# Patient Record
Sex: FEMALE | Race: WHITE | Employment: UNEMPLOYED | URBAN - METROPOLITAN AREA
[De-identification: names, ages, dates, MRNs, and addresses within clinical notes are randomized per-mention and may not be internally consistent; named-entity substitution may affect disease eponyms.]

---

## 2017-01-04 ENCOUNTER — OFFICE VISIT (OUTPATIENT)
Dept: FAMILY MEDICINE CLINIC | Age: 42
End: 2017-01-04

## 2017-01-04 VITALS
SYSTOLIC BLOOD PRESSURE: 132 MMHG | BODY MASS INDEX: 42.37 KG/M2 | WEIGHT: 196.4 LBS | TEMPERATURE: 98.2 F | DIASTOLIC BLOOD PRESSURE: 78 MMHG | HEART RATE: 81 BPM | HEIGHT: 57 IN

## 2017-01-04 DIAGNOSIS — R05.9 COUGH: ICD-10-CM

## 2017-01-04 DIAGNOSIS — J06.9 UPPER RESPIRATORY TRACT INFECTION, UNSPECIFIED TYPE: Primary | ICD-10-CM

## 2017-01-04 PROCEDURE — 99213 OFFICE O/P EST LOW 20 MIN: CPT | Performed by: NURSE PRACTITIONER

## 2017-01-04 RX ORDER — DEXTROMETHORPHAN HYDROBROMIDE AND PROMETHAZINE HYDROCHLORIDE 15; 6.25 MG/5ML; MG/5ML
5 SYRUP ORAL 4 TIMES DAILY PRN
Qty: 118 ML | Refills: 0 | Status: SHIPPED | OUTPATIENT
Start: 2017-01-04 | End: 2017-01-11

## 2017-01-04 ASSESSMENT — ENCOUNTER SYMPTOMS
DIARRHEA: 0
SORE THROAT: 1
CONSTIPATION: 0
SWOLLEN GLANDS: 0
VISUAL CHANGE: 0
COUGH: 1
RHINORRHEA: 1
WHEEZING: 0
BLOOD IN STOOL: 0
NAUSEA: 0
ABDOMINAL DISTENTION: 0
ANAL BLEEDING: 0
CHEST TIGHTNESS: 0
SINUS PRESSURE: 0
VOMITING: 0
ABDOMINAL PAIN: 0
CHANGE IN BOWEL HABIT: 0
SHORTNESS OF BREATH: 0

## 2017-03-19 DIAGNOSIS — R09.89 LABILE BLOOD PRESSURE: ICD-10-CM

## 2017-03-19 RX ORDER — LISINOPRIL 10 MG/1
TABLET ORAL
Qty: 30 TABLET | Refills: 0 | Status: SHIPPED | OUTPATIENT
Start: 2017-03-19 | End: 2017-04-19 | Stop reason: SDUPTHER

## 2017-03-31 ENCOUNTER — HOSPITAL ENCOUNTER (OUTPATIENT)
Age: 42
Setting detail: SPECIMEN
Discharge: HOME | End: 2017-03-31
Payer: COMMERCIAL

## 2017-03-31 ENCOUNTER — OFFICE VISIT (OUTPATIENT)
Dept: FAMILY MEDICINE CLINIC | Age: 42
End: 2017-03-31

## 2017-03-31 VITALS
OXYGEN SATURATION: 97 % | HEIGHT: 57 IN | HEART RATE: 84 BPM | DIASTOLIC BLOOD PRESSURE: 78 MMHG | SYSTOLIC BLOOD PRESSURE: 126 MMHG | WEIGHT: 195 LBS | BODY MASS INDEX: 42.07 KG/M2 | TEMPERATURE: 98.3 F | RESPIRATION RATE: 16 BRPM

## 2017-03-31 DIAGNOSIS — B37.9 YEAST INFECTION: ICD-10-CM

## 2017-03-31 DIAGNOSIS — N39.0 URINARY TRACT INFECTION WITH HEMATURIA, SITE UNSPECIFIED: ICD-10-CM

## 2017-03-31 DIAGNOSIS — R31.9 URINARY TRACT INFECTION WITH HEMATURIA, SITE UNSPECIFIED: ICD-10-CM

## 2017-03-31 DIAGNOSIS — R30.0 DYSURIA: Primary | ICD-10-CM

## 2017-03-31 LAB
BILIRUBIN, POC: ABNORMAL
BLOOD URINE, POC: ABNORMAL
CLARITY, POC: ABNORMAL
COLOR, POC: ABNORMAL
GLUCOSE URINE, POC: 250
KETONES, POC: ABNORMAL
LEUKOCYTE EST, POC: ABNORMAL
NITRITE, POC: ABNORMAL
PH, POC: 5
PROTEIN, POC: 30
SPECIFIC GRAVITY, POC: 1.03
UROBILINOGEN, POC: 2

## 2017-03-31 PROCEDURE — 87086 URINE CULTURE/COLONY COUNT: CPT

## 2017-03-31 PROCEDURE — 99213 OFFICE O/P EST LOW 20 MIN: CPT | Performed by: NURSE PRACTITIONER

## 2017-03-31 PROCEDURE — 81003 URINALYSIS AUTO W/O SCOPE: CPT | Performed by: NURSE PRACTITIONER

## 2017-03-31 RX ORDER — NYSTATIN 100000 [USP'U]/G
POWDER TOPICAL
Qty: 1 BOTTLE | Refills: 2 | Status: SHIPPED | OUTPATIENT
Start: 2017-03-31 | End: 2019-01-03 | Stop reason: ALTCHOICE

## 2017-03-31 RX ORDER — FLUCONAZOLE 150 MG/1
TABLET ORAL
Qty: 2 TABLET | Refills: 0 | Status: SHIPPED | OUTPATIENT
Start: 2017-03-31 | End: 2017-06-23 | Stop reason: CLARIF

## 2017-03-31 RX ORDER — PHENAZOPYRIDINE HYDROCHLORIDE 200 MG/1
200 TABLET, FILM COATED ORAL 3 TIMES DAILY PRN
Qty: 30 TABLET | Refills: 0 | Status: SHIPPED | OUTPATIENT
Start: 2017-03-31 | End: 2017-05-18 | Stop reason: CLARIF

## 2017-03-31 RX ORDER — SULFAMETHOXAZOLE AND TRIMETHOPRIM 800; 160 MG/1; MG/1
1 TABLET ORAL 2 TIMES DAILY
Qty: 14 TABLET | Refills: 0 | Status: SHIPPED | OUTPATIENT
Start: 2017-03-31 | End: 2017-04-07

## 2017-04-02 LAB — URINE CULTURE, ROUTINE: NORMAL

## 2017-04-19 DIAGNOSIS — R09.89 LABILE BLOOD PRESSURE: ICD-10-CM

## 2017-04-19 RX ORDER — LISINOPRIL 10 MG/1
10 TABLET ORAL DAILY
Qty: 30 TABLET | Refills: 3 | Status: SHIPPED | OUTPATIENT
Start: 2017-04-19 | End: 2017-05-18 | Stop reason: SDUPTHER

## 2017-04-20 DIAGNOSIS — G47.09 OTHER INSOMNIA: ICD-10-CM

## 2017-04-21 RX ORDER — ZOLPIDEM TARTRATE 10 MG/1
TABLET ORAL
Qty: 30 TABLET | Refills: 0 | Status: SHIPPED | OUTPATIENT
Start: 2017-04-21 | End: 2017-05-18 | Stop reason: SDUPTHER

## 2017-05-18 ENCOUNTER — HOSPITAL ENCOUNTER (OUTPATIENT)
Age: 42
Setting detail: SPECIMEN
Discharge: HOME | End: 2017-05-18
Payer: COMMERCIAL

## 2017-05-18 ENCOUNTER — OFFICE VISIT (OUTPATIENT)
Dept: FAMILY MEDICINE CLINIC | Age: 42
End: 2017-05-18

## 2017-05-18 VITALS
HEIGHT: 60 IN | BODY MASS INDEX: 38.09 KG/M2 | TEMPERATURE: 98.6 F | WEIGHT: 194 LBS | HEART RATE: 89 BPM | RESPIRATION RATE: 18 BRPM | SYSTOLIC BLOOD PRESSURE: 108 MMHG | OXYGEN SATURATION: 98 % | DIASTOLIC BLOOD PRESSURE: 64 MMHG

## 2017-05-18 DIAGNOSIS — Z79.899 HIGH RISK MEDICATION USE: ICD-10-CM

## 2017-05-18 DIAGNOSIS — F33.1 MODERATE EPISODE OF RECURRENT MAJOR DEPRESSIVE DISORDER (HCC): Primary | ICD-10-CM

## 2017-05-18 DIAGNOSIS — F41.9 ANXIETY: ICD-10-CM

## 2017-05-18 DIAGNOSIS — I10 ESSENTIAL HYPERTENSION: ICD-10-CM

## 2017-05-18 DIAGNOSIS — Z11.4 SCREENING FOR HIV (HUMAN IMMUNODEFICIENCY VIRUS): ICD-10-CM

## 2017-05-18 DIAGNOSIS — H04.123 CHRONICALLY DRY EYES, BILATERAL: ICD-10-CM

## 2017-05-18 DIAGNOSIS — G47.09 OTHER INSOMNIA: ICD-10-CM

## 2017-05-18 PROCEDURE — 80307 DRUG TEST PRSMV CHEM ANLYZR: CPT

## 2017-05-18 PROCEDURE — 99213 OFFICE O/P EST LOW 20 MIN: CPT | Performed by: NURSE PRACTITIONER

## 2017-05-18 RX ORDER — ZOLPIDEM TARTRATE 10 MG/1
TABLET ORAL
Qty: 30 TABLET | Refills: 2 | Status: SHIPPED | OUTPATIENT
Start: 2017-05-18 | End: 2017-06-23 | Stop reason: SDUPTHER

## 2017-05-18 RX ORDER — ALPRAZOLAM 2 MG/1
TABLET ORAL
Qty: 90 TABLET | Refills: 2 | Status: SHIPPED | OUTPATIENT
Start: 2017-05-18 | End: 2017-06-23 | Stop reason: SDUPTHER

## 2017-05-18 RX ORDER — CYCLOSPORINE 0.5 MG/ML
1 EMULSION OPHTHALMIC 2 TIMES DAILY
Qty: 1 EACH | Refills: 2 | Status: SHIPPED | OUTPATIENT
Start: 2017-05-18 | End: 2017-08-10 | Stop reason: ALTCHOICE

## 2017-05-18 RX ORDER — LISINOPRIL 10 MG/1
10 TABLET ORAL DAILY
Qty: 30 TABLET | Refills: 3 | Status: SHIPPED | OUTPATIENT
Start: 2017-05-18 | End: 2017-06-23 | Stop reason: SDUPTHER

## 2017-05-18 RX ORDER — ESCITALOPRAM OXALATE 10 MG/1
10 TABLET ORAL DAILY
Qty: 30 TABLET | Refills: 3 | Status: SHIPPED | OUTPATIENT
Start: 2017-05-18 | End: 2017-06-23 | Stop reason: SDUPTHER

## 2017-05-18 ASSESSMENT — PATIENT HEALTH QUESTIONNAIRE - PHQ9
1. LITTLE INTEREST OR PLEASURE IN DOING THINGS: 0
2. FEELING DOWN, DEPRESSED OR HOPELESS: 0
SUM OF ALL RESPONSES TO PHQ QUESTIONS 1-9: 0
SUM OF ALL RESPONSES TO PHQ9 QUESTIONS 1 & 2: 0

## 2017-05-19 LAB
ALCOHOL URINE: NEGATIVE MG/DL
AMPHETAMINE SCREEN, URINE: POSITIVE NG/ML
BARBITURATE SCREEN URINE: NEGATIVE NG/ML
BENZODIAZEPINE SCREEN, URINE: NEGATIVE NG/ML
CANNABINOID SCREEN URINE: NEGATIVE NG/ML
COCAINE METABOLITE SCREEN URINE: NEGATIVE NG/ML
CREATININE URINE: 247.9 MG/DL (ref 20–400)
Lab: NORMAL
MDMA URINE: POSITIVE NG/ML
METHADONE SCREEN, URINE: NEGATIVE NG/ML
OPIATE SCREEN URINE: NEGATIVE NG/ML
OXYCODONE SCREEN URINE: NEGATIVE NG/ML
PHENCYCLIDINE SCREEN URINE: NEGATIVE NG/ML
PROPOXYPHENE SCREEN: NEGATIVE NG/ML

## 2017-06-02 DIAGNOSIS — F33.3 SEVERE RECURRENT MAJOR DEPRESSIVE DISORDER WITH PSYCHOTIC FEATURES (HCC): ICD-10-CM

## 2017-06-02 RX ORDER — BUPROPION HYDROCHLORIDE 200 MG/1
TABLET, EXTENDED RELEASE ORAL
Qty: 60 TABLET | Refills: 0 | Status: SHIPPED | OUTPATIENT
Start: 2017-06-02 | End: 2017-06-23 | Stop reason: SDUPTHER

## 2017-06-23 ENCOUNTER — OFFICE VISIT (OUTPATIENT)
Dept: FAMILY MEDICINE CLINIC | Age: 42
End: 2017-06-23

## 2017-06-23 ENCOUNTER — HOSPITAL ENCOUNTER (OUTPATIENT)
Age: 42
Setting detail: SPECIMEN
Discharge: HOME | End: 2017-06-23
Payer: COMMERCIAL

## 2017-06-23 VITALS
DIASTOLIC BLOOD PRESSURE: 68 MMHG | BODY MASS INDEX: 38.09 KG/M2 | OXYGEN SATURATION: 95 % | SYSTOLIC BLOOD PRESSURE: 108 MMHG | HEART RATE: 93 BPM | WEIGHT: 194 LBS | TEMPERATURE: 97.8 F | HEIGHT: 60 IN

## 2017-06-23 DIAGNOSIS — G47.09 OTHER INSOMNIA: ICD-10-CM

## 2017-06-23 DIAGNOSIS — F33.1 MODERATE EPISODE OF RECURRENT MAJOR DEPRESSIVE DISORDER (HCC): ICD-10-CM

## 2017-06-23 DIAGNOSIS — R30.0 DYSURIA: ICD-10-CM

## 2017-06-23 DIAGNOSIS — Z79.899 HIGH RISK MEDICATION USE: ICD-10-CM

## 2017-06-23 DIAGNOSIS — F41.9 ANXIETY: ICD-10-CM

## 2017-06-23 DIAGNOSIS — B37.9 YEAST INFECTION: ICD-10-CM

## 2017-06-23 DIAGNOSIS — I10 ESSENTIAL HYPERTENSION: ICD-10-CM

## 2017-06-23 DIAGNOSIS — F33.3 SEVERE RECURRENT MAJOR DEPRESSIVE DISORDER WITH PSYCHOTIC FEATURES (HCC): Primary | ICD-10-CM

## 2017-06-23 DIAGNOSIS — H04.123 DRY EYES: ICD-10-CM

## 2017-06-23 LAB
BILIRUBIN, POC: NEGATIVE
BLOOD URINE, POC: 10
CLARITY, POC: NORMAL
COLOR, POC: YELLOW
GLUCOSE URINE, POC: NEGATIVE
KETONES, POC: NEGATIVE
LEUKOCYTE EST, POC: NEGATIVE
NITRITE, POC: NEGATIVE
PH, POC: 6
PROTEIN, POC: NEGATIVE
SPECIFIC GRAVITY, POC: 1.03
UROBILINOGEN, POC: 3.5

## 2017-06-23 PROCEDURE — 80307 DRUG TEST PRSMV CHEM ANLYZR: CPT

## 2017-06-23 PROCEDURE — 87086 URINE CULTURE/COLONY COUNT: CPT

## 2017-06-23 PROCEDURE — 81003 URINALYSIS AUTO W/O SCOPE: CPT | Performed by: NURSE PRACTITIONER

## 2017-06-23 PROCEDURE — 99214 OFFICE O/P EST MOD 30 MIN: CPT | Performed by: NURSE PRACTITIONER

## 2017-06-23 RX ORDER — CROMOLYN SODIUM 40 MG/ML
1 SOLUTION/ DROPS OPHTHALMIC 4 TIMES DAILY
Qty: 1 BOTTLE | Refills: 1 | Status: SHIPPED | OUTPATIENT
Start: 2017-06-23 | End: 2017-08-10 | Stop reason: ALTCHOICE

## 2017-06-23 RX ORDER — ZOLPIDEM TARTRATE 10 MG/1
TABLET ORAL
Qty: 30 TABLET | Refills: 0 | Status: SHIPPED | OUTPATIENT
Start: 2017-06-23 | End: 2017-10-04 | Stop reason: SDUPTHER

## 2017-06-23 RX ORDER — BUPROPION HYDROCHLORIDE 200 MG/1
TABLET, EXTENDED RELEASE ORAL
Qty: 60 TABLET | Refills: 3 | Status: SHIPPED | OUTPATIENT
Start: 2017-06-23 | End: 2017-09-18 | Stop reason: SDUPTHER

## 2017-06-23 RX ORDER — FLUCONAZOLE 150 MG/1
TABLET ORAL
Qty: 7 TABLET | Refills: 0 | Status: SHIPPED | OUTPATIENT
Start: 2017-06-23 | End: 2017-08-10 | Stop reason: ALTCHOICE

## 2017-06-23 RX ORDER — LISINOPRIL 10 MG/1
10 TABLET ORAL DAILY
Qty: 30 TABLET | Refills: 5 | Status: SHIPPED | OUTPATIENT
Start: 2017-06-23 | End: 2018-02-01 | Stop reason: SDUPTHER

## 2017-06-23 RX ORDER — ALPRAZOLAM 2 MG/1
TABLET ORAL
Qty: 60 TABLET | Refills: 0 | Status: SHIPPED | OUTPATIENT
Start: 2017-06-23 | End: 2018-03-01 | Stop reason: SDUPTHER

## 2017-06-23 RX ORDER — TIZANIDINE 4 MG/1
4 TABLET ORAL 2 TIMES DAILY PRN
Qty: 60 TABLET | Refills: 3 | Status: SHIPPED | OUTPATIENT
Start: 2017-06-23 | End: 2018-03-01 | Stop reason: SDUPTHER

## 2017-06-23 RX ORDER — ESCITALOPRAM OXALATE 20 MG/1
20 TABLET ORAL DAILY
Qty: 30 TABLET | Refills: 1 | Status: SHIPPED | OUTPATIENT
Start: 2017-06-23 | End: 2017-09-18 | Stop reason: SDUPTHER

## 2017-06-23 RX ORDER — NYSTATIN 100000 [USP'U]/G
POWDER TOPICAL
Qty: 1 BOTTLE | Status: CANCELLED | OUTPATIENT
Start: 2017-06-23

## 2017-06-23 ASSESSMENT — PATIENT HEALTH QUESTIONNAIRE - PHQ9
SUM OF ALL RESPONSES TO PHQ9 QUESTIONS 1 & 2: 0
SUM OF ALL RESPONSES TO PHQ QUESTIONS 1-9: 0
1. LITTLE INTEREST OR PLEASURE IN DOING THINGS: 0
2. FEELING DOWN, DEPRESSED OR HOPELESS: 0

## 2017-06-25 LAB
ALCOHOL URINE: NEGATIVE MG/DL
AMPHETAMINE SCREEN, URINE: POSITIVE NG/ML
BARBITURATE SCREEN URINE: NEGATIVE NG/ML
BENZODIAZEPINE SCREEN, URINE: NEGATIVE NG/ML
CANNABINOID SCREEN URINE: POSITIVE NG/ML
COCAINE METABOLITE SCREEN URINE: NEGATIVE NG/ML
CREATININE URINE: 212.3 MG/DL (ref 20–400)
Lab: NORMAL
MDMA URINE: POSITIVE NG/ML
METHADONE SCREEN, URINE: NEGATIVE NG/ML
OPIATE SCREEN URINE: NEGATIVE NG/ML
OXYCODONE SCREEN URINE: NEGATIVE NG/ML
PHENCYCLIDINE SCREEN URINE: NEGATIVE NG/ML
PROPOXYPHENE SCREEN: NEGATIVE NG/ML
URINE CULTURE, ROUTINE: NORMAL

## 2017-08-10 ENCOUNTER — OFFICE VISIT (OUTPATIENT)
Dept: FAMILY MEDICINE CLINIC | Age: 42
End: 2017-08-10

## 2017-08-10 VITALS
HEART RATE: 95 BPM | DIASTOLIC BLOOD PRESSURE: 70 MMHG | OXYGEN SATURATION: 99 % | RESPIRATION RATE: 18 BRPM | BODY MASS INDEX: 39.66 KG/M2 | SYSTOLIC BLOOD PRESSURE: 120 MMHG | WEIGHT: 202 LBS | TEMPERATURE: 98.2 F | HEIGHT: 60 IN

## 2017-08-10 DIAGNOSIS — F43.10 PTSD (POST-TRAUMATIC STRESS DISORDER): Primary | ICD-10-CM

## 2017-08-10 DIAGNOSIS — F32.A ANXIETY AND DEPRESSION: ICD-10-CM

## 2017-08-10 DIAGNOSIS — F41.9 ANXIETY AND DEPRESSION: ICD-10-CM

## 2017-08-10 DIAGNOSIS — B37.9 YEAST INFECTION: ICD-10-CM

## 2017-08-10 PROCEDURE — 99213 OFFICE O/P EST LOW 20 MIN: CPT | Performed by: NURSE PRACTITIONER

## 2017-08-10 RX ORDER — FLUCONAZOLE 150 MG/1
150 TABLET ORAL DAILY
Qty: 30 TABLET | Refills: 1 | Status: SHIPPED | OUTPATIENT
Start: 2017-08-10 | End: 2018-03-01 | Stop reason: SDUPTHER

## 2017-08-10 RX ORDER — NYSTATIN 100000 U/G
CREAM TOPICAL
Qty: 30 G | Refills: 1 | Status: SHIPPED | OUTPATIENT
Start: 2017-08-10 | End: 2018-03-01 | Stop reason: SDUPTHER

## 2017-08-10 RX ORDER — TOPIRAMATE 100 MG/1
100 TABLET, FILM COATED ORAL NIGHTLY
Qty: 30 TABLET | Refills: 3 | Status: SHIPPED | OUTPATIENT
Start: 2017-08-10 | End: 2017-10-04 | Stop reason: SDUPTHER

## 2017-08-10 ASSESSMENT — PATIENT HEALTH QUESTIONNAIRE - PHQ9
SUM OF ALL RESPONSES TO PHQ9 QUESTIONS 1 & 2: 2
SUM OF ALL RESPONSES TO PHQ QUESTIONS 1-9: 2
2. FEELING DOWN, DEPRESSED OR HOPELESS: 1
1. LITTLE INTEREST OR PLEASURE IN DOING THINGS: 1

## 2017-09-18 ENCOUNTER — OFFICE VISIT (OUTPATIENT)
Dept: FAMILY MEDICINE CLINIC | Age: 42
End: 2017-09-18

## 2017-09-18 VITALS
SYSTOLIC BLOOD PRESSURE: 108 MMHG | RESPIRATION RATE: 18 BRPM | DIASTOLIC BLOOD PRESSURE: 60 MMHG | TEMPERATURE: 98.5 F | WEIGHT: 203 LBS | OXYGEN SATURATION: 99 % | HEIGHT: 60 IN | HEART RATE: 84 BPM | BODY MASS INDEX: 39.85 KG/M2

## 2017-09-18 DIAGNOSIS — Z23 NEED FOR TETANUS BOOSTER: ICD-10-CM

## 2017-09-18 DIAGNOSIS — Z23 NEED FOR INFLUENZA VACCINATION: ICD-10-CM

## 2017-09-18 DIAGNOSIS — F33.3 SEVERE RECURRENT MAJOR DEPRESSIVE DISORDER WITH PSYCHOTIC FEATURES (HCC): ICD-10-CM

## 2017-09-18 DIAGNOSIS — R00.2 HEART PALPITATIONS: ICD-10-CM

## 2017-09-18 DIAGNOSIS — F33.1 MODERATE EPISODE OF RECURRENT MAJOR DEPRESSIVE DISORDER (HCC): Primary | ICD-10-CM

## 2017-09-18 DIAGNOSIS — F41.9 ANXIETY: ICD-10-CM

## 2017-09-18 PROCEDURE — 99213 OFFICE O/P EST LOW 20 MIN: CPT | Performed by: NURSE PRACTITIONER

## 2017-09-18 PROCEDURE — 90471 IMMUNIZATION ADMIN: CPT | Performed by: NURSE PRACTITIONER

## 2017-09-18 PROCEDURE — 90715 TDAP VACCINE 7 YRS/> IM: CPT | Performed by: NURSE PRACTITIONER

## 2017-09-18 PROCEDURE — 90688 IIV4 VACCINE SPLT 0.5 ML IM: CPT | Performed by: NURSE PRACTITIONER

## 2017-09-18 PROCEDURE — 90472 IMMUNIZATION ADMIN EACH ADD: CPT | Performed by: NURSE PRACTITIONER

## 2017-09-18 RX ORDER — BUPROPION HYDROCHLORIDE 200 MG/1
TABLET, EXTENDED RELEASE ORAL
Qty: 60 TABLET | Refills: 3 | Status: SHIPPED | OUTPATIENT
Start: 2017-09-18 | End: 2018-02-12 | Stop reason: SDUPTHER

## 2017-09-18 RX ORDER — ESCITALOPRAM OXALATE 20 MG/1
20 TABLET ORAL DAILY
Qty: 30 TABLET | Refills: 3 | Status: SHIPPED | OUTPATIENT
Start: 2017-09-18 | End: 2017-10-04 | Stop reason: SDUPTHER

## 2017-09-18 ASSESSMENT — PATIENT HEALTH QUESTIONNAIRE - PHQ9
2. FEELING DOWN, DEPRESSED OR HOPELESS: 0
SUM OF ALL RESPONSES TO PHQ9 QUESTIONS 1 & 2: 0
1. LITTLE INTEREST OR PLEASURE IN DOING THINGS: 0
SUM OF ALL RESPONSES TO PHQ QUESTIONS 1-9: 0

## 2017-09-21 ENCOUNTER — OFFICE VISIT (OUTPATIENT)
Dept: FAMILY MEDICINE CLINIC | Age: 42
End: 2017-09-21

## 2017-09-21 ENCOUNTER — TELEPHONE (OUTPATIENT)
Dept: FAMILY MEDICINE CLINIC | Age: 42
End: 2017-09-21

## 2017-09-21 ENCOUNTER — HOSPITAL ENCOUNTER (OUTPATIENT)
Dept: NON INVASIVE DIAGNOSTICS | Age: 42
Discharge: HOME | End: 2017-09-21
Payer: COMMERCIAL

## 2017-09-21 VITALS
TEMPERATURE: 97.9 F | BODY MASS INDEX: 39.73 KG/M2 | WEIGHT: 202.38 LBS | HEIGHT: 60 IN | DIASTOLIC BLOOD PRESSURE: 78 MMHG | SYSTOLIC BLOOD PRESSURE: 124 MMHG | OXYGEN SATURATION: 98 % | HEART RATE: 84 BPM | RESPIRATION RATE: 14 BRPM

## 2017-09-21 DIAGNOSIS — J20.9 ACUTE BRONCHITIS, UNSPECIFIED ORGANISM: Primary | ICD-10-CM

## 2017-09-21 DIAGNOSIS — R00.2 HEART PALPITATIONS: ICD-10-CM

## 2017-09-21 PROCEDURE — 99213 OFFICE O/P EST LOW 20 MIN: CPT | Performed by: PHYSICIAN ASSISTANT

## 2017-09-21 PROCEDURE — 93225 XTRNL ECG REC<48 HRS REC: CPT

## 2017-09-21 PROCEDURE — 93226 XTRNL ECG REC<48 HR SCAN A/R: CPT

## 2017-09-21 RX ORDER — AZITHROMYCIN 250 MG/1
TABLET, FILM COATED ORAL
Qty: 1 PACKET | Refills: 0 | Status: SHIPPED | OUTPATIENT
Start: 2017-09-21 | End: 2017-10-01

## 2017-09-21 RX ORDER — PREDNISONE 20 MG/1
20 TABLET ORAL DAILY
Qty: 5 TABLET | Refills: 0 | Status: SHIPPED | OUTPATIENT
Start: 2017-09-21 | End: 2018-02-01 | Stop reason: SDUPTHER

## 2017-09-21 RX ORDER — BENZONATATE 100 MG/1
100 CAPSULE ORAL 3 TIMES DAILY PRN
Qty: 30 CAPSULE | Refills: 0 | Status: SHIPPED | OUTPATIENT
Start: 2017-09-21 | End: 2018-02-01 | Stop reason: SDUPTHER

## 2017-09-21 ASSESSMENT — ENCOUNTER SYMPTOMS
SINUS PAIN: 0
COUGH: 1
NAUSEA: 0
RHINORRHEA: 1
SORE THROAT: 1

## 2017-10-04 DIAGNOSIS — F41.9 ANXIETY AND DEPRESSION: ICD-10-CM

## 2017-10-04 DIAGNOSIS — F33.1 MODERATE EPISODE OF RECURRENT MAJOR DEPRESSIVE DISORDER (HCC): ICD-10-CM

## 2017-10-04 DIAGNOSIS — F43.10 PTSD (POST-TRAUMATIC STRESS DISORDER): ICD-10-CM

## 2017-10-04 DIAGNOSIS — F32.A ANXIETY AND DEPRESSION: ICD-10-CM

## 2017-10-04 DIAGNOSIS — F41.9 ANXIETY: ICD-10-CM

## 2017-10-05 RX ORDER — ZOLPIDEM TARTRATE 10 MG/1
TABLET ORAL
Qty: 30 TABLET | Refills: 0 | Status: SHIPPED | OUTPATIENT
Start: 2017-10-05 | End: 2017-11-15 | Stop reason: SDUPTHER

## 2017-10-05 RX ORDER — ESCITALOPRAM OXALATE 20 MG/1
20 TABLET ORAL DAILY
Qty: 30 TABLET | Refills: 3 | Status: SHIPPED | OUTPATIENT
Start: 2017-10-05 | End: 2018-02-10 | Stop reason: SDUPTHER

## 2017-10-05 RX ORDER — TOPIRAMATE 100 MG/1
100 TABLET, FILM COATED ORAL NIGHTLY
Qty: 30 TABLET | Refills: 3 | Status: SHIPPED | OUTPATIENT
Start: 2017-10-05 | End: 2018-04-29 | Stop reason: SDUPTHER

## 2017-11-16 RX ORDER — ZOLPIDEM TARTRATE 10 MG/1
TABLET ORAL
Qty: 30 TABLET | Refills: 0 | Status: SHIPPED | OUTPATIENT
Start: 2017-11-16 | End: 2017-12-18 | Stop reason: SDUPTHER

## 2017-12-18 RX ORDER — ZOLPIDEM TARTRATE 10 MG/1
TABLET ORAL
Qty: 30 TABLET | Refills: 0 | Status: SHIPPED | OUTPATIENT
Start: 2017-12-18 | End: 2018-01-22 | Stop reason: SDUPTHER

## 2017-12-19 ENCOUNTER — HOSPITAL ENCOUNTER (OUTPATIENT)
Dept: LAB | Age: 42
Discharge: HOME | End: 2017-12-19
Payer: COMMERCIAL

## 2017-12-19 ENCOUNTER — HOSPITAL ENCOUNTER (OUTPATIENT)
Dept: GENERAL RADIOLOGY | Age: 42
Discharge: HOME | End: 2017-12-19
Payer: COMMERCIAL

## 2017-12-19 ENCOUNTER — HOSPITAL ENCOUNTER (OUTPATIENT)
Age: 42
Discharge: HOME | End: 2017-12-19
Payer: COMMERCIAL

## 2017-12-19 ENCOUNTER — OFFICE VISIT (OUTPATIENT)
Dept: FAMILY MEDICINE CLINIC | Age: 42
End: 2017-12-19

## 2017-12-19 VITALS
DIASTOLIC BLOOD PRESSURE: 60 MMHG | TEMPERATURE: 97.8 F | HEIGHT: 60 IN | SYSTOLIC BLOOD PRESSURE: 104 MMHG | HEART RATE: 76 BPM | RESPIRATION RATE: 12 BRPM | BODY MASS INDEX: 40.64 KG/M2 | OXYGEN SATURATION: 98 % | WEIGHT: 207 LBS

## 2017-12-19 DIAGNOSIS — G89.29 CHRONIC LEFT SHOULDER PAIN: ICD-10-CM

## 2017-12-19 DIAGNOSIS — R20.0 LEFT ARM NUMBNESS: ICD-10-CM

## 2017-12-19 DIAGNOSIS — M79.622 LEFT UPPER ARM PAIN: ICD-10-CM

## 2017-12-19 DIAGNOSIS — G89.29 CHRONIC LEFT SHOULDER PAIN: Primary | ICD-10-CM

## 2017-12-19 DIAGNOSIS — I10 ESSENTIAL HYPERTENSION: ICD-10-CM

## 2017-12-19 DIAGNOSIS — M25.512 CHRONIC LEFT SHOULDER PAIN: ICD-10-CM

## 2017-12-19 DIAGNOSIS — M25.512 CHRONIC LEFT SHOULDER PAIN: Primary | ICD-10-CM

## 2017-12-19 DIAGNOSIS — Z11.4 SCREENING FOR HIV (HUMAN IMMUNODEFICIENCY VIRUS): ICD-10-CM

## 2017-12-19 DIAGNOSIS — K21.9 GASTROESOPHAGEAL REFLUX DISEASE WITHOUT ESOPHAGITIS: ICD-10-CM

## 2017-12-19 LAB
ALBUMIN SERPL-MCNC: 3.9 G/DL (ref 3.9–4.9)
ALP BLD-CCNC: 64 U/L (ref 40–130)
ALT SERPL-CCNC: 11 U/L (ref 0–33)
ANION GAP SERPL CALCULATED.3IONS-SCNC: 12 MEQ/L (ref 7–13)
AST SERPL-CCNC: 9 U/L (ref 0–35)
BASOPHILS ABSOLUTE: 0 K/UL (ref 0–0.2)
BASOPHILS RELATIVE PERCENT: 0.3 %
BILIRUB SERPL-MCNC: 0.1 MG/DL (ref 0–1.2)
BUN BLDV-MCNC: 11 MG/DL (ref 6–20)
CALCIUM SERPL-MCNC: 8.5 MG/DL (ref 8.6–10.2)
CHLORIDE BLD-SCNC: 104 MEQ/L (ref 98–107)
CHOLESTEROL, TOTAL: 210 MG/DL (ref 0–199)
CO2: 24 MEQ/L (ref 22–29)
CREAT SERPL-MCNC: 0.85 MG/DL (ref 0.5–0.9)
EOSINOPHILS ABSOLUTE: 0.1 K/UL (ref 0–0.7)
EOSINOPHILS RELATIVE PERCENT: 1.5 %
GFR AFRICAN AMERICAN: >60
GFR NON-AFRICAN AMERICAN: >60
GLOBULIN: 2.6 G/DL (ref 2.3–3.5)
GLUCOSE BLD-MCNC: 86 MG/DL (ref 74–109)
HCT VFR BLD CALC: 41 % (ref 37–47)
HDLC SERPL-MCNC: 43 MG/DL (ref 40–59)
HEMOGLOBIN: 13.3 G/DL (ref 12–16)
LDL CHOLESTEROL CALCULATED: 108 MG/DL (ref 0–129)
LYMPHOCYTES ABSOLUTE: 3 K/UL (ref 1–4.8)
LYMPHOCYTES RELATIVE PERCENT: 32.8 %
MCH RBC QN AUTO: 30.4 PG (ref 27–31.3)
MCHC RBC AUTO-ENTMCNC: 32.6 % (ref 33–37)
MCV RBC AUTO: 93.2 FL (ref 82–100)
MONOCYTES ABSOLUTE: 0.9 K/UL (ref 0.2–0.8)
MONOCYTES RELATIVE PERCENT: 9.3 %
NEUTROPHILS ABSOLUTE: 5.2 K/UL (ref 1.4–6.5)
NEUTROPHILS RELATIVE PERCENT: 56.1 %
PDW BLD-RTO: 14.3 % (ref 11.5–14.5)
PLATELET # BLD: 287 K/UL (ref 130–400)
POTASSIUM SERPL-SCNC: 4 MEQ/L (ref 3.5–5.1)
RBC # BLD: 4.4 M/UL (ref 4.2–5.4)
SODIUM BLD-SCNC: 140 MEQ/L (ref 132–144)
TOTAL PROTEIN: 6.5 G/DL (ref 6.4–8.1)
TRIGL SERPL-MCNC: 295 MG/DL (ref 0–200)
WBC # BLD: 9.2 K/UL (ref 4.8–10.8)

## 2017-12-19 PROCEDURE — 96372 THER/PROPH/DIAG INJ SC/IM: CPT | Performed by: NURSE PRACTITIONER

## 2017-12-19 PROCEDURE — 80053 COMPREHEN METABOLIC PANEL: CPT

## 2017-12-19 PROCEDURE — 80061 LIPID PANEL: CPT

## 2017-12-19 PROCEDURE — 73030 X-RAY EXAM OF SHOULDER: CPT

## 2017-12-19 PROCEDURE — 85025 COMPLETE CBC W/AUTO DIFF WBC: CPT

## 2017-12-19 PROCEDURE — 36415 COLL VENOUS BLD VENIPUNCTURE: CPT

## 2017-12-19 PROCEDURE — 99213 OFFICE O/P EST LOW 20 MIN: CPT | Performed by: NURSE PRACTITIONER

## 2017-12-19 PROCEDURE — 73060 X-RAY EXAM OF HUMERUS: CPT

## 2017-12-19 PROCEDURE — 86703 HIV-1/HIV-2 1 RESULT ANTBDY: CPT

## 2017-12-19 RX ORDER — PREDNISONE 50 MG/1
50 TABLET ORAL DAILY
Qty: 5 TABLET | Refills: 0 | Status: SHIPPED | OUTPATIENT
Start: 2017-12-19 | End: 2017-12-24

## 2017-12-19 RX ORDER — PANTOPRAZOLE SODIUM 40 MG/1
40 TABLET, DELAYED RELEASE ORAL DAILY
Qty: 30 TABLET | Refills: 3 | Status: SHIPPED | OUTPATIENT
Start: 2017-12-19 | End: 2018-10-08 | Stop reason: SDUPTHER

## 2017-12-19 RX ORDER — HYDROCODONE BITARTRATE AND ACETAMINOPHEN 5; 325 MG/1; MG/1
1 TABLET ORAL EVERY 6 HOURS PRN
Qty: 28 TABLET | Refills: 0 | Status: SHIPPED | OUTPATIENT
Start: 2017-12-19 | End: 2017-12-26

## 2017-12-19 RX ORDER — KETOROLAC TROMETHAMINE 30 MG/ML
60 INJECTION, SOLUTION INTRAMUSCULAR; INTRAVENOUS ONCE
Status: COMPLETED | OUTPATIENT
Start: 2017-12-19 | End: 2017-12-19

## 2017-12-19 RX ORDER — TRIAMCINOLONE ACETONIDE 40 MG/ML
80 INJECTION, SUSPENSION INTRA-ARTICULAR; INTRAMUSCULAR ONCE
Status: COMPLETED | OUTPATIENT
Start: 2017-12-19 | End: 2017-12-19

## 2017-12-19 RX ORDER — TRIAMCINOLONE ACETONIDE 40 MG/ML
80 INJECTION, SUSPENSION INTRA-ARTICULAR; INTRAMUSCULAR ONCE
Status: DISCONTINUED | OUTPATIENT
Start: 2017-12-19 | End: 2017-12-19

## 2017-12-19 RX ADMIN — TRIAMCINOLONE ACETONIDE 80 MG: 40 INJECTION, SUSPENSION INTRA-ARTICULAR; INTRAMUSCULAR at 08:55

## 2017-12-19 RX ADMIN — KETOROLAC TROMETHAMINE 60 MG: 30 INJECTION, SOLUTION INTRAMUSCULAR; INTRAVENOUS at 08:55

## 2017-12-19 ASSESSMENT — PATIENT HEALTH QUESTIONNAIRE - PHQ9
1. LITTLE INTEREST OR PLEASURE IN DOING THINGS: 0
SUM OF ALL RESPONSES TO PHQ QUESTIONS 1-9: 0
SUM OF ALL RESPONSES TO PHQ9 QUESTIONS 1 & 2: 0
2. FEELING DOWN, DEPRESSED OR HOPELESS: 0

## 2017-12-19 NOTE — PROGRESS NOTES
Please notify Leobardo Ontiveros that x-ray results of the shoulder and arm are normal. Recommend MRI of the shoulder as ordered.

## 2017-12-21 LAB — HIV-1 AND HIV-2 ANTIBODIES: NEGATIVE

## 2018-01-22 RX ORDER — ZOLPIDEM TARTRATE 10 MG/1
TABLET ORAL
Qty: 30 TABLET | Refills: 0 | Status: SHIPPED | OUTPATIENT
Start: 2018-01-22 | End: 2018-02-19 | Stop reason: SDUPTHER

## 2018-02-01 ENCOUNTER — OFFICE VISIT (OUTPATIENT)
Dept: FAMILY MEDICINE CLINIC | Age: 43
End: 2018-02-01
Payer: COMMERCIAL

## 2018-02-01 VITALS
BODY MASS INDEX: 40.84 KG/M2 | WEIGHT: 208 LBS | TEMPERATURE: 97.7 F | DIASTOLIC BLOOD PRESSURE: 64 MMHG | RESPIRATION RATE: 14 BRPM | HEIGHT: 60 IN | SYSTOLIC BLOOD PRESSURE: 108 MMHG | HEART RATE: 79 BPM | OXYGEN SATURATION: 99 %

## 2018-02-01 DIAGNOSIS — J02.9 SORE THROAT: ICD-10-CM

## 2018-02-01 DIAGNOSIS — H65.02 ACUTE SEROUS OTITIS MEDIA OF LEFT EAR, RECURRENCE NOT SPECIFIED: ICD-10-CM

## 2018-02-01 DIAGNOSIS — F33.1 MODERATE EPISODE OF RECURRENT MAJOR DEPRESSIVE DISORDER (HCC): ICD-10-CM

## 2018-02-01 DIAGNOSIS — R06.2 WHEEZE: ICD-10-CM

## 2018-02-01 DIAGNOSIS — R53.83 OTHER FATIGUE: ICD-10-CM

## 2018-02-01 DIAGNOSIS — E78.1 HYPERTRIGLYCERIDEMIA: ICD-10-CM

## 2018-02-01 DIAGNOSIS — J20.9 ACUTE BRONCHITIS, UNSPECIFIED ORGANISM: ICD-10-CM

## 2018-02-01 DIAGNOSIS — I10 ESSENTIAL HYPERTENSION: Primary | ICD-10-CM

## 2018-02-01 DIAGNOSIS — H92.02 LEFT EAR PAIN: ICD-10-CM

## 2018-02-01 DIAGNOSIS — R52 BODY ACHES: ICD-10-CM

## 2018-02-01 DIAGNOSIS — R05.9 COUGH: ICD-10-CM

## 2018-02-01 LAB
INFLUENZA A ANTIBODY: NEGATIVE
INFLUENZA B ANTIBODY: NEGATIVE
S PYO AG THROAT QL: NORMAL

## 2018-02-01 PROCEDURE — 87804 INFLUENZA ASSAY W/OPTIC: CPT | Performed by: NURSE PRACTITIONER

## 2018-02-01 PROCEDURE — 99214 OFFICE O/P EST MOD 30 MIN: CPT | Performed by: NURSE PRACTITIONER

## 2018-02-01 PROCEDURE — 87880 STREP A ASSAY W/OPTIC: CPT | Performed by: NURSE PRACTITIONER

## 2018-02-01 RX ORDER — AMOXICILLIN AND CLAVULANATE POTASSIUM 875; 125 MG/1; MG/1
1 TABLET, FILM COATED ORAL EVERY 12 HOURS
Qty: 20 TABLET | Refills: 0 | Status: SHIPPED | OUTPATIENT
Start: 2018-02-01 | End: 2018-02-11

## 2018-02-01 RX ORDER — HYDROCODONE BITARTRATE AND ACETAMINOPHEN 5; 325 MG/1; MG/1
1 TABLET ORAL EVERY 4 HOURS PRN
Qty: 18 TABLET | Refills: 0 | Status: SHIPPED | OUTPATIENT
Start: 2018-02-01 | End: 2018-02-04

## 2018-02-01 RX ORDER — PREDNISONE 20 MG/1
20 TABLET ORAL DAILY
Qty: 5 TABLET | Refills: 0 | Status: SHIPPED | OUTPATIENT
Start: 2018-02-01 | End: 2018-02-06

## 2018-02-01 RX ORDER — BENZONATATE 100 MG/1
100 CAPSULE ORAL 3 TIMES DAILY PRN
Qty: 30 CAPSULE | Refills: 0 | Status: SHIPPED | OUTPATIENT
Start: 2018-02-01 | End: 2018-02-08

## 2018-02-01 RX ORDER — LISINOPRIL 10 MG/1
10 TABLET ORAL DAILY
Qty: 30 TABLET | Refills: 5 | Status: SHIPPED | OUTPATIENT
Start: 2018-02-01 | End: 2018-03-01 | Stop reason: ALTCHOICE

## 2018-02-01 ASSESSMENT — PATIENT HEALTH QUESTIONNAIRE - PHQ9
1. LITTLE INTEREST OR PLEASURE IN DOING THINGS: 0
SUM OF ALL RESPONSES TO PHQ9 QUESTIONS 1 & 2: 0
SUM OF ALL RESPONSES TO PHQ QUESTIONS 1-9: 0
2. FEELING DOWN, DEPRESSED OR HOPELESS: 0

## 2018-02-01 NOTE — PROGRESS NOTES
Dyslipidemia and Hypertension: Sukhjinder Trivedi presents for evaluation of lipids. Compliance with treatment thus far has been good. The patient exercises intermittently. Patient here for follow-up of elevated blood pressure. She is not exercising and is adherent to low salt diet. Blood pressure is well controlled at home Antihypertensive medication side effects: no medication side effects noted. Use of agents associated with hypertension: none. Depression: Sukhjinder Trivedi reports being in a good mood that is stable. The patient is not reporting insomnia, difficulty concentrating and usual interest in activities. This patient is not homicidal or suicidal.    Flu like symptoms: she has a severe cough that is worse at night. She has been bringing up green or yellow sputum. Fever and chills. She has felt exhausted. Vaginal odor:  She states that this has been going on for a while. It stinks when she urinates but also, her vaginal area smells and her underwear smells bad. She has left ear pain and sinus pressure. Lymph nodes feel swollen. She has also had a harsh cough. Bringing up some green/yellow phlegm. Hard time sleeping at night because of it. The pain in her ear is severe at times. 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 She has had fever and chills and has been feeling fatigued. Lab Results   Component Value Date    ALT 11 12/19/2017    AST 9 12/19/2017     Lab Results   Component Value Date    CHOL 210 (H) 12/19/2017    TRIG 295 (H) 12/19/2017    HDL 43 12/19/2017    LDLCALC 108 12/19/2017        PMH: The patient is not known to have coexisting coronary artery disease. ROS: This patient reports no chest pain or pressure. The patient reports no nausea or vomiting. There is no heartburn or indigestion. There is no diarrhea or constipation. No black, bloody, mucusy or tarry stool noticed. The patient reports no bloating and no change in appetite.  There is no numbness, tingling or swelling in the extremities. EXAM:  Constitutional Blood pressure 108/64, pulse 79, temperature 97.7 °F (36.5 °C), temperature source Temporal, resp. rate 14, height 4' 11.5\" (1.511 m), weight 208 lb (94.3 kg), last menstrual period 06/15/2015, SpO2 99 %, not currently breastfeeding. .  She has a normal affect, no acute distress, appears well developed and well nourished. Ears:  TM- right-  normal and left-  injected and fluid-  white  Nose/Sinuses:  Nares normal. Septum midline. Mucosa normal. No drainage or sinus tenderness. Mouth/Throat:  Mucosa moist.  No lesions. Pharynx without erythema, edema or exudate. Neck:  neck- supple, no mass, non-tender and no bruits  Lungs:  Normal expansion. Clear to auscultation. No rales, rhonchi, or wheezing., No chest wall tenderness. Harsh cough with wheeze. Heart:  Heart sounds are normal.  Regular rate and rhythm without murmur, gallop or rub. Abdomen:  Soft, non-tender, normal bowel sounds. No bruits, organomegaly or masses. Extremities: Extremities warm to touch, pink, with no edema. Rapid influenza test results: Influenza A is negative               Influenza B is negative    The rapid strep test today was Negative      DIAGNOSIS:   1. Essential hypertension  CBC Auto Differential    Comprehensive Metabolic Panel    lisinopril (PRINIVIL;ZESTRIL) 10 MG tablet   2. Hypertriglyceridemia  Lipid Panel   3. Other fatigue  TSH without Reflex    T4, Free    Vitamin D 25 Hydroxy    XR CHEST STANDARD (2 VW)   4. Moderate episode of recurrent major depressive disorder ([facility] [address]     5. Sore throat  POCT rapid strep A   6. Body aches  POCT Influenza A/B    XR CHEST STANDARD (2 VW)   7. Acute bronchitis, unspecified organism  benzonatate (TESSALON PERLES) 100 MG capsule    HYDROcodone-homatropine (HYCODAN) 5-1.5 MG/5ML syrup    predniSONE (DELTASONE) 20 MG tablet    amoxicillin-clavulanate (AUGMENTIN) 875-125 MG per tablet   8. Cough  XR CHEST STANDARD (2 VW)   9.

## 2018-02-10 DIAGNOSIS — F41.9 ANXIETY: ICD-10-CM

## 2018-02-10 DIAGNOSIS — F33.1 MODERATE EPISODE OF RECURRENT MAJOR DEPRESSIVE DISORDER (HCC): ICD-10-CM

## 2018-02-11 RX ORDER — ESCITALOPRAM OXALATE 20 MG/1
20 TABLET ORAL DAILY
Qty: 30 TABLET | Refills: 5 | Status: SHIPPED | OUTPATIENT
Start: 2018-02-11 | End: 2018-08-16 | Stop reason: SDUPTHER

## 2018-02-12 DIAGNOSIS — F33.3 SEVERE RECURRENT MAJOR DEPRESSIVE DISORDER WITH PSYCHOTIC FEATURES (HCC): ICD-10-CM

## 2018-02-12 RX ORDER — BUPROPION HYDROCHLORIDE 200 MG/1
TABLET, EXTENDED RELEASE ORAL
Qty: 60 TABLET | Refills: 0 | Status: SHIPPED | OUTPATIENT
Start: 2018-02-12 | End: 2018-04-29 | Stop reason: SDUPTHER

## 2018-02-19 RX ORDER — ZOLPIDEM TARTRATE 10 MG/1
TABLET ORAL
Qty: 30 TABLET | Refills: 0 | Status: SHIPPED | OUTPATIENT
Start: 2018-02-19 | End: 2018-03-29 | Stop reason: SDUPTHER

## 2018-03-01 ENCOUNTER — OFFICE VISIT (OUTPATIENT)
Dept: FAMILY MEDICINE CLINIC | Age: 43
End: 2018-03-01
Payer: COMMERCIAL

## 2018-03-01 ENCOUNTER — HOSPITAL ENCOUNTER (OUTPATIENT)
Age: 43
Setting detail: SPECIMEN
Discharge: HOME | End: 2018-03-01

## 2018-03-01 VITALS
BODY MASS INDEX: 40.84 KG/M2 | RESPIRATION RATE: 14 BRPM | HEART RATE: 84 BPM | WEIGHT: 208 LBS | OXYGEN SATURATION: 97 % | HEIGHT: 60 IN | DIASTOLIC BLOOD PRESSURE: 70 MMHG | TEMPERATURE: 98.7 F | SYSTOLIC BLOOD PRESSURE: 104 MMHG

## 2018-03-01 DIAGNOSIS — G44.201 INTRACTABLE TENSION-TYPE HEADACHE, UNSPECIFIED CHRONICITY PATTERN: ICD-10-CM

## 2018-03-01 DIAGNOSIS — N89.8 VAGINAL ODOR: Primary | ICD-10-CM

## 2018-03-01 DIAGNOSIS — Z12.31 ENCOUNTER FOR SCREENING MAMMOGRAM FOR BREAST CANCER: ICD-10-CM

## 2018-03-01 DIAGNOSIS — Z00.00 ROUTINE GENERAL MEDICAL EXAMINATION AT A HEALTH CARE FACILITY: ICD-10-CM

## 2018-03-01 DIAGNOSIS — N89.8 VAGINAL ODOR: ICD-10-CM

## 2018-03-01 DIAGNOSIS — I10 ESSENTIAL HYPERTENSION: ICD-10-CM

## 2018-03-01 DIAGNOSIS — F41.9 ANXIETY: ICD-10-CM

## 2018-03-01 DIAGNOSIS — B37.9 YEAST INFECTION: ICD-10-CM

## 2018-03-01 LAB
BILIRUBIN URINE: NEGATIVE
BLOOD, URINE: NEGATIVE
CLARITY: CLEAR
COLOR: YELLOW
GLUCOSE URINE: NEGATIVE MG/DL
KETONES, URINE: NEGATIVE MG/DL
LEUKOCYTE ESTERASE, URINE: NEGATIVE
NITRITE, URINE: NEGATIVE
PH UA: 7 (ref 5–9)
PROTEIN UA: NEGATIVE MG/DL
SPECIFIC GRAVITY UA: 1.02 (ref 1–1.03)
UROBILINOGEN, URINE: 0.2 E.U./DL

## 2018-03-01 PROCEDURE — 87086 URINE CULTURE/COLONY COUNT: CPT

## 2018-03-01 PROCEDURE — 88175 CYTOPATH C/V AUTO FLUID REDO: CPT

## 2018-03-01 PROCEDURE — 87070 CULTURE OTHR SPECIMN AEROBIC: CPT

## 2018-03-01 PROCEDURE — 99214 OFFICE O/P EST MOD 30 MIN: CPT | Performed by: NURSE PRACTITIONER

## 2018-03-01 PROCEDURE — 81003 URINALYSIS AUTO W/O SCOPE: CPT

## 2018-03-01 RX ORDER — TIZANIDINE 4 MG/1
4 TABLET ORAL 2 TIMES DAILY PRN
Qty: 60 TABLET | Refills: 3 | Status: SHIPPED | OUTPATIENT
Start: 2018-03-01 | End: 2019-01-03 | Stop reason: SDUPTHER

## 2018-03-01 RX ORDER — FLUCONAZOLE 150 MG/1
150 TABLET ORAL DAILY
Qty: 30 TABLET | Refills: 1 | Status: SHIPPED | OUTPATIENT
Start: 2018-03-01 | End: 2019-01-03 | Stop reason: ALTCHOICE

## 2018-03-01 RX ORDER — NYSTATIN 100000 U/G
CREAM TOPICAL
Qty: 30 G | Refills: 1 | Status: SHIPPED | OUTPATIENT
Start: 2018-03-01 | End: 2019-01-03 | Stop reason: SDUPTHER

## 2018-03-01 RX ORDER — LISINOPRIL 5 MG/1
5 TABLET ORAL DAILY
Qty: 30 TABLET | Refills: 2 | Status: SHIPPED | OUTPATIENT
Start: 2018-03-01 | End: 2018-05-29 | Stop reason: SDUPTHER

## 2018-03-01 RX ORDER — ALPRAZOLAM 2 MG/1
TABLET ORAL
Qty: 60 TABLET | Refills: 0 | Status: SHIPPED | OUTPATIENT
Start: 2018-03-01 | End: 2018-06-04 | Stop reason: SDUPTHER

## 2018-03-02 DIAGNOSIS — Z00.00 ROUTINE GENERAL MEDICAL EXAMINATION AT A HEALTH CARE FACILITY: ICD-10-CM

## 2018-03-03 LAB — URINE CULTURE, ROUTINE: NORMAL

## 2018-03-04 LAB
GENITAL CULTURE, ROUTINE: ABNORMAL
GENITAL CULTURE, ROUTINE: ABNORMAL
ORGANISM: ABNORMAL

## 2018-03-05 ENCOUNTER — TELEPHONE (OUTPATIENT)
Dept: FAMILY MEDICINE CLINIC | Age: 43
End: 2018-03-05

## 2018-03-05 RX ORDER — FLUCONAZOLE 150 MG/1
150 TABLET ORAL DAILY
Qty: 7 TABLET | Refills: 0 | Status: SHIPPED | OUTPATIENT
Start: 2018-03-05 | End: 2018-05-01 | Stop reason: SDUPTHER

## 2018-03-29 ENCOUNTER — PATIENT MESSAGE (OUTPATIENT)
Dept: FAMILY MEDICINE CLINIC | Age: 43
End: 2018-03-29

## 2018-03-29 RX ORDER — ZOLPIDEM TARTRATE 10 MG/1
TABLET ORAL
Qty: 30 TABLET | Refills: 0 | Status: SHIPPED | OUTPATIENT
Start: 2018-03-29 | End: 2018-05-01 | Stop reason: SDUPTHER

## 2018-03-29 NOTE — TELEPHONE ENCOUNTER
From: Antony Dickens  To:  Hiwot Shepherd CNP  Sent: 3/29/2018 8:14 AM EDT  Subject: Prescription Question    I need my ambin refilled I cant find it in my list to hit thank you so much

## 2018-04-29 DIAGNOSIS — F43.10 PTSD (POST-TRAUMATIC STRESS DISORDER): ICD-10-CM

## 2018-04-29 DIAGNOSIS — F41.9 ANXIETY AND DEPRESSION: ICD-10-CM

## 2018-04-29 DIAGNOSIS — F33.3 SEVERE RECURRENT MAJOR DEPRESSIVE DISORDER WITH PSYCHOTIC FEATURES (HCC): ICD-10-CM

## 2018-04-29 DIAGNOSIS — F32.A ANXIETY AND DEPRESSION: ICD-10-CM

## 2018-04-30 RX ORDER — TOPIRAMATE 100 MG/1
TABLET, FILM COATED ORAL
Qty: 30 TABLET | Refills: 0 | Status: SHIPPED | OUTPATIENT
Start: 2018-04-30 | End: 2018-05-28 | Stop reason: SDUPTHER

## 2018-04-30 RX ORDER — BUPROPION HYDROCHLORIDE 200 MG/1
TABLET, EXTENDED RELEASE ORAL
Qty: 60 TABLET | Refills: 0 | Status: SHIPPED | OUTPATIENT
Start: 2018-04-30 | End: 2018-05-28 | Stop reason: SDUPTHER

## 2018-05-01 ENCOUNTER — HOSPITAL ENCOUNTER (OUTPATIENT)
Age: 43
Setting detail: SPECIMEN
Discharge: HOME | End: 2018-05-01

## 2018-05-01 ENCOUNTER — OFFICE VISIT (OUTPATIENT)
Dept: FAMILY MEDICINE CLINIC | Age: 43
End: 2018-05-01
Payer: COMMERCIAL

## 2018-05-01 VITALS
HEIGHT: 60 IN | TEMPERATURE: 98.8 F | SYSTOLIC BLOOD PRESSURE: 110 MMHG | RESPIRATION RATE: 16 BRPM | DIASTOLIC BLOOD PRESSURE: 78 MMHG | BODY MASS INDEX: 41.43 KG/M2 | WEIGHT: 211 LBS | HEART RATE: 87 BPM | OXYGEN SATURATION: 98 %

## 2018-05-01 DIAGNOSIS — G47.00 INSOMNIA, UNSPECIFIED TYPE: ICD-10-CM

## 2018-05-01 DIAGNOSIS — F43.0 STRESS REACTION: ICD-10-CM

## 2018-05-01 DIAGNOSIS — F33.3 SEVERE RECURRENT MAJOR DEPRESSIVE DISORDER WITH PSYCHOTIC FEATURES (HCC): Primary | ICD-10-CM

## 2018-05-01 DIAGNOSIS — R07.89 CHEST PRESSURE: ICD-10-CM

## 2018-05-01 DIAGNOSIS — Z79.899 HIGH RISK MEDICATION USE: ICD-10-CM

## 2018-05-01 PROCEDURE — 99214 OFFICE O/P EST MOD 30 MIN: CPT | Performed by: NURSE PRACTITIONER

## 2018-05-01 PROCEDURE — 80307 DRUG TEST PRSMV CHEM ANLYZR: CPT

## 2018-05-01 RX ORDER — HYDROXYZINE PAMOATE 50 MG/1
50 CAPSULE ORAL NIGHTLY PRN
Qty: 30 CAPSULE | Refills: 1 | Status: SHIPPED | OUTPATIENT
Start: 2018-05-01 | End: 2019-01-03 | Stop reason: SDUPTHER

## 2018-05-01 RX ORDER — PROPRANOLOL HYDROCHLORIDE 20 MG/1
20 TABLET ORAL 3 TIMES DAILY
Qty: 90 TABLET | Refills: 1 | Status: SHIPPED | OUTPATIENT
Start: 2018-05-01 | End: 2018-08-16 | Stop reason: ALTCHOICE

## 2018-05-01 RX ORDER — ZOLPIDEM TARTRATE 10 MG/1
TABLET ORAL
Qty: 30 TABLET | Refills: 0 | Status: SHIPPED | OUTPATIENT
Start: 2018-05-01 | End: 2018-06-06 | Stop reason: SDUPTHER

## 2018-05-05 LAB
6-ACETYLMORPHINE: NOT DETECTED
7-AMINOCLONAZEPAM: NOT DETECTED
ALPHA-OH-ALPRAZOLAM: NOT DETECTED
ALPRAZOLAM: NOT DETECTED
AMPHETAMINE: NOT DETECTED
BARBITURATES: NOT DETECTED
BENZOYLECGONINE: NOT DETECTED
BUPRENORPHINE: NOT DETECTED
CARISOPRODOL: NOT DETECTED
CLONAZEPAM: NOT DETECTED
CODEINE: NOT DETECTED
CREATININE URINE: 89.1 MG/DL (ref 20–400)
DIAZEPAM: NOT DETECTED
EER PAIN MGT DRUG PANEL, HIGH RES/EMIT U: NORMAL
ETHYL GLUCURONIDE: NOT DETECTED
FENTANYL: NOT DETECTED
HYDROCODONE: NOT DETECTED
HYDROMORPHONE: NOT DETECTED
LORAZEPAM: NOT DETECTED
MARIJUANA METABOLITE: NOT DETECTED
MDA: NOT DETECTED
MDEA: NOT DETECTED
MDMA URINE: NOT DETECTED
MEPERIDINE: NOT DETECTED
METHADONE: NOT DETECTED
METHAMPHETAMINE: NOT DETECTED
METHYLPHENIDATE: NOT DETECTED
MIDAZOLAM: NOT DETECTED
MORPHINE: NOT DETECTED
NORBUPRENORPHINE, FREE: NOT DETECTED
NORDIAZEPAM: NOT DETECTED
NORFENTANYL: NOT DETECTED
NORHYDROCODONE, URINE: NOT DETECTED
NOROXYCODONE: NOT DETECTED
NOROXYMORPHONE, URINE: NOT DETECTED
OXAZEPAM: NOT DETECTED
OXYCODONE: NOT DETECTED
OXYMORPHONE: NOT DETECTED
PAIN MANAGEMENT DRUG PANEL: NORMAL
PCP: NOT DETECTED
PHENTERMINE: NOT DETECTED
PROPOXYPHENE: NOT DETECTED
TAPENTADOL, URINE: NOT DETECTED
TAPENTADOL-O-SULFATE, URINE: NOT DETECTED
TEMAZEPAM: NOT DETECTED
TRAMADOL: NOT DETECTED
ZOLPIDEM: PRESENT

## 2018-05-28 DIAGNOSIS — F41.9 ANXIETY AND DEPRESSION: ICD-10-CM

## 2018-05-28 DIAGNOSIS — F33.3 SEVERE RECURRENT MAJOR DEPRESSIVE DISORDER WITH PSYCHOTIC FEATURES (HCC): ICD-10-CM

## 2018-05-28 DIAGNOSIS — F32.A ANXIETY AND DEPRESSION: ICD-10-CM

## 2018-05-28 DIAGNOSIS — F43.10 PTSD (POST-TRAUMATIC STRESS DISORDER): ICD-10-CM

## 2018-05-29 DIAGNOSIS — I10 ESSENTIAL HYPERTENSION: ICD-10-CM

## 2018-05-29 RX ORDER — TOPIRAMATE 100 MG/1
TABLET, FILM COATED ORAL
Qty: 30 TABLET | Refills: 0 | Status: SHIPPED | OUTPATIENT
Start: 2018-05-29 | End: 2018-10-08 | Stop reason: SDUPTHER

## 2018-05-29 RX ORDER — LISINOPRIL 5 MG/1
5 TABLET ORAL DAILY
Qty: 30 TABLET | Refills: 3 | Status: SHIPPED | OUTPATIENT
Start: 2018-05-29 | End: 2018-08-16 | Stop reason: ALTCHOICE

## 2018-05-29 RX ORDER — BUPROPION HYDROCHLORIDE 200 MG/1
TABLET, EXTENDED RELEASE ORAL
Qty: 60 TABLET | Refills: 0 | Status: SHIPPED | OUTPATIENT
Start: 2018-05-29 | End: 2018-08-16 | Stop reason: ALTCHOICE

## 2018-06-04 DIAGNOSIS — F41.9 ANXIETY: ICD-10-CM

## 2018-06-05 RX ORDER — ALPRAZOLAM 2 MG/1
TABLET ORAL
Qty: 30 TABLET | Refills: 0 | Status: SHIPPED | OUTPATIENT
Start: 2018-06-05 | End: 2018-07-23 | Stop reason: SDUPTHER

## 2018-06-06 DIAGNOSIS — G47.00 INSOMNIA, UNSPECIFIED TYPE: ICD-10-CM

## 2018-06-07 RX ORDER — ZOLPIDEM TARTRATE 10 MG/1
TABLET ORAL
Qty: 30 TABLET | Refills: 0 | Status: SHIPPED | OUTPATIENT
Start: 2018-06-07 | End: 2018-07-23 | Stop reason: SDUPTHER

## 2018-07-23 DIAGNOSIS — F41.9 ANXIETY: ICD-10-CM

## 2018-07-23 DIAGNOSIS — G47.00 INSOMNIA, UNSPECIFIED TYPE: ICD-10-CM

## 2018-07-24 RX ORDER — ALPRAZOLAM 2 MG/1
TABLET ORAL
Qty: 30 TABLET | Refills: 0 | Status: SHIPPED | OUTPATIENT
Start: 2018-07-24 | End: 2018-08-16 | Stop reason: SDUPTHER

## 2018-07-24 RX ORDER — ZOLPIDEM TARTRATE 10 MG/1
TABLET ORAL
Qty: 30 TABLET | Refills: 0 | Status: SHIPPED | OUTPATIENT
Start: 2018-07-24 | End: 2018-08-16 | Stop reason: SDUPTHER

## 2018-07-24 NOTE — TELEPHONE ENCOUNTER
Last seen 5/1/2018. Has a follow up appointment scheduled for 8/7/2018. Medication is pending if okay. Please advise.

## 2018-08-16 ENCOUNTER — OFFICE VISIT (OUTPATIENT)
Dept: FAMILY MEDICINE CLINIC | Age: 43
End: 2018-08-16
Payer: COMMERCIAL

## 2018-08-16 VITALS
RESPIRATION RATE: 12 BRPM | HEART RATE: 85 BPM | BODY MASS INDEX: 42.6 KG/M2 | DIASTOLIC BLOOD PRESSURE: 80 MMHG | TEMPERATURE: 98.2 F | WEIGHT: 217 LBS | OXYGEN SATURATION: 98 % | SYSTOLIC BLOOD PRESSURE: 124 MMHG | HEIGHT: 60 IN

## 2018-08-16 DIAGNOSIS — F33.1 MODERATE EPISODE OF RECURRENT MAJOR DEPRESSIVE DISORDER (HCC): Primary | ICD-10-CM

## 2018-08-16 DIAGNOSIS — I10 ESSENTIAL HYPERTENSION: ICD-10-CM

## 2018-08-16 DIAGNOSIS — F41.9 ANXIETY: ICD-10-CM

## 2018-08-16 DIAGNOSIS — G47.00 INSOMNIA, UNSPECIFIED TYPE: ICD-10-CM

## 2018-08-16 PROCEDURE — 99214 OFFICE O/P EST MOD 30 MIN: CPT | Performed by: NURSE PRACTITIONER

## 2018-08-16 RX ORDER — ALPRAZOLAM 2 MG/1
TABLET ORAL
Qty: 30 TABLET | Refills: 0 | Status: CANCELLED | OUTPATIENT
Start: 2018-08-16 | End: 2018-09-15

## 2018-08-16 RX ORDER — LISINOPRIL 10 MG/1
10 TABLET ORAL DAILY
Qty: 30 TABLET | Refills: 3 | Status: SHIPPED | OUTPATIENT
Start: 2018-08-16 | End: 2018-10-08 | Stop reason: SDUPTHER

## 2018-08-16 RX ORDER — OLANZAPINE 2.5 MG/1
2.5 TABLET ORAL NIGHTLY
Qty: 30 TABLET | Refills: 3 | Status: SHIPPED | OUTPATIENT
Start: 2018-08-16 | End: 2018-10-08 | Stop reason: SDUPTHER

## 2018-08-16 RX ORDER — FLUCONAZOLE 150 MG/1
150 TABLET ORAL DAILY
Qty: 30 TABLET | Refills: 1 | Status: CANCELLED | OUTPATIENT
Start: 2018-08-16

## 2018-08-16 RX ORDER — ZOLPIDEM TARTRATE 10 MG/1
TABLET ORAL
Qty: 30 TABLET | Refills: 2 | Status: SHIPPED | OUTPATIENT
Start: 2018-08-16 | End: 2018-10-08 | Stop reason: SDUPTHER

## 2018-08-16 RX ORDER — ESCITALOPRAM OXALATE 20 MG/1
20 TABLET ORAL DAILY
Qty: 30 TABLET | Refills: 5 | Status: SHIPPED | OUTPATIENT
Start: 2018-08-16 | End: 2019-01-03 | Stop reason: ALTCHOICE

## 2018-08-16 RX ORDER — LISINOPRIL 10 MG/1
TABLET ORAL
COMMUNITY
Start: 2018-05-27 | End: 2018-08-16 | Stop reason: SDUPTHER

## 2018-08-16 RX ORDER — ALPRAZOLAM 2 MG/1
TABLET ORAL
Qty: 30 TABLET | Refills: 2 | Status: SHIPPED | OUTPATIENT
Start: 2018-08-16 | End: 2018-10-08 | Stop reason: SDUPTHER

## 2018-08-16 RX ORDER — PROPRANOLOL HYDROCHLORIDE 10 MG/1
10 TABLET ORAL 3 TIMES DAILY
Qty: 90 TABLET | Refills: 3 | Status: SHIPPED | OUTPATIENT
Start: 2018-08-16 | End: 2018-10-08 | Stop reason: SDUPTHER

## 2018-08-16 RX ORDER — IBUPROFEN 800 MG/1
800 TABLET ORAL EVERY 6 HOURS PRN
Qty: 120 TABLET | Refills: 3 | Status: SHIPPED | OUTPATIENT
Start: 2018-08-16 | End: 2019-01-03 | Stop reason: SDUPTHER

## 2018-08-16 NOTE — PROGRESS NOTES
Sarah Pearson reports being in a poor mood that is not stable. The patient is  reporting insomnia, difficulty concentrating and usual interest in activities. This patient is  not homicidal or suicidal.  The medication from last visit, Romie Pavon, is  helping and is not causing any side effects. The patient is not going to counseling/therapy. She states that she stopped taking the Wellbutrin because it seemed to make her anxiety worse. She has a lot of things going on in her life including her son who has had issues with the law. She feels like every day something is going on that feels like she can never get caught up. She states that Mearl Hammans does not know what she has done today is her this\". When asked if she cared to elaborate or tell me details she declines. She is not interested in talking to psychology at this time. She states that she takes Xanax very sparingly but does still take it. She never takes an entire tablet at a time. She will need a refill today. She feels that she needs something to help control her mood. She feels like she could \"go off the edge\" at any time. She admits that most of her stress comes from her family members. She feels shaky at times and then gets a headache after she has been very angry or upset. Insomnia: She states that she continues to take her Ambien most nights with no side effects. She has been able to sleep for the most part but if she does happen wake up she has a hard time falling back asleep because her anxiety has been so bad. Hypertension: She states that her blood pressure has been okay as far she knows that she feels like it goes high at times especially when she is under a lot of stress. She feels like her heart skips or heart races at times. This is always around high stress situations. Occasional pressure in the chest without pain. Xanax helps make it better when the medication is taken. ROS: The patient reports no nausea or vomiting.   There

## 2018-08-27 ENCOUNTER — HOSPITAL ENCOUNTER (EMERGENCY)
Age: 43
Discharge: HOME | End: 2018-08-27
Attending: EMERGENCY MEDICINE

## 2018-08-27 ENCOUNTER — APPOINTMENT (OUTPATIENT)
Dept: GENERAL RADIOLOGY | Age: 43
End: 2018-08-27

## 2018-08-27 VITALS
HEIGHT: 59 IN | OXYGEN SATURATION: 97 % | TEMPERATURE: 98.8 F | BODY MASS INDEX: 44.35 KG/M2 | HEART RATE: 79 BPM | SYSTOLIC BLOOD PRESSURE: 144 MMHG | DIASTOLIC BLOOD PRESSURE: 78 MMHG | WEIGHT: 220 LBS | RESPIRATION RATE: 18 BRPM

## 2018-08-27 DIAGNOSIS — S63.634A SPRAIN OF INTERPHALANGEAL JOINT OF RIGHT RING FINGER, INITIAL ENCOUNTER: Primary | ICD-10-CM

## 2018-08-27 PROCEDURE — 73140 X-RAY EXAM OF FINGER(S): CPT

## 2018-08-27 PROCEDURE — 29130 APPL FINGER SPLINT STATIC: CPT

## 2018-08-27 PROCEDURE — 99283 EMERGENCY DEPT VISIT LOW MDM: CPT

## 2018-08-27 ASSESSMENT — PAIN SCALES - GENERAL: PAINLEVEL_OUTOF10: 3

## 2018-08-27 ASSESSMENT — PAIN DESCRIPTION - LOCATION: LOCATION: FINGER (COMMENT WHICH ONE)

## 2018-08-27 ASSESSMENT — PAIN DESCRIPTION - PAIN TYPE: TYPE: ACUTE PAIN

## 2018-08-27 ASSESSMENT — PAIN DESCRIPTION - DESCRIPTORS: DESCRIPTORS: CONSTANT

## 2018-08-27 ASSESSMENT — ENCOUNTER SYMPTOMS: COLOR CHANGE: 1

## 2018-08-27 ASSESSMENT — PAIN - FUNCTIONAL ASSESSMENT: PAIN_FUNCTIONAL_ASSESSMENT: 0-10

## 2018-08-27 ASSESSMENT — PAIN DESCRIPTION - ORIENTATION: ORIENTATION: RIGHT

## 2018-08-28 NOTE — ED PROVIDER NOTES
98 % 4' 11\" (1.499 m) 220 lb (99.8 kg)       Physical Exam  This is a 80-year-old female without distress. Swelling and bruising to the DIP joint. Sclerae PIP joint but no bruising. Tenderness in both areas. Patient has no pain at the MCP or proximal.  Neurologic, vascular status tendon function intact in extension and flexion. No skin wounds. DIAGNOSTIC RESULTS     EKG: All EKG's are interpreted by the Emergency Department Physician who either signs or Co-signs this chart in the absence of a cardiologist.        RADIOLOGY:   Non-plain film images such as CT, Ultrasound and MRI are read by the radiologist. Plain radiographic images are visualized and preliminarily interpreted by the emergency physician with the below findings:    X-ray of the right ring finger reveals no fracture dislocation as interpreted by myself. Interpretation per the Radiologist below, if available at the time of this note:    XR FINGER RIGHT (MIN 2 VIEWS)    (Results Pending)         ED BEDSIDE ULTRASOUND:   Performed by ED Physician - none    LABS:  Labs Reviewed - No data to display    All other labs were within normal range or not returned as of this dictation. EMERGENCY DEPARTMENT COURSE and DIFFERENTIAL DIAGNOSIS/MDM:   Vitals:    Vitals:    08/27/18 2009   BP: (!) 151/81   Pulse: 88   Temp: 98.8 °F (37.1 °C)   TempSrc: Oral   SpO2: 98%   Weight: 220 lb (99.8 kg)   Height: 4' 11\" (1.499 m)     Patient has finger sprain but no fracture. Frank tape splint. Activity as tolerated. Discussed with patient. MDM    CRITICAL CARE TIME   Total Critical Care time was  minutes, excluding separately reportable procedures. There was a high probability of clinically significant/life threatening deterioration in the patient's condition which required my urgent intervention. CONSULTS:  None    PROCEDURES:  Unless otherwise noted below, none     Procedures    FINAL IMPRESSION      1.  Sprain of interphalangeal joint of right ring finger, initial encounter          DISPOSITION/PLAN   DISPOSITION Decision To Discharge 08/27/2018 09:06:44 PM      PATIENT REFERRED TO:  ANA Wilson - CNP  [facility] [address]  [phone]      As needed if not improving in 1 week      DISCHARGE MEDICATIONS:  New Prescriptions    No medications on file          (Please note that portions of this note were completed with a voice recognition program.  Efforts were made to edit the dictations but occasionally words are mis-transcribed.)    Didier Miranda MD (electronically signed)  Attending Emergency Physician       Nghia Angeles MD  08/27/18 0216

## 2018-08-28 NOTE — ED TRIAGE NOTES
Patient states was a [place] yesterday and hit the 4th digit of the right hand on part of the ride she was on, patient states pain increases with movement. Patient states it is the distal end of the finger, small amount of bruising noted, with minimal swelling, ice pack was applied.

## 2018-09-14 ENCOUNTER — HOSPITAL ENCOUNTER (OUTPATIENT)
Dept: LAB | Age: 43
Discharge: HOME | End: 2018-09-14
Payer: COMMERCIAL

## 2018-09-14 ENCOUNTER — TELEPHONE (OUTPATIENT)
Dept: FAMILY MEDICINE CLINIC | Age: 43
End: 2018-09-14

## 2018-09-14 DIAGNOSIS — N32.81 OVERACTIVE BLADDER: Primary | ICD-10-CM

## 2018-09-14 DIAGNOSIS — I10 ESSENTIAL HYPERTENSION: ICD-10-CM

## 2018-09-14 DIAGNOSIS — R53.83 OTHER FATIGUE: ICD-10-CM

## 2018-09-14 LAB
ALBUMIN SERPL-MCNC: 3.7 G/DL (ref 3.9–4.9)
ALP BLD-CCNC: 67 U/L (ref 40–130)
ALT SERPL-CCNC: 14 U/L (ref 0–33)
ANION GAP SERPL CALCULATED.3IONS-SCNC: 12 MEQ/L (ref 7–13)
AST SERPL-CCNC: 12 U/L (ref 0–35)
BASOPHILS ABSOLUTE: 0 K/UL (ref 0–0.2)
BASOPHILS RELATIVE PERCENT: 0.3 %
BILIRUB SERPL-MCNC: <0.2 MG/DL (ref 0–1.2)
BUN BLDV-MCNC: 11 MG/DL (ref 6–20)
CALCIUM SERPL-MCNC: 9.1 MG/DL (ref 8.6–10.2)
CHLORIDE BLD-SCNC: 101 MEQ/L (ref 98–107)
CO2: 26 MEQ/L (ref 22–29)
CREAT SERPL-MCNC: 0.67 MG/DL (ref 0.5–0.9)
EOSINOPHILS ABSOLUTE: 0.2 K/UL (ref 0–0.7)
EOSINOPHILS RELATIVE PERCENT: 1.6 %
GFR AFRICAN AMERICAN: >60
GFR NON-AFRICAN AMERICAN: >60
GLOBULIN: 2.9 G/DL (ref 2.3–3.5)
GLUCOSE BLD-MCNC: 149 MG/DL (ref 74–109)
HCT VFR BLD CALC: 41.1 % (ref 37–47)
HEMOGLOBIN: 13.4 G/DL (ref 12–16)
LYMPHOCYTES ABSOLUTE: 2.8 K/UL (ref 1–4.8)
LYMPHOCYTES RELATIVE PERCENT: 28.8 %
MCH RBC QN AUTO: 29.5 PG (ref 27–31.3)
MCHC RBC AUTO-ENTMCNC: 32.6 % (ref 33–37)
MCV RBC AUTO: 90.4 FL (ref 82–100)
MONOCYTES ABSOLUTE: 0.7 K/UL (ref 0.2–0.8)
MONOCYTES RELATIVE PERCENT: 7.3 %
NEUTROPHILS ABSOLUTE: 6 K/UL (ref 1.4–6.5)
NEUTROPHILS RELATIVE PERCENT: 62 %
PDW BLD-RTO: 14.3 % (ref 11.5–14.5)
PLATELET # BLD: 318 K/UL (ref 130–400)
POTASSIUM SERPL-SCNC: 4 MEQ/L (ref 3.5–5.1)
RBC # BLD: 4.54 M/UL (ref 4.2–5.4)
SODIUM BLD-SCNC: 139 MEQ/L (ref 132–144)
T4 FREE: 1.3 NG/DL (ref 0.93–1.7)
TOTAL PROTEIN: 6.6 G/DL (ref 6.4–8.1)
TSH SERPL DL<=0.05 MIU/L-ACNC: 3.39 UIU/ML (ref 0.27–4.2)
VITAMIN D 25-HYDROXY: 8.4 NG/ML (ref 30–100)
WBC # BLD: 9.7 K/UL (ref 4.8–10.8)

## 2018-09-14 PROCEDURE — 85025 COMPLETE CBC W/AUTO DIFF WBC: CPT

## 2018-09-14 PROCEDURE — 84443 ASSAY THYROID STIM HORMONE: CPT

## 2018-09-14 PROCEDURE — 36415 COLL VENOUS BLD VENIPUNCTURE: CPT

## 2018-09-14 PROCEDURE — 80053 COMPREHEN METABOLIC PANEL: CPT

## 2018-09-14 PROCEDURE — 84439 ASSAY OF FREE THYROXINE: CPT

## 2018-09-14 PROCEDURE — 82306 VITAMIN D 25 HYDROXY: CPT

## 2018-09-14 RX ORDER — DARIFENACIN HYDROBROMIDE 7.5 MG/1
7.5 TABLET, EXTENDED RELEASE ORAL DAILY
Qty: 30 TABLET | Refills: 3 | Status: SHIPPED | OUTPATIENT
Start: 2018-09-14 | End: 2019-01-03 | Stop reason: SDUPTHER

## 2018-09-14 NOTE — TELEPHONE ENCOUNTER
Patient was here this morning with her sister said to put a telephone message in for a bladder medication for her that you know what the nameof it is she didn't know the name. cp

## 2018-10-08 ENCOUNTER — OFFICE VISIT (OUTPATIENT)
Dept: FAMILY MEDICINE CLINIC | Age: 43
End: 2018-10-08

## 2018-10-08 VITALS
HEIGHT: 60 IN | HEART RATE: 87 BPM | DIASTOLIC BLOOD PRESSURE: 80 MMHG | BODY MASS INDEX: 44.17 KG/M2 | RESPIRATION RATE: 16 BRPM | WEIGHT: 225 LBS | SYSTOLIC BLOOD PRESSURE: 110 MMHG | TEMPERATURE: 98.9 F | OXYGEN SATURATION: 98 %

## 2018-10-08 DIAGNOSIS — E55.9 VITAMIN D DEFICIENCY: ICD-10-CM

## 2018-10-08 DIAGNOSIS — H65.112 ACUTE MUCOID OTITIS MEDIA OF LEFT EAR: ICD-10-CM

## 2018-10-08 DIAGNOSIS — I10 ESSENTIAL HYPERTENSION: ICD-10-CM

## 2018-10-08 DIAGNOSIS — J01.40 ACUTE NON-RECURRENT PANSINUSITIS: ICD-10-CM

## 2018-10-08 DIAGNOSIS — F32.A ANXIETY AND DEPRESSION: ICD-10-CM

## 2018-10-08 DIAGNOSIS — R73.09 ELEVATED GLUCOSE: ICD-10-CM

## 2018-10-08 DIAGNOSIS — F43.10 PTSD (POST-TRAUMATIC STRESS DISORDER): ICD-10-CM

## 2018-10-08 DIAGNOSIS — G47.00 INSOMNIA, UNSPECIFIED TYPE: ICD-10-CM

## 2018-10-08 DIAGNOSIS — R05.9 COUGH: ICD-10-CM

## 2018-10-08 DIAGNOSIS — F33.1 MODERATE EPISODE OF RECURRENT MAJOR DEPRESSIVE DISORDER (HCC): Primary | ICD-10-CM

## 2018-10-08 DIAGNOSIS — F41.9 ANXIETY: ICD-10-CM

## 2018-10-08 DIAGNOSIS — Z23 FLU VACCINE NEED: ICD-10-CM

## 2018-10-08 DIAGNOSIS — F41.9 ANXIETY AND DEPRESSION: ICD-10-CM

## 2018-10-08 DIAGNOSIS — G44.209 ACUTE NON INTRACTABLE TENSION-TYPE HEADACHE: ICD-10-CM

## 2018-10-08 DIAGNOSIS — K21.9 GASTROESOPHAGEAL REFLUX DISEASE WITHOUT ESOPHAGITIS: ICD-10-CM

## 2018-10-08 PROCEDURE — 96372 THER/PROPH/DIAG INJ SC/IM: CPT | Performed by: NURSE PRACTITIONER

## 2018-10-08 PROCEDURE — 99214 OFFICE O/P EST MOD 30 MIN: CPT | Performed by: NURSE PRACTITIONER

## 2018-10-08 PROCEDURE — 90688 IIV4 VACCINE SPLT 0.5 ML IM: CPT | Performed by: NURSE PRACTITIONER

## 2018-10-08 PROCEDURE — 90471 IMMUNIZATION ADMIN: CPT | Performed by: NURSE PRACTITIONER

## 2018-10-08 RX ORDER — KETOROLAC TROMETHAMINE 30 MG/ML
60 INJECTION, SOLUTION INTRAMUSCULAR; INTRAVENOUS ONCE
Status: COMPLETED | OUTPATIENT
Start: 2018-10-08 | End: 2018-10-08

## 2018-10-08 RX ORDER — AMOXICILLIN AND CLAVULANATE POTASSIUM 875; 125 MG/1; MG/1
1 TABLET, FILM COATED ORAL EVERY 12 HOURS
Qty: 20 TABLET | Refills: 0 | Status: SHIPPED | OUTPATIENT
Start: 2018-10-08 | End: 2018-10-18

## 2018-10-08 RX ORDER — LISINOPRIL 10 MG/1
10 TABLET ORAL DAILY
Qty: 30 TABLET | Refills: 3 | Status: SHIPPED | OUTPATIENT
Start: 2018-10-08 | End: 2019-01-03 | Stop reason: SDUPTHER

## 2018-10-08 RX ORDER — OLANZAPINE 2.5 MG/1
2.5 TABLET ORAL NIGHTLY
Qty: 30 TABLET | Refills: 3 | Status: SHIPPED | OUTPATIENT
Start: 2018-10-08 | End: 2019-01-03 | Stop reason: SDUPTHER

## 2018-10-08 RX ORDER — ZOLPIDEM TARTRATE 10 MG/1
TABLET ORAL
Qty: 30 TABLET | Refills: 2 | Status: SHIPPED | OUTPATIENT
Start: 2018-10-08 | End: 2019-01-03 | Stop reason: SDUPTHER

## 2018-10-08 RX ORDER — CEFTRIAXONE 500 MG/1
500 INJECTION, POWDER, FOR SOLUTION INTRAMUSCULAR; INTRAVENOUS ONCE
Status: COMPLETED | OUTPATIENT
Start: 2018-10-08 | End: 2018-10-08

## 2018-10-08 RX ORDER — PANTOPRAZOLE SODIUM 40 MG/1
40 TABLET, DELAYED RELEASE ORAL DAILY
Qty: 30 TABLET | Refills: 3 | Status: SHIPPED | OUTPATIENT
Start: 2018-10-08 | End: 2019-04-30 | Stop reason: ALTCHOICE

## 2018-10-08 RX ORDER — ALPRAZOLAM 2 MG/1
TABLET ORAL
Qty: 30 TABLET | Refills: 2 | Status: SHIPPED | OUTPATIENT
Start: 2018-10-08 | End: 2019-01-03 | Stop reason: SDUPTHER

## 2018-10-08 RX ORDER — TOPIRAMATE 100 MG/1
TABLET, FILM COATED ORAL
Qty: 30 TABLET | Refills: 3 | Status: SHIPPED | OUTPATIENT
Start: 2018-10-08 | End: 2019-01-03 | Stop reason: SDUPTHER

## 2018-10-08 RX ORDER — PROPRANOLOL HYDROCHLORIDE 10 MG/1
10 TABLET ORAL 3 TIMES DAILY
Qty: 90 TABLET | Refills: 3 | Status: SHIPPED | OUTPATIENT
Start: 2018-10-08 | End: 2019-01-03 | Stop reason: SDUPTHER

## 2018-10-08 RX ORDER — ERGOCALCIFEROL 1.25 MG/1
50000 CAPSULE ORAL WEEKLY
Qty: 12 CAPSULE | Refills: 1 | Status: SHIPPED | OUTPATIENT
Start: 2018-10-08 | End: 2019-01-03 | Stop reason: SDUPTHER

## 2018-10-08 RX ADMIN — KETOROLAC TROMETHAMINE 60 MG: 30 INJECTION, SOLUTION INTRAMUSCULAR; INTRAVENOUS at 15:31

## 2018-10-08 RX ADMIN — CEFTRIAXONE 500 MG: 500 INJECTION, POWDER, FOR SOLUTION INTRAMUSCULAR; INTRAVENOUS at 15:29

## 2018-10-08 ASSESSMENT — PATIENT HEALTH QUESTIONNAIRE - PHQ9
SUM OF ALL RESPONSES TO PHQ9 QUESTIONS 1 & 2: 0
SUM OF ALL RESPONSES TO PHQ QUESTIONS 1-9: 0
1. LITTLE INTEREST OR PLEASURE IN DOING THINGS: 0
SUM OF ALL RESPONSES TO PHQ QUESTIONS 1-9: 0
2. FEELING DOWN, DEPRESSED OR HOPELESS: 0

## 2018-10-08 NOTE — PROGRESS NOTES
Vaccine Information Sheet, \"Influenza - Inactivated\"  given to Bailey Soto, or parent/legal guardian of  Bailey Soto and verbalized understanding. Patient responses:    Have you ever had a reaction to a flu vaccine? No  Are you able to eat eggs without adverse effects? No  Do you have any current illness? No  Have you ever had Guillian [place] Syndrome? No    Flu vaccine given per order. Please see immunization tab.

## 2018-10-08 NOTE — PROGRESS NOTES
Marquise Ignacio reports being in a good mood that is  stable. The patient is  reporting insomnia, difficulty concentrating and usual interest in activities. This patient is  not homicidal or suicidal.  The medication from last visit lexapro and zanaflex is  helping and is not causing any side effects. The patient is not going to counseling/therapy. Anxiety: she states that she continues to take xanax very sparingly. Continues to take high does but does not take often. Insomnia: she states that she has been sleeping ok when she takes the Ambien. No side effects. GERD: symptoms have been well controlled on medication. Will need refill today. HTN: she states that she has been taking the lisinopril without side effect. URI: she states that she has been sick for several days now. She has sinus pain and pressure around her ears and behind her eyes. Has had some minimal drainage from both eyes that is described as crusts. No pink or redness to the eyes. No pains to the eyes themselves. There feels to be a pressure from behind eyes low. She has had a dry cough. Feels like she has some postnasal drip coming from the nose. There is no chest pressure or tightness. nof ever or chills. ROS: This patient reports no chest pain or pressure. The patient reports no nausea or vomiting. There is no heartburn or indigestion. There is no diarrhea or constipation. No black, bloody, mucusy or tarry stool noticed. The patient reports no bloating and no change in appetite. There is no numbness, tingling or swelling in the extremities. EXAM:  Constitutional Blood pressure 110/80, pulse 87, temperature 98.9 °F (37.2 °C), temperature source Temporal, resp. rate 16, height 5' (1.524 m), weight 225 lb (102.1 kg), last menstrual period 06/15/2015, SpO2 98 %, not currently breastfeeding. ,normal affect, no acute distress, appears well developed and well nourished.   Ears:  TM- right-  normal and left- injected and fluid-  dark yellow and [place]- normal  Nose/Sinuses:  Nares normal. Septum midline. Mucosa normal. No drainage or sinus tenderness. Mouth/Throat:  Mucosa moist.  No lesions. Pharynx without erythema, edema or exudate. Neck:  neck- supple, no mass, non-tender and no bruits  Lungs:  Normal expansion. Clear to auscultation. No rales, rhonchi, or wheezing. Heart:  Heart sounds are normal.  Regular rate and rhythm without murmur, gallop or rub. Abdomen:  Soft, non-tender, normal bowel sounds. No bruits, organomegaly or masses. Extremities: pulses present in all extremities. No edema. DIAGNOSIS:    Diagnosis Orders   1. Moderate episode of recurrent major depressive disorder (HCC)  OLANZapine (ZYPREXA) 2.5 MG tablet   2. Anxiety  ALPRAZolam (XANAX) 2 MG tablet    propranolol (INDERAL) 10 MG tablet   3. Insomnia, unspecified type  OLANZapine (ZYPREXA) 2.5 MG tablet    zolpidem (AMBIEN) 10 MG tablet   4. Gastroesophageal reflux disease without esophagitis  pantoprazole (PROTONIX) 40 MG tablet   5. PTSD (post-traumatic stress disorder)  topiramate (TOPAMAX) 100 MG tablet   6. Anxiety and depression  topiramate (TOPAMAX) 100 MG tablet   7. Vitamin D deficiency  Vitamin D 25 Hydroxy    vitamin D (ERGOCALCIFEROL) 61851 units CAPS capsule   8. Essential hypertension  lisinopril (PRINIVIL;ZESTRIL) 10 MG tablet    CBC Auto Differential    Comprehensive Metabolic Panel   9. Elevated glucose  Hemoglobin A1C   10. Cough  HYDROcodone-homatropine (HYCODAN) 5-1.5 MG/5ML syrup   11. Acute mucoid otitis media of left ear  amoxicillin-clavulanate (AUGMENTIN) 875-125 MG per tablet    cefTRIAXone (ROCEPHIN) injection 500 mg   12. Acute non-recurrent pansinusitis  amoxicillin-clavulanate (AUGMENTIN) 875-125 MG per tablet    cefTRIAXone (ROCEPHIN) injection 500 mg   13. Acute non intractable tension-type headache  ketorolac (TORADOL) injection 60 mg   14.  Flu vaccine need  INFLUENZA, QUADV, 3 YRS AND OLDER, IM, MDV, 0.5ML (FLUZONE QUADV)       PLAN: Include orders in the DX section. Follow up: 2 months and as needed. Blood work one week prior as ordered. 1.  Mood has been stable with comminution medication. We'll continue the same and refill given. 2. 5. 6.   Anxiety symptoms have been well controlled with current medication including as needed Xanax. She continues to take sparingly. 3.  She states that she continues to take Ambien for insomnia or else she has a difficult time falling asleep. No side effects with medication. 4.  GERD symptoms have been well controlled with current medication. Refill given. 7.  Reviewed recent lab with very low vitamin D level. Discussed recommendation for her weekly vitamin D supplement high dose and repeat labs in about 8 weeks. Discussed common symptoms associated with vitamin D deficiency and she has many. 8.  Blood pressure has been well controlled on lisinopril. Continue the same. 9.  She was not fasting for last lab but glucose level was slightly elevated. Will check hemoglobin A1c with next lab as this has not been checked in a while. 10.  11.  12.  13. Antibiotic given for ear infection. Cough syrup given for symptom. Patient request Toradol injection antibiotic injection to help get things started. She just wants to feel better. 14.  Patient requests flu shot today. She is afebrile. Immunization is given per her wishes. Controlled Substances Monitoring:     RX Monitoring 10/8/2018   Attestation The Prescription Monitoring Report for this patient was reviewed today. Documentation Possible medication side effects, risk of tolerance/dependence & alternative treatments discussed. ;No signs of potential drug abuse or diversion identified. Medication Contracts Existing medication contract.          Please note this report has been partially produced using speech recognition software and may cause contain errors related to that system including grammar, punctuation and spelling as well as words and phrases that may seem inappropriate. If there are questions or concerns please feel free to contact me to clarify.         Electronically signed by Aleyda Estrada, 9:26 AM 10/9/18

## 2019-01-03 ENCOUNTER — HOSPITAL ENCOUNTER (OUTPATIENT)
Age: 44
Setting detail: SPECIMEN
Discharge: HOME | End: 2019-01-03
Payer: COMMERCIAL

## 2019-01-03 ENCOUNTER — TELEPHONE (OUTPATIENT)
Dept: FAMILY MEDICINE CLINIC | Age: 44
End: 2019-01-03

## 2019-01-03 ENCOUNTER — OFFICE VISIT (OUTPATIENT)
Dept: FAMILY MEDICINE CLINIC | Age: 44
End: 2019-01-03
Payer: COMMERCIAL

## 2019-01-03 VITALS
DIASTOLIC BLOOD PRESSURE: 78 MMHG | RESPIRATION RATE: 16 BRPM | TEMPERATURE: 97.6 F | BODY MASS INDEX: 43.78 KG/M2 | HEART RATE: 95 BPM | HEIGHT: 60 IN | SYSTOLIC BLOOD PRESSURE: 114 MMHG | OXYGEN SATURATION: 98 % | WEIGHT: 223 LBS

## 2019-01-03 DIAGNOSIS — B37.9 YEAST INFECTION: ICD-10-CM

## 2019-01-03 DIAGNOSIS — F32.A ANXIETY AND DEPRESSION: ICD-10-CM

## 2019-01-03 DIAGNOSIS — I10 ESSENTIAL HYPERTENSION: ICD-10-CM

## 2019-01-03 DIAGNOSIS — J45.31 REACTIVE AIRWAY DISEASE, MILD PERSISTENT, WITH ACUTE EXACERBATION: ICD-10-CM

## 2019-01-03 DIAGNOSIS — Z79.899 HIGH RISK MEDICATION USE: ICD-10-CM

## 2019-01-03 DIAGNOSIS — K21.9 GASTROESOPHAGEAL REFLUX DISEASE WITHOUT ESOPHAGITIS: ICD-10-CM

## 2019-01-03 DIAGNOSIS — G47.00 INSOMNIA, UNSPECIFIED TYPE: ICD-10-CM

## 2019-01-03 DIAGNOSIS — N32.81 OVERACTIVE BLADDER: ICD-10-CM

## 2019-01-03 DIAGNOSIS — E55.9 VITAMIN D DEFICIENCY: ICD-10-CM

## 2019-01-03 DIAGNOSIS — F41.9 ANXIETY AND DEPRESSION: ICD-10-CM

## 2019-01-03 DIAGNOSIS — F33.1 MODERATE EPISODE OF RECURRENT MAJOR DEPRESSIVE DISORDER (HCC): Primary | ICD-10-CM

## 2019-01-03 DIAGNOSIS — H66.93 BILATERAL OTITIS MEDIA, UNSPECIFIED OTITIS MEDIA TYPE: ICD-10-CM

## 2019-01-03 DIAGNOSIS — F41.9 ANXIETY: ICD-10-CM

## 2019-01-03 DIAGNOSIS — R30.0 DYSURIA: ICD-10-CM

## 2019-01-03 DIAGNOSIS — E66.01 CLASS 3 SEVERE OBESITY WITH BODY MASS INDEX (BMI) OF 40.0 TO 44.9 IN ADULT, UNSPECIFIED OBESITY TYPE, UNSPECIFIED WHETHER SERIOUS COMORBIDITY PRESENT (HCC): ICD-10-CM

## 2019-01-03 DIAGNOSIS — F43.0 STRESS REACTION: ICD-10-CM

## 2019-01-03 DIAGNOSIS — F43.10 PTSD (POST-TRAUMATIC STRESS DISORDER): ICD-10-CM

## 2019-01-03 LAB
BILIRUBIN, POC: ABNORMAL
BLOOD URINE, POC: 25
CLARITY, POC: ABNORMAL
COLOR, POC: YELLOW
GLUCOSE URINE, POC: ABNORMAL
KETONES, POC: ABNORMAL
LEUKOCYTE EST, POC: ABNORMAL
NITRITE, POC: ABNORMAL
PH, POC: 6
PROTEIN, POC: ABNORMAL
SPECIFIC GRAVITY, POC: 1.03
UROBILINOGEN, POC: 3.5

## 2019-01-03 PROCEDURE — 81003 URINALYSIS AUTO W/O SCOPE: CPT | Performed by: NURSE PRACTITIONER

## 2019-01-03 PROCEDURE — 87086 URINE CULTURE/COLONY COUNT: CPT

## 2019-01-03 PROCEDURE — 80307 DRUG TEST PRSMV CHEM ANLYZR: CPT

## 2019-01-03 PROCEDURE — 99214 OFFICE O/P EST MOD 30 MIN: CPT | Performed by: NURSE PRACTITIONER

## 2019-01-03 RX ORDER — NEOMYCIN SULFATE, POLYMYXIN B SULFATE, HYDROCORTISONE 3.5; 10000; 1 MG/ML; [USP'U]/ML; MG/ML
2 SOLUTION/ DROPS AURICULAR (OTIC) EVERY 8 HOURS SCHEDULED
Qty: 1 BOTTLE | Refills: 1 | Status: SHIPPED | OUTPATIENT
Start: 2019-01-03 | End: 2019-01-13

## 2019-01-03 RX ORDER — OLANZAPINE 2.5 MG/1
2.5 TABLET ORAL NIGHTLY
Qty: 30 TABLET | Refills: 3 | Status: SHIPPED | OUTPATIENT
Start: 2019-01-03 | End: 2019-02-28 | Stop reason: SDUPTHER

## 2019-01-03 RX ORDER — FLUCONAZOLE 150 MG/1
150 TABLET ORAL DAILY
Qty: 30 TABLET | Refills: 1 | Status: CANCELLED | OUTPATIENT
Start: 2019-01-03

## 2019-01-03 RX ORDER — BUDESONIDE 0.25 MG/2ML
250 INHALANT ORAL 2 TIMES DAILY
Qty: 60 AMPULE | Refills: 0 | Status: SHIPPED | OUTPATIENT
Start: 2019-01-03 | End: 2019-03-26

## 2019-01-03 RX ORDER — LISINOPRIL 10 MG/1
10 TABLET ORAL DAILY
Qty: 30 TABLET | Refills: 3 | Status: SHIPPED | OUTPATIENT
Start: 2019-01-03 | End: 2019-04-30 | Stop reason: ALTCHOICE

## 2019-01-03 RX ORDER — DESVENLAFAXINE 50 MG/1
50 TABLET, EXTENDED RELEASE ORAL DAILY
Qty: 30 TABLET | Refills: 3 | Status: SHIPPED | OUTPATIENT
Start: 2019-01-03 | End: 2019-04-25 | Stop reason: SDUPTHER

## 2019-01-03 RX ORDER — PANTOPRAZOLE SODIUM 40 MG/1
40 TABLET, DELAYED RELEASE ORAL DAILY
Qty: 30 TABLET | Refills: 3 | Status: CANCELLED | OUTPATIENT
Start: 2019-01-03

## 2019-01-03 RX ORDER — TOPIRAMATE 100 MG/1
TABLET, FILM COATED ORAL
Qty: 30 TABLET | Refills: 3 | Status: SHIPPED | OUTPATIENT
Start: 2019-01-03 | End: 2019-04-30 | Stop reason: ALTCHOICE

## 2019-01-03 RX ORDER — ZOLPIDEM TARTRATE 10 MG/1
TABLET ORAL
Qty: 30 TABLET | Refills: 2 | Status: SHIPPED | OUTPATIENT
Start: 2019-01-03 | End: 2019-02-28 | Stop reason: SDUPTHER

## 2019-01-03 RX ORDER — ALBUTEROL SULFATE 0.63 MG/3ML
1 SOLUTION RESPIRATORY (INHALATION) EVERY 6 HOURS PRN
Qty: 270 ML | Refills: 3 | Status: SHIPPED | OUTPATIENT
Start: 2019-01-03 | End: 2019-03-26

## 2019-01-03 RX ORDER — ERGOCALCIFEROL 1.25 MG/1
50000 CAPSULE ORAL WEEKLY
Qty: 12 CAPSULE | Refills: 1 | Status: SHIPPED | OUTPATIENT
Start: 2019-01-03 | End: 2019-04-30 | Stop reason: SDUPTHER

## 2019-01-03 RX ORDER — PROPRANOLOL HYDROCHLORIDE 10 MG/1
10 TABLET ORAL 3 TIMES DAILY
Qty: 90 TABLET | Refills: 3 | Status: SHIPPED | OUTPATIENT
Start: 2019-01-03 | End: 2019-08-23 | Stop reason: ALTCHOICE

## 2019-01-03 RX ORDER — DARIFENACIN HYDROBROMIDE 7.5 MG/1
7.5 TABLET, EXTENDED RELEASE ORAL DAILY
Qty: 30 TABLET | Refills: 3 | Status: SHIPPED | OUTPATIENT
Start: 2019-01-03 | End: 2019-04-30 | Stop reason: ALTCHOICE

## 2019-01-03 RX ORDER — HYDROXYZINE PAMOATE 50 MG/1
50 CAPSULE ORAL NIGHTLY PRN
Qty: 30 CAPSULE | Refills: 1 | Status: SHIPPED | OUTPATIENT
Start: 2019-01-03 | End: 2019-04-30 | Stop reason: SDUPTHER

## 2019-01-03 RX ORDER — ALBUTEROL SULFATE 1.25 MG/3ML
1 SOLUTION RESPIRATORY (INHALATION) EVERY 6 HOURS PRN
Qty: 360 ML | Refills: 3 | Status: SHIPPED | OUTPATIENT
Start: 2019-01-03 | End: 2019-03-26

## 2019-01-03 RX ORDER — ALPRAZOLAM 2 MG/1
TABLET ORAL
Qty: 30 TABLET | Refills: 2 | Status: SHIPPED | OUTPATIENT
Start: 2019-01-03 | End: 2019-03-26 | Stop reason: SDUPTHER

## 2019-01-03 RX ORDER — TIZANIDINE 4 MG/1
4 TABLET ORAL 2 TIMES DAILY PRN
Qty: 60 TABLET | Refills: 3 | Status: SHIPPED | OUTPATIENT
Start: 2019-01-03 | End: 2019-04-30 | Stop reason: SDUPTHER

## 2019-01-03 RX ORDER — NYSTATIN 100000 U/G
CREAM TOPICAL
Qty: 30 G | Refills: 1 | Status: SHIPPED | OUTPATIENT
Start: 2019-01-03 | End: 2019-03-26

## 2019-01-03 RX ORDER — IBUPROFEN 800 MG/1
800 TABLET ORAL EVERY 6 HOURS PRN
Qty: 120 TABLET | Refills: 3 | Status: SHIPPED | OUTPATIENT
Start: 2019-01-03 | End: 2019-12-30 | Stop reason: SDUPTHER

## 2019-01-03 RX ORDER — CITALOPRAM 20 MG/1
20 TABLET ORAL DAILY
Qty: 90 TABLET | Refills: 1 | Status: CANCELLED | OUTPATIENT
Start: 2019-01-03

## 2019-01-03 RX ORDER — DEXLANSOPRAZOLE 60 MG/1
60 CAPSULE, DELAYED RELEASE ORAL DAILY
Qty: 90 CAPSULE | Refills: 1 | Status: SHIPPED | OUTPATIENT
Start: 2019-01-03 | End: 2019-04-30 | Stop reason: ALTCHOICE

## 2019-01-03 RX ORDER — PHENTERMINE HYDROCHLORIDE 37.5 MG/1
37.5 CAPSULE ORAL EVERY MORNING
Qty: 30 CAPSULE | Refills: 0 | Status: SHIPPED | OUTPATIENT
Start: 2019-01-03 | End: 2019-01-31 | Stop reason: SDUPTHER

## 2019-01-03 RX ORDER — SULFAMETHOXAZOLE AND TRIMETHOPRIM 800; 160 MG/1; MG/1
1 TABLET ORAL 2 TIMES DAILY
Qty: 20 TABLET | Refills: 0 | Status: SHIPPED | OUTPATIENT
Start: 2019-01-03 | End: 2019-01-13

## 2019-01-03 ASSESSMENT — PATIENT HEALTH QUESTIONNAIRE - PHQ9
1. LITTLE INTEREST OR PLEASURE IN DOING THINGS: 1
SUM OF ALL RESPONSES TO PHQ QUESTIONS 1-9: 2
2. FEELING DOWN, DEPRESSED OR HOPELESS: 1
SUM OF ALL RESPONSES TO PHQ QUESTIONS 1-9: 2
SUM OF ALL RESPONSES TO PHQ9 QUESTIONS 1 & 2: 2

## 2019-01-05 LAB
6-ACETYLMORPHINE: NOT DETECTED
7-AMINOCLONAZEPAM: NOT DETECTED
ALPHA-OH-ALPRAZOLAM: NOT DETECTED
ALPRAZOLAM: NOT DETECTED
AMPHETAMINE: NOT DETECTED
BARBITURATES: NOT DETECTED
BENZOYLECGONINE: NOT DETECTED
BUPRENORPHINE: NOT DETECTED
CARISOPRODOL: NOT DETECTED
CLONAZEPAM: NOT DETECTED
CODEINE: NOT DETECTED
CREATININE URINE: 211.4 MG/DL (ref 20–400)
DIAZEPAM: NOT DETECTED
EER PAIN MGT DRUG PANEL, HIGH RES/EMIT U: NORMAL
ETHYL GLUCURONIDE: NOT DETECTED
FENTANYL: NOT DETECTED
HYDROCODONE: NOT DETECTED
HYDROMORPHONE: NOT DETECTED
LORAZEPAM: NOT DETECTED
MARIJUANA METABOLITE: NOT DETECTED
MDA: NOT DETECTED
MDEA: NOT DETECTED
MDMA URINE: NOT DETECTED
MEPERIDINE: NOT DETECTED
METHADONE: NOT DETECTED
METHAMPHETAMINE: NOT DETECTED
METHYLPHENIDATE: NOT DETECTED
MIDAZOLAM: NOT DETECTED
MORPHINE: NOT DETECTED
NORBUPRENORPHINE, FREE: NOT DETECTED
NORDIAZEPAM: NOT DETECTED
NORFENTANYL: NOT DETECTED
NORHYDROCODONE, URINE: NOT DETECTED
NOROXYCODONE: NOT DETECTED
NOROXYMORPHONE, URINE: NOT DETECTED
OXAZEPAM: NOT DETECTED
OXYCODONE: NOT DETECTED
OXYMORPHONE: NOT DETECTED
PAIN MANAGEMENT DRUG PANEL: NORMAL
PCP: NOT DETECTED
PHENTERMINE: NOT DETECTED
PROPOXYPHENE: NOT DETECTED
TAPENTADOL, URINE: NOT DETECTED
TAPENTADOL-O-SULFATE, URINE: NOT DETECTED
TEMAZEPAM: NOT DETECTED
TRAMADOL: NOT DETECTED
URINE CULTURE, ROUTINE: NORMAL
ZOLPIDEM: PRESENT

## 2019-01-18 ENCOUNTER — HOSPITAL ENCOUNTER (OUTPATIENT)
Dept: LAB | Age: 44
Discharge: HOME | End: 2019-01-18
Payer: COMMERCIAL

## 2019-01-18 DIAGNOSIS — R73.09 ELEVATED GLUCOSE: ICD-10-CM

## 2019-01-18 DIAGNOSIS — I10 ESSENTIAL HYPERTENSION: ICD-10-CM

## 2019-01-18 DIAGNOSIS — E55.9 VITAMIN D DEFICIENCY: ICD-10-CM

## 2019-01-18 LAB
ALBUMIN SERPL-MCNC: 4.3 G/DL (ref 3.9–4.9)
ALP BLD-CCNC: 67 U/L (ref 40–130)
ALT SERPL-CCNC: 14 U/L (ref 0–33)
ANION GAP SERPL CALCULATED.3IONS-SCNC: 14 MEQ/L (ref 7–13)
AST SERPL-CCNC: 12 U/L (ref 0–35)
BASOPHILS ABSOLUTE: 0.1 K/UL (ref 0–0.2)
BASOPHILS RELATIVE PERCENT: 0.6 %
BILIRUB SERPL-MCNC: <0.2 MG/DL (ref 0–1.2)
BUN BLDV-MCNC: 11 MG/DL (ref 6–20)
CALCIUM SERPL-MCNC: 9.2 MG/DL (ref 8.6–10.2)
CHLORIDE BLD-SCNC: 100 MEQ/L (ref 98–107)
CO2: 26 MEQ/L (ref 22–29)
CREAT SERPL-MCNC: 0.93 MG/DL (ref 0.5–0.9)
EOSINOPHILS ABSOLUTE: 0.1 K/UL (ref 0–0.7)
EOSINOPHILS RELATIVE PERCENT: 1.1 %
GFR AFRICAN AMERICAN: >60
GFR NON-AFRICAN AMERICAN: >60
GLOBULIN: 3.2 G/DL (ref 2.3–3.5)
GLUCOSE BLD-MCNC: 102 MG/DL (ref 74–109)
HBA1C MFR BLD: 5.5 % (ref 4.8–5.9)
HCT VFR BLD CALC: 42.8 % (ref 37–47)
HEMOGLOBIN: 14.2 G/DL (ref 12–16)
LYMPHOCYTES ABSOLUTE: 3.1 K/UL (ref 1–4.8)
LYMPHOCYTES RELATIVE PERCENT: 35.6 %
MCH RBC QN AUTO: 29.8 PG (ref 27–31.3)
MCHC RBC AUTO-ENTMCNC: 33.1 % (ref 33–37)
MCV RBC AUTO: 90.2 FL (ref 82–100)
MONOCYTES ABSOLUTE: 0.8 K/UL (ref 0.2–0.8)
MONOCYTES RELATIVE PERCENT: 9.2 %
NEUTROPHILS ABSOLUTE: 4.6 K/UL (ref 1.4–6.5)
NEUTROPHILS RELATIVE PERCENT: 53.5 %
PDW BLD-RTO: 13.2 % (ref 11.5–14.5)
PLATELET # BLD: 319 K/UL (ref 130–400)
POTASSIUM SERPL-SCNC: 4 MEQ/L (ref 3.5–5.1)
RBC # BLD: 4.75 M/UL (ref 4.2–5.4)
SODIUM BLD-SCNC: 140 MEQ/L (ref 132–144)
TOTAL PROTEIN: 7.5 G/DL (ref 6.4–8.1)
VITAMIN D 25-HYDROXY: 13 NG/ML (ref 30–100)
WBC # BLD: 8.7 K/UL (ref 4.8–10.8)

## 2019-01-18 PROCEDURE — 82306 VITAMIN D 25 HYDROXY: CPT

## 2019-01-18 PROCEDURE — 36415 COLL VENOUS BLD VENIPUNCTURE: CPT

## 2019-01-18 PROCEDURE — 83036 HEMOGLOBIN GLYCOSYLATED A1C: CPT

## 2019-01-18 PROCEDURE — 85025 COMPLETE CBC W/AUTO DIFF WBC: CPT

## 2019-01-18 PROCEDURE — 80053 COMPREHEN METABOLIC PANEL: CPT

## 2019-01-24 ENCOUNTER — OFFICE VISIT (OUTPATIENT)
Dept: FAMILY MEDICINE CLINIC | Age: 44
End: 2019-01-24
Payer: COMMERCIAL

## 2019-01-24 ENCOUNTER — HOSPITAL ENCOUNTER (OUTPATIENT)
Age: 44
Setting detail: SPECIMEN
Discharge: HOME | End: 2019-01-24
Payer: COMMERCIAL

## 2019-01-24 VITALS
DIASTOLIC BLOOD PRESSURE: 78 MMHG | SYSTOLIC BLOOD PRESSURE: 110 MMHG | HEIGHT: 60 IN | WEIGHT: 215 LBS | RESPIRATION RATE: 18 BRPM | OXYGEN SATURATION: 97 % | BODY MASS INDEX: 42.21 KG/M2 | TEMPERATURE: 97.7 F | HEART RATE: 97 BPM

## 2019-01-24 DIAGNOSIS — E55.9 VITAMIN D DEFICIENCY: ICD-10-CM

## 2019-01-24 DIAGNOSIS — F33.3 SEVERE RECURRENT MAJOR DEPRESSIVE DISORDER WITH PSYCHOTIC FEATURES (HCC): Primary | ICD-10-CM

## 2019-01-24 DIAGNOSIS — R51.9 SEVERE HEADACHE: ICD-10-CM

## 2019-01-24 DIAGNOSIS — H53.9 VISUAL CHANGES: ICD-10-CM

## 2019-01-24 DIAGNOSIS — Z79.899 HIGH RISK MEDICATION USE: ICD-10-CM

## 2019-01-24 DIAGNOSIS — G43.909 MIGRAINE WITHOUT STATUS MIGRAINOSUS, NOT INTRACTABLE, UNSPECIFIED MIGRAINE TYPE: ICD-10-CM

## 2019-01-24 DIAGNOSIS — I10 ESSENTIAL HYPERTENSION: ICD-10-CM

## 2019-01-24 DIAGNOSIS — G43.911 INTRACTABLE MIGRAINE WITH STATUS MIGRAINOSUS, UNSPECIFIED MIGRAINE TYPE: ICD-10-CM

## 2019-01-24 PROCEDURE — 96372 THER/PROPH/DIAG INJ SC/IM: CPT | Performed by: NURSE PRACTITIONER

## 2019-01-24 PROCEDURE — 80307 DRUG TEST PRSMV CHEM ANLYZR: CPT

## 2019-01-24 PROCEDURE — 99214 OFFICE O/P EST MOD 30 MIN: CPT | Performed by: NURSE PRACTITIONER

## 2019-01-24 RX ORDER — PROMETHAZINE HYDROCHLORIDE 25 MG/ML
25 INJECTION, SOLUTION INTRAMUSCULAR; INTRAVENOUS ONCE
Qty: 1 ML | Refills: 0 | Status: CANCELLED
Start: 2019-01-24 | End: 2019-01-24

## 2019-01-24 RX ORDER — ERGOCALCIFEROL 1.25 MG/1
50000 CAPSULE ORAL WEEKLY
Qty: 12 CAPSULE | Refills: 1 | Status: SHIPPED | OUTPATIENT
Start: 2019-01-24 | End: 2019-01-31 | Stop reason: SDUPTHER

## 2019-01-24 RX ORDER — ELETRIPTAN HYDROBROMIDE 20 MG/1
20 TABLET, FILM COATED ORAL
Qty: 6 TABLET | Refills: 3 | Status: SHIPPED | OUTPATIENT
Start: 2019-01-24 | End: 2019-01-31 | Stop reason: SDUPTHER

## 2019-01-24 RX ORDER — PROMETHAZINE HYDROCHLORIDE 25 MG/ML
25 INJECTION, SOLUTION INTRAMUSCULAR; INTRAVENOUS ONCE
Status: COMPLETED | OUTPATIENT
Start: 2019-01-24 | End: 2019-01-24

## 2019-01-24 RX ORDER — KETOROLAC TROMETHAMINE 30 MG/ML
60 INJECTION, SOLUTION INTRAMUSCULAR; INTRAVENOUS ONCE
Status: COMPLETED | OUTPATIENT
Start: 2019-01-24 | End: 2019-01-24

## 2019-01-24 RX ADMIN — PROMETHAZINE HYDROCHLORIDE 25 MG: 25 INJECTION, SOLUTION INTRAMUSCULAR; INTRAVENOUS at 16:29

## 2019-01-24 RX ADMIN — KETOROLAC TROMETHAMINE 60 MG: 30 INJECTION, SOLUTION INTRAMUSCULAR; INTRAVENOUS at 16:28

## 2019-01-24 ASSESSMENT — PATIENT HEALTH QUESTIONNAIRE - PHQ9
1. LITTLE INTEREST OR PLEASURE IN DOING THINGS: 0
SUM OF ALL RESPONSES TO PHQ QUESTIONS 1-9: 0
2. FEELING DOWN, DEPRESSED OR HOPELESS: 0
SUM OF ALL RESPONSES TO PHQ QUESTIONS 1-9: 0
SUM OF ALL RESPONSES TO PHQ9 QUESTIONS 1 & 2: 0

## 2019-01-27 LAB
6-ACETYLMORPHINE: NOT DETECTED
7-AMINOCLONAZEPAM: NOT DETECTED
ALPHA-OH-ALPRAZOLAM: NOT DETECTED
ALPRAZOLAM: NOT DETECTED
AMPHETAMINE: NOT DETECTED
BARBITURATES: NOT DETECTED
BENZOYLECGONINE: NOT DETECTED
BUPRENORPHINE: NOT DETECTED
CARISOPRODOL: NOT DETECTED
CLONAZEPAM: NOT DETECTED
CODEINE: NOT DETECTED
CREATININE URINE: 299.8 MG/DL (ref 20–400)
DIAZEPAM: NOT DETECTED
EER PAIN MGT DRUG PANEL, HIGH RES/EMIT U: NORMAL
ETHYL GLUCURONIDE: NOT DETECTED
FENTANYL: NOT DETECTED
HYDROCODONE: NOT DETECTED
HYDROMORPHONE: NOT DETECTED
LORAZEPAM: NOT DETECTED
MARIJUANA METABOLITE: NOT DETECTED
MDA: NOT DETECTED
MDEA: NOT DETECTED
MDMA URINE: NOT DETECTED
MEPERIDINE: NOT DETECTED
METHADONE: NOT DETECTED
METHAMPHETAMINE: NOT DETECTED
METHYLPHENIDATE: NOT DETECTED
MIDAZOLAM: NOT DETECTED
MORPHINE: NOT DETECTED
NORBUPRENORPHINE, FREE: NOT DETECTED
NORDIAZEPAM: NOT DETECTED
NORFENTANYL: NOT DETECTED
NORHYDROCODONE, URINE: NOT DETECTED
NOROXYCODONE: NOT DETECTED
NOROXYMORPHONE, URINE: NOT DETECTED
OXAZEPAM: NOT DETECTED
OXYCODONE: NOT DETECTED
OXYMORPHONE: NOT DETECTED
PAIN MANAGEMENT DRUG PANEL: NORMAL
PCP: NOT DETECTED
PHENTERMINE: PRESENT
PROPOXYPHENE: NOT DETECTED
TAPENTADOL, URINE: NOT DETECTED
TAPENTADOL-O-SULFATE, URINE: NOT DETECTED
TEMAZEPAM: NOT DETECTED
TRAMADOL: NOT DETECTED
ZOLPIDEM: PRESENT

## 2019-01-31 ENCOUNTER — OFFICE VISIT (OUTPATIENT)
Dept: FAMILY MEDICINE CLINIC | Age: 44
End: 2019-01-31
Payer: COMMERCIAL

## 2019-01-31 VITALS
WEIGHT: 214 LBS | BODY MASS INDEX: 42.01 KG/M2 | HEIGHT: 60 IN | DIASTOLIC BLOOD PRESSURE: 80 MMHG | SYSTOLIC BLOOD PRESSURE: 118 MMHG | OXYGEN SATURATION: 97 % | TEMPERATURE: 97.9 F | HEART RATE: 100 BPM | RESPIRATION RATE: 16 BRPM

## 2019-01-31 DIAGNOSIS — G43.909 MIGRAINE WITHOUT STATUS MIGRAINOSUS, NOT INTRACTABLE, UNSPECIFIED MIGRAINE TYPE: ICD-10-CM

## 2019-01-31 DIAGNOSIS — R30.0 DYSURIA: ICD-10-CM

## 2019-01-31 DIAGNOSIS — E66.01 CLASS 3 SEVERE OBESITY WITH BODY MASS INDEX (BMI) OF 40.0 TO 44.9 IN ADULT, UNSPECIFIED OBESITY TYPE, UNSPECIFIED WHETHER SERIOUS COMORBIDITY PRESENT (HCC): ICD-10-CM

## 2019-01-31 DIAGNOSIS — G43.911 INTRACTABLE MIGRAINE WITH STATUS MIGRAINOSUS, UNSPECIFIED MIGRAINE TYPE: Primary | ICD-10-CM

## 2019-01-31 PROCEDURE — 96372 THER/PROPH/DIAG INJ SC/IM: CPT | Performed by: NURSE PRACTITIONER

## 2019-01-31 PROCEDURE — 99213 OFFICE O/P EST LOW 20 MIN: CPT | Performed by: NURSE PRACTITIONER

## 2019-01-31 RX ORDER — ELETRIPTAN HYDROBROMIDE 20 MG/1
20 TABLET, FILM COATED ORAL
Qty: 18 TABLET | Refills: 3 | Status: SHIPPED | OUTPATIENT
Start: 2019-01-31 | End: 2019-04-30 | Stop reason: SDUPTHER

## 2019-01-31 RX ORDER — PHENTERMINE HYDROCHLORIDE 37.5 MG/1
37.5 CAPSULE ORAL EVERY MORNING
Qty: 30 CAPSULE | Refills: 0 | Status: SHIPPED | OUTPATIENT
Start: 2019-01-31 | End: 2019-02-28 | Stop reason: SDUPTHER

## 2019-01-31 RX ORDER — HYDROCODONE BITARTRATE AND ACETAMINOPHEN 5; 325 MG/1; MG/1
1 TABLET ORAL EVERY 4 HOURS PRN
Qty: 12 TABLET | Refills: 0 | Status: SHIPPED | OUTPATIENT
Start: 2019-01-31 | End: 2019-02-03

## 2019-01-31 RX ORDER — PROMETHAZINE HYDROCHLORIDE 25 MG/ML
25 INJECTION, SOLUTION INTRAMUSCULAR; INTRAVENOUS ONCE
Status: COMPLETED | OUTPATIENT
Start: 2019-01-31 | End: 2019-01-31

## 2019-01-31 RX ORDER — KETOROLAC TROMETHAMINE 30 MG/ML
60 INJECTION, SOLUTION INTRAMUSCULAR; INTRAVENOUS ONCE
Status: COMPLETED | OUTPATIENT
Start: 2019-01-31 | End: 2019-01-31

## 2019-01-31 RX ORDER — CIPROFLOXACIN 250 MG/1
250 TABLET, FILM COATED ORAL 2 TIMES DAILY
Qty: 6 TABLET | Refills: 0 | Status: SHIPPED | OUTPATIENT
Start: 2019-01-31 | End: 2019-02-03

## 2019-01-31 RX ADMIN — KETOROLAC TROMETHAMINE 60 MG: 30 INJECTION, SOLUTION INTRAMUSCULAR; INTRAVENOUS at 11:43

## 2019-01-31 RX ADMIN — PROMETHAZINE HYDROCHLORIDE 25 MG: 25 INJECTION, SOLUTION INTRAMUSCULAR; INTRAVENOUS at 11:44

## 2019-02-05 ENCOUNTER — TELEPHONE (OUTPATIENT)
Dept: FAMILY MEDICINE CLINIC | Age: 44
End: 2019-02-05

## 2019-02-05 DIAGNOSIS — L20.9 ATOPIC DERMATITIS, UNSPECIFIED TYPE: Primary | ICD-10-CM

## 2019-02-28 ENCOUNTER — OFFICE VISIT (OUTPATIENT)
Dept: FAMILY MEDICINE CLINIC | Age: 44
End: 2019-02-28
Payer: COMMERCIAL

## 2019-02-28 VITALS
SYSTOLIC BLOOD PRESSURE: 108 MMHG | RESPIRATION RATE: 14 BRPM | WEIGHT: 213 LBS | DIASTOLIC BLOOD PRESSURE: 60 MMHG | BODY MASS INDEX: 41.82 KG/M2 | HEIGHT: 60 IN | HEART RATE: 89 BPM | TEMPERATURE: 97.8 F | OXYGEN SATURATION: 98 %

## 2019-02-28 DIAGNOSIS — E66.01 CLASS 3 SEVERE OBESITY WITH BODY MASS INDEX (BMI) OF 40.0 TO 44.9 IN ADULT, UNSPECIFIED OBESITY TYPE, UNSPECIFIED WHETHER SERIOUS COMORBIDITY PRESENT (HCC): ICD-10-CM

## 2019-02-28 DIAGNOSIS — Z23 NEED FOR VACCINATION FOR STREP PNEUMONIAE: ICD-10-CM

## 2019-02-28 DIAGNOSIS — F33.1 MODERATE EPISODE OF RECURRENT MAJOR DEPRESSIVE DISORDER (HCC): ICD-10-CM

## 2019-02-28 DIAGNOSIS — G43.909 MIGRAINE WITHOUT STATUS MIGRAINOSUS, NOT INTRACTABLE, UNSPECIFIED MIGRAINE TYPE: ICD-10-CM

## 2019-02-28 DIAGNOSIS — G47.00 INSOMNIA, UNSPECIFIED TYPE: ICD-10-CM

## 2019-02-28 DIAGNOSIS — L30.9 FINGERTIP ECZEMA: ICD-10-CM

## 2019-02-28 DIAGNOSIS — F33.3 SEVERE RECURRENT MAJOR DEPRESSIVE DISORDER WITH PSYCHOTIC FEATURES (HCC): Primary | ICD-10-CM

## 2019-02-28 DIAGNOSIS — Z87.891 FORMER SMOKER: ICD-10-CM

## 2019-02-28 PROCEDURE — 90471 IMMUNIZATION ADMIN: CPT | Performed by: NURSE PRACTITIONER

## 2019-02-28 PROCEDURE — 99214 OFFICE O/P EST MOD 30 MIN: CPT | Performed by: NURSE PRACTITIONER

## 2019-02-28 PROCEDURE — 90732 PPSV23 VACC 2 YRS+ SUBQ/IM: CPT | Performed by: NURSE PRACTITIONER

## 2019-02-28 RX ORDER — OLANZAPINE 5 MG/1
5 TABLET ORAL NIGHTLY
Qty: 30 TABLET | Refills: 2 | Status: SHIPPED | OUTPATIENT
Start: 2019-02-28 | End: 2019-03-26 | Stop reason: SDUPTHER

## 2019-02-28 RX ORDER — TRIAMCINOLONE ACETONIDE 1 MG/G
CREAM TOPICAL
Qty: 45 G | Refills: 1 | Status: SHIPPED | OUTPATIENT
Start: 2019-02-28 | End: 2019-04-30 | Stop reason: ALTCHOICE

## 2019-02-28 RX ORDER — PHENTERMINE HYDROCHLORIDE 37.5 MG/1
37.5 CAPSULE ORAL EVERY MORNING
Qty: 30 CAPSULE | Refills: 0 | Status: SHIPPED | OUTPATIENT
Start: 2019-02-28 | End: 2019-03-30

## 2019-02-28 RX ORDER — ZOLPIDEM TARTRATE 10 MG/1
TABLET ORAL
Qty: 30 TABLET | Refills: 2 | Status: SHIPPED | OUTPATIENT
Start: 2019-02-28 | End: 2019-03-26 | Stop reason: SDUPTHER

## 2019-02-28 ASSESSMENT — PATIENT HEALTH QUESTIONNAIRE - PHQ9
2. FEELING DOWN, DEPRESSED OR HOPELESS: 1
SUM OF ALL RESPONSES TO PHQ QUESTIONS 1-9: 2
SUM OF ALL RESPONSES TO PHQ9 QUESTIONS 1 & 2: 2
SUM OF ALL RESPONSES TO PHQ QUESTIONS 1-9: 2
1. LITTLE INTEREST OR PLEASURE IN DOING THINGS: 1

## 2019-03-06 ENCOUNTER — TELEPHONE (OUTPATIENT)
Dept: FAMILY MEDICINE CLINIC | Age: 44
End: 2019-03-06

## 2019-03-06 DIAGNOSIS — B37.9 YEAST INFECTION: ICD-10-CM

## 2019-03-06 RX ORDER — FLUCONAZOLE 150 MG/1
150 TABLET ORAL DAILY
Qty: 30 TABLET | Refills: 0 | Status: SHIPPED | OUTPATIENT
Start: 2019-03-06 | End: 2019-08-23 | Stop reason: SDUPTHER

## 2019-03-06 RX ORDER — NYSTATIN 100000 U/G
CREAM TOPICAL
Qty: 30 G | Refills: 0 | Status: SHIPPED | OUTPATIENT
Start: 2019-03-06 | End: 2019-12-30 | Stop reason: SDUPTHER

## 2019-03-26 ENCOUNTER — OFFICE VISIT (OUTPATIENT)
Dept: FAMILY MEDICINE CLINIC | Age: 44
End: 2019-03-26
Payer: COMMERCIAL

## 2019-03-26 VITALS
HEIGHT: 60 IN | TEMPERATURE: 97.9 F | DIASTOLIC BLOOD PRESSURE: 80 MMHG | SYSTOLIC BLOOD PRESSURE: 124 MMHG | WEIGHT: 227.8 LBS | HEART RATE: 115 BPM | BODY MASS INDEX: 44.72 KG/M2 | OXYGEN SATURATION: 99 % | RESPIRATION RATE: 16 BRPM

## 2019-03-26 DIAGNOSIS — G47.00 INSOMNIA, UNSPECIFIED TYPE: ICD-10-CM

## 2019-03-26 DIAGNOSIS — R94.6 BORDERLINE ABNORMAL TFTS: ICD-10-CM

## 2019-03-26 DIAGNOSIS — F33.1 MODERATE EPISODE OF RECURRENT MAJOR DEPRESSIVE DISORDER (HCC): Primary | ICD-10-CM

## 2019-03-26 DIAGNOSIS — E55.9 VITAMIN D DEFICIENCY: ICD-10-CM

## 2019-03-26 DIAGNOSIS — F41.9 ANXIETY: ICD-10-CM

## 2019-03-26 DIAGNOSIS — I10 ESSENTIAL HYPERTENSION: ICD-10-CM

## 2019-03-26 DIAGNOSIS — Z83.3 FAMILY HISTORY OF DIABETES MELLITUS: ICD-10-CM

## 2019-03-26 DIAGNOSIS — H04.123 BILATERAL DRY EYES: ICD-10-CM

## 2019-03-26 DIAGNOSIS — E66.01 CLASS 3 SEVERE OBESITY WITH BODY MASS INDEX (BMI) OF 40.0 TO 44.9 IN ADULT, UNSPECIFIED OBESITY TYPE, UNSPECIFIED WHETHER SERIOUS COMORBIDITY PRESENT (HCC): ICD-10-CM

## 2019-03-26 DIAGNOSIS — F42.9 OBSESSIVE-COMPULSIVE DISORDER, UNSPECIFIED TYPE: ICD-10-CM

## 2019-03-26 PROCEDURE — 99214 OFFICE O/P EST MOD 30 MIN: CPT | Performed by: NURSE PRACTITIONER

## 2019-03-26 RX ORDER — CYCLOSPORINE 0.5 MG/ML
1 EMULSION OPHTHALMIC 2 TIMES DAILY
Qty: 1 VIAL | Refills: 3 | Status: SHIPPED | OUTPATIENT
Start: 2019-03-26 | End: 2019-04-30 | Stop reason: ALTCHOICE

## 2019-03-26 RX ORDER — OLANZAPINE 5 MG/1
5 TABLET ORAL NIGHTLY
Qty: 30 TABLET | Refills: 2 | Status: SHIPPED | OUTPATIENT
Start: 2019-03-26 | End: 2019-03-26 | Stop reason: SDUPTHER

## 2019-03-26 RX ORDER — ZOLPIDEM TARTRATE 10 MG/1
TABLET ORAL
Qty: 30 TABLET | Refills: 2 | Status: SHIPPED | OUTPATIENT
Start: 2019-03-26 | End: 2019-04-30 | Stop reason: SDUPTHER

## 2019-03-26 RX ORDER — OLANZAPINE 10 MG/1
10 TABLET ORAL NIGHTLY
Qty: 30 TABLET | Refills: 2 | Status: SHIPPED | OUTPATIENT
Start: 2019-03-26 | End: 2019-04-30 | Stop reason: SDUPTHER

## 2019-03-26 RX ORDER — PHENTERMINE HYDROCHLORIDE 37.5 MG/1
37.5 CAPSULE ORAL EVERY MORNING
Qty: 30 CAPSULE | Refills: 0 | Status: CANCELLED | OUTPATIENT
Start: 2019-03-26 | End: 2019-04-25

## 2019-03-26 RX ORDER — ALPRAZOLAM 2 MG/1
TABLET ORAL
Qty: 30 TABLET | Refills: 2 | Status: SHIPPED | OUTPATIENT
Start: 2019-03-26 | End: 2019-04-30 | Stop reason: SDUPTHER

## 2019-04-25 RX ORDER — DESVENLAFAXINE 50 MG/1
50 TABLET, EXTENDED RELEASE ORAL DAILY
Qty: 30 TABLET | Refills: 5 | Status: SHIPPED | OUTPATIENT
Start: 2019-04-25 | End: 2019-04-30 | Stop reason: SDUPTHER

## 2019-04-25 NOTE — TELEPHONE ENCOUNTER
Pharmacy requesting medication refill.  Please approve or deny this request.    Rx requested:  Requested Prescriptions     Pending Prescriptions Disp Refills    desvenlafaxine succinate (PRISTIQ) 50 MG TB24 extended release tablet [Pharmacy Med Name: DESVENLAFAXINE ER SUCCINATE 50MG T] 30 tablet 5     Sig: TAKE 1 TABLET BY MOUTH DAILY         Last Office Visit:   3/26/2019      Next Visit Date:  Future Appointments   Date Time Provider [place]   4/30/2019  2:00 PM Para Downs, [address]

## 2019-04-30 ENCOUNTER — OFFICE VISIT (OUTPATIENT)
Dept: FAMILY MEDICINE CLINIC | Age: 44
End: 2019-04-30
Payer: COMMERCIAL

## 2019-04-30 VITALS
RESPIRATION RATE: 14 BRPM | SYSTOLIC BLOOD PRESSURE: 128 MMHG | OXYGEN SATURATION: 97 % | TEMPERATURE: 98.2 F | HEART RATE: 102 BPM | WEIGHT: 239 LBS | BODY MASS INDEX: 46.92 KG/M2 | HEIGHT: 60 IN | DIASTOLIC BLOOD PRESSURE: 80 MMHG

## 2019-04-30 DIAGNOSIS — F41.9 ANXIETY: ICD-10-CM

## 2019-04-30 DIAGNOSIS — R60.1 GENERALIZED EDEMA: ICD-10-CM

## 2019-04-30 DIAGNOSIS — H04.129 DRY EYE: ICD-10-CM

## 2019-04-30 DIAGNOSIS — E55.9 VITAMIN D DEFICIENCY: ICD-10-CM

## 2019-04-30 DIAGNOSIS — G47.00 INSOMNIA, UNSPECIFIED TYPE: ICD-10-CM

## 2019-04-30 DIAGNOSIS — F33.1 MODERATE EPISODE OF RECURRENT MAJOR DEPRESSIVE DISORDER (HCC): Primary | ICD-10-CM

## 2019-04-30 DIAGNOSIS — F43.0 STRESS REACTION: ICD-10-CM

## 2019-04-30 DIAGNOSIS — G43.911 INTRACTABLE MIGRAINE WITH STATUS MIGRAINOSUS, UNSPECIFIED MIGRAINE TYPE: ICD-10-CM

## 2019-04-30 DIAGNOSIS — M54.50 ACUTE BILATERAL LOW BACK PAIN WITHOUT SCIATICA: ICD-10-CM

## 2019-04-30 PROCEDURE — 99214 OFFICE O/P EST MOD 30 MIN: CPT | Performed by: NURSE PRACTITIONER

## 2019-04-30 PROCEDURE — 96372 THER/PROPH/DIAG INJ SC/IM: CPT | Performed by: NURSE PRACTITIONER

## 2019-04-30 RX ORDER — TIZANIDINE 4 MG/1
4 TABLET ORAL 2 TIMES DAILY PRN
Qty: 60 TABLET | Refills: 3 | Status: SHIPPED | OUTPATIENT
Start: 2019-04-30 | End: 2019-11-11 | Stop reason: SDUPTHER

## 2019-04-30 RX ORDER — DESVENLAFAXINE 50 MG/1
50 TABLET, EXTENDED RELEASE ORAL DAILY
Qty: 30 TABLET | Refills: 5 | Status: SHIPPED | OUTPATIENT
Start: 2019-04-30 | End: 2019-04-30 | Stop reason: SDUPTHER

## 2019-04-30 RX ORDER — ERGOCALCIFEROL 1.25 MG/1
50000 CAPSULE ORAL WEEKLY
Qty: 12 CAPSULE | Refills: 1 | Status: SHIPPED | OUTPATIENT
Start: 2019-04-30 | End: 2019-08-23 | Stop reason: SDUPTHER

## 2019-04-30 RX ORDER — OLANZAPINE 10 MG/1
10 TABLET ORAL NIGHTLY
Qty: 30 TABLET | Refills: 2 | Status: SHIPPED | OUTPATIENT
Start: 2019-04-30 | End: 2019-08-23 | Stop reason: ALTCHOICE

## 2019-04-30 RX ORDER — KETOROLAC TROMETHAMINE 30 MG/ML
60 INJECTION, SOLUTION INTRAMUSCULAR; INTRAVENOUS ONCE
Status: COMPLETED | OUTPATIENT
Start: 2019-04-30 | End: 2019-04-30

## 2019-04-30 RX ORDER — KETOROLAC TROMETHAMINE 10 MG/1
10 TABLET, FILM COATED ORAL EVERY 6 HOURS PRN
Qty: 20 TABLET | Refills: 0 | Status: SHIPPED | OUTPATIENT
Start: 2019-04-30 | End: 2019-08-23 | Stop reason: SDUPTHER

## 2019-04-30 RX ORDER — ALPRAZOLAM 2 MG/1
TABLET ORAL
Qty: 30 TABLET | Refills: 2 | Status: SHIPPED | OUTPATIENT
Start: 2019-04-30 | End: 2019-08-23 | Stop reason: SDUPTHER

## 2019-04-30 RX ORDER — DESVENLAFAXINE 100 MG/1
100 TABLET, EXTENDED RELEASE ORAL DAILY
Qty: 30 TABLET | Refills: 2 | Status: SHIPPED | OUTPATIENT
Start: 2019-04-30 | End: 2019-08-23 | Stop reason: ALTCHOICE

## 2019-04-30 RX ORDER — ZOLPIDEM TARTRATE 10 MG/1
TABLET ORAL
Qty: 30 TABLET | Refills: 2 | Status: SHIPPED | OUTPATIENT
Start: 2019-04-30 | End: 2019-08-23 | Stop reason: SDUPTHER

## 2019-04-30 RX ORDER — POTASSIUM CHLORIDE 20 MEQ/1
20 TABLET, EXTENDED RELEASE ORAL DAILY
Qty: 5 TABLET | Refills: 0 | Status: SHIPPED | OUTPATIENT
Start: 2019-04-30 | End: 2019-08-23 | Stop reason: ALTCHOICE

## 2019-04-30 RX ORDER — ELETRIPTAN HYDROBROMIDE 20 MG/1
20 TABLET, FILM COATED ORAL
Qty: 18 TABLET | Refills: 3 | Status: SHIPPED | OUTPATIENT
Start: 2019-04-30 | End: 2019-12-30 | Stop reason: SDUPTHER

## 2019-04-30 RX ORDER — HYDROXYZINE PAMOATE 50 MG/1
50 CAPSULE ORAL NIGHTLY PRN
Qty: 30 CAPSULE | Refills: 1 | Status: SHIPPED | OUTPATIENT
Start: 2019-04-30 | End: 2019-08-23 | Stop reason: ALTCHOICE

## 2019-04-30 RX ORDER — FUROSEMIDE 20 MG/1
20 TABLET ORAL DAILY
Qty: 5 TABLET | Refills: 0 | Status: SHIPPED | OUTPATIENT
Start: 2019-04-30 | End: 2019-08-23 | Stop reason: ALTCHOICE

## 2019-04-30 RX ADMIN — KETOROLAC TROMETHAMINE 60 MG: 30 INJECTION, SOLUTION INTRAMUSCULAR; INTRAVENOUS at 14:57

## 2019-04-30 NOTE — PROGRESS NOTES
edema: She states that since traveling back from [facility] she has had swelling in her legs feet arms and hands. She has never had this before. No swelling around the airway her mouth. No facial swelling. No difficulty breathing or difficulty with lying down. Swelling improves a little bit with elevating of the lower extremities but does not completely go away. She states that her diet might have been a little different well on medication but nothing significant. She has not had any redness or pain to the legs or arms. ROS: This patient reports no chest pains or pressure. There is no shortness of breath or chest wall soreness. EXAM:  Constitutional Blood pressure 128/80, pulse 102, temperature 98.2 °F (36.8 °C), temperature source Temporal, resp. rate 14, height 5' (1.524 m), weight 239 lb (108.4 kg), last menstrual period 06/15/2015, SpO2 97 %, not currently breastfeeding. ,normal affect, no acute distress, appears well developed and well nourished. Lungs are clear with equal breath sounds. Chest wall is not tender. Heart is in a regular rhythm with normal rate and no murmurs, rubs, or gallops. The four extremities are trace-1+ pitting edema. Abdomen is soft and non tender. No masses, guarding or rebound noted. Neck is supple. No adenopathy. DIAGNOSIS:    Diagnosis Orders   1. Moderate episode of recurrent major depressive disorder (HCC)  OLANZapine (ZYPREXA) 10 MG tablet   2. Anxiety  ALPRAZolam (XANAX) 2 MG tablet   3. Insomnia, unspecified type  zolpidem (AMBIEN) 10 MG tablet    OLANZapine (ZYPREXA) 10 MG tablet   4. Stress reaction  hydrOXYzine (VISTARIL) 50 MG capsule   5. Vitamin D deficiency  vitamin D (ERGOCALCIFEROL) 67446 units CAPS capsule   6. Intractable migraine with status migrainosus, unspecified migraine type  eletriptan (RELPAX) 20 MG tablet   7. Generalized edema  furosemide (LASIX) 20 MG tablet    potassium chloride (KLOR-CON M) 20 MEQ extended release tablet   8. Acute bilateral low back pain without sciatica  ketorolac (TORADOL) injection 60 mg   9. Dry eye  fluorometholone (FML) 0.25 % ophthalmic suspension         PLAN: Include orders in the DX section. Follow up: 1 month and as needed. Blood work one week prior as ordered. 1.  2.  3.  4.  Continue current medications at current doses other than increasing Pristiq to 100 mg. Prescription sent. 5.  Recommend rechecking vitamin D with next lab. Previous lab orders given to patient. 6.  Migraine symptoms have been much better lately. She wants to stop the lisinopril. States that she started in the past because it helped her headaches a lot. She would prefer the Relpax over Imitrex. 7.  We'll try 5 days of Lasix and potassium supplement. We'll readdress at next visit if still present. 8.  Toradol injection given as well as short-term oral Toradol. She is advised to avoid taking any other anti-inflammatories while taking the Toradol. She states that she has tolerated this medication in the past.  She knows some back stretching exercises that she plans to do at home. Can address at next visit as well. 9.  Eyedrops sent for chronic dry eye symptoms. Can no longer afford her stasis which was originally ordered by eye doctor. Controlled Substances Monitoring:     RX Monitoring 4/30/2019   Attestation The Prescription Monitoring Report for this patient was reviewed today. Chronic Pain Routine Monitoring No signs of potential drug abuse or diversion identified: otherwise, see note documentation   Chronic Pain > 80 MEDD Obtained or confirmed a written medication contract was on file. Please note this report has been partially produced using speech recognition software and may cause contain errors related to that system including grammar, punctuation and spelling as well as words and phrases that may seem inappropriate.  If there are questions or concerns please feel free to contact me to clarify.         Electronically signed by Santhosh SAHNIQOJANET, 3:54 PM 4/30/19

## 2019-05-22 ENCOUNTER — APPOINTMENT (OUTPATIENT)
Dept: GENERAL RADIOLOGY | Age: 44
End: 2019-05-22

## 2019-05-22 ENCOUNTER — HOSPITAL ENCOUNTER (EMERGENCY)
Age: 44
Discharge: HOME | End: 2019-05-22
Attending: EMERGENCY MEDICINE

## 2019-05-22 VITALS
HEART RATE: 97 BPM | WEIGHT: 220 LBS | DIASTOLIC BLOOD PRESSURE: 83 MMHG | HEIGHT: 59 IN | RESPIRATION RATE: 18 BRPM | OXYGEN SATURATION: 98 % | TEMPERATURE: 98.7 F | BODY MASS INDEX: 44.35 KG/M2 | SYSTOLIC BLOOD PRESSURE: 149 MMHG

## 2019-05-22 DIAGNOSIS — S62.666A CLOSED NONDISPLACED FRACTURE OF DISTAL PHALANX OF RIGHT LITTLE FINGER, INITIAL ENCOUNTER: Primary | ICD-10-CM

## 2019-05-22 PROCEDURE — 29130 APPL FINGER SPLINT STATIC: CPT

## 2019-05-22 PROCEDURE — 99283 EMERGENCY DEPT VISIT LOW MDM: CPT

## 2019-05-22 PROCEDURE — 6370000000 HC RX 637 (ALT 250 FOR IP): Performed by: EMERGENCY MEDICINE

## 2019-05-22 PROCEDURE — 73140 X-RAY EXAM OF FINGER(S): CPT

## 2019-05-22 RX ORDER — HYDROCODONE BITARTRATE AND ACETAMINOPHEN 5; 325 MG/1; MG/1
1 TABLET ORAL EVERY 6 HOURS PRN
Qty: 10 TABLET | Refills: 0 | Status: SHIPPED | OUTPATIENT
Start: 2019-05-22 | End: 2019-05-25

## 2019-05-22 RX ORDER — HYDROCODONE BITARTRATE AND ACETAMINOPHEN 5; 325 MG/1; MG/1
1 TABLET ORAL ONCE
Status: COMPLETED | OUTPATIENT
Start: 2019-05-22 | End: 2019-05-22

## 2019-05-22 RX ADMIN — HYDROCODONE BITARTRATE AND ACETAMINOPHEN 1 TABLET: 5; 325 TABLET ORAL at 20:03

## 2019-05-22 ASSESSMENT — PAIN DESCRIPTION - LOCATION: LOCATION: FINGER (COMMENT WHICH ONE)

## 2019-05-22 ASSESSMENT — PAIN SCALES - GENERAL: PAINLEVEL_OUTOF10: 7

## 2019-05-22 ASSESSMENT — PAIN DESCRIPTION - ORIENTATION: ORIENTATION: RIGHT

## 2019-05-22 NOTE — ED PROVIDER NOTES
[facility] ED  eMERGENCY dEPARTMENT eNCOUnter      Pt Name: Mathieu Carrillo  MRN: [id number]  [place] 1975  Date of evaluation: 5/22/2019  Provider: Mercy Currie MD    CHIEF COMPLAINT       Chief Complaint   Patient presents with    Hand Injury     Smashed in door at noon today         HISTORY OF PRESENT ILLNESS   (Location/Symptom, Timing/Onset,Context/Setting, Quality, Duration, Modifying Factors, Severity)  Note limiting factors. Mathieu Carrillo is a 44 y.o. female who presents to the emergency department who slammed her right fifth digit into the house door. She has pain distally, right fifth. There is no other injury. She is not a diabetic not a smoker injury occurred earlier today, 2 or 3 hours ago     HPI    NursingNotes were reviewed. REVIEW OF SYSTEMS    (2-9 systems for level 4, 10 or more for level 5)     Review of Systems   Constitutional symptoms:  no Fatigue, no fever, no chills. Skin symptoms:  Negative except as documented in HPI. ENMT symptoms:  Negative except as documented in HPI. Respiratory symptoms:  Negative except as documented in HPI. Cardiovascular symptoms:  Negative except as documented in HPI. Gastrointestinal symptoms: Negative except for documented as above in the HPI   Genitourinary symptoms:  Negative except as documented in HPI. Musculoskeletal symptoms:  Negative except as documented in HPI. Right fifth digit injury  Neurologic symptoms:  Negative except as documented in HPI. Remainder of 10 systems, all negative except for mentioned above      Except as noted above the remainder of the review of systems was reviewed and negative.        PAST MEDICAL HISTORY     Past Medical History:   Diagnosis Date    Anxiety and depression     Anxiety and depression     Asthma     Essential hypertension 5/18/2017    Headache(784.0)     Obesity     PTSD (post-traumatic stress disorder)     Urinary incontinence          SURGICALHISTORY       Past deterioration in the patient's condition which required my urgent intervention. CONSULTS:  None    PROCEDURES:  Unless otherwise noted below, none     Procedures    FINAL IMPRESSION      1. Closed nondisplaced fracture of distal phalanx of right little finger, initial encounter          DISPOSITION/PLAN   DISPOSITION Decision To Discharge 05/22/2019 07:54:55 PM      PATIENT REFERRED TO:  Gregory Thomas MD  [address] [id number] [phone]    In 2 days        DISCHARGE MEDICATIONS:  New Prescriptions    HYDROCODONE-ACETAMINOPHEN (NORCO) 5-325 MG PER TABLET    Take 1 tablet by mouth every 6 hours as needed for Pain for up to 3 days.           (Please note that portions of this note were completed with a voice recognition program.  Efforts were made to edit the dictations but occasionally words are mis-transcribed.)    Lissy Lopez MD (electronically signed)  Attending Emergency Physician          Lissy Lopez MD  05/22/19 2527

## 2019-05-23 NOTE — PROGRESS NOTES
Discharge instructions reviewed with pt. Pt confirmed understanding with no further questions asked. Script for Standard [place] given to pt. No further complaints voiced. Pt discharged with daughter and grandchildren. Pt shows no s/s of distress.

## 2019-05-23 NOTE — PROGRESS NOTES
Splint applied to right 5th digit. Pt medicated before and tolerated well. No further complaints voiced.

## 2019-08-14 ENCOUNTER — APPOINTMENT (OUTPATIENT)
Dept: GENERAL RADIOLOGY | Age: 44
End: 2019-08-14

## 2019-08-14 ENCOUNTER — HOSPITAL ENCOUNTER (EMERGENCY)
Age: 44
Discharge: HOME | End: 2019-08-14
Attending: EMERGENCY MEDICINE

## 2019-08-14 VITALS
TEMPERATURE: 98.7 F | SYSTOLIC BLOOD PRESSURE: 143 MMHG | RESPIRATION RATE: 20 BRPM | BODY MASS INDEX: 45.16 KG/M2 | HEART RATE: 85 BPM | DIASTOLIC BLOOD PRESSURE: 81 MMHG | WEIGHT: 230 LBS | OXYGEN SATURATION: 97 % | HEIGHT: 60 IN

## 2019-08-14 DIAGNOSIS — R05.9 COUGH: Primary | ICD-10-CM

## 2019-08-14 DIAGNOSIS — J20.9 BRONCHITIS WITH BRONCHOSPASM: ICD-10-CM

## 2019-08-14 LAB
ALBUMIN SERPL-MCNC: 4 G/DL (ref 3.5–4.6)
ALP BLD-CCNC: 98 U/L (ref 40–130)
ALT SERPL-CCNC: 15 U/L (ref 0–33)
ANION GAP SERPL CALCULATED.3IONS-SCNC: 13 MEQ/L (ref 9–15)
AST SERPL-CCNC: 15 U/L (ref 0–35)
BASOPHILS ABSOLUTE: 0.1 K/UL (ref 0–0.2)
BASOPHILS RELATIVE PERCENT: 0.4 %
BILIRUB SERPL-MCNC: 0.4 MG/DL (ref 0.2–0.7)
BUN BLDV-MCNC: 8 MG/DL (ref 6–20)
CALCIUM SERPL-MCNC: 9.1 MG/DL (ref 8.5–9.9)
CHLORIDE BLD-SCNC: 100 MEQ/L (ref 95–107)
CO2: 25 MEQ/L (ref 20–31)
CREAT SERPL-MCNC: 0.78 MG/DL (ref 0.5–0.9)
D DIMER: 0.45 MG/L FEU (ref 0–0.5)
EOSINOPHILS ABSOLUTE: 0.2 K/UL (ref 0–0.7)
EOSINOPHILS RELATIVE PERCENT: 1.7 %
GFR AFRICAN AMERICAN: >60
GFR NON-AFRICAN AMERICAN: >60
GLOBULIN: 4 G/DL (ref 2.3–3.5)
GLUCOSE BLD-MCNC: 130 MG/DL (ref 70–99)
HCT VFR BLD CALC: 41.9 % (ref 37–47)
HEMOGLOBIN: 13.7 G/DL (ref 12–16)
INR BLD: 1
LYMPHOCYTES ABSOLUTE: 3.5 K/UL (ref 1–4.8)
LYMPHOCYTES RELATIVE PERCENT: 26.9 %
MAGNESIUM: 2 MG/DL (ref 1.7–2.4)
MCH RBC QN AUTO: 28.3 PG (ref 27–31.3)
MCHC RBC AUTO-ENTMCNC: 32.7 % (ref 33–37)
MCV RBC AUTO: 86.6 FL (ref 82–100)
MONOCYTES ABSOLUTE: 0.6 K/UL (ref 0.2–0.8)
MONOCYTES RELATIVE PERCENT: 4.9 %
NEUTROPHILS ABSOLUTE: 8.5 K/UL (ref 1.4–6.5)
NEUTROPHILS RELATIVE PERCENT: 66.1 %
PDW BLD-RTO: 13.7 % (ref 11.5–14.5)
PLATELET # BLD: 328 K/UL (ref 130–400)
POTASSIUM SERPL-SCNC: 3.7 MEQ/L (ref 3.4–4.9)
PROTHROMBIN TIME: 13.4 SEC (ref 12.3–14.9)
RBC # BLD: 4.83 M/UL (ref 4.2–5.4)
S PYO AG THROAT QL: NEGATIVE
SODIUM BLD-SCNC: 138 MEQ/L (ref 135–144)
TOTAL PROTEIN: 8 G/DL (ref 6.3–8)
WBC # BLD: 12.9 K/UL (ref 4.8–10.8)

## 2019-08-14 PROCEDURE — 96375 TX/PRO/DX INJ NEW DRUG ADDON: CPT

## 2019-08-14 PROCEDURE — 2580000003 HC RX 258: Performed by: EMERGENCY MEDICINE

## 2019-08-14 PROCEDURE — 83735 ASSAY OF MAGNESIUM: CPT

## 2019-08-14 PROCEDURE — 85610 PROTHROMBIN TIME: CPT

## 2019-08-14 PROCEDURE — 71046 X-RAY EXAM CHEST 2 VIEWS: CPT

## 2019-08-14 PROCEDURE — 80053 COMPREHEN METABOLIC PANEL: CPT

## 2019-08-14 PROCEDURE — 6360000002 HC RX W HCPCS: Performed by: EMERGENCY MEDICINE

## 2019-08-14 PROCEDURE — 85379 FIBRIN DEGRADATION QUANT: CPT

## 2019-08-14 PROCEDURE — 6370000000 HC RX 637 (ALT 250 FOR IP): Performed by: EMERGENCY MEDICINE

## 2019-08-14 PROCEDURE — 87880 STREP A ASSAY W/OPTIC: CPT

## 2019-08-14 PROCEDURE — 99283 EMERGENCY DEPT VISIT LOW MDM: CPT

## 2019-08-14 PROCEDURE — 85025 COMPLETE CBC W/AUTO DIFF WBC: CPT

## 2019-08-14 PROCEDURE — 96365 THER/PROPH/DIAG IV INF INIT: CPT

## 2019-08-14 PROCEDURE — 87081 CULTURE SCREEN ONLY: CPT

## 2019-08-14 PROCEDURE — 87040 BLOOD CULTURE FOR BACTERIA: CPT

## 2019-08-14 PROCEDURE — 94640 AIRWAY INHALATION TREATMENT: CPT

## 2019-08-14 RX ORDER — METHYLPREDNISOLONE SODIUM SUCCINATE 125 MG/2ML
125 INJECTION, POWDER, LYOPHILIZED, FOR SOLUTION INTRAMUSCULAR; INTRAVENOUS ONCE
Status: COMPLETED | OUTPATIENT
Start: 2019-08-14 | End: 2019-08-14

## 2019-08-14 RX ORDER — AZITHROMYCIN 250 MG/1
TABLET, FILM COATED ORAL
Qty: 6 TABLET | Refills: 0 | Status: SHIPPED | OUTPATIENT
Start: 2019-08-14 | End: 2019-08-23 | Stop reason: ALTCHOICE

## 2019-08-14 RX ORDER — IPRATROPIUM BROMIDE AND ALBUTEROL SULFATE 2.5; .5 MG/3ML; MG/3ML
1 SOLUTION RESPIRATORY (INHALATION) ONCE
Status: COMPLETED | OUTPATIENT
Start: 2019-08-14 | End: 2019-08-14

## 2019-08-14 RX ORDER — ZOLPIDEM TARTRATE 10 MG/1
TABLET ORAL NIGHTLY PRN
COMMUNITY
End: 2019-09-23 | Stop reason: SDUPTHER

## 2019-08-14 RX ORDER — SODIUM CHLORIDE 0.9 % (FLUSH) 0.9 %
3 SYRINGE (ML) INJECTION EVERY 8 HOURS
Status: DISCONTINUED | OUTPATIENT
Start: 2019-08-14 | End: 2019-08-14 | Stop reason: HOSPADM

## 2019-08-14 RX ORDER — ONDANSETRON 2 MG/ML
4 INJECTION INTRAMUSCULAR; INTRAVENOUS ONCE
Status: COMPLETED | OUTPATIENT
Start: 2019-08-14 | End: 2019-08-14

## 2019-08-14 RX ORDER — PREDNISONE 20 MG/1
40 TABLET ORAL DAILY
Qty: 10 TABLET | Refills: 0 | Status: SHIPPED | OUTPATIENT
Start: 2019-08-14 | End: 2019-08-19

## 2019-08-14 RX ORDER — KETOROLAC TROMETHAMINE 30 MG/ML
30 INJECTION, SOLUTION INTRAMUSCULAR; INTRAVENOUS ONCE
Status: COMPLETED | OUTPATIENT
Start: 2019-08-14 | End: 2019-08-14

## 2019-08-14 RX ORDER — MORPHINE SULFATE 4 MG/ML
4 INJECTION, SOLUTION INTRAMUSCULAR; INTRAVENOUS ONCE
Status: DISCONTINUED | OUTPATIENT
Start: 2019-08-14 | End: 2019-08-14

## 2019-08-14 RX ORDER — GUAIFENESIN AND CODEINE PHOSPHATE 100; 10 MG/5ML; MG/5ML
10 SOLUTION ORAL NIGHTLY PRN
Qty: 100 ML | Refills: 0 | Status: SHIPPED | OUTPATIENT
Start: 2019-08-14 | End: 2019-08-21

## 2019-08-14 RX ADMIN — IPRATROPIUM BROMIDE AND ALBUTEROL SULFATE 1 AMPULE: .5; 3 SOLUTION RESPIRATORY (INHALATION) at 19:18

## 2019-08-14 RX ADMIN — AZITHROMYCIN MONOHYDRATE 500 MG: 500 INJECTION, POWDER, LYOPHILIZED, FOR SOLUTION INTRAVENOUS at 19:50

## 2019-08-14 RX ADMIN — Medication 3 ML: at 19:20

## 2019-08-14 RX ADMIN — KETOROLAC TROMETHAMINE 30 MG: 30 INJECTION, SOLUTION INTRAMUSCULAR at 19:50

## 2019-08-14 RX ADMIN — METHYLPREDNISOLONE SODIUM SUCCINATE 125 MG: 125 INJECTION, POWDER, FOR SOLUTION INTRAMUSCULAR; INTRAVENOUS at 19:20

## 2019-08-14 RX ADMIN — ONDANSETRON 4 MG: 2 INJECTION INTRAMUSCULAR; INTRAVENOUS at 19:50

## 2019-08-14 ASSESSMENT — ENCOUNTER SYMPTOMS
CONSTIPATION: 0
BACK PAIN: 0
RHINORRHEA: 1
VOICE CHANGE: 0
VOMITING: 0
CHEST TIGHTNESS: 0
FACIAL SWELLING: 0
EYE REDNESS: 0
DIARRHEA: 0
TROUBLE SWALLOWING: 0
EYE DISCHARGE: 0
COUGH: 1
SINUS PRESSURE: 0
CHOKING: 0
SINUS PAIN: 1
BLOOD IN STOOL: 0
WHEEZING: 1
ABDOMINAL PAIN: 0
EYE PAIN: 0
SORE THROAT: 1
STRIDOR: 0
SHORTNESS OF BREATH: 0

## 2019-08-14 ASSESSMENT — PAIN DESCRIPTION - PROGRESSION: CLINICAL_PROGRESSION: GRADUALLY IMPROVING

## 2019-08-14 ASSESSMENT — PAIN DESCRIPTION - LOCATION
LOCATION: HEAD
LOCATION: HEAD

## 2019-08-14 ASSESSMENT — PAIN DESCRIPTION - DESCRIPTORS
DESCRIPTORS: DISCOMFORT;HEADACHE
DESCRIPTORS: DISCOMFORT

## 2019-08-14 ASSESSMENT — PAIN SCALES - GENERAL
PAINLEVEL_OUTOF10: 8
PAINLEVEL_OUTOF10: 4

## 2019-08-14 ASSESSMENT — PAIN - FUNCTIONAL ASSESSMENT
PAIN_FUNCTIONAL_ASSESSMENT: ACTIVITIES ARE NOT PREVENTED
PAIN_FUNCTIONAL_ASSESSMENT: 0-10
PAIN_FUNCTIONAL_ASSESSMENT: ACTIVITIES ARE NOT PREVENTED

## 2019-08-14 NOTE — ED TRIAGE NOTES
PAtient has had a coughfor a week. Harsh and productive Patient has some yellow and gray mucus. Patient states now she has coughed up some blood. . Patient has a right upper rales. Patient had a left expiratory wheeze that clears when she coughs.

## 2019-08-14 NOTE — ED PROVIDER NOTES
with the below findings:        Interpretation per the Radiologist below, if available at the time ofthis note:    XR CHEST STANDARD (2 VW)    (Results Pending)         ED BEDSIDE ULTRASOUND:   Performed by ED Physician - none    LABS:  Labs Reviewed   COMPREHENSIVE METABOLIC PANEL - Abnormal; Notable for the following components:       Result Value    Glucose 130 (*)     Globulin 4.0 (*)     All other components within normal limits   CBC WITH AUTO DIFFERENTIAL - Abnormal; Notable for the following components:    WBC 12.9 (*)     MCHC 32.7 (*)     Neutrophils # 8.5 (*)     All other components within normal limits   RAPID STREP SCREEN   CULTURE BLOOD #1   CULTURE BLOOD #2   MAGNESIUM   D-DIMER, QUANTITATIVE   PROTIME-INR   CULTURE BETA STREP CONFIRM PLATE       All other labs were within normal range or not returned as of this dictation. EMERGENCY DEPARTMENT COURSE and DIFFERENTIAL DIAGNOSIS/MDM:   Vitals:    Vitals:    08/14/19 1846   BP: (!) 143/81   Pulse: 97   Resp: 16   Temp: 98.7 °F (37.1 °C)   TempSrc: Oral   SpO2: 96%   Weight: 230 lb (104.3 kg)   Height: 4' 11.5\" (1.511 m)           MDM  Number of Diagnoses or Management Options  Bronchitis with bronchospasm: established and improving  Cough: established and improving  Diagnosis management comments: Patient was better after  treatment and IV steroid lungs clear topics in full sentences, d-dimer is negative patient advised to close follow up with [facility] return to the emergency room today she did worse patient understood very well will be started with a steroid and Z-Ye patient has also treatment at home has frequent every 4-6 hourly as needed       Amount and/or Complexity of Data Reviewed  Clinical lab tests: ordered and reviewed  Tests in the radiology section of CPT®: ordered and reviewed        CRITICAL CARE TIME   Total Critical Care time was  minutes, excluding separately reportableprocedures.   There was a high probability of clinicallysignificant/life threatening deterioration in the patient's condition which required my urgent intervention. ONSULTS:  None    PROCEDURES:  Unless otherwise noted below, none     Procedures    FINAL IMPRESSION      1. Cough    2. Bronchitis with bronchospasm          DISPOSITION/PLAN   DISPOSITION        PATIENT REFERRED TO:  ANA Mendoza - [place]  [address], [address] [facility] [place]  [phone]    In 2 days        DISCHARGE MEDICATIONS:  New Prescriptions    AZITHROMYCIN (ZITHROMAX) 250 MG TABLET    2 TABS DAY 1 THEN 1 TAB DAYS 2-5    GUAIFENESIN-CODEINE (TUSSI-ORGANIDIN NR) 100-10 MG/5ML SYRUP    Take 10 mLs by mouth nightly as needed for Cough for up to 7 days.     PREDNISONE (DELTASONE) 20 MG TABLET    Take 2 tablets by mouth daily for 5 doses          (Please note that portions of this note were completed with a voice recognition program.  Efforts were made to edit the dictations but occasionally words are mis-transcribed.)    Clearance MD Tori (electronically signed)  Attending Emergency Physician       Clearance MD Tori  08/14/19 2005       Clearance MD Tori  08/14/19 2010

## 2019-08-17 LAB — S PYO THROAT QL CULT: NORMAL

## 2019-08-19 LAB
BLOOD CULTURE, ROUTINE: NORMAL
CULTURE, BLOOD 2: NORMAL

## 2019-08-23 ENCOUNTER — TELEPHONE (OUTPATIENT)
Dept: FAMILY MEDICINE CLINIC | Age: 44
End: 2019-08-23

## 2019-08-23 ENCOUNTER — HOSPITAL ENCOUNTER (OUTPATIENT)
Dept: GENERAL RADIOLOGY | Age: 44
Discharge: HOME | End: 2019-08-25

## 2019-08-23 ENCOUNTER — OFFICE VISIT (OUTPATIENT)
Dept: FAMILY MEDICINE CLINIC | Age: 44
End: 2019-08-23
Payer: COMMERCIAL

## 2019-08-23 ENCOUNTER — HOSPITAL ENCOUNTER (OUTPATIENT)
Age: 44
Discharge: HOME | End: 2019-08-25

## 2019-08-23 VITALS
OXYGEN SATURATION: 97 % | RESPIRATION RATE: 20 BRPM | TEMPERATURE: 97.4 F | HEIGHT: 60 IN | BODY MASS INDEX: 45.35 KG/M2 | WEIGHT: 231 LBS | HEART RATE: 108 BPM | DIASTOLIC BLOOD PRESSURE: 80 MMHG | SYSTOLIC BLOOD PRESSURE: 138 MMHG

## 2019-08-23 DIAGNOSIS — J20.9 ACUTE BRONCHITIS, UNSPECIFIED ORGANISM: ICD-10-CM

## 2019-08-23 DIAGNOSIS — I10 ESSENTIAL HYPERTENSION: ICD-10-CM

## 2019-08-23 DIAGNOSIS — B37.9 YEAST INFECTION: ICD-10-CM

## 2019-08-23 DIAGNOSIS — F41.9 ANXIETY: ICD-10-CM

## 2019-08-23 DIAGNOSIS — F33.3 SEVERE RECURRENT MAJOR DEPRESSIVE DISORDER WITH PSYCHOTIC FEATURES (HCC): Primary | ICD-10-CM

## 2019-08-23 DIAGNOSIS — E55.9 VITAMIN D DEFICIENCY: ICD-10-CM

## 2019-08-23 DIAGNOSIS — G47.00 INSOMNIA, UNSPECIFIED TYPE: ICD-10-CM

## 2019-08-23 PROCEDURE — 99214 OFFICE O/P EST MOD 30 MIN: CPT | Performed by: NURSE PRACTITIONER

## 2019-08-23 PROCEDURE — 96372 THER/PROPH/DIAG INJ SC/IM: CPT | Performed by: NURSE PRACTITIONER

## 2019-08-23 PROCEDURE — 71046 X-RAY EXAM CHEST 2 VIEWS: CPT

## 2019-08-23 RX ORDER — ZOLPIDEM TARTRATE 10 MG/1
TABLET ORAL
Qty: 30 TABLET | Refills: 2 | Status: SHIPPED | OUTPATIENT
Start: 2019-08-23 | End: 2019-09-23 | Stop reason: SDUPTHER

## 2019-08-23 RX ORDER — KETOROLAC TROMETHAMINE 10 MG/1
10 TABLET, FILM COATED ORAL EVERY 6 HOURS PRN
Qty: 20 TABLET | Refills: 0 | Status: SHIPPED | OUTPATIENT
Start: 2019-08-23 | End: 2019-10-21 | Stop reason: SDUPTHER

## 2019-08-23 RX ORDER — LEVOFLOXACIN 500 MG/1
500 TABLET, FILM COATED ORAL DAILY
Qty: 7 TABLET | Refills: 0 | Status: SHIPPED | OUTPATIENT
Start: 2019-08-23 | End: 2019-08-30

## 2019-08-23 RX ORDER — ALPRAZOLAM 2 MG/1
TABLET ORAL
Qty: 30 TABLET | Refills: 2 | Status: SHIPPED | OUTPATIENT
Start: 2019-08-23 | End: 2019-09-23 | Stop reason: SDUPTHER

## 2019-08-23 RX ORDER — FLUCONAZOLE 150 MG/1
150 TABLET ORAL DAILY
Qty: 30 TABLET | Refills: 0 | Status: SHIPPED | OUTPATIENT
Start: 2019-08-23 | End: 2019-12-30 | Stop reason: SDUPTHER

## 2019-08-23 RX ORDER — CEFTRIAXONE 500 MG/1
500 INJECTION, POWDER, FOR SOLUTION INTRAMUSCULAR; INTRAVENOUS ONCE
Status: COMPLETED | OUTPATIENT
Start: 2019-08-23 | End: 2019-08-23

## 2019-08-23 RX ORDER — ERGOCALCIFEROL 1.25 MG/1
50000 CAPSULE ORAL WEEKLY
Qty: 12 CAPSULE | Refills: 1 | Status: SHIPPED | OUTPATIENT
Start: 2019-08-23 | End: 2019-12-30 | Stop reason: SDUPTHER

## 2019-08-23 RX ORDER — ALPRAZOLAM 2 MG/1
2 TABLET ORAL 2 TIMES DAILY PRN
COMMUNITY
End: 2019-09-23 | Stop reason: SDUPTHER

## 2019-08-23 RX ORDER — PREDNISONE 20 MG/1
TABLET ORAL
Qty: 20 TABLET | Refills: 0 | Status: SHIPPED | OUTPATIENT
Start: 2019-08-23 | End: 2019-09-02

## 2019-08-23 RX ADMIN — CEFTRIAXONE 500 MG: 500 INJECTION, POWDER, FOR SOLUTION INTRAMUSCULAR; INTRAVENOUS at 10:14

## 2019-08-23 ASSESSMENT — PATIENT HEALTH QUESTIONNAIRE - PHQ9
SUM OF ALL RESPONSES TO PHQ QUESTIONS 1-9: 2
2. FEELING DOWN, DEPRESSED OR HOPELESS: 1
1. LITTLE INTEREST OR PLEASURE IN DOING THINGS: 1
SUM OF ALL RESPONSES TO PHQ9 QUESTIONS 1 & 2: 2
SUM OF ALL RESPONSES TO PHQ QUESTIONS 1-9: 2

## 2019-08-23 NOTE — PROGRESS NOTES
by the emergency room and it does not work for her. She has done well with hycodan in the past and is requesting this. ROS: The patient has no headache, vision or hearing changes. The patient reports no numbness, tingling or weakness in the arms or legs. There is no difficulty with speech or swallowing. The patient reports no nausea or vomiting. There is no heartburn or indigestion. There is no diarrhea or constipation. No black, bloody, mucusy or tarry stool noticed. The patient reports no bloating and no change in appetite. EXAM:  Constitutional Blood pressure 138/80, pulse 108, temperature 97.4 °F (36.3 °C), temperature source Temporal, resp. rate 20, height 4' 11.5\" (1.511 m), weight 231 lb (104.8 kg), last menstrual period 06/15/2015, SpO2 97 %, not currently breastfeeding. ,normal affect, no acute distress, appears well developed and well nourished. Neck:  neck- supple, no mass, non-tender and no bruits  Lungs:  Breathing Pattern: regular, no distress, Breath sounds: rhonchi- throughout and scattered and wheezing- throughout and scattered  Heart:  Heart sounds are normal.  Regular rate and rhythm without murmur, gallop or rub. Abdomen:  Soft, non-tender, normal bowel sounds. No bruits, organomegaly or masses. Extremities: Extremities warm to touch, pink, with no edema. DIAGNOSIS:    Diagnosis Orders   1. Severe recurrent major depressive disorder with psychotic features (HCC)  Vilazodone HCl 10 & 20 MG KIT   2. Anxiety  ALPRAZolam (XANAX) 2 MG tablet   3. Essential hypertension     4. Yeast infection  fluconazole (DIFLUCAN) 150 MG tablet   5. Vitamin D deficiency  vitamin D (ERGOCALCIFEROL) 34657 units CAPS capsule   6. Acute bronchitis, unspecified organism  HYDROcodone-homatropine (HYCODAN) 5-1.5 MG/5ML syrup    levofloxacin (LEVAQUIN) 500 MG tablet    XR CHEST STANDARD (2 VW)    cefTRIAXone (ROCEPHIN) injection 500 mg   7.  Insomnia, unspecified type  zolpidem (AMBIEN) 10 MG tablet

## 2019-08-30 RX ORDER — VILAZODONE HYDROCHLORIDE 10 MG/1
10 TABLET ORAL DAILY
Qty: 30 TABLET | Refills: 1 | Status: SHIPPED | OUTPATIENT
Start: 2019-08-30 | End: 2019-09-05

## 2019-08-30 NOTE — TELEPHONE ENCOUNTER
I have sent a new prescription for the 10 mg tablets and she should be able to use a co-pay with card with this. Staff: Please see if we have co-pay cards for Viibryd. If we do not, the patient can print one from online. If this does not work, we can try something different but it is worth trying again.

## 2019-09-05 RX ORDER — PAROXETINE HYDROCHLORIDE 20 MG/1
20 TABLET, FILM COATED ORAL DAILY
Qty: 30 TABLET | Refills: 3 | Status: SHIPPED | OUTPATIENT
Start: 2019-09-05 | End: 2019-09-23 | Stop reason: SDUPTHER

## 2019-09-05 NOTE — TELEPHONE ENCOUNTER
I reviewed her previous medication list with all the medications that we have tried in the past and I do not see Paxil or Celexa. If she willing to try 1 of these?

## 2019-09-16 ENCOUNTER — TELEPHONE (OUTPATIENT)
Dept: FAMILY MEDICINE CLINIC | Age: 44
End: 2019-09-16

## 2019-09-16 RX ORDER — ONDANSETRON 8 MG/1
8 TABLET, ORALLY DISINTEGRATING ORAL EVERY 8 HOURS PRN
Qty: 30 TABLET | Refills: 2 | Status: SHIPPED | OUTPATIENT
Start: 2019-09-16 | End: 2020-08-17

## 2019-09-23 ENCOUNTER — OFFICE VISIT (OUTPATIENT)
Dept: FAMILY MEDICINE CLINIC | Age: 44
End: 2019-09-23
Payer: COMMERCIAL

## 2019-09-23 VITALS
RESPIRATION RATE: 14 BRPM | TEMPERATURE: 98.2 F | BODY MASS INDEX: 44.96 KG/M2 | WEIGHT: 229 LBS | DIASTOLIC BLOOD PRESSURE: 70 MMHG | HEIGHT: 60 IN | OXYGEN SATURATION: 97 % | SYSTOLIC BLOOD PRESSURE: 108 MMHG | HEART RATE: 98 BPM

## 2019-09-23 DIAGNOSIS — E66.01 MORBID OBESITY (HCC): ICD-10-CM

## 2019-09-23 DIAGNOSIS — F41.9 ANXIETY: Primary | ICD-10-CM

## 2019-09-23 DIAGNOSIS — I10 ESSENTIAL HYPERTENSION: ICD-10-CM

## 2019-09-23 DIAGNOSIS — G47.00 INSOMNIA, UNSPECIFIED TYPE: ICD-10-CM

## 2019-09-23 DIAGNOSIS — F33.3 SEVERE RECURRENT MAJOR DEPRESSIVE DISORDER WITH PSYCHOTIC FEATURES (HCC): ICD-10-CM

## 2019-09-23 PROCEDURE — 99214 OFFICE O/P EST MOD 30 MIN: CPT | Performed by: NURSE PRACTITIONER

## 2019-09-23 RX ORDER — ZOLPIDEM TARTRATE 10 MG/1
TABLET ORAL
Qty: 30 TABLET | Refills: 2 | Status: SHIPPED | OUTPATIENT
Start: 2019-09-23 | End: 2019-11-18 | Stop reason: SDUPTHER

## 2019-09-23 RX ORDER — PHENTERMINE HYDROCHLORIDE 37.5 MG/1
37.5 CAPSULE ORAL EVERY MORNING
Qty: 30 CAPSULE | Refills: 0 | Status: SHIPPED | OUTPATIENT
Start: 2019-09-23 | End: 2019-10-21 | Stop reason: SDUPTHER

## 2019-09-23 RX ORDER — ALPRAZOLAM 2 MG/1
TABLET ORAL
Qty: 30 TABLET | Refills: 2 | Status: SHIPPED | OUTPATIENT
Start: 2019-09-23 | End: 2019-11-18 | Stop reason: SDUPTHER

## 2019-09-23 RX ORDER — PAROXETINE HYDROCHLORIDE 40 MG/1
40 TABLET, FILM COATED ORAL DAILY
Qty: 30 TABLET | Refills: 3 | Status: SHIPPED | OUTPATIENT
Start: 2019-09-23 | End: 2019-11-18 | Stop reason: SDUPTHER

## 2019-09-23 ASSESSMENT — PATIENT HEALTH QUESTIONNAIRE - PHQ9
SUM OF ALL RESPONSES TO PHQ QUESTIONS 1-9: 0
2. FEELING DOWN, DEPRESSED OR HOPELESS: 0
1. LITTLE INTEREST OR PLEASURE IN DOING THINGS: 0
SUM OF ALL RESPONSES TO PHQ QUESTIONS 1-9: 0
SUM OF ALL RESPONSES TO PHQ9 QUESTIONS 1 & 2: 0

## 2019-09-23 NOTE — PROGRESS NOTES
Cisco Argueta reports being in a fair mood that is  stable. The patient is not reporting insomnia, difficulty concentrating and usual interest in activities. This patient is  not homicidal or suicidal.  The medication from last visit, paxil, is  helping and is not causing any side effects. The patient is not going to counseling/therapy. She states that she has been tolerating the Paxil 20 mg well with no side effects and would like to increase to 40 mg. Has had more energy and much less anxiety. Continues to take Xanax as needed for anxiety symptoms. Does not feel that she has any rebound symptoms. Obesity: She states that she is interested in starting Adipex again. She has taken in the past with no side effects and has had some weight loss. Has noticed that OCD symptoms tend to worsen when taking the medication but she is prepared and will try not to bite her fingernails while taking the Adipex. Hoping that being on the Paxil will help avoid compulsive behaviors as well. Insomnia: She continues to take Ambien as needed for sleep at night with no side effects reported. States that she can sleep eventually without the medication but sleeps much better with it and does not lie awake for hours. hypertension: She states that she continues to eat a low-salt diet and has not had high blood pressure lately. Not currently taking any medication for blood pressure. ROS: This patient reports no chest pain or pressure. There is no shortness of breath or cough. The patient reports no nausea or vomiting. There is no heartburn or indigestion. There is no diarrhea or constipation. No black, bloody, mucusy or tarry stool noticed. The patient reports no bloating and no change in appetite. There is no numbness, tingling or swelling in the extremities. EXAM:  Constitutional Blood pressure 108/70, pulse 98, temperature 98.2 °F (36.8 °C), temperature source Temporal, resp.  rate 14, height 4' 11.5\" Monitoring:   RX Monitoring 9/23/2019   Attestation -   Periodic Controlled Substance Monitoring Possible medication side effects, risk of tolerance/dependence & alternative treatments discussed. ;No signs of potential drug abuse or diversion identified. ;Assessed functional status. Chronic Pain > 80 MEDD -       This patient has had a longstanding history of diet modification and routine physical activity without significant loss of body weight. This patient is > or equal to a BMI of 30. This patient has no known history of substance abuse. The patient is not pregnant per their report and/or urine pregnancy is negative in childbearing aged women with functional reproductive organs. The patient is instructed to continue caloric intake reduction and physical activity for at least 30 minutes per day/5 days per week while taking the medication. The patient is aware that the medication is prescribed on a month to month basis and can only be given for up to 3 consecutive months before a 6 month break from weight loss medication is warranted. I am aware of the guidelines for prescribing Adipex. Please note this report has been partially produced using speech recognition software and may cause contain errors related to that system including grammar, punctuation and spelling as well as words and phrases that may seem inappropriate. If there are questions or concerns please feel free to contact me to clarify.         Electronically signed by Tiffanie Estrada, 1:32 PM 9/23/19

## 2019-10-21 ENCOUNTER — OFFICE VISIT (OUTPATIENT)
Dept: FAMILY MEDICINE CLINIC | Age: 44
End: 2019-10-21
Payer: COMMERCIAL

## 2019-10-21 VITALS
OXYGEN SATURATION: 97 % | HEIGHT: 60 IN | WEIGHT: 229 LBS | DIASTOLIC BLOOD PRESSURE: 70 MMHG | HEART RATE: 96 BPM | RESPIRATION RATE: 16 BRPM | TEMPERATURE: 97.6 F | BODY MASS INDEX: 44.96 KG/M2 | SYSTOLIC BLOOD PRESSURE: 110 MMHG

## 2019-10-21 DIAGNOSIS — F42.9 OBSESSIVE-COMPULSIVE DISORDER, UNSPECIFIED TYPE: ICD-10-CM

## 2019-10-21 DIAGNOSIS — E66.01 MORBID OBESITY (HCC): ICD-10-CM

## 2019-10-21 DIAGNOSIS — G89.29 CHRONIC MIDLINE LOW BACK PAIN WITH BILATERAL SCIATICA: ICD-10-CM

## 2019-10-21 DIAGNOSIS — M54.41 CHRONIC MIDLINE LOW BACK PAIN WITH BILATERAL SCIATICA: ICD-10-CM

## 2019-10-21 DIAGNOSIS — F41.9 ANXIETY: ICD-10-CM

## 2019-10-21 DIAGNOSIS — F33.3 SEVERE RECURRENT MAJOR DEPRESSIVE DISORDER WITH PSYCHOTIC FEATURES (HCC): Primary | ICD-10-CM

## 2019-10-21 DIAGNOSIS — M54.42 CHRONIC MIDLINE LOW BACK PAIN WITH BILATERAL SCIATICA: ICD-10-CM

## 2019-10-21 DIAGNOSIS — Z23 NEED FOR INFLUENZA VACCINATION: ICD-10-CM

## 2019-10-21 DIAGNOSIS — G47.00 INSOMNIA, UNSPECIFIED TYPE: ICD-10-CM

## 2019-10-21 PROCEDURE — 96372 THER/PROPH/DIAG INJ SC/IM: CPT | Performed by: NURSE PRACTITIONER

## 2019-10-21 PROCEDURE — 90471 IMMUNIZATION ADMIN: CPT | Performed by: NURSE PRACTITIONER

## 2019-10-21 PROCEDURE — 90688 IIV4 VACCINE SPLT 0.5 ML IM: CPT | Performed by: NURSE PRACTITIONER

## 2019-10-21 PROCEDURE — 99214 OFFICE O/P EST MOD 30 MIN: CPT | Performed by: NURSE PRACTITIONER

## 2019-10-21 RX ORDER — KETOROLAC TROMETHAMINE 30 MG/ML
60 INJECTION, SOLUTION INTRAMUSCULAR; INTRAVENOUS ONCE
Status: COMPLETED | OUTPATIENT
Start: 2019-10-21 | End: 2019-10-21

## 2019-10-21 RX ORDER — ALPRAZOLAM 2 MG/1
TABLET ORAL
Qty: 30 TABLET | Refills: 2 | Status: CANCELLED | OUTPATIENT
Start: 2019-10-21 | End: 2020-01-21

## 2019-10-21 RX ORDER — ZOLPIDEM TARTRATE 10 MG/1
TABLET ORAL
Qty: 30 TABLET | Refills: 2 | Status: CANCELLED | OUTPATIENT
Start: 2019-10-21 | End: 2020-01-21

## 2019-10-21 RX ORDER — KETOROLAC TROMETHAMINE 10 MG/1
10 TABLET, FILM COATED ORAL EVERY 6 HOURS PRN
Qty: 20 TABLET | Refills: 0 | Status: SHIPPED | OUTPATIENT
Start: 2019-10-21 | End: 2019-12-30 | Stop reason: SDUPTHER

## 2019-10-21 RX ORDER — PHENTERMINE HYDROCHLORIDE 37.5 MG/1
37.5 CAPSULE ORAL EVERY MORNING
Qty: 30 CAPSULE | Refills: 0 | Status: SHIPPED | OUTPATIENT
Start: 2019-10-21 | End: 2019-11-18 | Stop reason: ALTCHOICE

## 2019-10-21 RX ORDER — LIDOCAINE 50 MG/G
1 PATCH TOPICAL DAILY
Qty: 30 PATCH | Refills: 0 | Status: SHIPPED | OUTPATIENT
Start: 2019-10-21 | End: 2019-11-20

## 2019-10-21 RX ADMIN — KETOROLAC TROMETHAMINE 60 MG: 30 INJECTION, SOLUTION INTRAMUSCULAR; INTRAVENOUS at 11:20

## 2019-10-21 ASSESSMENT — PATIENT HEALTH QUESTIONNAIRE - PHQ9
1. LITTLE INTEREST OR PLEASURE IN DOING THINGS: 0
SUM OF ALL RESPONSES TO PHQ9 QUESTIONS 1 & 2: 0
2. FEELING DOWN, DEPRESSED OR HOPELESS: 0
SUM OF ALL RESPONSES TO PHQ QUESTIONS 1-9: 0
SUM OF ALL RESPONSES TO PHQ QUESTIONS 1-9: 0

## 2019-11-11 RX ORDER — TIZANIDINE 4 MG/1
4 TABLET ORAL 2 TIMES DAILY PRN
Qty: 60 TABLET | Refills: 3 | Status: SHIPPED | OUTPATIENT
Start: 2019-11-11 | End: 2020-07-30 | Stop reason: ALTCHOICE

## 2019-11-18 ENCOUNTER — OFFICE VISIT (OUTPATIENT)
Dept: FAMILY MEDICINE CLINIC | Age: 44
End: 2019-11-18
Payer: COMMERCIAL

## 2019-11-18 VITALS
HEART RATE: 104 BPM | DIASTOLIC BLOOD PRESSURE: 80 MMHG | TEMPERATURE: 97.6 F | SYSTOLIC BLOOD PRESSURE: 124 MMHG | RESPIRATION RATE: 18 BRPM | BODY MASS INDEX: 45.35 KG/M2 | OXYGEN SATURATION: 98 % | WEIGHT: 231 LBS | HEIGHT: 60 IN

## 2019-11-18 DIAGNOSIS — F41.9 ANXIETY: ICD-10-CM

## 2019-11-18 DIAGNOSIS — M54.50 ACUTE EXACERBATION OF CHRONIC LOW BACK PAIN: ICD-10-CM

## 2019-11-18 DIAGNOSIS — G89.29 CHRONIC MIDLINE LOW BACK PAIN WITH BILATERAL SCIATICA: ICD-10-CM

## 2019-11-18 DIAGNOSIS — G47.00 INSOMNIA, UNSPECIFIED TYPE: ICD-10-CM

## 2019-11-18 DIAGNOSIS — R00.2 PALPITATIONS: ICD-10-CM

## 2019-11-18 DIAGNOSIS — F43.10 PTSD (POST-TRAUMATIC STRESS DISORDER): ICD-10-CM

## 2019-11-18 DIAGNOSIS — M54.42 CHRONIC MIDLINE LOW BACK PAIN WITH BILATERAL SCIATICA: ICD-10-CM

## 2019-11-18 DIAGNOSIS — F33.3 SEVERE RECURRENT MAJOR DEPRESSIVE DISORDER WITH PSYCHOTIC FEATURES (HCC): Primary | ICD-10-CM

## 2019-11-18 DIAGNOSIS — R00.0 TACHYCARDIA: ICD-10-CM

## 2019-11-18 DIAGNOSIS — R07.89 CHEST PRESSURE: ICD-10-CM

## 2019-11-18 DIAGNOSIS — M54.41 CHRONIC MIDLINE LOW BACK PAIN WITH BILATERAL SCIATICA: ICD-10-CM

## 2019-11-18 DIAGNOSIS — F43.0 STRESS REACTION: ICD-10-CM

## 2019-11-18 DIAGNOSIS — G89.29 ACUTE EXACERBATION OF CHRONIC LOW BACK PAIN: ICD-10-CM

## 2019-11-18 PROCEDURE — 96372 THER/PROPH/DIAG INJ SC/IM: CPT | Performed by: NURSE PRACTITIONER

## 2019-11-18 PROCEDURE — 99214 OFFICE O/P EST MOD 30 MIN: CPT | Performed by: NURSE PRACTITIONER

## 2019-11-18 RX ORDER — PAROXETINE HYDROCHLORIDE 40 MG/1
40 TABLET, FILM COATED ORAL DAILY
Qty: 30 TABLET | Refills: 3 | Status: SHIPPED | OUTPATIENT
Start: 2019-11-18 | End: 2019-12-30 | Stop reason: SDUPTHER

## 2019-11-18 RX ORDER — ZOLPIDEM TARTRATE 10 MG/1
TABLET ORAL
Qty: 30 TABLET | Refills: 1 | Status: SHIPPED | OUTPATIENT
Start: 2019-12-09 | End: 2019-12-30 | Stop reason: SDUPTHER

## 2019-11-18 RX ORDER — KETOROLAC TROMETHAMINE 10 MG/1
10 TABLET, FILM COATED ORAL EVERY 6 HOURS PRN
Qty: 20 TABLET | Refills: 0 | Status: SHIPPED | OUTPATIENT
Start: 2019-11-18 | End: 2019-12-30 | Stop reason: SDUPTHER

## 2019-11-18 RX ORDER — KETOROLAC TROMETHAMINE 30 MG/ML
60 INJECTION, SOLUTION INTRAMUSCULAR; INTRAVENOUS ONCE
Status: COMPLETED | OUTPATIENT
Start: 2019-11-18 | End: 2019-11-18

## 2019-11-18 RX ORDER — ALPRAZOLAM 2 MG/1
TABLET ORAL
Qty: 30 TABLET | Refills: 1 | Status: SHIPPED | OUTPATIENT
Start: 2019-12-09 | End: 2019-12-30 | Stop reason: SDUPTHER

## 2019-11-18 RX ORDER — HYDROXYZINE PAMOATE 25 MG/1
25 CAPSULE ORAL 3 TIMES DAILY PRN
Qty: 60 CAPSULE | Refills: 2 | Status: SHIPPED | OUTPATIENT
Start: 2019-11-18 | End: 2020-08-17

## 2019-11-18 RX ADMIN — KETOROLAC TROMETHAMINE 60 MG: 30 INJECTION, SOLUTION INTRAMUSCULAR; INTRAVENOUS at 09:45

## 2019-11-18 ASSESSMENT — PATIENT HEALTH QUESTIONNAIRE - PHQ9
2. FEELING DOWN, DEPRESSED OR HOPELESS: 1
1. LITTLE INTEREST OR PLEASURE IN DOING THINGS: 1
SUM OF ALL RESPONSES TO PHQ QUESTIONS 1-9: 2
SUM OF ALL RESPONSES TO PHQ9 QUESTIONS 1 & 2: 2
SUM OF ALL RESPONSES TO PHQ QUESTIONS 1-9: 2

## 2019-12-03 NOTE — TELEPHONE ENCOUNTER
Patient called needs refill on her medication. LV 9/18/17. cp O-Z Flap Text: The defect edges were debeveled with a #15 scalpel blade.  Given the location of the defect, shape of the defect and the proximity to free margins an O-Z flap was deemed most appropriate.  Using a sterile surgical marker, an appropriate transposition flap was drawn incorporating the defect and placing the expected incisions within the relaxed skin tension lines where possible. The area thus outlined was incised deep to adipose tissue with a #15 scalpel blade.  The skin margins were undermined to an appropriate distance in all directions utilizing iris scissors.

## 2019-12-30 ENCOUNTER — OFFICE VISIT (OUTPATIENT)
Dept: FAMILY MEDICINE CLINIC | Age: 44
End: 2019-12-30
Payer: COMMERCIAL

## 2019-12-30 VITALS
RESPIRATION RATE: 14 BRPM | TEMPERATURE: 97.5 F | HEIGHT: 60 IN | WEIGHT: 228 LBS | OXYGEN SATURATION: 97 % | BODY MASS INDEX: 44.76 KG/M2 | SYSTOLIC BLOOD PRESSURE: 120 MMHG | DIASTOLIC BLOOD PRESSURE: 80 MMHG | HEART RATE: 104 BPM

## 2019-12-30 DIAGNOSIS — R05.9 COUGH: ICD-10-CM

## 2019-12-30 DIAGNOSIS — G47.00 INSOMNIA, UNSPECIFIED TYPE: ICD-10-CM

## 2019-12-30 DIAGNOSIS — F43.0 STRESS REACTION: ICD-10-CM

## 2019-12-30 DIAGNOSIS — F33.3 SEVERE RECURRENT MAJOR DEPRESSIVE DISORDER WITH PSYCHOTIC FEATURES (HCC): Primary | ICD-10-CM

## 2019-12-30 DIAGNOSIS — R09.82 POST-NASAL DRIP: ICD-10-CM

## 2019-12-30 DIAGNOSIS — G89.29 CHRONIC MIDLINE LOW BACK PAIN WITH BILATERAL SCIATICA: ICD-10-CM

## 2019-12-30 DIAGNOSIS — G43.911 INTRACTABLE MIGRAINE WITH STATUS MIGRAINOSUS, UNSPECIFIED MIGRAINE TYPE: ICD-10-CM

## 2019-12-30 DIAGNOSIS — M54.41 CHRONIC MIDLINE LOW BACK PAIN WITH BILATERAL SCIATICA: ICD-10-CM

## 2019-12-30 DIAGNOSIS — F41.9 ANXIETY: ICD-10-CM

## 2019-12-30 DIAGNOSIS — H66.93 BILATERAL OTITIS MEDIA, UNSPECIFIED OTITIS MEDIA TYPE: ICD-10-CM

## 2019-12-30 DIAGNOSIS — M54.42 CHRONIC MIDLINE LOW BACK PAIN WITH BILATERAL SCIATICA: ICD-10-CM

## 2019-12-30 DIAGNOSIS — B37.9 YEAST INFECTION: ICD-10-CM

## 2019-12-30 PROCEDURE — 99214 OFFICE O/P EST MOD 30 MIN: CPT | Performed by: NURSE PRACTITIONER

## 2019-12-30 RX ORDER — NYSTATIN 100000 U/G
CREAM TOPICAL
Qty: 30 G | Refills: 0 | Status: SHIPPED | OUTPATIENT
Start: 2019-12-30 | End: 2020-05-20 | Stop reason: SDUPTHER

## 2019-12-30 RX ORDER — IBUPROFEN 800 MG/1
800 TABLET ORAL EVERY 6 HOURS PRN
Qty: 120 TABLET | Refills: 3 | Status: SHIPPED | OUTPATIENT
Start: 2019-12-30 | End: 2020-02-03 | Stop reason: SDUPTHER

## 2019-12-30 RX ORDER — AZITHROMYCIN 250 MG/1
TABLET, FILM COATED ORAL
Qty: 6 TABLET | Refills: 0 | Status: SHIPPED | OUTPATIENT
Start: 2019-12-30 | End: 2020-01-09

## 2019-12-30 RX ORDER — ALPRAZOLAM 2 MG/1
TABLET ORAL
Qty: 30 TABLET | Refills: 2 | Status: SHIPPED | OUTPATIENT
Start: 2019-12-30 | End: 2020-03-06 | Stop reason: SDUPTHER

## 2019-12-30 RX ORDER — KETOROLAC TROMETHAMINE 10 MG/1
10 TABLET, FILM COATED ORAL EVERY 6 HOURS PRN
Qty: 20 TABLET | Refills: 0 | Status: SHIPPED | OUTPATIENT
Start: 2019-12-30 | End: 2020-02-03 | Stop reason: SDUPTHER

## 2019-12-30 RX ORDER — HYDROXYZINE PAMOATE 25 MG/1
25 CAPSULE ORAL 3 TIMES DAILY PRN
Qty: 60 CAPSULE | Refills: 2 | Status: CANCELLED | OUTPATIENT
Start: 2019-12-30

## 2019-12-30 RX ORDER — ZOLPIDEM TARTRATE 10 MG/1
TABLET ORAL
Qty: 30 TABLET | Refills: 2 | Status: SHIPPED | OUTPATIENT
Start: 2019-12-30 | End: 2020-03-06 | Stop reason: SDUPTHER

## 2019-12-30 RX ORDER — PAROXETINE HYDROCHLORIDE 40 MG/1
40 TABLET, FILM COATED ORAL DAILY
Qty: 30 TABLET | Refills: 3 | Status: SHIPPED | OUTPATIENT
Start: 2019-12-30 | End: 2020-07-30 | Stop reason: ALTCHOICE

## 2019-12-30 RX ORDER — FLUCONAZOLE 150 MG/1
150 TABLET ORAL DAILY
Qty: 30 TABLET | Refills: 0 | Status: SHIPPED | OUTPATIENT
Start: 2019-12-30 | End: 2020-07-30 | Stop reason: ALTCHOICE

## 2019-12-30 RX ORDER — ELETRIPTAN HYDROBROMIDE 20 MG/1
20 TABLET, FILM COATED ORAL
Qty: 18 TABLET | Refills: 3 | Status: SHIPPED | OUTPATIENT
Start: 2019-12-30 | End: 2020-08-17

## 2019-12-30 RX ORDER — ERGOCALCIFEROL 1.25 MG/1
50000 CAPSULE ORAL WEEKLY
Qty: 12 CAPSULE | Refills: 1 | Status: SHIPPED | OUTPATIENT
Start: 2019-12-30 | End: 2020-07-30 | Stop reason: ALTCHOICE

## 2019-12-30 RX ORDER — FLUTICASONE PROPIONATE 50 MCG
1 SPRAY, SUSPENSION (ML) NASAL DAILY
Qty: 1 BOTTLE | Refills: 2 | Status: SHIPPED | OUTPATIENT
Start: 2019-12-30 | End: 2020-07-30 | Stop reason: ALTCHOICE

## 2019-12-30 ASSESSMENT — PATIENT HEALTH QUESTIONNAIRE - PHQ9
SUM OF ALL RESPONSES TO PHQ QUESTIONS 1-9: 2
SUM OF ALL RESPONSES TO PHQ9 QUESTIONS 1 & 2: 2
1. LITTLE INTEREST OR PLEASURE IN DOING THINGS: 1
SUM OF ALL RESPONSES TO PHQ QUESTIONS 1-9: 2
2. FEELING DOWN, DEPRESSED OR HOPELESS: 1

## 2020-02-03 ENCOUNTER — HOSPITAL ENCOUNTER (OUTPATIENT)
Age: 45
Setting detail: SPECIMEN
Discharge: HOME | End: 2020-02-03
Payer: COMMERCIAL

## 2020-02-03 ENCOUNTER — OFFICE VISIT (OUTPATIENT)
Dept: FAMILY MEDICINE CLINIC | Age: 45
End: 2020-02-03
Payer: COMMERCIAL

## 2020-02-03 VITALS
HEIGHT: 57 IN | DIASTOLIC BLOOD PRESSURE: 86 MMHG | RESPIRATION RATE: 20 BRPM | WEIGHT: 237 LBS | TEMPERATURE: 97.9 F | OXYGEN SATURATION: 98 % | HEART RATE: 114 BPM | BODY MASS INDEX: 51.13 KG/M2 | SYSTOLIC BLOOD PRESSURE: 110 MMHG

## 2020-02-03 LAB
BILIRUBIN, POC: ABNORMAL
BLOOD URINE, POC: ABNORMAL
CLARITY, POC: CLEAR
COLOR, POC: YELLOW
GLUCOSE URINE, POC: ABNORMAL
KETONES, POC: ABNORMAL
LEUKOCYTE EST, POC: ABNORMAL
NITRITE, POC: ABNORMAL
PH, POC: 6
PROTEIN, POC: ABNORMAL
SPECIFIC GRAVITY, POC: 1.02
UROBILINOGEN, POC: 3.5

## 2020-02-03 PROCEDURE — 99213 OFFICE O/P EST LOW 20 MIN: CPT | Performed by: NURSE PRACTITIONER

## 2020-02-03 PROCEDURE — 87086 URINE CULTURE/COLONY COUNT: CPT

## 2020-02-03 PROCEDURE — 81003 URINALYSIS AUTO W/O SCOPE: CPT | Performed by: NURSE PRACTITIONER

## 2020-02-03 RX ORDER — IBUPROFEN 800 MG/1
800 TABLET ORAL EVERY 6 HOURS PRN
Qty: 120 TABLET | Refills: 3 | Status: SHIPPED | OUTPATIENT
Start: 2020-02-03 | End: 2020-05-20 | Stop reason: SDUPTHER

## 2020-02-03 RX ORDER — KETOROLAC TROMETHAMINE 10 MG/1
10 TABLET, FILM COATED ORAL EVERY 6 HOURS PRN
Qty: 20 TABLET | Refills: 0 | Status: SHIPPED | OUTPATIENT
Start: 2020-02-03 | End: 2020-05-20 | Stop reason: SDUPTHER

## 2020-02-03 RX ORDER — SULFAMETHOXAZOLE AND TRIMETHOPRIM 800; 160 MG/1; MG/1
1 TABLET ORAL 2 TIMES DAILY
Qty: 14 TABLET | Refills: 0 | Status: SHIPPED | OUTPATIENT
Start: 2020-02-03 | End: 2020-02-10

## 2020-02-03 NOTE — PROGRESS NOTES
[facility] reports being in a poor mood that is not stable. The patient is  reporting insomnia, difficulty concentrating and usual interest in activities. This patient is  not homicidal or suicidal.  The medication from last visit, rexulti and paxil, is not helping and is not causing any side effects. The patient is not going to counseling/therapy. He states that she is not really having any problems thinking about the past but her present has been a lot of stress. She continues to worry every day about her son who is incarcerated currently. She is not able to see him. Has been having a lot of thoughts about him. She states that she has been crying without much warning and cannot seem to control it. She states that she was able to get the Rexulti without any issue and does not have any side effects but does not feel that it made any significant difference in her depression. She continues to feel overwhelmed easily. Dysuria: started about 2 weeks ago. She states that she is having pain in her right flank area. No fever or chills. No nausea or vomiting. No chills. She has had urinary frequency. Urinalysis in office today with RBC. EXAM:  Constitutional Blood pressure 110/86, pulse 114, temperature 97.9 °F (36.6 °C), temperature source Temporal, resp. rate 20, height 4' 9\" (1.448 m), weight 237 lb (107.5 kg), last menstrual period 06/15/2015, SpO2 98 %, not currently breastfeeding. ,normal affect, no acute distress, appears well developed and well nourished. Lungs are clear with equal breath sounds. Chest wall is not tender. Heart is in a regular rhythm with normal rate and no murmurs, rubs, or gallops. The four extremities are without edema. Abdomen is soft and non tender. No masses, guarding or rebound noted. Right CVA tenderness. DIAGNOSIS:    Diagnosis Orders   1.  Severe recurrent major depressive disorder with psychotic features (HCC)  brexpiprazole (REXULTI) 1 MG TABS tablet 2. Urinary frequency  POCT Urinalysis No Micro (Auto)    sulfamethoxazole-trimethoprim (BACTRIM DS;SEPTRA DS) 800-160 MG per tablet    Urine Culture   3. Dysuria  CT ABDOMEN PELVIS WO CONTRAST Additional Contrast? None    sulfamethoxazole-trimethoprim (BACTRIM DS;SEPTRA DS) 800-160 MG per tablet    Urine Culture   4. Right flank pain  CT ABDOMEN PELVIS WO CONTRAST Additional Contrast? None    sulfamethoxazole-trimethoprim (BACTRIM DS;SEPTRA DS) 800-160 MG per tablet    Urine Culture    ibuprofen (ADVIL;MOTRIN) 800 MG tablet    ketorolac (TORADOL) 10 MG tablet   5. Hematuria, unspecified type  CT ABDOMEN PELVIS WO CONTRAST Additional Contrast? None    sulfamethoxazole-trimethoprim (BACTRIM DS;SEPTRA DS) 800-160 MG per tablet    Urine Culture       PLAN: Include orders in the DX section. Follow up: 1 month and as needed. Antibiotic ordered for urinary symptoms. Suspect kidney stone. CT ordered to be done in the near future. The patient is instructed to take Probiotic tablets twice a day for the duration of antibiotic therapy and for 4 days after completion of antibiotics. This will help restore the good bacteria to your colon and prevent side effects of antibiotic therapy such as cramping and diarrhea. Probiotic tablets can be found at your local pharmacy over the counter. Ask your pharmacist if you need help finding tablets. Please note this report has been partially produced using speech recognition software and may cause contain errors related to that system including grammar, punctuation and spelling as well as words and phrases that may seem inappropriate. If there are questions or concerns please feel free to contact me to clarify.           Electronically signed by Adi Guo, 2:47 PM 2/3/20

## 2020-02-05 ENCOUNTER — TELEPHONE (OUTPATIENT)
Dept: FAMILY MEDICINE CLINIC | Age: 45
End: 2020-02-05

## 2020-02-05 LAB
REASON FOR REJECTION: NORMAL
REJECTED TEST: NORMAL
URINE CULTURE, ROUTINE: NORMAL

## 2020-03-06 ENCOUNTER — HOSPITAL ENCOUNTER (OUTPATIENT)
Dept: LAB | Age: 45
Discharge: HOME | End: 2020-03-06
Payer: COMMERCIAL

## 2020-03-06 ENCOUNTER — OFFICE VISIT (OUTPATIENT)
Dept: FAMILY MEDICINE CLINIC | Age: 45
End: 2020-03-06
Payer: COMMERCIAL

## 2020-03-06 VITALS
BODY MASS INDEX: 51.34 KG/M2 | RESPIRATION RATE: 14 BRPM | TEMPERATURE: 98.3 F | OXYGEN SATURATION: 98 % | DIASTOLIC BLOOD PRESSURE: 60 MMHG | HEART RATE: 104 BPM | SYSTOLIC BLOOD PRESSURE: 112 MMHG | HEIGHT: 57 IN | WEIGHT: 238 LBS

## 2020-03-06 LAB
ALBUMIN SERPL-MCNC: 3.8 G/DL (ref 3.5–4.6)
ALP BLD-CCNC: 80 U/L (ref 40–130)
ALT SERPL-CCNC: 18 U/L (ref 0–33)
ANION GAP SERPL CALCULATED.3IONS-SCNC: 15 MEQ/L (ref 9–15)
AST SERPL-CCNC: 16 U/L (ref 0–35)
BASOPHILS ABSOLUTE: 0 K/UL (ref 0–0.2)
BASOPHILS RELATIVE PERCENT: 0.2 %
BILIRUB SERPL-MCNC: 0.3 MG/DL (ref 0.2–0.7)
BUN BLDV-MCNC: 8 MG/DL (ref 6–20)
CALCIUM SERPL-MCNC: 9.3 MG/DL (ref 8.5–9.9)
CHLORIDE BLD-SCNC: 102 MEQ/L (ref 95–107)
CO2: 25 MEQ/L (ref 20–31)
CREAT SERPL-MCNC: 0.78 MG/DL (ref 0.5–0.9)
EOSINOPHILS ABSOLUTE: 0.1 K/UL (ref 0–0.7)
EOSINOPHILS RELATIVE PERCENT: 1.2 %
GFR AFRICAN AMERICAN: >60
GFR NON-AFRICAN AMERICAN: >60
GLOBULIN: 3.5 G/DL (ref 2.3–3.5)
GLUCOSE BLD-MCNC: 138 MG/DL (ref 70–99)
HCT VFR BLD CALC: 40.7 % (ref 37–47)
HEMOGLOBIN: 12.9 G/DL (ref 12–16)
LYMPHOCYTES ABSOLUTE: 2.9 K/UL (ref 1–4.8)
LYMPHOCYTES RELATIVE PERCENT: 25.8 %
MCH RBC QN AUTO: 29.4 PG (ref 27–31.3)
MCHC RBC AUTO-ENTMCNC: 31.7 % (ref 33–37)
MCV RBC AUTO: 92.7 FL (ref 82–100)
MONOCYTES ABSOLUTE: 0.8 K/UL (ref 0.2–0.8)
MONOCYTES RELATIVE PERCENT: 7.1 %
NEUTROPHILS ABSOLUTE: 7.5 K/UL (ref 1.4–6.5)
NEUTROPHILS RELATIVE PERCENT: 65.7 %
PDW BLD-RTO: 14.5 % (ref 11.5–14.5)
PLATELET # BLD: 371 K/UL (ref 130–400)
POTASSIUM SERPL-SCNC: 4 MEQ/L (ref 3.4–4.9)
RBC # BLD: 4.39 M/UL (ref 4.2–5.4)
SODIUM BLD-SCNC: 142 MEQ/L (ref 135–144)
T4 FREE: 1.19 NG/DL (ref 0.84–1.68)
TOTAL PROTEIN: 7.3 G/DL (ref 6.3–8)
TSH SERPL DL<=0.05 MIU/L-ACNC: 3.14 UIU/ML (ref 0.44–3.86)
WBC # BLD: 11.3 K/UL (ref 4.8–10.8)

## 2020-03-06 PROCEDURE — 84439 ASSAY OF FREE THYROXINE: CPT

## 2020-03-06 PROCEDURE — 84443 ASSAY THYROID STIM HORMONE: CPT

## 2020-03-06 PROCEDURE — 36415 COLL VENOUS BLD VENIPUNCTURE: CPT

## 2020-03-06 PROCEDURE — 85025 COMPLETE CBC W/AUTO DIFF WBC: CPT

## 2020-03-06 PROCEDURE — 86376 MICROSOMAL ANTIBODY EACH: CPT

## 2020-03-06 PROCEDURE — 99214 OFFICE O/P EST MOD 30 MIN: CPT | Performed by: NURSE PRACTITIONER

## 2020-03-06 PROCEDURE — 80053 COMPREHEN METABOLIC PANEL: CPT

## 2020-03-06 RX ORDER — ZOLPIDEM TARTRATE 10 MG/1
TABLET ORAL
Qty: 30 TABLET | Refills: 2 | Status: SHIPPED | OUTPATIENT
Start: 2020-03-06 | End: 2020-05-16

## 2020-03-06 RX ORDER — ALBUTEROL SULFATE 1.25 MG/3ML
1 SOLUTION RESPIRATORY (INHALATION) EVERY 6 HOURS PRN
Qty: 360 ML | Refills: 3 | Status: SHIPPED | OUTPATIENT
Start: 2020-03-06 | End: 2021-02-16

## 2020-03-06 RX ORDER — ALBUTEROL SULFATE 90 UG/1
2 AEROSOL, METERED RESPIRATORY (INHALATION) EVERY 6 HOURS PRN
Qty: 1 INHALER | Refills: 2 | Status: SHIPPED | OUTPATIENT
Start: 2020-03-06 | End: 2021-09-23 | Stop reason: SDUPTHER

## 2020-03-06 RX ORDER — ALPRAZOLAM 2 MG/1
TABLET ORAL
Qty: 30 TABLET | Refills: 2 | Status: SHIPPED | OUTPATIENT
Start: 2020-03-06 | End: 2020-04-16 | Stop reason: SDUPTHER

## 2020-03-06 ASSESSMENT — PATIENT HEALTH QUESTIONNAIRE - PHQ9
SUM OF ALL RESPONSES TO PHQ QUESTIONS 1-9: 2
SUM OF ALL RESPONSES TO PHQ9 QUESTIONS 1 & 2: 2
SUM OF ALL RESPONSES TO PHQ QUESTIONS 1-9: 2
1. LITTLE INTEREST OR PLEASURE IN DOING THINGS: 1
2. FEELING DOWN, DEPRESSED OR HOPELESS: 1

## 2020-03-06 NOTE — PROGRESS NOTES
Gigi Head reports being in a fair mood that is  stable. The patient is  reporting insomnia, difficulty concentrating and usual interest in activities. This patient is  not homicidal or suicidal.  The medication from last visit, rexulti and paxil, is  helping and is not causing any side effects. The patient is not going to counseling/therapy. Anxiety and insomnia: She states that she continues to struggle with sleep at night. Has not really been taking the Ambien now and tries to take that medication more sparingly. She does admit to taking her Xanax at least once per day but cuts the tablet in half. She does admit to feeling irritable. No major changes in her home life lately. Snoring/shortness of breath/daytime sleepiness: She states that she has been having issues lately with feeling short of breath. Her sister who is present during the office visit, Gerardo Dozier, states that her sister has a longstanding history of snoring and waking up frequently during the night. She also states that her sister can fall asleep sitting up watching TV. She states that the patient also looks like she is going to drift off and fall asleep while driving. The patient admits that these things are true. She has been having symptoms for quite a while now but they just now got to the point where she is worried about them. She does have occasional wheezing especially with cold air and does have a nebulizer at home and is requesting refill of her albuterol nebulizer solution. ROS: This patient reports no chest pain or pressure. There is no cough or chest congestion. The patient reports no nausea or vomiting. There is no heartburn or indigestion. There is no diarrhea or constipation. No black, bloody, mucusy or tarry stool noticed. The patient reports no bloating and no change in appetite. There is no numbness, tingling or swelling in the extremities.     EXAM:  Constitutional Blood pressure 112/60, pulse 104, temperature 98.3 °F (36.8 °C), temperature source Temporal, resp. rate 14, height 4' 9\" (1.448 m), weight 238 lb (108 kg), last menstrual period 06/15/2015, SpO2 98 %, not currently breastfeeding. ,normal affect, no acute distress, appears well developed and well nourished. .  Nose/Sinuses:  Nares normal. Septum midline. Mucosa normal. No drainage or sinus tenderness. Mouth/Throat:  Throat- no edema, erythema, exudate, cobblestoning, tonsillar enlargement, uvular enlargement or crowding but limited airway opening visualized with anatomy of tongue/upper palate. Neck:  neck- supple, no mass, non-tender and no bruits  Lungs:  Normal expansion. Clear to auscultation. No rales, rhonchi, or wheezing., No chest wall tenderness. Heart:  Heart sounds are normal.  Regular rate and rhythm without murmur, gallop or rub. Abdomen:  Soft, non-tender, normal bowel sounds. No bruits, organomegaly or masses. Extremities: Extremities warm to touch, pink, with no edema. DIAGNOSIS:    Diagnosis Orders   1. Severe recurrent major depressive disorder with psychotic features ([facility] [address]     2. Anxiety  ALPRAZolam (XANAX) 2 MG tablet   3. Insomnia, unspecified type  zolpidem (AMBIEN) 10 MG tablet   4. Shortness of breath  Elisabet Galvin MD, Pulmonology, [place]    albuterol sulfate  (90 Base) MCG/ACT inhaler   5. Daytime somnolence  Elisabet Galvin MD, Pulmonology, [place]    CBC Auto Differential    Comprehensive Metabolic Panel    TSH without Reflex    T4, Free    Thyroid Peroxidase Antibody   6. Megan Rogers MD, Pulmonology, [place]   7. High risk medication use         PLAN: Include orders in the DX section. Follow up: 6 weeks and as needed. Blood work to be done today. 1.  2.  3.  7.  Mood has been more stable on current combination of medications. She does continue to have irritability but with other symptoms that are going on she may have sleep deprivation.   I do encourage her to

## 2020-03-10 LAB — THYROID PEROXIDASE (TPO) ABS: >1000 IU/ML (ref 0–35)

## 2020-03-13 ENCOUNTER — TELEPHONE (OUTPATIENT)
Dept: FAMILY MEDICINE CLINIC | Age: 45
End: 2020-03-13

## 2020-04-16 ENCOUNTER — VIRTUAL VISIT (OUTPATIENT)
Dept: FAMILY MEDICINE CLINIC | Age: 45
End: 2020-04-16
Payer: COMMERCIAL

## 2020-04-16 PROCEDURE — 99214 OFFICE O/P EST MOD 30 MIN: CPT | Performed by: NURSE PRACTITIONER

## 2020-04-16 RX ORDER — ALPRAZOLAM 2 MG/1
TABLET ORAL
Qty: 30 TABLET | Refills: 2 | Status: SHIPPED | OUTPATIENT
Start: 2020-04-16 | End: 2020-07-07 | Stop reason: SDUPTHER

## 2020-04-16 RX ORDER — CYCLOSPORINE 0.5 MG/ML
1 EMULSION OPHTHALMIC 2 TIMES DAILY
Qty: 1 VIAL | Refills: 3 | Status: SHIPPED | OUTPATIENT
Start: 2020-04-16 | End: 2020-12-31 | Stop reason: SDUPTHER

## 2020-04-16 NOTE — PROGRESS NOTES
once as needed for Migraine may repeat in 2 hours if necessary  ANA Quarles CNP   fluconazole (DIFLUCAN) 150 MG tablet Take 1 tablet by mouth daily  ANA Quarles CNP   nystatin (MYCOSTATIN) 810734 UNIT/GM cream APPLY EXTERNALLY TO THE AFFECTED AREA TWICE DAILY  ANA Quarles CNP   PARoxetine (PAXIL) 40 MG tablet Take 1 tablet by mouth daily  ANA Quarles CNP   vitamin D (ERGOCALCIFEROL) 1.25 MG (66206 UT) CAPS capsule Take 1 capsule by mouth once a week  ANA Welsh CNP   fluticasone (FLONASE) 50 MCG/ACT nasal spray 1 spray by Each Nostril route daily  ANA Quarles CNP   hydrOXYzine (VISTARIL) 25 MG capsule Take 1 capsule by mouth 3 times daily as needed for Anxiety  ANA Quarles CNP   tiZANidine (ZANAFLEX) 4 MG tablet Take 1 tablet by mouth 2 times daily as needed (muscle spasm)  ANA Quarles CNP   ondansetron (ZOFRAN ODT) 8 MG TBDP disintegrating tablet Place 1 tablet under the tongue every 8 hours as needed for Nausea or Vomiting  ANA Quarles CNP       Social History     Tobacco Use    Smoking status: Former Smoker    Smokeless tobacco: Never Used   Substance Use Topics    Alcohol use: No    Drug use: No        PHYSICAL EXAMINATION:  [ INSTRUCTIONS:  \"[x]\" Indicates a positive item  \"[]\" Indicates a negative item  -- DELETE ALL ITEMS NOT EXAMINED]  [x] Alert  [x] Oriented to person/place/time    [x] No apparent distress  [] Toxic appearing    [] Face flushed appearing [] Sclera clear  [] Lips are cyanotic      [x] Breathing appears normal  [] Appears tachypneic      [] Rash on visible skin    [x] Cranial Nerves II-XII grossly intact    [x] Motor grossly intact in visible upper extremities    [] Motor grossly intact in visible lower extremities    [x] Normal Mood  [] Anxious appearing    [] Depressed appearing  [] Confused appearing      [] Poor short term memory  [] Poor long term memory    [] OTHER:      Due to this being a TeleHealth encounter, evaluation of the following organ systems is limited: Vitals/Constitutional/EENT/Resp/CV/GI//MS/Neuro/Skin/Heme-Lymph-Imm. Diagnosis Orders   1. Severe recurrent major depressive disorder with psychotic features (HCC)  brexpiprazole (REXULTI) 2 MG TABS tablet   2. Anxiety  ALPRAZolam (XANAX) 2 MG tablet   3. Insomnia, unspecified type     4. Hashimoto's disease  Thyroid Peroxidase Antibody    TSH without Reflex    T4, Free   5. Bilateral dry eyes  cycloSPORINE (RESTASIS) 0.05 % ophthalmic emulsion       No follow-ups on file. 1.  2.  3.  Mood has been stable on current combination of medication. She is encouraged to take Xanax sparingly and is no longer taking Ambien at my request.    4.  Discussed recommendation to closely monitor thyroid function now that we know that she has significant presence of antiperoxidase antibodies. She does have a strong family history of thyroid disease. Lab orders to be mailed to her to be done in the next month. 5.  Eyedrop prescription sent in. She has taken this for several years with good results in treating her dry eye syndrome. An  electronic signature was used to authenticate this note. Controlled Substance Monitoring:    Acute and Chronic Pain Monitoring:   RX Monitoring 4/16/2020   Attestation -   Periodic Controlled Substance Monitoring Possible medication side effects, risk of tolerance/dependence & alternative treatments discussed. ;No signs of potential drug abuse or diversion identified. ;Assessed functional status. Chronic Pain > 80 MEDD -       Please note this report has been partially produced using speech recognition software and may cause contain errors related to that system including grammar, punctuation and spelling as well as words and phrases that may seem inappropriate. If there are questions or concerns please feel free to contact me to clarify.         --Luann Mccann, APRN - CNP on 4/16/2020

## 2020-04-29 ENCOUNTER — VIRTUAL VISIT (OUTPATIENT)
Dept: PULMONOLOGY | Age: 45
End: 2020-04-29
Payer: COMMERCIAL

## 2020-04-29 VITALS — BODY MASS INDEX: 50.4 KG/M2 | WEIGHT: 250 LBS | HEIGHT: 59 IN

## 2020-04-29 PROBLEM — G47.33 OSA (OBSTRUCTIVE SLEEP APNEA): Status: ACTIVE | Noted: 2020-04-29

## 2020-04-29 PROBLEM — R06.02 SHORTNESS OF BREATH: Status: ACTIVE | Noted: 2020-04-29

## 2020-04-29 PROCEDURE — 99204 OFFICE O/P NEW MOD 45 MIN: CPT | Performed by: INTERNAL MEDICINE

## 2020-04-29 ASSESSMENT — ENCOUNTER SYMPTOMS
TROUBLE SWALLOWING: 0
ABDOMINAL PAIN: 0
EYE ITCHING: 0
VOICE CHANGE: 0
DIARRHEA: 0
COUGH: 0
WHEEZING: 0
VOMITING: 0
SHORTNESS OF BREATH: 1
SORE THROAT: 0
RHINORRHEA: 0
NAUSEA: 0
CHEST TIGHTNESS: 0
EYE DISCHARGE: 0
SINUS PRESSURE: 0

## 2020-04-29 NOTE — PROGRESS NOTES
2020    TELEHEALTH EVALUATION -- Audio/Visual (During ZWVTM-21 public health emergency)    HPI:video  Visit for consult    Elliott Justin (:  1975) has requested an audio/video evaluation for the following concern(s): Shortness of breath and sleep apnea    She has c/o shortness of breath since last few years, worse since last 1 month  No diagnose with asthma or COPD. She said she smoke for 4 yrs but never heavy smoker  8 yrs ago   She is not using any inhaler  No Wheezing   No Cough or   Sputum  No Hemoptysis  No Chest tightness   No Chest pain with radiation  or pleuritic pain  No Fever or chills. No Rhinorrhea and postnasal drip. She said she was told she has thyroid and going to see dr. Michaela Velásquez  She gain 60 lbs in last 1 yrs  CXR done in  show no active disease, no PFT done   She has anxiety and takes xanax. She said when she cannot breathe she feels like she is getting panic attack. She does not have sleep study done. She is complaining of snoring and daytime sleepiness and tiredness. No C/o witness apnea. She does not wakes up with gasping for air. C/o wakes up frequently during sleep . complaint of morning headache. She  does not have restful sleep. She  does not take daily naps. She does not fall asleep while watching TV. She  does not have a complaint of sleepiness while driving but feels tired. 60 Weight gain in last 12 month   No sleep walking, talking or eating . No sleep onset hallucination  No sleep paralysis   No sleep attacks        Review of Systems   Constitutional: Negative for chills, diaphoresis, fatigue and fever. HENT: Negative for congestion, mouth sores, nosebleeds, postnasal drip, rhinorrhea, sinus pressure, sneezing, sore throat, trouble swallowing and voice change. Snoring   Eyes: Negative for discharge, itching and visual disturbance. Respiratory: Positive for shortness of breath. Negative for cough, chest tightness and wheezing. Baseline Diagnostic Sleep Study; Future      Return in about 29 days (around 5/28/2020) for sleep study, shortness of breath, kylie. Nora Garcia is a 38 y.o. female being evaluated by a Virtual Visit (video visit) encounter to address concerns as mentioned above. A caregiver was present when appropriate. Due to this being a TeleHealth encounter (During [facility]-74 public health emergency), evaluation of the following organ systems was limited: Vitals/Constitutional/EENT/Resp/CV/GI//MS/Neuro/Skin/Heme-Lymph-Imm. Pursuant to the emergency declaration under the [address] [place] [address], [address] [place] [address] authority and the [employer] Act, this Virtual Visit was conducted with patient's (and/or legal guardian's) consent, to reduce the patient's risk of exposure to COVID-19 and provide necessary medical care. The patient (and/or legal guardian) has also been advised to contact this office for worsening conditions or problems, and seek emergency medical treatment and/or call 911 if deemed necessary. Patient identification was verified at the start of the visit: Yes    Total time spent on this encounter: 60 min    Services were provided through a video synchronous discussion virtually to substitute for in-person clinic visit. Patient and provider were located at their individual homes. --Charis Maynard MD on 4/29/2020 at 10:45 AM    An electronic signature was used to authenticate this note.

## 2020-05-13 ENCOUNTER — HOSPITAL ENCOUNTER (OUTPATIENT)
Dept: SLEEP CENTER | Age: 45
Discharge: HOME | End: 2020-05-15
Payer: COMMERCIAL

## 2020-05-13 PROCEDURE — 95810 POLYSOM 6/> YRS 4/> PARAM: CPT

## 2020-05-13 PROCEDURE — 95810 POLYSOM 6/> YRS 4/> PARAM: CPT | Performed by: INTERNAL MEDICINE

## 2020-05-20 NOTE — TELEPHONE ENCOUNTER
pharmacy requesting medication refill.  Please approve or deny this request.    Rx requested:  Requested Prescriptions     Pending Prescriptions Disp Refills    nystatin (MYCOSTATIN) 142006 UNIT/GM cream 30 g 0     Sig: APPLY EXTERNALLY TO THE AFFECTED AREA TWICE DAILY    ibuprofen (ADVIL;MOTRIN) 800 MG tablet 120 tablet 3     Sig: Take 1 tablet by mouth every 6 hours as needed for Pain After toradol dosing complete    ketorolac (TORADOL) 10 MG tablet 20 tablet 0     Sig: Take 1 tablet by mouth every 6 hours as needed for Pain Take the toradol first and if ineffective, take ibuprofen as routine         Last Office Visit:   3/6/2020      Next Visit Date:  Future Appointments   Date Time Provider [place]   5/27/2020  3:15 PM Elinor Dumont, [facility] [facility]   6/19/2020  1:00 PM [address]   6/23/2020 12:00 PM Dagmar PFT ROOM 1 [facility] PFT [address] [place] [address]

## 2020-05-21 RX ORDER — NYSTATIN 100000 U/G
CREAM TOPICAL
Qty: 30 G | Refills: 0 | Status: SHIPPED | OUTPATIENT
Start: 2020-05-21 | End: 2020-11-02 | Stop reason: SDUPTHER

## 2020-05-21 RX ORDER — IBUPROFEN 800 MG/1
800 TABLET ORAL EVERY 6 HOURS PRN
Qty: 120 TABLET | Refills: 3 | Status: SHIPPED | OUTPATIENT
Start: 2020-05-21 | End: 2020-07-30 | Stop reason: ALTCHOICE

## 2020-05-21 RX ORDER — KETOROLAC TROMETHAMINE 10 MG/1
10 TABLET, FILM COATED ORAL EVERY 6 HOURS PRN
Qty: 20 TABLET | Refills: 0 | Status: SHIPPED | OUTPATIENT
Start: 2020-05-21 | End: 2020-07-30 | Stop reason: ALTCHOICE

## 2020-05-27 ENCOUNTER — VIRTUAL VISIT (OUTPATIENT)
Dept: PULMONOLOGY | Age: 45
End: 2020-05-27
Payer: COMMERCIAL

## 2020-05-27 PROCEDURE — 99214 OFFICE O/P EST MOD 30 MIN: CPT | Performed by: INTERNAL MEDICINE

## 2020-05-27 ASSESSMENT — ENCOUNTER SYMPTOMS
RHINORRHEA: 0
SHORTNESS OF BREATH: 1
NAUSEA: 0
EYE ITCHING: 0
VOICE CHANGE: 0
COUGH: 0
SINUS PRESSURE: 0
VOMITING: 0
DIARRHEA: 0
WHEEZING: 0
EYE DISCHARGE: 0
TROUBLE SWALLOWING: 0
ABDOMINAL PAIN: 0
SORE THROAT: 0
CHEST TIGHTNESS: 0

## 2020-05-27 NOTE — PROGRESS NOTES
2020    TELEHEALTH EVALUATION -- Audio/Visual (During FFZLV-34 public health emergency)    HPI:    Onesimo Lutz (:  1975) has requested an audio/video evaluation for the following concern(s):    Patient said she had sleep study done . No PFT or CXR done . Patient has PSG done on  20 an shows MARBELLA. AHI 21.1  RDI 23.2  CPAP titration study not done. No C/o witness apnea. She does not wakes up with gasping for air. C/o wakes up frequently during sleep . complaint of morning headache. She  does not have restful sleep. She  does not take daily naps. She does not fall asleep while watching TV. She  does not have a complaint of sleepiness while driving but feels tired. She has c/o shortness of breath since last few years, worse since last 1 month. No Wheezing . CXR and PFT will be done before next visit. No Cough or Sputum  No Hemoptysis  No Chest tightness   No Chest pain with radiation  or pleuritic pain  No Fever or chills. No Rhinorrhea and postnasal drip    Review of Systems   Constitutional: Negative for chills, diaphoresis, fatigue and fever. HENT: Negative for congestion, mouth sores, nosebleeds, postnasal drip, rhinorrhea, sinus pressure, sneezing, sore throat, trouble swallowing and voice change. Snoring    Eyes: Negative for discharge, itching and visual disturbance. Respiratory: Positive for shortness of breath. Negative for cough, chest tightness and wheezing. Cardiovascular: Negative for chest pain, palpitations and leg swelling. Gastrointestinal: Negative for abdominal pain, diarrhea, nausea and vomiting. Genitourinary: Negative for difficulty urinating and hematuria. Musculoskeletal: Negative for arthralgias, joint swelling and myalgias. Skin: Negative for rash. Allergic/Immunologic: Negative for environmental allergies and food allergies. Neurological: Negative for dizziness, tremors, weakness and headaches.    Psychiatric/Behavioral: Abnormal-            Psychiatric:       [x] Normal Affect [x] No Hallucinations        [] Abnormal-     Other pertinent observable physical exam findings-     ASSESSMENT/PLAN:  1. MARBELLA (obstructive sleep apnea)  She  has PSG done on  5/13/20 an shows MARBELLA. AHI 21.1  RDI 23.2  CPAP titration study not done. No C/o witness apnea. C/o wakes up frequently during sleep . complaint of morning headache.will request with CPAP titration     2. Shortness of breath  Patient is having  Chronic shortness of breath which is chronic and un changed, continue nebulizer with albuterol and albuterol HFA as needed basis. Chest x-ray and PFT before next visit. 3. Morbid obesity ([address]  Patient patient is advised try to lose weight. obesity related risk explained to the patient ,  Suggested weight control approaches, including dietary changes , exercise, behavioral modification. Return in about 4 weeks (around 6/24/2020) for marbella, shortness of breath. Compa Claros is a 38 y.o. female being evaluated by a Virtual Visit (video visit) encounter to address concerns as mentioned above. A caregiver was present when appropriate. Due to this being a TeleHealth encounter ([facility]), evaluation of the following organ systems was limited: Vitals/Constitutional/EENT/Resp/CV/GI//MS/Neuro/Skin/Heme-Lymph-Imm. Pursuant to the emergency declaration under the [facility], [address] [place] [address] and the [employer] and [employer] Act, this Virtual Visit was conducted with patient's (and/or legal guardian's) consent, to reduce the patient's risk of exposure to COVID-19 and provide necessary medical care. The patient (and/or legal guardian) has also been advised to contact this office for worsening conditions or problems, and seek emergency medical treatment and/or call 911 if deemed necessary.      Patient identification was verified at the start of the visit: Yes    Total time spent on this encounter: 26 min    Services were provided through a video synchronous discussion virtually to substitute for in-person clinic visit. Patient and provider were located at their individual homes. --Nirav Fraire MD on 5/27/2020 at 3:28 PM    An electronic signature was used to authenticate this note.

## 2020-07-01 ENCOUNTER — NURSE TRIAGE (OUTPATIENT)
Dept: OTHER | Facility: CLINIC | Age: 45
End: 2020-07-01

## 2020-07-01 ENCOUNTER — HOSPITAL ENCOUNTER (EMERGENCY)
Age: 45
Discharge: HOME | End: 2020-07-01
Attending: EMERGENCY MEDICINE
Payer: COMMERCIAL

## 2020-07-01 ENCOUNTER — TELEPHONE (OUTPATIENT)
Dept: ADMINISTRATIVE | Age: 45
End: 2020-07-01

## 2020-07-01 VITALS
WEIGHT: 230 LBS | OXYGEN SATURATION: 97 % | RESPIRATION RATE: 16 BRPM | HEART RATE: 101 BPM | HEIGHT: 57 IN | SYSTOLIC BLOOD PRESSURE: 160 MMHG | DIASTOLIC BLOOD PRESSURE: 97 MMHG | TEMPERATURE: 98.5 F | BODY MASS INDEX: 49.62 KG/M2

## 2020-07-01 PROCEDURE — 99282 EMERGENCY DEPT VISIT SF MDM: CPT

## 2020-07-01 PROCEDURE — 6370000000 HC RX 637 (ALT 250 FOR IP): Performed by: EMERGENCY MEDICINE

## 2020-07-01 PROCEDURE — 6360000002 HC RX W HCPCS: Performed by: EMERGENCY MEDICINE

## 2020-07-01 RX ORDER — TRAMADOL HYDROCHLORIDE 50 MG/1
50 TABLET ORAL EVERY 8 HOURS PRN
Qty: 20 TABLET | Refills: 0 | Status: SHIPPED | OUTPATIENT
Start: 2020-07-01 | End: 2020-07-07

## 2020-07-01 RX ORDER — AMOXICILLIN 500 MG/1
1000 CAPSULE ORAL ONCE
Status: COMPLETED | OUTPATIENT
Start: 2020-07-01 | End: 2020-07-01

## 2020-07-01 RX ORDER — TRAMADOL HYDROCHLORIDE 50 MG/1
100 TABLET ORAL ONCE
Status: COMPLETED | OUTPATIENT
Start: 2020-07-01 | End: 2020-07-01

## 2020-07-01 RX ORDER — AMOXICILLIN 500 MG/1
500 CAPSULE ORAL 3 TIMES DAILY
Qty: 30 CAPSULE | Refills: 0 | Status: SHIPPED | OUTPATIENT
Start: 2020-07-01 | End: 2020-07-11

## 2020-07-01 RX ORDER — DEXAMETHASONE 4 MG/1
8 TABLET ORAL ONCE
Status: COMPLETED | OUTPATIENT
Start: 2020-07-01 | End: 2020-07-01

## 2020-07-01 RX ORDER — NEOMYCIN SULFATE, POLYMYXIN B SULFATE AND HYDROCORTISONE 10; 3.5; 1 MG/ML; MG/ML; [USP'U]/ML
3 SUSPENSION/ DROPS AURICULAR (OTIC) 3 TIMES DAILY
Qty: 1 BOTTLE | Refills: 0 | Status: SHIPPED | OUTPATIENT
Start: 2020-07-01 | End: 2020-07-08

## 2020-07-01 RX ADMIN — DEXAMETHASONE 8 MG: 4 TABLET ORAL at 13:23

## 2020-07-01 RX ADMIN — TRAMADOL HYDROCHLORIDE 100 MG: 50 TABLET, FILM COATED ORAL at 13:22

## 2020-07-01 RX ADMIN — AMOXICILLIN 1000 MG: 500 CAPSULE ORAL at 13:22

## 2020-07-01 ASSESSMENT — ENCOUNTER SYMPTOMS
EYE PAIN: 0
SINUS PAIN: 1
DIARRHEA: 0
TROUBLE SWALLOWING: 1
EYE DISCHARGE: 0
VOICE CHANGE: 0
BACK PAIN: 0
SHORTNESS OF BREATH: 0
EYE REDNESS: 0
WHEEZING: 0
STRIDOR: 0
CHOKING: 0
CHEST TIGHTNESS: 0
COUGH: 0
CONSTIPATION: 0
VOMITING: 0
ABDOMINAL PAIN: 0
FACIAL SWELLING: 0
SORE THROAT: 1
BLOOD IN STOOL: 0
SINUS PRESSURE: 1

## 2020-07-01 ASSESSMENT — PAIN DESCRIPTION - DESCRIPTORS: DESCRIPTORS: THROBBING

## 2020-07-01 ASSESSMENT — PAIN SCALES - GENERAL
PAINLEVEL_OUTOF10: 10
PAINLEVEL_OUTOF10: 9

## 2020-07-01 ASSESSMENT — PAIN DESCRIPTION - LOCATION: LOCATION: EAR;THROAT

## 2020-07-01 ASSESSMENT — PAIN DESCRIPTION - PAIN TYPE: TYPE: ACUTE PAIN

## 2020-07-01 ASSESSMENT — PAIN DESCRIPTION - ORIENTATION: ORIENTATION: RIGHT

## 2020-07-01 ASSESSMENT — PAIN DESCRIPTION - PROGRESSION: CLINICAL_PROGRESSION: GRADUALLY WORSENING

## 2020-07-01 ASSESSMENT — PAIN DESCRIPTION - FREQUENCY: FREQUENCY: CONTINUOUS

## 2020-07-01 ASSESSMENT — PAIN DESCRIPTION - ONSET: ONSET: SUDDEN

## 2020-07-01 NOTE — TELEPHONE ENCOUNTER
Reason for Disposition   Severe earache pain    Answer Assessment - Initial Assessment Questions  1. LOCATION: \"Which ear is involved? \"      Right ear  2. ONSET: \"When did the ear start hurting\"       2 days ago and worsening quickly  3. SEVERITY: \"How bad is the pain? \"  (Scale 1-10; mild, moderate or severe)    - MILD (1-3): doesn't interfere with normal activities     - MODERATE (4-7): interferes with normal activities or awakens from sleep     - SEVERE (8-10): excruciating pain, unable to do any normal activities       \"over a 10\"  4. URI SYMPTOMS: \" Do you have a runny nose or cough? \"      Mild cough  5. FEVER: \"Do you have a fever? \" If so, ask: \"What is your temperature, how was it measured, and when did it start? \"      no  6. CAUSE: \"Have you been swimming recently? \", \"How often do you use Q-TIPS? \", \"Have you had any recent air travel or scuba diving? \"      *No Answer*  7. OTHER SYMPTOMS: \"Do you have any other symptoms? \" (e.g., headache, stiff neck, dizziness, vomiting, runny nose, decreased hearing)      HA from mild cough, sore throat  8. PREGNANCY: \"Is there any chance you are pregnant? \" \"When was your last menstrual period? \"      no    Protocols used: EARACHE-ADULT-[place]    Pod 1 [place]    Caller reports symptoms as documented above. Caller informed of disposition. Soft transfer to pre-service center to schedule appointment as recommended. Care advice as documented. Please do not respond to the triage nurse through this encounter. Any subsequent communication should be directly with the patient.

## 2020-07-01 NOTE — ED PROVIDER NOTES
[facility] ED  eMERGENCY dEPARTMENT eNCOUnter      Pt Name: Nyla Pak  MRN: [id number]  [place] 1975  Date of evaluation: 7/1/2020  Provider: Nancy Callejas MD    [address] [place] [address]       Chief Complaint   Patient presents with    Otalgia     Rt ear pain and sore throat x 3 days        HISTORY OF PRESENT ILLNESS   (Location/Symptom, Timing/Onset,Context/Setting, Quality, Duration, Modifying Factors, Severity)  Note limiting factors. Nyla Pak is a 38 y.o. female who presents to the emergency department has been patient is sick for the last 3 days time as per patient no one sick at home at this time no documented fever no short of breath no cough congestion sore throat hard to swallow drinking fluids patient also has right ear pain and difficult to sleep on that side no drainage denies any acute taper denies any swelling hearing slightly muffled history anxiety depression obesity PTSD chronic migraine headache hypertension patient non-smoker rated pain as 7-8 out of 10    HPI    NursingNotes were reviewed. REVIEW OF SYSTEMS    (2-9 systems for level 4, 10 or more for level 5)     Review of Systems   Constitutional: Positive for activity change and appetite change. Negative for fever. HENT: Positive for ear pain, sinus pressure, sinus pain, sore throat and trouble swallowing. Negative for congestion, drooling, facial swelling, mouth sores, nosebleeds and voice change. Eyes: Negative for pain, discharge, redness and visual disturbance. Respiratory: Negative for cough, choking, chest tightness, shortness of breath, wheezing and stridor. Cardiovascular: Negative for chest pain, palpitations and leg swelling. Gastrointestinal: Negative for abdominal pain, blood in stool, constipation, diarrhea and vomiting. Endocrine: Negative for cold intolerance, polyphagia and polyuria. Genitourinary: Negative for dysuria, flank pain, frequency, genital sores and urgency.    Musculoskeletal: Negative for back pain, joint swelling, neck pain and neck stiffness. Skin: Negative for pallor and rash. Neurological: Negative for tremors, seizures, syncope, weakness, numbness and headaches. Hematological: Negative for adenopathy. Does not bruise/bleed easily. Psychiatric/Behavioral: Negative for agitation, behavioral problems, hallucinations and sleep disturbance. The patient is not hyperactive. All other systems reviewed and are negative. Except as noted above the remainder of the review of systems was reviewed and negative. PAST MEDICAL HISTORY     Past Medical History:   Diagnosis Date    Anxiety and depression     Anxiety and depression     Asthma     Essential hypertension 5/18/2017    Headache(784.0)     Obesity     MARBELLA (obstructive sleep apnea) 4/29/2020    PTSD (post-traumatic stress disorder)     Urinary incontinence          SURGICALHISTORY       Past Surgical History:   Procedure Laterality Date    CHOLECYSTECTOMY      ENDOMETRIAL ABLATION      HYSTERECTOMY, TOTAL ABDOMINAL  7/15/15    DR. BECKHAM    TONSILLECTOMY      TUBAL LIGATION           CURRENT MEDICATIONS       Previous Medications    ALBUTEROL (ACCUNEB) 1.25 MG/3ML NEBULIZER SOLUTION    Inhale 3 mLs into the lungs every 6 hours as needed for Wheezing    ALBUTEROL SULFATE  (90 BASE) MCG/ACT INHALER    Inhale 2 puffs into the lungs every 6 hours as needed for Wheezing    BREXPIPRAZOLE (REXULTI) 2 MG TABS TABLET    Take 1 tablet by mouth daily    CYCLOSPORINE (RESTASIS) 0.05 % OPHTHALMIC EMULSION    Place 1 drop into both eyes 2 times daily    ELETRIPTAN (RELPAX) 20 MG TABLET    Take 1 tablet by mouth once as needed for Migraine may repeat in 2 hours if necessary    FLUCONAZOLE (DIFLUCAN) 150 MG TABLET    Take 1 tablet by mouth daily    FLUTICASONE (FLONASE) 50 MCG/ACT NASAL SPRAY    1 spray by Each Nostril route daily    HYDROXYZINE (VISTARIL) 25 MG CAPSULE    Take 1 capsule by mouth 3 times daily as needed for Anxiety    IBUPROFEN (ADVIL;MOTRIN) 800 MG TABLET    Take 1 tablet by mouth every 6 hours as needed for Pain After toradol dosing complete    KETOROLAC (TORADOL) 10 MG TABLET    Take 1 tablet by mouth every 6 hours as needed for Pain Take the toradol first and if ineffective, take ibuprofen as routine    NYSTATIN (MYCOSTATIN) 685612 UNIT/GM CREAM    APPLY EXTERNALLY TO THE AFFECTED AREA TWICE DAILY    ONDANSETRON (ZOFRAN ODT) 8 MG TBDP DISINTEGRATING TABLET    Place 1 tablet under the tongue every 8 hours as needed for Nausea or Vomiting    PAROXETINE (PAXIL) 40 MG TABLET    Take 1 tablet by mouth daily    TIZANIDINE (ZANAFLEX) 4 MG TABLET    Take 1 tablet by mouth 2 times daily as needed (muscle spasm)    VITAMIN D (ERGOCALCIFEROL) 1.25 MG (83648 UT) CAPS CAPSULE    Take 1 capsule by mouth once a week       ALLERGIES     Aspirin; Codeine; Lithium; and Naproxen    FAMILY HISTORY     History reviewed. No pertinent family history.        SOCIAL HISTORY       Social History     Socioeconomic History    Marital status:      Spouse name: None    Number of children: None    Years of education: None    Highest education level: None   Occupational History    None   Social Needs    Financial resource strain: None    Food insecurity     Worry: None     Inability: None    Transportation needs     Medical: None     Non-medical: None   Tobacco Use    Smoking status: Former Smoker    Smokeless tobacco: Never Used   Substance and Sexual Activity    Alcohol use: No    Drug use: No    Sexual activity: Yes   Lifestyle    Physical activity     Days per week: None     Minutes per session: None    Stress: None   Relationships    Social connections     Talks on phone: None     Gets together: None     Attends Taoist service: None     Active member of club or organization: None     Attends meetings of clubs or organizations: None     Relationship status: None    Intimate partner violence     Fear of current or ex partner: None     Emotionally abused: None     Physically abused: None     Forced sexual activity: None   Other Topics Concern    None   Social History Narrative    None       SCREENINGS      @FLOW([id number])@      PHYSICAL EXAM    (up to 7 for level 4, 8 or more for level 5)     ED Triage Vitals [07/01/20 1247]   BP Temp Temp Source Pulse Resp SpO2 Height Weight   (!) 160/97 98.5 °F (36.9 °C) Oral 101 16 97 % 4' 9\" (1.448 m) 230 lb (104.3 kg)       Physical Exam  Vitals signs reviewed. Constitutional:       Appearance: She is well-developed. She is obese. Comments: Alert cooperative patient slightly anxious and uncomfortable because of right ear pain able to talk in full sentences no drooling noted   HENT:      Head: Normocephalic. Right Ear: Tympanic membrane and ear canal normal.      Left Ear: Tympanic membrane, ear canal and external ear normal. There is no impacted cerumen. Ears:      Comments: Attention to the right ear patient has no cellulitis the ear lobe patient has marked tenderness on examination finding consistent with otitis externa is mild erythema of the auditory canal noted no drainage no rash     Nose: No congestion or rhinorrhea. Mouth/Throat:      Mouth: Mucous membranes are moist.      Pharynx: Posterior oropharyngeal erythema present. No oropharyngeal exudate. Comments: Patient come to the oropharynx patient has a slightly swollen uvula but no angioedema no exudate no blisters noted  Eyes:      General:         Right eye: No discharge. Left eye: No discharge. Extraocular Movements: Extraocular movements intact. Conjunctiva/sclera: Conjunctivae normal.      Pupils: Pupils are equal, round, and reactive to light. Neck:      Musculoskeletal: Normal range of motion and neck supple. No neck rigidity or muscular tenderness. Vascular: No carotid bruit. Cardiovascular:      Rate and Rhythm: Normal rate. Pulses: Normal pulses. Heart sounds: Normal heart sounds. No murmur. No gallop. Pulmonary:      Effort: Pulmonary effort is normal. No respiratory distress. Breath sounds: Normal breath sounds. No wheezing. Abdominal:      General: Abdomen is flat. Bowel sounds are normal.      Palpations: Abdomen is soft. There is no mass. Tenderness: There is no rebound. Musculoskeletal: Normal range of motion. General: No tenderness. Lymphadenopathy:      Cervical: No cervical adenopathy. Skin:     General: Skin is warm. Capillary Refill: Capillary refill takes less than 2 seconds. Findings: No erythema or rash. Neurological:      Mental Status: She is alert and oriented to person, place, and time. Cranial Nerves: No cranial nerve deficit. Motor: No abnormal muscle tone. Psychiatric:         Mood and Affect: Mood normal.         Behavior: Behavior normal.         Thought Content: Thought content normal.         DIAGNOSTIC RESULTS     EKG: All EKG's are interpreted by the Emergency Department Physician who either signs or Co-signsthis chart in the absence of a cardiologist.        RADIOLOGY:   Barbie [place]av[place] such as CT, Ultrasound and MRI are read by the radiologist. Plain radiographic images are visualized and preliminarily interpreted by the emergency physician with the below findings:        Interpretation per the Radiologist below, if available at the time ofthis note:    No orders to display         ED BEDSIDE ULTRASOUND:   Performed by ED Physician - none    LABS:  Labs Reviewed - No data to display    All other labs were within normal range or not returned as of this dictation.     EMERGENCY DEPARTMENT COURSE and DIFFERENTIAL DIAGNOSIS/MDM:   Vitals:    Vitals:    07/01/20 1247   BP: (!) 160/97   Pulse: 101   Resp: 16   Temp: 98.5 °F (36.9 °C)   TempSrc: Oral   SpO2: 97%   Weight: 230 lb (104.3 kg)   Height: 4' 9\" (1.448 m)           MDM    CRITICAL CARE TIME   Total Critical Care time was  minutes, excluding separately reportableprocedures. There was a high probability of clinicallysignificant/life threatening deterioration in the patient's condition which required my urgent intervention. NSULTS:  None    PROCEDURES:  Unless otherwise noted below, none     Procedures    FINAL IMPRESSION      1. Infective otitis externa of right ear    2. Acute pharyngitis, unspecified etiology          DISPOSITION/PLAN   DISPOSITION Decision To Discharge 07/01/2020 01:03:01 PM      PATIENT REFERRED TO:  Adelina Osler, APRN - [facility]  [address] [facility]  [phone]    In 1 week        DISCHARGE MEDICATIONS:  New Prescriptions    AMOXICILLIN (AMOXIL) 500 MG CAPSULE    Take 1 capsule by mouth 3 times daily for 10 days    NEOMYCIN-POLYMYXIN-HYDROCORTISONE (CORTISPORIN) 3.5-19395-8 OTIC SUSPENSION    Place 3 drops into the right ear 3 times daily for 7 days To the affected ear    TRAMADOL (ULTRAM) 50 MG TABLET    Take 1 tablet by mouth every 8 hours as needed for Pain for up to 6 days.           (Please note that portions of this note were completed with a voice recognition program.  Efforts were made to edit the dictations but occasionally words are mis-transcribed.)    Carlo Souza MD (electronically signed)  Attending Emergency Physician       Carlo Souza MD  07/01/20 309 Delta Granger MD  07/01/20 3407

## 2020-07-07 RX ORDER — ALPRAZOLAM 2 MG/1
TABLET ORAL
Qty: 30 TABLET | Refills: 0 | Status: SHIPPED | OUTPATIENT
Start: 2020-07-07 | End: 2020-07-30 | Stop reason: ALTCHOICE

## 2020-07-07 NOTE — TELEPHONE ENCOUNTER
Patient called to request a refill of xanex 2mg to be sent to [facility] in [place]. Unable to find it in patients medication list.  Please advise.

## 2020-07-07 NOTE — LETTER
[address]  [address], [address]  Phone: [phone]  Fax: [phone]        Jurline Severin, APRN - CNP      July 7, 2020      Linda Martinez  [address] [place] [address]      Dear Jose Chawla,      We received a refill request on one or more of your medications. IDANIA De has approved a 30 day supply. She would like for you to schedule a follow up appointment within he next month with her before this refill runs out as it is a controlled medication. We are offering virtual, Mychart, telephone and in office appointments on certain days when the providers are in.  Please call [phone] option 1 to set up this appointment. Thank you.        Sincerely,           [facility]

## 2020-07-13 ENCOUNTER — HOSPITAL ENCOUNTER (OUTPATIENT)
Dept: ULTRASOUND IMAGING | Age: 45
Discharge: HOME | End: 2020-07-15
Payer: COMMERCIAL

## 2020-07-13 PROCEDURE — 76536 US EXAM OF HEAD AND NECK: CPT

## 2020-07-14 ENCOUNTER — HOSPITAL ENCOUNTER (OUTPATIENT)
Dept: SLEEP CENTER | Age: 45
Discharge: HOME | End: 2020-07-16
Payer: COMMERCIAL

## 2020-07-14 PROCEDURE — 95811 POLYSOM 6/>YRS CPAP 4/> PARM: CPT | Performed by: INTERNAL MEDICINE

## 2020-07-14 PROCEDURE — 95811 POLYSOM 6/>YRS CPAP 4/> PARM: CPT

## 2020-07-14 NOTE — RESULT ENCOUNTER NOTE
Please notify Max Roel that ultrasound of the thyroid shows no nodules or concerning findings.   There is evidence of Hashimoto's disease as we have discussed in the past.

## 2020-07-21 ENCOUNTER — VIRTUAL VISIT (OUTPATIENT)
Dept: PULMONOLOGY | Age: 45
End: 2020-07-21
Payer: COMMERCIAL

## 2020-07-21 PROCEDURE — 99214 OFFICE O/P EST MOD 30 MIN: CPT | Performed by: INTERNAL MEDICINE

## 2020-07-21 ASSESSMENT — ENCOUNTER SYMPTOMS
VOMITING: 0
SHORTNESS OF BREATH: 1
TROUBLE SWALLOWING: 0
SORE THROAT: 0
CHEST TIGHTNESS: 0
NAUSEA: 0
SINUS PRESSURE: 0
WHEEZING: 0
RHINORRHEA: 0
EYE ITCHING: 0
ABDOMINAL PAIN: 0
VOICE CHANGE: 0
COUGH: 0
EYE DISCHARGE: 0
DIARRHEA: 0

## 2020-07-21 NOTE — PROGRESS NOTES
2020    TELEHEALTH EVALUATION -- Audio/Visual (During -12 public health emergency)    HPI:    Michelle Cuba (:  1975) has requested an audio/video evaluation for the following concern(s):    Patient had CPAP study done . She does not wakes up with gasping for air. C/o wakes up frequently during sleep . complaint of morning headache. She  does not have restful sleep. She  does not take daily naps. She does not fall asleep while watching TV. She  does not have a complaint of sleepiness while driving but feels tired. Patient is using c/o shortness of breath   No Wheezing   No Cough with  Sputum  No Hemoptysis  No Chest tightness   No Chest pain with radiation  or pleuritic pain  No Fever or chills. No Rhinorrhea and postnasal drip. CXR and PFT not done       Review of Systems   Constitutional: Negative for chills, diaphoresis, fatigue and fever. HENT: Negative for congestion, mouth sores, nosebleeds, postnasal drip, rhinorrhea, sinus pressure, sneezing, sore throat, trouble swallowing and voice change. Eyes: Negative for discharge, itching and visual disturbance. Respiratory: Positive for shortness of breath. Negative for cough, chest tightness and wheezing. Cardiovascular: Negative for chest pain, palpitations and leg swelling. Gastrointestinal: Negative for abdominal pain, diarrhea, nausea and vomiting. Genitourinary: Negative for difficulty urinating and hematuria. Musculoskeletal: Negative for arthralgias, joint swelling and myalgias. Skin: Negative for rash. Allergic/Immunologic: Negative for environmental allergies and food allergies. Neurological: Positive for headaches. Negative for dizziness, tremors and weakness. Psychiatric/Behavioral: Positive for sleep disturbance. Negative for behavioral problems. Prior to Visit Medications    Medication Sig Taking?  Authorizing Provider   ALPRAZolam (XANAX) 2 MG tablet TAKE 1 TABLET BY MOUTH ONCE DAILY AS NEEDED FOR ANXIETY  ANA Wild CNP   nystatin (MYCOSTATIN) 783937 UNIT/GM cream APPLY EXTERNALLY TO THE AFFECTED AREA TWICE DAILY  ANA Wild CNP   ibuprofen (ADVIL;MOTRIN) 800 MG tablet Take 1 tablet by mouth every 6 hours as needed for Pain After toradol dosing complete  ANA Wild CNP   ketorolac (TORADOL) 10 MG tablet Take 1 tablet by mouth every 6 hours as needed for Pain Take the toradol first and if ineffective, take ibuprofen as routine  ANA Wild CNP   brexpiprazole (REXULTI) 2 MG TABS tablet Take 1 tablet by mouth daily  ANA Wild CNP   cycloSPORINE (RESTASIS) 0.05 % ophthalmic emulsion Place 1 drop into both eyes 2 times daily  ANA Wild CNP   albuterol sulfate  (90 Base) MCG/ACT inhaler Inhale 2 puffs into the lungs every 6 hours as needed for Wheezing  ANA Wild CNP   albuterol (ACCUNEB) 1.25 MG/3ML nebulizer solution Inhale 3 mLs into the lungs every 6 hours as needed for Wheezing  ANA Wild CNP   eletriptan (RELPAX) 20 MG tablet Take 1 tablet by mouth once as needed for Migraine may repeat in 2 hours if necessary  ANA Wild CNP   fluconazole (DIFLUCAN) 150 MG tablet Take 1 tablet by mouth daily  ANA Wild CNP   PARoxetine (PAXIL) 40 MG tablet Take 1 tablet by mouth daily  ANA Wild CNP   vitamin D (ERGOCALCIFEROL) 1.25 MG (47852 UT) CAPS capsule Take 1 capsule by mouth once a week  ANA Welsh CNP   fluticasone (FLONASE) 50 MCG/ACT nasal spray 1 spray by Each Nostril route daily  ANA Wild CNP   hydrOXYzine (VISTARIL) 25 MG capsule Take 1 capsule by mouth 3 times daily as needed for Anxiety  ANA Wild CNP   tiZANidine (ZANAFLEX) 4 MG tablet Take 1 tablet by mouth 2 times daily as needed (muscle spasm)  Gray Yazmin Riedy, APRN - CNP   ondansetron (ZOFRAN ODT) 8 MG TBDP disintegrating tablet Place 1 tablet had CPAP study done . therapeutic CPAP at 9 cm  . She does not wakes up with gasping for air. C/o wakes up frequently during sleep . complaint of morning headache. She  does not have restful sleep. She will be started on CPAP at 9 cm     Counseling: CPAP/BiPAP uses, patient advised to use CPAP at least 5-6 hours every night. Driving: patient is advised for extreme caution when driving or operating machinery if there is a feeling of drowsiness, especially while driving it is preferable to stop driving and take a brief nap. Sleep hygiene:Avoid supine sleep, sleep on  sides. Avoid  sleep deprivation. Explained sleep hygiene. Advice to avoid Alcohol and sedative      2. Shortness of breath  She  is having  Chronic shortness of breath which is chronic and un changed, continue nebulizer with albuterol and albuterol HFA as needed basis. Chest x-ray and PFT before next visit. 3. Morbid obesity ([address]  Patient patient is advised try to lose weight. obesity related risk explained to the patient ,  Suggested weight control approaches, including dietary changes , exercise, behavioral modification. Return in about 4 weeks (around 8/18/2020) for kylie, shortness of breath. Aziza Fallon is a 38 y.o. female being evaluated by a Virtual Visit (video visit) encounter to address concerns as mentioned above. A caregiver was present when appropriate. Due to this being a TeleHealth encounter (During NIQ-04 public health emergency), evaluation of the following organ systems was limited: Vitals/Constitutional/EENT/Resp/CV/GI//MS/Neuro/Skin/Heme-Lymph-Imm.   Pursuant to the emergency declaration under the [facility], [address] [place] [address] authority and the [employer] Act, this Virtual Visit was conducted with patient's (and/or legal guardian's) consent, to reduce the patient's risk of exposure to COVID-19 and provide necessary medical

## 2020-07-30 ENCOUNTER — HOSPITAL ENCOUNTER (EMERGENCY)
Age: 45
Discharge: HOME | End: 2020-07-30
Attending: EMERGENCY MEDICINE
Payer: COMMERCIAL

## 2020-07-30 ENCOUNTER — APPOINTMENT (OUTPATIENT)
Dept: GENERAL RADIOLOGY | Age: 45
End: 2020-07-30
Payer: COMMERCIAL

## 2020-07-30 VITALS
OXYGEN SATURATION: 96 % | BODY MASS INDEX: 53.94 KG/M2 | HEART RATE: 92 BPM | HEIGHT: 57 IN | DIASTOLIC BLOOD PRESSURE: 81 MMHG | TEMPERATURE: 98.9 F | RESPIRATION RATE: 16 BRPM | SYSTOLIC BLOOD PRESSURE: 150 MMHG | WEIGHT: 250 LBS

## 2020-07-30 PROCEDURE — 73130 X-RAY EXAM OF HAND: CPT

## 2020-07-30 PROCEDURE — 99283 EMERGENCY DEPT VISIT LOW MDM: CPT

## 2020-07-30 PROCEDURE — 6360000002 HC RX W HCPCS: Performed by: EMERGENCY MEDICINE

## 2020-07-30 PROCEDURE — 96372 THER/PROPH/DIAG INJ SC/IM: CPT

## 2020-07-30 RX ORDER — KETOROLAC TROMETHAMINE 30 MG/ML
60 INJECTION, SOLUTION INTRAMUSCULAR; INTRAVENOUS ONCE
Status: COMPLETED | OUTPATIENT
Start: 2020-07-30 | End: 2020-07-30

## 2020-07-30 RX ORDER — CYCLOBENZAPRINE HCL 10 MG
10 TABLET ORAL 3 TIMES DAILY PRN
Qty: 30 TABLET | Refills: 0 | Status: SHIPPED | OUTPATIENT
Start: 2020-07-30 | End: 2020-08-09

## 2020-07-30 RX ORDER — KETOROLAC TROMETHAMINE 10 MG/1
10 TABLET, FILM COATED ORAL EVERY 6 HOURS PRN
Qty: 20 TABLET | Refills: 0 | Status: SHIPPED | OUTPATIENT
Start: 2020-07-30 | End: 2020-12-01 | Stop reason: SDUPTHER

## 2020-07-30 RX ADMIN — KETOROLAC TROMETHAMINE 60 MG: 30 INJECTION, SOLUTION INTRAMUSCULAR at 14:00

## 2020-07-30 ASSESSMENT — PAIN SCALES - GENERAL
PAINLEVEL_OUTOF10: 3
PAINLEVEL_OUTOF10: 7
PAINLEVEL_OUTOF10: 7

## 2020-07-30 ASSESSMENT — ENCOUNTER SYMPTOMS
APNEA: 0
SINUS PRESSURE: 0
VOMITING: 0
WHEEZING: 0
DIARRHEA: 0
BACK PAIN: 0
ABDOMINAL DISTENTION: 0
ABDOMINAL PAIN: 0
CONSTIPATION: 0
RHINORRHEA: 0
SHORTNESS OF BREATH: 0
EYE PAIN: 0
PHOTOPHOBIA: 0
SORE THROAT: 0
COUGH: 0
NAUSEA: 0
COLOR CHANGE: 0

## 2020-07-30 ASSESSMENT — PAIN DESCRIPTION - PAIN TYPE
TYPE: ACUTE PAIN
TYPE: ACUTE PAIN

## 2020-07-30 ASSESSMENT — PAIN DESCRIPTION - LOCATION
LOCATION: HAND
LOCATION: HAND

## 2020-07-30 ASSESSMENT — PAIN DESCRIPTION - ORIENTATION
ORIENTATION: RIGHT
ORIENTATION: RIGHT

## 2020-07-30 ASSESSMENT — PAIN DESCRIPTION - FREQUENCY: FREQUENCY: CONTINUOUS

## 2020-07-30 ASSESSMENT — PAIN DESCRIPTION - DESCRIPTORS: DESCRIPTORS: THROBBING

## 2020-07-30 NOTE — ED PROVIDER NOTES
[address] [place] [address] ED  eMERGENCY dEPARTMENT eNCOUnter      Pt Name: Dorinda Noriega  MRN: [id number]  [place] 1975  Date of evaluation: 7/30/2020  Provider: Radha Thayer MD    CHIEF COMPLAINT       Chief Complaint   Patient presents with    Hand Pain     Right hand pain, punched a metal door about 1pm today         HISTORY OF PRESENT ILLNESS   (Location/Symptom, Timing/Onset,Context/Setting, Quality, Duration, Modifying Factors, Severity)  Note limiting factors. Dorinda Noriega is a 38 y.o. female who presents to the emergency department with complaint of right hand pain after punching a metal door in anger just prior to presentation. Pain is 7 in a scale of 1-10 and sharp. Pain is worse with movement. Denies any other systemic symptoms. Denies any other injuries. Pain does not radiate. Ice helps. HPI    Nursing Notes were reviewed. REVIEW OF SYSTEMS    (2-9 systems for level 4, 10 or more for level 5)     Review of Systems   Constitutional: Negative. Negative for activity change, appetite change, chills, fatigue and fever. HENT: Negative for congestion, ear discharge, ear pain, hearing loss, rhinorrhea, sinus pressure and sore throat. Eyes: Negative for photophobia, pain and visual disturbance. Respiratory: Negative for apnea, cough, shortness of breath and wheezing. Cardiovascular: Negative for chest pain, palpitations and leg swelling. Gastrointestinal: Negative for abdominal distention, abdominal pain, constipation, diarrhea, nausea and vomiting. Endocrine: Negative for cold intolerance, heat intolerance and polyuria. Genitourinary: Negative for dysuria, flank pain, frequency and urgency. Musculoskeletal: Positive for arthralgias, joint swelling and myalgias. Negative for back pain, gait problem and neck stiffness. Skin: Negative for color change, pallor and rash. Allergic/Immunologic: Negative for food allergies and immunocompromised state.    Neurological: Negative for dizziness, tremors, syncope, weakness, light-headedness and headaches. Psychiatric/Behavioral: Negative for agitation, confusion and hallucinations. All other systems reviewed and are negative. Except as noted above the remainder of the review of systems was reviewed and negative. PAST MEDICAL HISTORY     Past Medical History:   Diagnosis Date    Anxiety and depression     Anxiety and depression     Asthma     Essential hypertension 5/18/2017    Headache(784.0)     Obesity     MARBELLA (obstructive sleep apnea) 4/29/2020    PTSD (post-traumatic stress disorder)     Urinary incontinence          SURGICAL HISTORY       Past Surgical History:   Procedure Laterality Date    CHOLECYSTECTOMY      ENDOMETRIAL ABLATION      HYSTERECTOMY, TOTAL ABDOMINAL  7/15/15    DR. BECKHAM    TONSILLECTOMY      TUBAL LIGATION           CURRENT MEDICATIONS       Previous Medications    ALBUTEROL (ACCUNEB) 1.25 MG/3ML NEBULIZER SOLUTION    Inhale 3 mLs into the lungs every 6 hours as needed for Wheezing    ALBUTEROL SULFATE  (90 BASE) MCG/ACT INHALER    Inhale 2 puffs into the lungs every 6 hours as needed for Wheezing    CYCLOSPORINE (RESTASIS) 0.05 % OPHTHALMIC EMULSION    Place 1 drop into both eyes 2 times daily    ELETRIPTAN (RELPAX) 20 MG TABLET    Take 1 tablet by mouth once as needed for Migraine may repeat in 2 hours if necessary    HYDROXYZINE (VISTARIL) 25 MG CAPSULE    Take 1 capsule by mouth 3 times daily as needed for Anxiety    NYSTATIN (MYCOSTATIN) 433265 UNIT/GM CREAM    APPLY EXTERNALLY TO THE AFFECTED AREA TWICE DAILY    ONDANSETRON (ZOFRAN ODT) 8 MG TBDP DISINTEGRATING TABLET    Place 1 tablet under the tongue every 8 hours as needed for Nausea or Vomiting       ALLERGIES     Aspirin; Codeine; Lithium; and Naproxen    FAMILY HISTORY     History reviewed. No pertinent family history.        SOCIAL HISTORY       Social History     Socioeconomic History    Marital status:  Spouse name: None    Number of children: None    Years of education: None    Highest education level: None   Occupational History    None   Social Needs    Financial resource strain: None    Food insecurity     Worry: None     Inability: None    Transportation needs     Medical: None     Non-medical: None   Tobacco Use    Smoking status: Former Smoker    Smokeless tobacco: Never Used   Substance and Sexual Activity    Alcohol use: No    Drug use: No    Sexual activity: Yes   Lifestyle    Physical activity     Days per week: None     Minutes per session: None    Stress: None   Relationships    Social connections     Talks on phone: None     Gets together: None     Attends Gnosticism service: None     Active member of club or organization: None     Attends meetings of clubs or organizations: None     Relationship status: None    Intimate partner violence     Fear of current or ex partner: None     Emotionally abused: None     Physically abused: None     Forced sexual activity: None   Other Topics Concern    None   Social History Narrative    None       SCREENINGS             PHYSICAL EXAM    (up to 7 for level 4, 8 or more for level 5)     ED Triage Vitals [07/30/20 1348]   BP Temp Temp Source Pulse Resp SpO2 Height Weight   (!) 150/81 98.9 °F (37.2 °C) Oral 92 16 96 % 4' 9\" (1.448 m) 250 lb (113.4 kg)       Physical Exam  Vitals signs and nursing note reviewed. Constitutional:       General: She is in acute distress. Appearance: Normal appearance. She is well-developed. She is obese. She is not ill-appearing, toxic-appearing or diaphoretic. HENT:      Head: Normocephalic and atraumatic. Nose: Nose normal. No congestion or rhinorrhea. Mouth/Throat:      Mouth: Mucous membranes are moist.      Pharynx: Oropharynx is clear. No oropharyngeal exudate or posterior oropharyngeal erythema. Eyes:      General: No scleral icterus. Right eye: No discharge.          Left eye: No discharge. Conjunctiva/sclera: Conjunctivae normal.      Pupils: Pupils are equal, round, and reactive to light. Neck:      Musculoskeletal: Normal range of motion and neck supple. No neck rigidity or muscular tenderness. Thyroid: No thyromegaly. Vascular: No carotid bruit or JVD. Trachea: No tracheal deviation. Cardiovascular:      Rate and Rhythm: Normal rate and regular rhythm. Heart sounds: Normal heart sounds. No murmur. No friction rub. No gallop. Pulmonary:      Effort: Pulmonary effort is normal. No respiratory distress. Breath sounds: Normal breath sounds. No stridor. No wheezing, rhonchi or rales. Chest:      Chest wall: No tenderness. Abdominal:      General: Bowel sounds are normal. There is no distension. Palpations: Abdomen is soft. There is no mass. Tenderness: There is no abdominal tenderness. There is no right CVA tenderness, left CVA tenderness, guarding or rebound. Hernia: No hernia is present. Musculoskeletal: Normal range of motion. General: Swelling, tenderness and signs of injury present. No deformity. Right lower leg: No edema. Left lower leg: No edema. Comments: Right hand swelling and tenderness without deformity. Lymphadenopathy:      Cervical: No cervical adenopathy. Skin:     General: Skin is warm and dry. Coloration: Skin is not jaundiced or pale. Findings: No bruising, erythema, lesion or rash. Neurological:      Mental Status: She is alert and oriented to person, place, and time. Cranial Nerves: No cranial nerve deficit. Sensory: No sensory deficit. Motor: No weakness or abnormal muscle tone. Coordination: Coordination normal.      Gait: Gait normal.      Deep Tendon Reflexes: Reflexes are normal and symmetric. Reflexes normal.   Psychiatric:         Behavior: Behavior normal.         Thought Content:  Thought content normal.         Judgment: Judgment normal. DIAGNOSTIC RESULTS     EKG: All EKG's are interpreted by the Emergency Department Physician who either signs or Co-signs this chart in the absence of a cardiologist.        RADIOLOGY:   Non-plain film images such as CT, Ultrasound and MRI are read by the radiologist. Valeria Filippo radiographicimages are visualized and preliminarily interpreted by the emergency physician with the below findings:        Interpretation per the Radiologist below, if available at the time of this note:    XR HAND RIGHT (MIN 3 VIEWS)    (Results Pending)         ED BEDSIDE ULTRASOUND:   Performed by ED Physician - none    LABS:  Labs Reviewed - No data to display    All other labs were within normal range or not returned as of this dictation. EMERGENCY DEPARTMENT COURSE and DIFFERENTIALDIAGNOSIS/MDM:   Vitals:    Vitals:    07/30/20 1348   BP: (!) 150/81   Pulse: 92   Resp: 16   Temp: 98.9 °F (37.2 °C)   TempSrc: Oral   SpO2: 96%   Weight: 250 lb (113.4 kg)   Height: 4' 9\" (1.448 m)           MDM  Number of Diagnoses or Management Options     Amount and/or Complexity of Data Reviewed  Tests in the radiology section of CPT®: ordered and reviewed    Risk of Complications, Morbidity, and/or Mortality  Presenting problems: moderate  Diagnostic procedures: moderate  Management options: moderate    Patient Progress  Patient progress: improved      CRITICAL CARE TIME   Total Critical Care time was  minutes, excluding separately reportable procedures. There was a high probability of clinically significant/life threatening deterioration in the patient's condition which required my urgentintervention. CONSULTS:  None    PROCEDURES:  Unless otherwise noted below, none     Procedures    FINAL IMPRESSION      1.  Contusion of right hand, initial encounter          DISPOSITION/PLAN   DISPOSITION        PATIENT REFERRED TO:  ANA Avery - CNP  [facility], [address] [facility]  [phone]    In 3 days        DISCHARGE MEDICATIONS:  New Prescriptions    CYCLOBENZAPRINE (FLEXERIL) 10 MG TABLET    Take 1 tablet by mouth 3 times daily as needed for Muscle spasms    KETOROLAC (TORADOL) 10 MG TABLET    Take 1 tablet by mouth every 6 hours as needed for Pain          (Please note that portions of this note were completed with a voice recognitionprogram.  Efforts were made to edit the dictations but occasionally words are mis-transcribed.)    Lesa Murillo MD (electronically signed)  Attending Emergency Physician          Lesa Murillo MD  07/30/20 25 [id number]

## 2020-07-30 NOTE — ED TRIAGE NOTES
Pt came to ER for c/o right hand pain after punching a metal door today about 1pm.  Pt given an ice pack upon arrival to ED 8. Pt able to wiggle fingers, has a strong pulse and cap refill brisk. Right hand slightly swollen. Pain rated 7/10. Plan of care explained to Pt at bedside by Dr. Hilary Chauhan.

## 2020-08-05 ENCOUNTER — TELEPHONE (OUTPATIENT)
Dept: FAMILY MEDICINE CLINIC | Age: 45
End: 2020-08-05

## 2020-08-05 NOTE — TELEPHONE ENCOUNTER
Patient called in today stating that she would like to start again on her Brexpiprazole. She says she was off of it for a long time and would like to restart taking medication. Patient stated she was having really bad suicidal thoughts yesterday to the point where she almost drove her car off a bridge. Patient declined having thoughts at the time of our phone call. Ellen Pettit advised, she is speaking to patient at the moment.

## 2020-08-05 NOTE — TELEPHONE ENCOUNTER
Spoke with [facility] [employer], she advised that the patient be seen by Dr. Ned Horan as soon as possible or be evaluated by [facility] intake.   A referral has been placed, [facility] does not have access to her computer at this time, will ask Dr. Anshu Ocampo to sign referral.    Danii Edmonds

## 2020-08-05 NOTE — TELEPHONE ENCOUNTER
I spoke with the patient and advised her to go to the ER dur to the suicidal thoughts. She stated that she never act on it. She states that she loves her grandchildren too much to leave them. She says she had taken herself off of Rexulti 2 months ago, and just restarted it back for a few weeks. She asked if she should stop the medication. She also wants to b e placed on a new medication. Will forward the message to Dr. Ebony Mcmullen as well.   Radha Grigsby

## 2020-08-07 RX ORDER — FLUCONAZOLE 150 MG/1
150 TABLET ORAL DAILY
Qty: 30 TABLET | Refills: 0 | Status: SHIPPED | OUTPATIENT
Start: 2020-08-07 | End: 2020-11-02 | Stop reason: SDUPTHER

## 2020-08-07 NOTE — TELEPHONE ENCOUNTER
Patient is requesting medication refill medication she was taking for yeast infection and also the Xanax she states she will be running out today. I advised patient that you may not be able to fill the Xanax medication before her upcoming appointment on the 10th due to it being a controlled substance.  Please approve or deny this request.    Rx requested:  Requested Prescriptions     Pending Prescriptions Disp Refills    fluconazole (DIFLUCAN) 150 MG tablet 30 tablet 0     Sig: Take 1 tablet by mouth daily         Last Office Visit:   4/16/2020      Next Visit Date:  Future Appointments   Date Time Provider [place]   8/10/2020  7:10 AM ANA López - CNP [address]   8/19/2020  2:15 PM Sreekanth Brody MD [address] [place] [address]

## 2020-08-10 ENCOUNTER — TELEPHONE (OUTPATIENT)
Dept: WOMENS IMAGING | Age: 45
End: 2020-08-10

## 2020-08-10 ENCOUNTER — VIRTUAL VISIT (OUTPATIENT)
Dept: FAMILY MEDICINE CLINIC | Age: 45
End: 2020-08-10
Payer: COMMERCIAL

## 2020-08-10 PROCEDURE — 99214 OFFICE O/P EST MOD 30 MIN: CPT | Performed by: NURSE PRACTITIONER

## 2020-08-10 RX ORDER — ALPRAZOLAM 2 MG/1
TABLET ORAL
Qty: 30 TABLET | Refills: 0 | Status: SHIPPED | OUTPATIENT
Start: 2020-08-10 | End: 2020-08-21 | Stop reason: SDUPTHER

## 2020-08-10 NOTE — PROGRESS NOTES
8/10/2020    TELEHEALTH EVALUATION -- Audio/Visual (During IXJXX-23 public health emergency)    Due to [place] 19 outbreak, patient's office visit was converted to a virtual visit. Patient was contacted and agreed to proceed with a virtual visit via [employer]. me  The risks and benefits of converting to a virtual visit were discussed in light of the current infectious disease epidemic. Patient also understood that insurance coverage and co-pays are up to their individual insurance plans. Aziza Fallon is a 38 y.o. female being evaluated by a Virtual Visit (video visit) encounter to address concerns as mentioned above. A caregiver was present when appropriate. Due to this being a TeleHealth encounter (During KHNYJ-38 public health emergency), evaluation of the following organ systems was limited: Vitals/Constitutional/EENT/Resp/CV/GI//MS/Neuro/Skin/Heme-Lymph-Imm. Pursuant to the emergency declaration under the [address] [place] [address] and the [employer] and [employer] Act, this Virtual Visit was conducted with patient's (and/or legal guardian's) consent, to reduce the patient's risk of exposure to COVID-19 and provide necessary medical care. The patient (and/or legal guardian) has also been advised to contact this office for worsening conditions or problems, and seek emergency medical treatment and/or call 911 if deemed necessary. Patient identification was verified at the start of the visit: Yes    Total time spent for this encounter: Not billed by time    Services were provided through a video synchronous discussion virtually to substitute for in-person clinic visit. Patient and provider were located at their individual homes. The patient is talking with me virtually from her home and I am located at my office in [facility].        HPI:    Aziza Fallon (:  1975) has requested an audio/video evaluation for the following concern(s): depression and anxiety. Depression/anxiety/irritability: The patient states that her mood worsened significantly last week. She states that she \"lost it\" for a while. The stress of life \"got to her\". She felt that she was stretched too thin and taking care of too many responsibilities of her family. [place] like she could not do anything right or good enough for people. She states that the relationship with her  is the best it has been in years however the relationship with her children has been very strained. She has been caring for her grandchildren often. She states that she was very agitated and angry at the world last week and ended up punching a metal door and injuring her hand. Was evaluated at the ER for this. She states that family members encouraged her to call my office because she was saying things like she wanted to harm herself. The patient states that she is aware of what she was saying and never \"blacked out\". She is also aware that she would never do anything to hurt her self. States that she loves her grandchildren too much. She states that she did not have a plan to harm herself. She states that after listening to music with her headphones to lay down and went to sleep. This helped her tremendously and she woke up the next morning feeling a lot better already. She suspects that suddenly coming off of her Rexulti for depression and restarting it triggered this mood instability as she is not had this before. She has had episodes of time where she goes from very irritable and full of energy to very down and depressed. She has not seen a mental health professional in several years and has refused to do so. She states that after last week she is willing to start with psychology but does not want to see psychiatry and would like for me to start her on something different for mood.   She continues to take Xanax as needed but states that she has been taking it more routinely lately. Had been cutting the tablets in half but lately has been taking the full dose. She does not want to do this long-term. She is aware of the risk of dependency and rebound anxiety. He currently denies any thoughts of self-harm or harm to others and states that she would like to get back on a different medication. She is tried multiple medications in the past and they have either not worked for her or given her significant side effects. Right hand pain: She states that she had an immobilizer placed in the ER but she took it off. Hand is feeling somewhat better. Less swelling in the fingers. She is able to move all of her fingers and there is no numbness or tingling. She denies any pain of the arm. Has been putting ice to the area and no severe pain reported lately. This injury occurred on July 30. ROS: This patient reports no chest pain or pressure. There is no shortness of breath or cough. The patient reports no nausea or vomiting. There is no heartburn or indigestion. There is no diarrhea or constipation. No black, bloody, mucusy or tarry stool noticed. The patient reports no bloating and no change in appetite. There is no numbness, tingling or swelling in the extremities. Prior to Visit Medications    Medication Sig Taking?  Authorizing Provider   ALPRAZolam (XANAX) 2 MG tablet TAKE 1 TABLET BY MOUTH ONCE DAILY AS NEEDED FOR ANXIETY Yes ANA Welsh CNP   lurasidone (LATUDA) 20 MG TABS tablet Take 1 tablet by mouth daily Yes ANA Stone CNP   fluconazole (DIFLUCAN) 150 MG tablet Take 1 tablet by mouth daily  ANA Stone CNP   ketorolac (TORADOL) 10 MG tablet Take 1 tablet by mouth every 6 hours as needed for Pain  Fercho Chávez MD   nystatin (MYCOSTATIN) 083297 UNIT/GM cream APPLY EXTERNALLY TO THE AFFECTED AREA TWICE DAILY  ANA Stone CNP   cycloSPORINE (RESTASIS) 0.05 % ophthalmic emulsion Place 1 drop into both eyes 2 times daily  ANA Segura CNP   albuterol sulfate  (90 Base) MCG/ACT inhaler Inhale 2 puffs into the lungs every 6 hours as needed for Wheezing  ANA Segura CNP   albuterol (ACCUNEB) 1.25 MG/3ML nebulizer solution Inhale 3 mLs into the lungs every 6 hours as needed for Wheezing  ANA Segura CNP   eletriptan (RELPAX) 20 MG tablet Take 1 tablet by mouth once as needed for Migraine may repeat in 2 hours if necessary  ANA Segura CNP   hydrOXYzine (VISTARIL) 25 MG capsule Take 1 capsule by mouth 3 times daily as needed for Anxiety  ANA Segura CNP   ondansetron (ZOFRAN ODT) 8 MG TBDP disintegrating tablet Place 1 tablet under the tongue every 8 hours as needed for Nausea or Vomiting  ANA Rodriguez CNP       Social History     Tobacco Use    Smoking status: Former Smoker    Smokeless tobacco: Never Used   Substance Use Topics    Alcohol use: No    Drug use: No            PHYSICAL EXAMINATION:  [ INSTRUCTIONS:  \"[x]\" Indicates a positive item  \"[]\" Indicates a negative item  -- DELETE ALL ITEMS NOT EXAMINED]  [x] Alert  [x] Oriented to person/place/time    [x] No apparent distress  [] Toxic appearing    [] Face flushed appearing [] Sclera clear  [] Lips are cyanotic      [x] Breathing appears normal  [] Appears tachypneic      [] Rash on visible skin    [x] Cranial Nerves II-XII grossly intact    [x] Motor grossly intact in visible upper extremities    [] Motor grossly intact in visible lower extremities    [x] Normal Mood  [] Anxious appearing    [] Depressed appearing  [] Confused appearing      [] Poor short term memory  [] Poor long term memory    [] OTHER:      Due to this being a TeleHealth encounter, evaluation of the following organ systems is limited: Vitals/Constitutional/EENT/Resp/CV/GI//MS/Neuro/Skin/Heme-Lymph-Imm. ASSESSMENT/PLAN:   Diagnosis Orders   1.  Severe recurrent major depressive disorder with psychotic features [facility])  Rico Cunningham, PhD, Psychology, [place]   2. Lindi Jeans, PhD, Psychology, [place]    ALPRAZolam Chery Murphy) 2 MG tablet   3. PTSD (post-traumatic stress disorder)  Rico Cunningham, PhD, Psychology, [place]   4. Panic attack  Rico Cunningham, PhD, Psychology, [place]   5. Mood disorder [facility])  Laura Simmons, PhD, Psychology, [place]    lurasidone (LATUDA) 20 MG TABS tablet   6. Contusion of finger of right hand, unspecified finger, subsequent encounter           Return in about 2 weeks (around 8/24/2020) for medication review/depression- vv.     1.  2.  3.  4.  5.  We discussed her mood and her recent and current symptoms in great detail. Discussed that I am glad that she is willing to finally have evaluation by psychology. I feel strongly that there is underlying mood disorder that has been affecting her mental status lately. It is also highly likely that sudden cessation of mood medication and restarting the medication contributed to her mood as well. She is aware of need to avoid sudden cessation of medication but to notify me if side effects are occurring  . We will plan for short-term interval follow-ups over the next month or 2 for mood and medication adjustment. She is encouraged to schedule as soon as possible with psychology as well. She is aware of crisis hotline number and aware of need to notify me if her mood becomes unstable again. She is encouraged to cut down on Xanax use and restart taking half tablet no more than twice a day. She verbalizes understanding of this plan. 6.  She states that her hand seems to be healing up okay and she is able to move the hand okay with no limitation in range of motion. She will notify me if anything changes or if pain occurs elsewhere to the extremity.     Controlled Substance Monitoring:    Acute and Chronic Pain Monitoring:   RX Monitoring 8/10/2020   Attestation -   Periodic Controlled Substance Monitoring Possible medication side effects, risk of tolerance/dependence & alternative treatments discussed. ;No signs of potential drug abuse or diversion identified. ;Assessed functional status. Chronic Pain > 80 MEDD -       Please note this report has been partially produced using speech recognition software and may cause contain errors related to that system including grammar, punctuation and spelling as well as words and phrases that may seem inappropriate. If there are questions or concerns please feel free to contact me to clarify. An  electronic signature was used to authenticate this note. --ANA Vega CNP on 8/10/2020 at 7:43 AM        Pursuant to the emergency declaration under the [facility] waiver authority and the [employer] and [employer] Act, this Virtual  Visit was conducted, with patient's consent, to reduce the patient's risk of exposure to COVID-19 and provide continuity of care for an established patient. Services were provided through a video synchronous discussion virtually to substitute for in-person clinic visit.

## 2020-08-11 ENCOUNTER — VIRTUAL VISIT (OUTPATIENT)
Dept: BEHAVIORAL/MENTAL HEALTH CLINIC | Age: 45
End: 2020-08-11
Payer: COMMERCIAL

## 2020-08-11 PROCEDURE — 90791 PSYCH DIAGNOSTIC EVALUATION: CPT | Performed by: PSYCHOLOGIST

## 2020-08-11 ASSESSMENT — PATIENT HEALTH QUESTIONNAIRE - PHQ9
4. FEELING TIRED OR HAVING LITTLE ENERGY: 3
SUM OF ALL RESPONSES TO PHQ QUESTIONS 1-9: 24
3. TROUBLE FALLING OR STAYING ASLEEP: 3
SUM OF ALL RESPONSES TO PHQ QUESTIONS 1-9: 24
SUM OF ALL RESPONSES TO PHQ9 QUESTIONS 1 & 2: 6
7. TROUBLE CONCENTRATING ON THINGS, SUCH AS READING THE NEWSPAPER OR WATCHING TELEVISION: 3
10. IF YOU CHECKED OFF ANY PROBLEMS, HOW DIFFICULT HAVE THESE PROBLEMS MADE IT FOR YOU TO DO YOUR WORK, TAKE CARE OF THINGS AT HOME, OR GET ALONG WITH OTHER PEOPLE: 1
2. FEELING DOWN, DEPRESSED OR HOPELESS: 3
1. LITTLE INTEREST OR PLEASURE IN DOING THINGS: 3
8. MOVING OR SPEAKING SO SLOWLY THAT OTHER PEOPLE COULD HAVE NOTICED. OR THE OPPOSITE, BEING SO FIGETY OR RESTLESS THAT YOU HAVE BEEN MOVING AROUND A LOT MORE THAN USUAL: 0
6. FEELING BAD ABOUT YOURSELF - OR THAT YOU ARE A FAILURE OR HAVE LET YOURSELF OR YOUR FAMILY DOWN: 3
5. POOR APPETITE OR OVEREATING: 3
9. THOUGHTS THAT YOU WOULD BE BETTER OFF DEAD, OR OF HURTING YOURSELF: 3

## 2020-08-11 NOTE — TELEPHONE ENCOUNTER
She asks is that something you suggest? She stated she just wants to feel better and would like to know if its very similar and does it work the same? If not she will go get the [facility] but she would like to know your opinion prior to paying that much.
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
## 2020-08-11 NOTE — PATIENT INSTRUCTIONS
1. See crisis information below- hotline, textline or ER  2.  Follow up as scheduled     [address]:    Emergency or crisis issues for mental health concerns should be handled through the 24-Hour Hotline 1 99 777284    A new crisis text resource available for [facility]: Text [employer]HOPE to 929657 and get a reply from a trained Crisis Counselor

## 2020-08-11 NOTE — TELEPHONE ENCOUNTER
I do not believe that there are samples of Latuda. We could try vraylar which I believe that we have samples of. This is a similar medication. Would she like to do that?

## 2020-08-12 NOTE — TELEPHONE ENCOUNTER
I called patient at [phone] and her son answered the phone and gave me a number to reach her at [phone]. I called patient and she is aware of vraylar. She also wanted me to update her chart with her number, chart has been updated.

## 2020-08-14 ENCOUNTER — TELEPHONE (OUTPATIENT)
Dept: BEHAVIORAL/MENTAL HEALTH CLINIC | Age: 45
End: 2020-08-14

## 2020-08-14 NOTE — TELEPHONE ENCOUNTER
TELEPHONE INTAKE ENCOUNTER FOR PSYCHIATRIC VISIT    This questionnaire is helpful for Dr. Sixto Garcia for the purposes of preparing for your first appointment. This is not part of a diagnostic assessment or treatment plan, and is intended for information purposes only. You may choose to decline to answer any of these questions, your ability to schedule an appointment with Dr. Sixto Garcia is not dependent on your willingness to answer. (statement has been read to patient, patient agrees and has no concerns or questions)    PT ACKNOWLEDGED       Who referred you? [address]    In a few words, what symptoms are you hoping to address?  AGITATED, FIGHTING, ON EDGE   How long have these symptoms been a problem?   o SINCE I WAS 4   Are you having sleep issues you'd like to address?   o YES  o How many hours a night do you sleep? 4    Are you interested in talking about a change to your psychiatric medications?  YES      SOCIAL HISTORY:     THE FOLLOWING IS REGARDING YOUR PERSONAL HISTORY:    With whom do you live? , DAUGHTER, SON, 2 GRANDKIDS    What is the highest level of education you have received? 11TH GRADE    Are you  or ?     Are you currently employed or on disability? NEITHER    Do you have a Yazidism preference/lester or belief in God? NO    PSYCHIATRIC HISTORY:    Have you ever been diagnosed with depression, anxiety, or any other psychiatric condition?  ANXIETY, DEPRESSION, SPLIT PERSONALITY, PTSD    Are you currently taking any psychiatric medications? Sloane Keyla, Ayaka [place]      Are you currently taking any vitamins or supplements? What are they?  NO     Past psychiatric medications   YES, TOO MANY TO NAME     Inform patient that they will need to have an appointment prior to refills on any current medications, so they will need to ensure they have adequate refills leading up to the appointment date.     Informed patient that Dr. Sixto Garcia will make the decision whether to continue any medications they are currently prescribed, and they are not guaranteed refills on any current psychiatric medications.  Informed patient of policy on benzodiazepines, Dr. Mayelin Ram does not prescribe standing benzodiazepines for treatment of anxiety disorders. PT AWARE OF [facility] POLICY     For females, is there any chance you are currently pregnant (and number of weeks pregnant)? Have you recently had a baby or are you planning to become pregnant?  NO    Are you currently taking any controlled substances? These include benzodiazepines, stimulants, opiates/suboxone. (ensure adequate refills and prescriber that will be continuing medications)   Jadine Sever    Do you have any allergies to medications?  ASPIRIN    Do you have a current therapist?    DR. KITCHEN    Have you been hospitalized for treatment of a psychiatric condition?  NO    Have you ever received TMS or ECT? NO      SUBSTANCE USE DISORDER    Have you ever been treated for a substance use disorder?  NO    Do you use alcohol? RARELY    Do you use any other drugs or marijuana  NO    Do you smoke cigarettes? NO      HEALTH HISTORY    Have you ever had a seizure? History of concussions or head trauma?  I HAVE HAD ANXIETY INDUCED SEIZURES, ABOUT 7 YEARS AGO    Do you have any heart problems? NO    Have you ever had a sleep study? YES Do you have a history of sleep apnea? YES    FAMILY HISTORY:       THE FOLLOWING IS REGARDING YOUR FAMILY HISTORY:  Has anyone in  your family been diagnosed with a mental health condition like depression, anxiety, schizophrenia, bipolar disorder or substance abuse? MY MOTHER AND 2 SISTERS    Has anyone in your family committed suicide? NO    Has anyone in your family  of an unexplained cause or cardiac problems younger than 48years old. NO    Will anyone be accompanying you to the appointment?     NO      Do you have a current PCP?   [address]      Do you have any current thoughts of harming yourself?  NO      Informed patient: please arrive early to your appointment, there will be some paperwork for you to complete prior to your appointment. An appointment should last an hour but please be prepared to wait for up to 1/2-hour and schedule ample time to return to work after your appointment. You will not receive the link for doxy again so please save this in your files and log in at the time of the appt. You will not receive a reminder either so please be aware of the time and date of your appointment and log in a little before the appointment to check your link. If you are having trouble logging in the day of the appt, please call [phone]. Save this number in your phone for [facility]. If doing an appt over Doxy or [employer] Video, please wear headphones to your appointment if possible. Please make sure you are connected to WiFi. Do not attempt to have session while driving. Find somewhere you can talk in private. Please bring any relevant paperwork from your previous psychiatric appointments/assessments with you to your first appointment. You may also fax this paperwork to [facility] at [phone].

## 2020-08-17 ENCOUNTER — VIRTUAL VISIT (OUTPATIENT)
Dept: BEHAVIORAL/MENTAL HEALTH CLINIC | Age: 45
End: 2020-08-17
Payer: COMMERCIAL

## 2020-08-17 PROCEDURE — 90792 PSYCH DIAG EVAL W/MED SRVCS: CPT | Performed by: PSYCHIATRY & NEUROLOGY

## 2020-08-17 RX ORDER — HYDROXYZINE HYDROCHLORIDE 25 MG/1
75 TABLET, FILM COATED ORAL NIGHTLY
Qty: 90 TABLET | Refills: 0 | Status: SHIPPED | OUTPATIENT
Start: 2020-08-17 | End: 2020-09-16

## 2020-08-17 RX ORDER — PROPRANOLOL HYDROCHLORIDE 10 MG/1
20 TABLET ORAL 3 TIMES DAILY PRN
Qty: 180 TABLET | Refills: 3 | Status: SHIPPED | OUTPATIENT
Start: 2020-08-17 | End: 2020-08-21 | Stop reason: SINTOL

## 2020-08-17 NOTE — PROGRESS NOTES
trauma/seizures: PNES in the past only     Active Ambulatory Problems     Diagnosis Date Noted    Anxiety and depression     PTSD (post-traumatic stress disorder)     Migraine headache 12/23/2014    Nausea and vomiting 08/03/2015    S/P abdominal hysterectomy 08/10/2015    Severe recurrent major depressive disorder with psychotic features ([facility] [address] 09/22/2015    Morbid obesity ([facility] [address] 10/13/2015    Overactive bladder 11/10/2015    Stress incontinence, female 11/10/2015    Abdominal pain 03/11/2008    Blood in the urine 03/11/2008    Essential hypertension 05/18/2017    Yeast infection 08/10/2017    Vitamin D deficiency 01/24/2019    Former smoker 02/28/2019    Obsessive-compulsive disorder 03/26/2019    Bilateral dry eyes 03/26/2019    Shortness of breath 04/29/2020    MARBELLA (obstructive sleep apnea) 04/29/2020     Resolved Ambulatory Problems     Diagnosis Date Noted    No Resolved Ambulatory Problems     Past Medical History:   Diagnosis Date    Asthma     Headache(784.0)     Obesity     Urinary incontinence        Substance Abuse History (over the past 12 months, unless otherwise specified):   Tobacco: Denies  Caffeine: Denies  Alcohol: Denies  Marijuana: Denies    Denies other illicit substance use    Past Family History:  Family history of mental health conditions or suicide: \"a lot\" but says no diagnosed conditions   Denies History of cardiac difficulties or sudden unexplained or cardiac death     Psychiatric Review Of Systems:  Primary symptoms (current): depressed mood, tearfulness, feelings of hopelessness, hypersomnia and irritability  Depression: Endorses   depressed mood, increased guilt, denies psychomotor slowing  Anxiety: Endorses   frequent excessive worrying, panic attacks  Sleep: sleeping 2 hours every 24 hour period on average; reports sleep is very poor   History of periods of time with decreased need for sleep: denies (feels very tired)   Excessive worries Yes  Obsessions or Compulsions Denies  Nightmares  Yes  History of trauma Yes  Dissociative Symptoms Endorses  , re-experiencing, flashbacks, triggers, hypervigilance, hyperarousal (easy startle)    Symptoms of ADHD Denies prev diagnosis of ADHD   Auditory or Visual Hallucinations: Denies  Current suicidal/homicidal ideations: Endorses she had SI last week without a plan     Medications    Current Outpatient Medications:     lamoTRIgine (LAMICTAL) 25 MG tablet, Take 1 tablet by mouth every morning for 14 days, THEN 1 tablet 2 times daily for 14 days. , Disp: 42 tablet, Rfl: 0    hydrOXYzine (ATARAX) 25 MG tablet, Take 3 tablets by mouth nightly, Disp: 90 tablet, Rfl: 0    propranolol (INDERAL) 10 MG tablet, Take 2 tablets by mouth 3 times daily as needed (as needed for irritability), Disp: 180 tablet, Rfl: 3    ALPRAZolam (XANAX) 2 MG tablet, TAKE 1 TABLET BY MOUTH ONCE DAILY AS NEEDED FOR ANXIETY, Disp: 30 tablet, Rfl: 0    fluconazole (DIFLUCAN) 150 MG tablet, Take 1 tablet by mouth daily, Disp: 30 tablet, Rfl: 0    ketorolac (TORADOL) 10 MG tablet, Take 1 tablet by mouth every 6 hours as needed for Pain, Disp: 20 tablet, Rfl: 0    nystatin (MYCOSTATIN) 500771 UNIT/GM cream, APPLY EXTERNALLY TO THE AFFECTED AREA TWICE DAILY, Disp: 30 g, Rfl: 0    cycloSPORINE (RESTASIS) 0.05 % ophthalmic emulsion, Place 1 drop into both eyes 2 times daily, Disp: 1 vial, Rfl: 3    albuterol sulfate  (90 Base) MCG/ACT inhaler, Inhale 2 puffs into the lungs every 6 hours as needed for Wheezing, Disp: 1 Inhaler, Rfl: 2    albuterol (ACCUNEB) 1.25 MG/3ML nebulizer solution, Inhale 3 mLs into the lungs every 6 hours as needed for Wheezing, Disp: 360 mL, Rfl: 3    Allergies  Allergies   Allergen Reactions    Aspirin Shortness Of Breath    Codeine     Lithium     Naproxen     Trazodone And Nefazodone      Couldn't swallow          Evaluation    Vitals:   Reviewed vitals from recent visit, no concerns     BP Readings from Last 3 Encounters:   07/30/20 (!) 150/81   07/01/20 (!) 160/97   03/06/20 112/60       Wt Readings from Last 3 Encounters:   07/30/20 250 lb (113.4 kg)   07/01/20 230 lb (104.3 kg)   04/29/20 250 lb (113.4 kg)         Labs:     No other recent labs or imaging    Lab Results   Component Value Date    ALT 18 03/06/2020    AST 16 03/06/2020    ALKPHOS 80 03/06/2020    BILITOT 0.3 03/06/2020         Medical Review Of Systems:  Constitutional:  Negative for fever and activity change. HENT:  Negative for nosebleeds, congestion, rhinorrhea, mouth sores, neck pain and neck stiffness. Eyes:   No complaints of blurred vision. Respiratory:  Negative for cough and wheezing. Cardiovascular:  Negative for chest pain. Gastrointestinal:  Negative for nausea, abdominal pain, diarrhea and constipation  Genitourinary:  Negative of decreased urine volume and difficulty urinating. Reproductive: No complaints reported at this time. Musculoskeletal:  Negative for back pain. Skin:  Negative for pallor, rash and wound. Neurological:  Negative for dizziness, weakness and headaches.     Mental Status Evaluation:  Level of consciousness:  alert and oriented to person, place, and situation  Appearance:  well-appearing good grooming and good hygiene  Behavior/Motor:  no abnormalities noted  Attitude toward examiner:  attentive and good eye contact  Speech:  spontaneous, normal rate and normal volume   Mood: \"down\", appears euthymic  Affect:  mood congruent  Thought processes:  linear and logical  Thought content:  Denies suicidal or homicidal ideation, denies auditory or visual hallucinations  Cognition:  no deficits in attention, concentration notable, recent memory grossly intact  Concentration intact  Memory intact  Insight good  Judgement fair   Fund of Knowledge adequate    Assessment:   Pt is a 38 yof with h/o depression, PTSD, and PNES (remote history) who presents for med management in context of worsening depression with worsening sleep and irritability as a primary symptom. Will dc Vraylar, no history of tahira or symptoms consistent with true Bipolar 1 disorder or schizophrenia. Lamictal for mood stabilization. Pt will monitor for rash and is aware of risk of SJS, will go to ED with any new concerning rash symptoms. Pt is kings for safety and denies thoughts of SI/HI. Amenable to plan. No acute concerns to address regarding medications. Medical Diagnoses:  Patient Active Problem List   Diagnosis    Anxiety and depression    PTSD (post-traumatic stress disorder)    Migraine headache    Nausea and vomiting    S/P abdominal hysterectomy    Severe recurrent major depressive disorder with psychotic features ([address]    Morbid obesity ([address]    Overactive bladder    Stress incontinence, female    Abdominal pain    Blood in the urine    Essential hypertension    Yeast infection    Vitamin D deficiency    Former smoker    Obsessive-compulsive disorder    Bilateral dry eyes    Shortness of breath    MARBELLA (obstructive sleep apnea)       Psychiatric Diagnoses:  1. PTSD (post-traumatic stress disorder)    2. Obsessive-compulsive disorder, unspecified type    3. Anxiety and depression          Plan:  -  STOP Vraylar   -  START LAMICTAL 25 mg QD to 25 mg BID   -  START Propranolol 10-20 mg TID   -  START Hydroxyzine 75 mg QHS   -  Minimize ambien use  -  No Labs ordered today   -  Crisis plan reviewed and patient verbally contracts for safety. Go to ED with emergent symptoms or safety concerns.  -  Risks, benefits, side effects of medications, including any / all black box warnings, discussed with patient, who verbalizes their understanding. Disposition:  home    Follow Up:  No follow-ups on file. Follow-up in 2 weeks, patient informed to call for follow-up with Parker Llanos, or call in the interim for any side-effects, decompensation, questions, or problems between now and the next visit. Eva Mccoy, MD  [facility]      Psychiatry     1 hour spent with patient in video/audio encounter        An  electronic signature was used to authenticate this note. Pursuant to the emergency declaration under the [address] [place] [address], [facility] waiver authority and the [employer] and [employer] Act, this Virtual  Visit was conducted, with patient's consent, to reduce the patient's risk of exposure to COVID-19 and provide continuity of care for an established patient. Services were provided through a video synchronous discussion virtually to substitute for in-person clinic visit.

## 2020-08-18 ENCOUNTER — TELEPHONE (OUTPATIENT)
Dept: FAMILY MEDICINE CLINIC | Age: 45
End: 2020-08-18

## 2020-08-18 RX ORDER — LAMOTRIGINE 25 MG/1
TABLET ORAL
Qty: 35 TABLET | Refills: 0 | Status: CANCELLED | OUTPATIENT
Start: 2020-08-18 | End: 2020-09-08

## 2020-08-18 NOTE — TELEPHONE ENCOUNTER
Patient states she went to [facility] and used wrist BP machine. Readings were 194/112 and 178/123. I advised patient to go get BP done with a machine that takes reading at the upper arm. If reading still high 140 or higher to schedule appt with PCP and if she has a headache to go to the ER. Patient also stated that the pharmacy told her Rx for [facility] requiring PA.

## 2020-08-19 ENCOUNTER — TELEPHONE (OUTPATIENT)
Dept: FAMILY MEDICINE CLINIC | Age: 45
End: 2020-08-19

## 2020-08-19 RX ORDER — LAMOTRIGINE 25 MG/1
TABLET ORAL
Qty: 42 TABLET | Refills: 0 | Status: SHIPPED | OUTPATIENT
Start: 2020-08-19 | End: 2020-09-15 | Stop reason: DRUGHIGH

## 2020-08-19 NOTE — TELEPHONE ENCOUNTER
Patient states you were going to send Rx for LAMICTAL to pharmacy. Nothing has been sent. Please send.

## 2020-08-21 ENCOUNTER — VIRTUAL VISIT (OUTPATIENT)
Dept: FAMILY MEDICINE CLINIC | Age: 45
End: 2020-08-21
Payer: COMMERCIAL

## 2020-08-21 PROCEDURE — 99214 OFFICE O/P EST MOD 30 MIN: CPT | Performed by: NURSE PRACTITIONER

## 2020-08-21 RX ORDER — METOPROLOL SUCCINATE 25 MG/1
25 TABLET, EXTENDED RELEASE ORAL DAILY
Qty: 30 TABLET | Refills: 3 | Status: SHIPPED | OUTPATIENT
Start: 2020-08-21 | End: 2020-11-05 | Stop reason: SDUPTHER

## 2020-08-21 RX ORDER — ALPRAZOLAM 2 MG/1
TABLET ORAL
Qty: 60 TABLET | Refills: 1 | Status: SHIPPED | OUTPATIENT
Start: 2020-08-21 | End: 2020-10-16 | Stop reason: SDUPTHER
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
## 2020-08-25 ENCOUNTER — VIRTUAL VISIT (OUTPATIENT)
Dept: BEHAVIORAL/MENTAL HEALTH CLINIC | Age: 45
End: 2020-08-25
Payer: COMMERCIAL

## 2020-08-25 PROCEDURE — 90832 PSYTX W PT 30 MINUTES: CPT | Performed by: PSYCHOLOGIST

## 2020-08-25 ASSESSMENT — PATIENT HEALTH QUESTIONNAIRE - PHQ9
SUM OF ALL RESPONSES TO PHQ QUESTIONS 1-9: 20
4. FEELING TIRED OR HAVING LITTLE ENERGY: 3
SUM OF ALL RESPONSES TO PHQ9 QUESTIONS 1 & 2: 6
10. IF YOU CHECKED OFF ANY PROBLEMS, HOW DIFFICULT HAVE THESE PROBLEMS MADE IT FOR YOU TO DO YOUR WORK, TAKE CARE OF THINGS AT HOME, OR GET ALONG WITH OTHER PEOPLE: 2
3. TROUBLE FALLING OR STAYING ASLEEP: 3
1. LITTLE INTEREST OR PLEASURE IN DOING THINGS: 3
9. THOUGHTS THAT YOU WOULD BE BETTER OFF DEAD, OR OF HURTING YOURSELF: 0
2. FEELING DOWN, DEPRESSED OR HOPELESS: 3
7. TROUBLE CONCENTRATING ON THINGS, SUCH AS READING THE NEWSPAPER OR WATCHING TELEVISION: 2
SUM OF ALL RESPONSES TO PHQ QUESTIONS 1-9: 20
5. POOR APPETITE OR OVEREATING: 3
8. MOVING OR SPEAKING SO SLOWLY THAT OTHER PEOPLE COULD HAVE NOTICED. OR THE OPPOSITE, BEING SO FIGETY OR RESTLESS THAT YOU HAVE BEEN MOVING AROUND A LOT MORE THAN USUAL: 0
6. FEELING BAD ABOUT YOURSELF - OR THAT YOU ARE A FAILURE OR HAVE LET YOURSELF OR YOUR FAMILY DOWN: 3

## 2020-08-25 NOTE — PATIENT INSTRUCTIONS
1. Dedicate 5 minutes 3x/day to practice the deep breathing    2. Review the list of apps and give some a try! [facility] Box, CBTi  and progressive muscle relaxation for sleep, etc.)    3. Read the below information about stress management    4. F/U as scheduled    \"The entire autonomic nervous system (and through it, our internal organs and glands) is largely driven by our breathing patterns. By changing our breathing we can influence millions of biochemical reactions in our body, producing more relaxing substances such as endorphins and fewer anxiety-producing ones like adrenaline and higher blood acidity. Mindfulness of the breath is so effective that it is common to all meditative and prayer traditions. \" Anxiety Fear & Breathing - Breathing. com    \"When overcoming high levels of anxiety, it is important to learn the techniques of correct breathing. Many people who live with high levels of anxiety are known to breathe through their chest. Shallow breathing through the chest means you are disrupting the balance of oxygen and carbon dioxide necessary to be in a relaxed state. This type of breathing will perpetuate the symptoms of anxiety. \" Uni-Control. com      Diaphragmatic Breathing             _____________________________________________________________________________  1. Sit in a comfortable position    2. Place one hand on your stomach and the other on your chest    3. Try to breathe so that only your stomach rises and falls    As you inhale, concentrate on your chest remaining relatively still while your stomach rises. It may be helpful for you to imagine that your pants are too big and you need to push your stomach out to hold them up. When exhaling, allow your stomach to fall in and the air to fully escape. Inhale slowly. You may choose to hold the air in for about a second. Exhale slowly. Dont push the air out, but just let the natural pressure of your body slowly move it out.     It is normal for this healthy method of breathing to feel a little awkward at first.  With practice, it will feel more natural.    4. Get your mind on your side      One other important factor in getting relaxed is your mind. Your mind and body are connected. The mind influences the body and the body influences the mind. What you do with your mind when you are trying to relax is very important. The key is to avoid thinking about stressful things. You can think about       Neutral things (e.g., counting, saying a word like calm or relax)    Pleasant things (e.g., imagining a pleasant place)     5. It is recommended that you practice 2 times per day, 10 minutes each time. ----------------------------------------------------------------------------------------------------------------------  ----------------------------------------------------------------------------------------------------------------------  ----------------------------------------------------------------------------------------------------------------------  ----------------------------------------------------------------------------------------------------------------------      CONTROLLED or MEASURED BREATHING EXERCISE: (YOU MAY WANT TO DOWNLOAD THE FREE ZEE \"VIRTUAL HOPE BOX\" TO PRACTICE THIS EXERCISE)     You can sit or stand, but be sure to soften up a little before you begin. Make sure your hands are relaxed, and your knees are soft.  Drop your shoulders and let your jaw relax.  Now breath in slowly through your nose and count to three, keep your shoulders down and allow your stomach to expand as you breathe in.   Hold the breath for a moment.  Now release your breath slowly and smoothly as you count to six.  Repeat for a couple of minutes        It can be very helpful to use tools like relaxed breathing, muscle relaxation, and guided imagery/visualization to cope with stress, pain, anxiety and depression.  I recommend to all my patients that they try different techniques to find the ones that work best for them. Below are 2 websites that have several breathing, relaxation, and visualization exercises that you can listen to and download for free.    NetworkAffair.tn. html  · Deep Breathing & Guided Relaxation Exercises (3)  · Guided Imagery/Visualization Exercises (5)  · Mindfulness & Meditation Exercises (3)  · Progressive Muscle Relaxation   · Soothing Instrumental Music (11)    [email or web address]  · Diaphragmatic Breathing   · Deep Breathing I   · Deep Breathing II   · Progressive Muscle Relaxation   · Guided Imagery: The [place]   · Guided Imagery: [facility]   · Relaxing Phrases   · Just This Breath   · Increasing Awareness   · Sending Thoughts Away on Clouds  · Sending Thoughts Away on Leaves  · Sorting Into Boxes     -----------------------------------------------------  Below are several apps that you can download to your smartphone to help with relaxation and mood coping. [email or web address]  Platform: JiaThis & Heartbeater.com  Cost: Free  Your breathing has a profound effect on your body. Shakila Moya know this fact to be true if youve ever taken deep breaths to calm yourself down when you were upset. That exercise can often make you feel more centered, and its proof that breathing is powerful. The Vwumhzj5Scpbr marck uses guided breathing exercises to help reduce symptoms of an anxiety attack. If an attack is coming or the symptoms are unbearable, slip away into a quiet room, open your marck, and let the worry and stress slip away with each breath. Universal Breathing - Pranayama Free  Platform: [employer] Systems  Cost: Free  Focused breathing exercises can help you regain composure during an anxiety attack. They can also help you prevent an anxiety attack before one starts. Pranayama breathing techniques are common in yoga and have powerful benefits.  If youre a beginner, you select what you want to be right now -- calm, motivated, and confident are among the options -- then let the audio hypnosis guide you through a session. Anti-Anxiety   Platform: Android  Cost: Free  You can tell the marck your problems by taking a diagnostic quiz about your level of stress and anxiety. Using your answers, the marck will design a custom treatment plan for you. Follow instructional self-help videos like How to Tolerate and Lessen Anxiety.  Keep a daily journal of your anxiety and worries, and track your progress as you learn to regain a sense of calm. Worry Box - Anxiety Self-Help  Platform: Android  Cost: Free  Have you ever wished you could put all your worries in a box, leave them there, and walk away? The Worry Box marck may let you do just that. The marck functions a lot like a journal: Write down your thoughts, anxieties, and worries, and let the marck help you think them through. It will ask questions, give specific anxiety-reducing help, and can even direct you to help you reduce your worries and anxiety. It is all password protected, so you can feel safe sharing the details of your stresses. -----------------------------------------------  Relax Melodies  Platform: iPhone and AndWeatherista  Cost: Free  Anxiety can disrupt healthy sleep patterns in more than one way. First, people who dont get enough sleep tend to feel more anxious. Then, people who are more anxious have a difficult time sleeping. Creating a calming environment may help you fall asleep and stay asleep. Relax to one of this marcks 50 sounds. Need the music to stop once youre asleep? Set a timer, and it will stop playing. Set an alarm when you need to be awake. Then, enjoy the benefits of a good nights sleep, free from anxiety. Relaxing Sounds of Nature - Lite  Platform: iPhone  Cost: Free  You can find rest and relaxation without having to travel.  The marck comes with 35 nature tracks, which include soothing classics like crickets chirping, breaking waves, and a serene lake. You can download more free tracks to Kismet, and customize a favorite combination that helps you reduce your anxiety in a peaceful setting. Allow the sounds of nature to sweep you away from your worries in the comfort of your living room, office, or bedroom. Nature Sounds Relax and Sleep  Platform: Android  Cost: Free  If you find yourself longing for the sound of the ocean to help you relax, the INWEBTURE Limitednifin and Sleep marck is for you. Open this marck whenever youre feeling anxious or stressed. You can select locations or sounds like the jungle, ocean, or thunder and slip away into a place of relaxation and comfort. If the sounds make you feel sleepy, even better. Use the marck to doze off into a relaxing slumber  Calming Music to Simplicity  Platform: Android  Cost: Free  [employer] arent the only relaxing tunes in smartphone apps. Music, especially some traditional [employer]an[employer] music, can be relaxing and soothing. The Calming Music to Simplicity marck contains nine traditional [employer]an[employer] music selections. Press play and let your worries melt away. Relax Ocean Waves Sleep by GruvIt  Platform: Android  Cost: Free  Living far from a beach doesnt mean you have to be far from total relaxation. Slip on a set of headphones and drift into a distant sand-and-suds oasis. Whether youre trying to head off an anxiety attack or just need to get some good sleep after a few anxious days, the Relax Ocean Waves Sleep marck helps you find a place of serenity.  --------------------------------------------------------------  Harsha Kathy Meditation Relaxation  Platform: Android  Cost: Free  Harsha Reas is a traditional [facility] that brings together posture, breathing, and the mind to reduce anxiety. This Android marck connects users with a library of relaxation videos that contain instructions for relaxing and clearing the mind.  The videos are created by Dr. Joe Rodriguez, a psychologist with more than 20 years of experience. In addition to viewing Dr. Michelet Beebe videos, you can read a variety of articles related to anxiety, meditation, and stress management. Anxiety Free  Platform: UKDN Waterflow   Cost: Free  Meditation requires mindfulness and a sense of presence in your thoughts. Hypnosis is one step beyond meditation. It works by sending signals to your brain and transforming it almost unknowingly. The creators of this marck say that its audio recordings contain subliminal signals that speak to the subconscious with powerful effect.  Hypnosis, like meditation, requires practice, and the goal is to get each user to a place where self-hypnosis is possible in order to reduce anxiety. Relax & Rest Guided Meditations  Platform: iPhone and Android  Cost: $0.99  While group meetings and discussions are always an option, some people find relaxation more easily on their own. This marck lets you relax in the space of your own home or office with three guided meditations. Breath Awareness Guided Meditation (5 minutes), Deep Rested Guided Meditation (13 minutes), and Whole Body Guided Relaxation (24 minutes) are designed specifically to help you relax and sink into a peaceful meditation moment. Equanimity - Meditation Timer & Tracker  Platform: iPTempMinene   Cost: $4.99  Meditation is one way you can cope with the symptoms and side effects of anxiety. People who meditate can eventually train themselves to stay calm when feeling stressed or anxious. Equanimity - Meditation Timer & Tracker is simple and straightforward. Time each session and watch for visual light cues to let you know how long youve been meditating. Take notes about each session, and watch your progress as you learn to manage your stress and anxiety.   Virtual Hope Box  Platform: Guardiumhone and Android  Cost: Free  The [facility] Box (VHB) is a smartphone application that contains simple tools to help with coping, relaxation, distraction, and top thought-leaders and experts across relationship, career, anxiety, sleep, addiction and more. Their proprietary Programs and Moodboosts help users gain new perspectives and tools to change behaviors and live life in forward motion. [employer] features:  Programs   Created exclusively for [employer], programs are designed to give users unique insights and tools to feed their appetite for personal growth. Users can listen to bite-sized Coaching sessions and learn new tools such as meditations, breathing exercises, visualizations and affirmations. By offering a holistic, multi-faceted approach, users will be able to reach peak mental performance and gain a deeper understanding of their physical, emotional and spiritual well-being. Moodboost   On a daily basis, people experience a wide range of emotions. Whether they're nervous about a work meeting, having trouble sleeping, or need an extra boost of confidence before their date, [employer]'s quick daily Moodboosts can help turn a user's negative thoughts into a positive state of mind. NOWBOX   Users can track progress and star their favorite sessions and Moodboosts to help them stay on course of their personal growth journey. Users can create a daily mental wellness routine to help them form healthier habits and change behaviors. Ask the Experts   Users can submit personal questions to be answered by [employer] experts and see those posed by the community. They will also get Smooth Sailing tips and Words of [place] to help users navigate their day.

## 2020-08-25 NOTE — PROGRESS NOTES
Behavioral Health Consultation  Laura Cornell, Ph.D., [facility]  Psychologist  8/25/20  11:16 AM EDT      Time spent with Patient: 30 minutes  This is patient's second  [facility] appointment. Reason for Consult:  depression, anxiety and stress  Referring Provider: ANA Cantu CNP      Feedback given to PCP. S:      TELEHEALTH EVALUATION -- Audio and/or Visual (During PSIWU-94 public health emergency)    Due to COVID 19 outbreak, patient's office visit was converted to a virtual visit. Patient was contacted and agreed to proceed with a virtual visit via [employer]. Patient reports that they are located close to home, in her car driven by sister who she gives permission to be present for the appt. Provider Location is [facility]. The risks and benefits of converting to a virtual visit were discussed in light of the current infectious disease epidemic. Patient also understood that insurance coverage and co-pays are up to their individual insurance plans. Patient provides verbal consent for this visit to be billed to insurance. Pursuant to the emergency declaration under the [facility], [facility] waiver authority and the [employer] and [employer] General Act, this Virtual  Visit was conducted, with patient's consent, to reduce the patient's risk of exposure to COVID-19 and provide continuity of care for an established patient. Services were provided through a video synchronous discussion virtually to substitute for in-person clinic visit. Pt describes \"same old everyday drama\", reports feeling \"not good at all\" today. Pt notes the impact of her son getting into legal trouble \"and has to turn himself in. Notes the charge of breaking and entering and rape from child's mother. \"She's causing a lot of problems\" and pt has obtained a restraining order on her due to threats. Pt's son had just gotten out of snf 3 months ago.  Pt notes good benefit of the medication except for the sleeping medication (believed to be atarax)- wakes up in the middle of the night with a Pt denies SI/HI. O:  MSE:    Appearance    alert, cooperative  Appetite abnormal  Sleep disturbance Yes  Fatigue Yes  Loss of pleasure Yes  Impulsive behavior Yes  Speech    spontaneous, normal rate and normal volume  Mood    Anxious  Depressed  Affect    depressed affect  Thought Content    intact  Thought Process    coherent  Associations    logical connections  Insight    Fair  Judgment    Intact  Orientation    oriented to person, place, time, and general circumstances  Memory    recent and remote memory intact  Attention/Concentration    Intact inappt,reports as a concern  Morbid ideation No  Suicide Assessment    no suicidal ideation      History:    Medications:   Current Outpatient Medications   Medication Sig Dispense Refill    ALPRAZolam (XANAX) 2 MG tablet TAKE 1 TABLET BY MOUTH TWICE DAILY AS NEEDED FOR ANXIETY 60 tablet 1    metoprolol succinate (TOPROL XL) 25 MG extended release tablet Take 1 tablet by mouth daily 30 tablet 3    lamoTRIgine (LAMICTAL) 25 MG tablet Take 1 tablet by mouth every morning for 14 days, THEN 1 tablet 2 times daily for 14 days.  42 tablet 0    hydrOXYzine (ATARAX) 25 MG tablet Take 3 tablets by mouth nightly 90 tablet 0    fluconazole (DIFLUCAN) 150 MG tablet Take 1 tablet by mouth daily 30 tablet 0    ketorolac (TORADOL) 10 MG tablet Take 1 tablet by mouth every 6 hours as needed for Pain 20 tablet 0    nystatin (MYCOSTATIN) 392430 UNIT/GM cream APPLY EXTERNALLY TO THE AFFECTED AREA TWICE DAILY 30 g 0    cycloSPORINE (RESTASIS) 0.05 % ophthalmic emulsion Place 1 drop into both eyes 2 times daily 1 vial 3    albuterol sulfate  (90 Base) MCG/ACT inhaler Inhale 2 puffs into the lungs every 6 hours as needed for Wheezing 1 Inhaler 2    albuterol (ACCUNEB) 1.25 MG/3ML nebulizer solution Inhale 3 mLs into the lungs every 6 hours Score 20 24 2 2 2 0 0     Interpretation of Total Score Depression Severity: 1-4 = Minimal depression, 5-9 = Mild depression, 10-14 = Moderate depression, 15-19 = Moderately severe depression, 20-27 = Severe depression      Diagnosis:    Major depressive disorder; recurrent and moderate  Posttraumatic stress disorder      Diagnosis Date    Anxiety and depression     Anxiety and depression     Asthma     Essential hypertension 5/18/2017    Headache(784.0)     Obesity     MARBELLA (obstructive sleep apnea) 4/29/2020    PTSD (post-traumatic stress disorder)     Urinary incontinence            Plan:  Pt interventions:  Conducted functional assessment, [place]-setting to identify pt's primary goals for [facility] visit / overall health, Supportive techniques, Provided Psychoeducation re: anxiety, depression and stress management, Explained relaxed breathing technique in detail and practiced this with pt in visit, Emphasized importance of regular practice of relaxation strategies to target / promote symptom relief and Provided pt list of websites and several smartphone marck resources for further practicing guided meditations and breathing exercises      Pt Behavioral Change Plan:  1. Dedicate 5 minutes 3x/day to practice the deep breathing    2. Review the list of apps and give some a try! [facility] Box, CBTi  and progressive muscle relaxation for sleep, etc.)    3. Read the below information about stress management    4. F/U as scheduled      Please note this report has been partially produced using speech recognition software  And may cause contain errors related to that system including grammar, punctuation and spelling as well as words and phrases that may seem inappropriate. If there are questions or concerns please feel free to contact me to clarify.

## 2020-08-25 NOTE — Clinical Note
Pt establishes with Dr Nehemias Chavez next week. Notes good benefit from current medication except for the sleep medication (I'm assuming is atarax) stating she has been waking up with headaches when she takes it.  Thanks Bobby

## 2020-09-01 ENCOUNTER — VIRTUAL VISIT (OUTPATIENT)
Dept: BEHAVIORAL/MENTAL HEALTH CLINIC | Age: 45
End: 2020-09-01
Payer: COMMERCIAL

## 2020-09-01 PROCEDURE — 99214 OFFICE O/P EST MOD 30 MIN: CPT | Performed by: PSYCHIATRY & NEUROLOGY

## 2020-09-01 RX ORDER — RAMELTEON 8 MG/1
8 TABLET ORAL NIGHTLY PRN
Qty: 30 TABLET | Refills: 3 | Status: SHIPPED | OUTPATIENT
Start: 2020-09-01 | End: 2020-10-06

## 2020-09-01 RX ORDER — BUSPIRONE HYDROCHLORIDE 7.5 MG/1
7.5 TABLET ORAL 3 TIMES DAILY
Qty: 90 TABLET | Refills: 2 | Status: SHIPPED | OUTPATIENT
Start: 2020-09-01 | End: 2020-10-06
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
## 2020-09-03 ENCOUNTER — VIRTUAL VISIT (OUTPATIENT)
Dept: FAMILY MEDICINE CLINIC | Age: 45
End: 2020-09-03
Payer: COMMERCIAL

## 2020-09-03 PROCEDURE — 99214 OFFICE O/P EST MOD 30 MIN: CPT | Performed by: NURSE PRACTITIONER

## 2020-09-03 RX ORDER — PANTOPRAZOLE SODIUM 40 MG/1
40 TABLET, DELAYED RELEASE ORAL DAILY
Qty: 30 TABLET | Refills: 2 | Status: SHIPPED | OUTPATIENT
Start: 2020-09-03 | End: 2020-10-17 | Stop reason: SDUPTHER

## 2020-09-03 ASSESSMENT — PATIENT HEALTH QUESTIONNAIRE - PHQ9
2. FEELING DOWN, DEPRESSED OR HOPELESS: 1
SUM OF ALL RESPONSES TO PHQ9 QUESTIONS 1 & 2: 2
1. LITTLE INTEREST OR PLEASURE IN DOING THINGS: 1
SUM OF ALL RESPONSES TO PHQ QUESTIONS 1-9: 2
SUM OF ALL RESPONSES TO PHQ QUESTIONS 1-9: 2
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
## 2020-09-15 ENCOUNTER — VIRTUAL VISIT (OUTPATIENT)
Dept: BEHAVIORAL/MENTAL HEALTH CLINIC | Age: 45
End: 2020-09-15
Payer: COMMERCIAL

## 2020-09-15 PROCEDURE — 99214 OFFICE O/P EST MOD 30 MIN: CPT | Performed by: PSYCHIATRY & NEUROLOGY

## 2020-09-15 RX ORDER — LAMOTRIGINE 100 MG/1
50 TABLET ORAL 2 TIMES DAILY
Qty: 30 TABLET | Refills: 3 | Status: SHIPPED | OUTPATIENT
Start: 2020-09-15 | End: 2020-10-06

## 2020-09-15 RX ORDER — LAMOTRIGINE 25 MG/1
50 TABLET ORAL DAILY
Qty: 60 TABLET | Refills: 3 | Status: SHIPPED | OUTPATIENT
Start: 2020-09-15 | End: 2020-09-15 | Stop reason: DRUGHIGH

## 2020-09-15 NOTE — PROGRESS NOTES
blocker for BP now and monitoring at home on wrist cuff. Sending in rx to see if insurance will cover an arm cuff   ? 50 mg QD now, in 2 weeks if no rash will increase to 50 mg BID   ? lamictal 50 mg   ? rozerem 8 mg QHS   ? buspar 7.5 mg TID   · 9/15/20  ? Sleeping 6 hours  ? Lamictal 50 mg QD BID is current dose will increase to 50 mg BID   ? Rozerem 8 mg QHS   ? Buspar 7.5 mg TID   ? genesite ordered    AT TODAY'S VISIT   1. Meds working continue current dose, primary sx related to situation adjustment   2. No Labs ordered today  3. Crisis plan reviewed and patient verbally contracts for safety. Go to ED with emergent symptoms or safety concerns. 4. Risks, benefits, side effects of medications, including any / all black box warnings, discussed with patient, who verbalizes their understanding. Pt is kings for safety and denies thoughts of SI/HI. Amenable to plan. No acute concerns to address regarding medications. Subjective:      Pt reports son is still going through court process   Trying to get bail reduced   Has been very anxious and depressed, constantly thinking about her son and his future   grandkids are doing well   Spending time with her grandbaby  Blood pressure   Low energy  Doesn't want to leave the house   No rash   Less intrusive thoughts   Doing generally well     Patient reports they have been compliant with current medication regimen and have not missed a dose. Patient denies medication side effects     At today's visit, patient denies thoughts of harm to self or others since last appointment, and denies auditory or visual hallucinations.      At today's visit, pt reports that since our last visit, their average MOOD has been (on a scale of 1-10, with 1 being severely depressed and 10 being not depressed at all)  Very depressed [] 1  [] 2  [x] 3  [] 4  [] 5  [] 6  [] 7  [] 8  [] 9  [] 10  Not depressed    At today's visit, pt reports that since our last visit, their average ANXIETY has been (on a scale of 1-10, with 10 being severely anxious and 1 being not anxious at all)  Not anxious [] 1     [] 2     [] 3     [] 4   [] 5    [] 6      [x] 7   [] 8   [] 9       [] 10  Very anxious       At today's visit, pt reports that since our last visit, their average APPETITE has been    [] Decreased     [x] Normal/Unchanged   [] Increased      At today's visit, pt reports that since our last visit, their average HOURS OF SLEEP (every 24 hours) has been    [] 0-3   [] 4-5    [] 5-6    [] 6-7    [x] 8-9    [] 10-11   [] 11+    At today's visit, pt reports that since our last visit, their average Energy has been     [] Poor    [x] Average  [] Increased         Aggression:  [] yes  [x] no    Patient is [x] Able to contract for safety  [] unable to CONTRACT FOR SAFETY     ROS:  [x] All negative/unchanged except if checked.  Explain positive(checked items) below:     [] Constitutional  [] Eyes  [] Ear/Nose/Mouth/Throat  [] Respiratory  [] CV  [] GI  []   [] Musculoskeletal  [] Skin/Breast  [] Neurological  [] Endocrine  [] Heme/Lymph  [] Allergic/Immunologic      MEDICATIONS:    Current Outpatient Medications:     lamoTRIgine (LAMICTAL) 100 MG tablet, Take 0.5 tablets by mouth 2 times daily Stop if rash occurs and call office or go to ED, Disp: 30 tablet, Rfl: 3    pantoprazole (PROTONIX) 40 MG tablet, Take 1 tablet by mouth daily, Disp: 30 tablet, Rfl: 2    Blood Pressure Monitoring (BLOOD PRESSURE KIT) ZELALEM, 1 Units by Does not apply route as needed (for BP monitoring at home for hypertension) Cuff for upper arm, Disp: 1 Device, Rfl: 0    ramelteon (ROZEREM) 8 MG tablet, Take 1 tablet by mouth nightly as needed for Sleep, Disp: 30 tablet, Rfl: 3    busPIRone (BUSPAR) 7.5 MG tablet, Take 1 tablet by mouth 3 times daily For anxiety, Disp: 90 tablet, Rfl: 2    ALPRAZolam (XANAX) 2 MG tablet, TAKE 1 TABLET BY MOUTH TWICE DAILY AS NEEDED FOR ANXIETY, Disp: 60 tablet, Rfl: 1    metoprolol succinate (TOPROL XL) 25 MG extended release tablet, Take 1 tablet by mouth daily, Disp: 30 tablet, Rfl: 3    hydrOXYzine (ATARAX) 25 MG tablet, Take 3 tablets by mouth nightly, Disp: 90 tablet, Rfl: 0    fluconazole (DIFLUCAN) 150 MG tablet, Take 1 tablet by mouth daily, Disp: 30 tablet, Rfl: 0    ketorolac (TORADOL) 10 MG tablet, Take 1 tablet by mouth every 6 hours as needed for Pain, Disp: 20 tablet, Rfl: 0    nystatin (MYCOSTATIN) 599479 UNIT/GM cream, APPLY EXTERNALLY TO THE AFFECTED AREA TWICE DAILY, Disp: 30 g, Rfl: 0    cycloSPORINE (RESTASIS) 0.05 % ophthalmic emulsion, Place 1 drop into both eyes 2 times daily, Disp: 1 vial, Rfl: 3    albuterol sulfate  (90 Base) MCG/ACT inhaler, Inhale 2 puffs into the lungs every 6 hours as needed for Wheezing, Disp: 1 Inhaler, Rfl: 2    albuterol (ACCUNEB) 1.25 MG/3ML nebulizer solution, Inhale 3 mLs into the lungs every 6 hours as needed for Wheezing, Disp: 360 mL, Rfl: 3    Examination:    Vitals: not taken in person, most recent vitals in chart reviewed  There were no vitals filed for this visit.     Wt Readings from Last 3 Encounters:   07/30/20 250 lb (113.4 kg)   07/01/20 230 lb (104.3 kg)   04/29/20 250 lb (113.4 kg)       Labs:   no recent labs    Mental Status Examination:    Level of consciousness:  alert and oriented to person, place, and situation  Appearance:  well-appearing good grooming and good hygiene  Behavior/Motor:  no abnormalities noted  Attitude toward examiner:  attentive and good eye contact  Speech:  spontaneous, normal rate and normal volume   Mood: \"good\"  Affect:  mood congruent  Thought processes:  linear and logical  Thought content:  Denies suicidal or homicidal ideation, denies auditory or visual hallucinations  Cognition:  no deficits in attention, concentration notable, recent memory grossly intact  Concentration intact  Memory intact  Insight good   Judgement fair   Fund of Knowledge adequate    Treatment Plan:  Reviewed current Medications with the patient. Education provided on the compliance with treatment. Reviewed OARRs, no concerns identified     The anticipated benefits and side effects of the medications, including the anticipated results of not receiving the medication, and of alternatives to the medications were explained to the patient and their informed consent was obtained for starting medications as well as adjusting the doses (titration or tapering) as indicated. The above information was given by physician in verbal form and sufficient understanding was in evidence. The patient participated in discussion of the information and question and/or concerns were addressed before the medication was given. PSYCHOTHERAPY/COUNSELING:  Encourage patient to attend outpatient appointments and therapy. [x] Therapeutic interview  [] Supportive  [x] CBT  [x] Ongoing  [] Other    No follow-ups on file. Follow-up in 2 weeks, patient informed to call for follow-up    Please note this report has been partially produced using speech recognition software  And may cause contain errors related to that system including grammar, punctuation and spelling as well as words and phrases that may seem inappropriate. If there are questions or concerns please feel free to contact me to clarify. Nirav Romeo MD  Electronically signed by Nirav Romeo MD on 9/15/2020 at 3:10 PM  9/15/2020 3:10 PM    Psychiatry   Due to this being a TeleHealth encounter, evaluation of the following organ systems is limited: Vitals/Constitutional/EENT/Resp/CV/GI//MS/Neuro/Skin/Heme-Lymph-Imm. An  electronic signature was used to authenticate this note.   --Nirav Romeo MD on 9/15/2020 at 3:10 PM    Pursuant to the emergency declaration under the [facility], [facility] waiver authority and the [employer] Act, this Virtual  Visit was conducted, with

## 2020-10-06 ENCOUNTER — VIRTUAL VISIT (OUTPATIENT)
Dept: BEHAVIORAL/MENTAL HEALTH CLINIC | Age: 45
End: 2020-10-06
Payer: COMMERCIAL

## 2020-10-06 PROCEDURE — 99214 OFFICE O/P EST MOD 30 MIN: CPT | Performed by: PSYCHIATRY & NEUROLOGY

## 2020-10-06 RX ORDER — LAMOTRIGINE 100 MG/1
50 TABLET ORAL 2 TIMES DAILY
Qty: 30 TABLET | Refills: 3 | Status: SHIPPED | OUTPATIENT
Start: 2020-10-06 | End: 2020-11-04 | Stop reason: SDUPTHER

## 2020-10-06 RX ORDER — RAMELTEON 8 MG/1
8 TABLET ORAL NIGHTLY PRN
Qty: 30 TABLET | Refills: 3 | Status: SHIPPED | OUTPATIENT
Start: 2020-10-06 | End: 2020-11-04 | Stop reason: SDUPTHER

## 2020-10-06 RX ORDER — BUSPIRONE HYDROCHLORIDE 15 MG/1
15 TABLET ORAL 3 TIMES DAILY
Qty: 90 TABLET | Refills: 2 | Status: SHIPPED | OUTPATIENT
Start: 2020-10-06 | End: 2020-11-04

## 2020-10-06 NOTE — PROGRESS NOTES
10/6/2020    TELEHEALTH PSYCHIATRY FOLLOWUP -- Audio/Visual (During JGEXE-54 public health emergency)      Psychiatric Diagnoses:  1. Anxiety and depression    2. PTSD (post-traumatic stress disorder)          Due to COVID 19 outbreak, patient's office visit was converted to a virtual visit. Patient was contacted and agreed to proceed with a virtual visit via DOXY  30 minutes with direct communication with patient for encounter The risks and benefits of converting to a virtual visit were discussed in light of the current infectious disease epidemic. Patient also understood that insurance coverage and co-pays are up to their individual insurance plans. Patient Location:        Patient's home address  Provider Location ([facility]        Assessment/Plan:   Increase buspar   Resend gene sight     Medical Diagnoses:  Patient Active Problem List   Diagnosis    Anxiety and depression    PTSD (post-traumatic stress disorder)    Migraine headache    Nausea and vomiting    S/P abdominal hysterectomy    Severe recurrent major depressive disorder with psychotic features ([address]    Morbid obesity ([address]    Overactive bladder    Stress incontinence, female    Abdominal pain    Blood in the urine    Essential hypertension    Yeast infection    Vitamin D deficiency    Former smoker    Obsessive-compulsive disorder    Bilateral dry eyes    Shortness of breath    MARBELLA (obstructive sleep apnea)           DATE and changes made  · 8/17/20  ? STOP Vraylar   ? START LAMICTAL 25 mg QD to 25 mg BID   ? START Propranolol 10-20 mg TID   ? START Hydroxyzine 75 mg QHS   ? Minimize ambien use  · 9/1/20  ? Had to stop propranolol due to hypertension on home cuff and needed to switch to   ? Pt STOPPED hydroxyzine, causing headaches   ? STOPPED propranolol, on selective beta blocker for BP now and monitoring at home on wrist cuff. Sending in rx to see if insurance will cover an arm cuff   ?  50 mg QD now, in 2 weeks if no rash will increase to 50 mg BID   ? lamictal 50 mg   ? rozerem 8 mg QHS   ? buspar 7.5 mg TID   · 9/15/20  ? Sleeping 6 hours  ? Lamictal 50 mg QD BID is current dose will increase to 50 mg BID   ? Rozerem 8 mg QHS   ? Buspar 7.5 mg TID   ? genesite ordered  · 10/6/20  ? Resend genesight   ? Buspar 7.5 mg TID   ? Buspar 15 mg TID   ? Rozerem 8 mg QHS   ? Lamictal 50 mg BID     AT TODAY'S VISIT     1. No Labs ordered today  2. Crisis plan reviewed and patient verbally contracts for safety. Go to ED with emergent symptoms or safety concerns. 3. Risks, benefits, side effects of medications, including any / all black box warnings, discussed with patient, who verbalizes their understanding. Pt is kings for safety and denies thoughts of SI/HI. Amenable to plan. No acute concerns to address regarding medications. Subjective:      Pt reports she is very upset about her son still being in shelter   She is very upset with her son having to pay 15,000 dollars for a , and bail that has been set at 350,000   Says she is trying to be strong for her grandchildren   Very anxious and worried all the time   Fears for her son's safety     Patient reports they have been compliant with current medication regimen and have not missed a dose. Patient denies medication side effects. At today's visit, patient denies thoughts of harm to self or others since last appointment, and denies auditory or visual hallucinations.      At today's visit, pt reports that since our last visit, their average MOOD has been (on a scale of 1-10, with 1 being severely depressed and 10 being not depressed at all)  Very depressed [] 1  [] 2  [] 3  [x] 4  [] 5  [] 6  [] 7  [] 8  [] 9  [] 10  Not depressed    At today's visit, pt reports that since our last visit, their average ANXIETY has been (on a scale of 1-10, with 10 being severely anxious and 1 being not anxious at all)  Not anxious [] 1     [] 2     [] 3     [] 4 [] 5    [] 6      [x] 7   [] 8   [] 9       [] 10  Very anxious       At today's visit, pt reports that since our last visit, their average APPETITE has been    [] Decreased     [x] Normal/Unchanged   [] Increased      At today's visit, pt reports that since our last visit, their average HOURS OF SLEEP (every 24 hours) has been    [] 0-3   [] 4-5    [] 5-6    [] 6-7    [x] 8-9    [] 10-11   [] 11+    At today's visit, pt reports that since our last visit, their average Energy has been     [] Poor    [x] Average  [] Increased         Aggression:  [] yes  [x] no    Patient is [x] Able to contract for safety  [] unable to CONTRACT FOR SAFETY     ROS:  [x] All negative/unchanged except if checked.  Explain positive(checked items) below:     [] Constitutional  [] Eyes  [] Ear/Nose/Mouth/Throat  [] Respiratory  [] CV  [] GI  []   [] Musculoskeletal  [] Skin/Breast  [] Neurological  [] Endocrine  [] Heme/Lymph  [] Allergic/Immunologic    MEDICATIONS:    Current Outpatient Medications:     lamoTRIgine (LAMICTAL) 100 MG tablet, Take 0.5 tablets by mouth 2 times daily, Disp: 30 tablet, Rfl: 3    busPIRone (BUSPAR) 15 MG tablet, Take 15 mg by mouth 3 times daily, Disp: 90 tablet, Rfl: 2    ramelteon (ROZEREM) 8 MG tablet, Take 1 tablet by mouth nightly as needed for Sleep, Disp: 30 tablet, Rfl: 3    pantoprazole (PROTONIX) 40 MG tablet, Take 1 tablet by mouth daily, Disp: 30 tablet, Rfl: 2    Blood Pressure Monitoring (BLOOD PRESSURE KIT) ZELALEM, 1 Units by Does not apply route as needed (for BP monitoring at home for hypertension) Cuff for upper arm, Disp: 1 Device, Rfl: 0    metoprolol succinate (TOPROL XL) 25 MG extended release tablet, Take 1 tablet by mouth daily, Disp: 30 tablet, Rfl: 3    fluconazole (DIFLUCAN) 150 MG tablet, Take 1 tablet by mouth daily, Disp: 30 tablet, Rfl: 0    ketorolac (TORADOL) 10 MG tablet, Take 1 tablet by mouth every 6 hours as needed for Pain, Disp: 20 tablet, Rfl: 0    nystatin (MYCOSTATIN) 485786 UNIT/GM cream, APPLY EXTERNALLY TO THE AFFECTED AREA TWICE DAILY, Disp: 30 g, Rfl: 0    cycloSPORINE (RESTASIS) 0.05 % ophthalmic emulsion, Place 1 drop into both eyes 2 times daily, Disp: 1 vial, Rfl: 3    albuterol sulfate  (90 Base) MCG/ACT inhaler, Inhale 2 puffs into the lungs every 6 hours as needed for Wheezing, Disp: 1 Inhaler, Rfl: 2    albuterol (ACCUNEB) 1.25 MG/3ML nebulizer solution, Inhale 3 mLs into the lungs every 6 hours as needed for Wheezing, Disp: 360 mL, Rfl: 3    Examination:    Vitals: not taken in person, most recent vitals in chart reviewed  There were no vitals filed for this visit. Wt Readings from Last 3 Encounters:   07/30/20 250 lb (113.4 kg)   07/01/20 230 lb (104.3 kg)   04/29/20 250 lb (113.4 kg)       Labs:   no recent labs    Mental Status Examination:    Level of consciousness:  alert and oriented to person, place, and situation  Appearance:  well-appearing good grooming and good hygiene  Behavior/Motor:  no abnormalities noted  Attitude toward examiner:  attentive and good eye contact  Speech:  spontaneous, normal rate and normal volume   Mood: \"down, very sad\"  Affect:  mood congruent  Thought processes:  linear and logical  Thought content:  Denies suicidal or homicidal ideation, denies auditory or visual hallucinations  Cognition:  no deficits in attention, concentration notable, recent memory grossly intact  Concentration intact  Memory intact  Insight good   Judgement fair   Fund of Knowledge adequate    Treatment Plan:  Reviewed current Medications with the patient. Education provided on the compliance with treatment.    Reviewed OARRs, no concerns identified     The anticipated benefits and side effects of the medications, including the anticipated results of not receiving the medication, and of alternatives to the medications were explained to the patient and their informed consent was obtained for starting medications as well as adjusting the doses (titration or tapering) as indicated. The above information was given by physician in verbal form and sufficient understanding was in evidence. The patient participated in discussion of the information and question and/or concerns were addressed before the medication was given. PSYCHOTHERAPY/COUNSELING:  Encourage patient to attend outpatient appointments and therapy. [x] Therapeutic interview  [] Supportive  [x] CBT  [x] Ongoing  [] Other    No follow-ups on file. Follow-up in 2 weeks, patient informed to call for follow-up    Please note this report has been partially produced using speech recognition software  And may cause contain errors related to that system including grammar, punctuation and spelling as well as words and phrases that may seem inappropriate. If there are questions or concerns please feel free to contact me to clarify. Franco Duarte MD  Electronically signed by Franco Duarte MD on 10/6/2020 at 10:40 AM  10/6/2020 10:40 AM    Psychiatry   Due to this being a TeleHealth encounter, evaluation of the following organ systems is limited: Vitals/Constitutional/EENT/Resp/CV/GI//MS/Neuro/Skin/Heme-Lymph-Imm. An  electronic signature was used to authenticate this note. --Franco Duarte MD on 10/6/2020 at 10:40 AM    Pursuant to the emergency declaration under the [facility] waiver authority and the [employer] Act, this Virtual  Visit was conducted, with patient's consent, to reduce the patient's risk of exposure to COVID-19 and provide continuity of care for an established patient Services were provided through a video synchronous discussion virtually to substitute for in-person clinic visit.

## 2020-10-16 ENCOUNTER — OFFICE VISIT (OUTPATIENT)
Dept: FAMILY MEDICINE CLINIC | Age: 45
End: 2020-10-16
Payer: COMMERCIAL

## 2020-10-16 ENCOUNTER — TELEPHONE (OUTPATIENT)
Dept: BEHAVIORAL/MENTAL HEALTH CLINIC | Age: 45
End: 2020-10-16

## 2020-10-16 VITALS
SYSTOLIC BLOOD PRESSURE: 130 MMHG | TEMPERATURE: 98.1 F | RESPIRATION RATE: 18 BRPM | BODY MASS INDEX: 49.84 KG/M2 | DIASTOLIC BLOOD PRESSURE: 80 MMHG | OXYGEN SATURATION: 97 % | HEIGHT: 57 IN | WEIGHT: 231 LBS | HEART RATE: 98 BPM

## 2020-10-16 PROCEDURE — 99214 OFFICE O/P EST MOD 30 MIN: CPT | Performed by: NURSE PRACTITIONER

## 2020-10-16 RX ORDER — ALPRAZOLAM 2 MG/1
TABLET ORAL
Qty: 60 TABLET | Refills: 0 | Status: SHIPPED | OUTPATIENT
Start: 2020-10-16 | End: 2020-11-05 | Stop reason: SDUPTHER

## 2020-10-16 RX ORDER — ALPRAZOLAM 2 MG/1
TABLET ORAL
COMMUNITY
Start: 2020-09-24 | End: 2020-11-05 | Stop reason: SDUPTHER

## 2020-10-16 ASSESSMENT — PATIENT HEALTH QUESTIONNAIRE - PHQ9
2. FEELING DOWN, DEPRESSED OR HOPELESS: 1
SUM OF ALL RESPONSES TO PHQ QUESTIONS 1-9: 2
1. LITTLE INTEREST OR PLEASURE IN DOING THINGS: 1
SUM OF ALL RESPONSES TO PHQ QUESTIONS 1-9: 2
SUM OF ALL RESPONSES TO PHQ9 QUESTIONS 1 & 2: 2
SUM OF ALL RESPONSES TO PHQ QUESTIONS 1-9: 2
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
## 2020-10-16 NOTE — TELEPHONE ENCOUNTER
Interval Update     Spoke with FAUSTINO Marshall on the phone, discussed patient's thoughts of suicide - which were said to be without intent to act - and that patient was having difficulty sleeping. Called patient and left voicemail to return call to office, or proceed to emergency room for emergency needs. Will try to call patient again on Monday. For worsening suicidal thoughts or if patient is endorsing thoughts of harm to herself or others, she should be directed to the emergency room or call 911.      Louise Kwong MD

## 2020-10-17 NOTE — TELEPHONE ENCOUNTER
Patient is requesting medication refill.  Please approve or deny this request.    Rx requested:  Requested Prescriptions     Pending Prescriptions Disp Refills    pantoprazole (PROTONIX) 40 MG tablet 30 tablet 2     Sig: Take 1 tablet by mouth daily         Last Office Visit:   10/16/2020      Next Visit Date:  Future Appointments   Date Time Provider [place]   10/20/2020  2:30 PM Terry Whitt MD [facility] [place] [facility] [place]  Arabi Miller   11/5/2020  1:40 PM ANA James - CNP [place] [id number]

## 2020-10-19 RX ORDER — PANTOPRAZOLE SODIUM 40 MG/1
40 TABLET, DELAYED RELEASE ORAL DAILY
Qty: 30 TABLET | Refills: 2 | Status: SHIPPED | OUTPATIENT
Start: 2020-10-19 | End: 2020-12-01 | Stop reason: SDUPTHER

## 2020-10-20 ENCOUNTER — VIRTUAL VISIT (OUTPATIENT)
Dept: BEHAVIORAL/MENTAL HEALTH CLINIC | Age: 45
End: 2020-10-20
Payer: COMMERCIAL

## 2020-10-20 PROCEDURE — 99214 OFFICE O/P EST MOD 30 MIN: CPT | Performed by: PSYCHIATRY & NEUROLOGY

## 2020-10-20 RX ORDER — HYDROXYZINE HYDROCHLORIDE 25 MG/1
25 TABLET, FILM COATED ORAL NIGHTLY PRN
Qty: 30 TABLET | Refills: 0 | Status: SHIPPED | OUTPATIENT
Start: 2020-10-20 | End: 2020-11-04 | Stop reason: SDUPTHER

## 2020-10-20 NOTE — PROGRESS NOTES
10/20/2020    TELEHEALTH PSYCHIATRY FOLLOWUP -- Audio/Visual (During DUILJ-15 public health emergency)      Psychiatric Diagnoses:  1. PTSD (post-traumatic stress disorder)    2. Severe recurrent major depressive disorder with psychotic features ([address]          Due to COVID 19 outbreak, patient's office visit was converted to a virtual visit. Patient was contacted and agreed to proceed with a virtual visit via DOXY  30 minutes with direct communication with patient for encounter The risks and benefits of converting to a virtual visit were discussed in light of the current infectious disease epidemic. Patient also understood that insurance coverage and co-pays are up to their individual insurance plans. Patient Location:        Patient's home address  Provider Location (City/State):        [address]        Assessment/Plan:   Pt is doing much better. Sleeping better since news about her son came through. Medical Diagnoses:  Patient Active Problem List   Diagnosis    Anxiety and depression    PTSD (post-traumatic stress disorder)    Migraine headache    Nausea and vomiting    S/P abdominal hysterectomy    Severe recurrent major depressive disorder with psychotic features ([address]    Morbid obesity ([address]    Overactive bladder    Stress incontinence, female    Abdominal pain    Blood in the urine    Essential hypertension    Yeast infection    Vitamin D deficiency    Former smoker    Obsessive-compulsive disorder    Bilateral dry eyes    Shortness of breath    MARBELLA (obstructive sleep apnea)     DATE and changes made  · 8/17/20  ? STOP Vraylar   ? START LAMICTAL 25 mg QD to 25 mg BID   ? START Propranolol 10-20 mg TID   ? START Hydroxyzine 75 mg QHS   ? Minimize ambien use  · 9/1/20  ? Had to stop propranolol due to hypertension on home cuff and needed to switch to   ? Pt STOPPED hydroxyzine, causing headaches   ?  STOPPED propranolol, on selective beta blocker for BP now and monitoring at home on wrist cuff. Sending in rx to see if insurance will cover an arm cuff   ? 50 mg QD now, in 2 weeks if no rash will increase to 50 mg BID   ? lamictal 50 mg   ? rozerem 8 mg QHS   ? buspar 7.5 mg TID   · 9/15/20  ? Sleeping 6 hours  ? Lamictal 50 mg QD BID is current dose will increase to 50 mg BID   ? Rozerem 8 mg QHS   ? Buspar 7.5 mg TID   ? genesite ordered  · 10/6/20  ? Resend genesight   ? Buspar 7.5 mg TID   § Buspar 15 mg TID   ? Rozerem 8 mg QHS   ? Lamictal 50 mg BID  · 10/20  ? Still waiting on genesite, patient will come to office for this   ? START HYDROXYZINE 25 mg QHS PRN sleep     AT TODAY'S VISIT     1. No Labs ordered today   2. Crisis plan reviewed and patient verbally contracts for safety. Go to ED with emergent symptoms or safety concerns. 3. Risks, benefits, side effects of medications, including any / all black box warnings, discussed with patient, who verbalizes their understanding. Pt is kings for safety and denies thoughts of SI/HI. Amenable to plan. No acute concerns to address regarding medications. Subjective:      Pt reports alternative phone number [phone] for now    Improving mood   Less stress from ongoing legal issues with son as she has had news that was in his favor   Son will be coming home   Pt is more hopeful, much happier     Patient reports they have been compliant with current medication regimen and have not missed a dose. Patient denies medication side effects. At today's visit, patient denies thoughts of harm to self or others since last appointment, and denies auditory or visual hallucinations.      At today's visit, pt reports that since our last visit, their average MOOD has been (on a scale of 1-10, with 1 being severely depressed and 10 being not depressed at all)  Very depressed [] 1  [] 2  [] 3  [] 4  [] 5  [] 6  [] 7  [x] 8  [] 9  [] 10  Not depressed    At today's visit, pt reports that since our last visit, their average ANXIETY has been (on a scale of 1-10, with 10 being severely anxious and 1 being not anxious at all)  Not anxious [] 1     [] 2     [] 3     [x] 4   [] 5    [] 6      [] 7   [] 8   [] 9       [] 10  Very anxious       At today's visit, pt reports that since our last visit, their average APPETITE has been    [] Decreased     [x] Normal/Unchanged   [] Increased      At today's visit, pt reports that since our last visit, their average HOURS OF SLEEP (every 24 hours) has been    [] 0-3   [] 4-5    [] 5-6    [] 6-7    [x] 8-9    [] 10-11   [] 11+    At today's visit, pt reports that since our last visit, their average Energy has been     [] Poor    [x] Average  [] Increased         Aggression:  [] yes  [x] no    Patient is [x] Able to contract for safety  [] unable to CONTRACT FOR SAFETY     ROS:  [x] All negative/unchanged except if checked.  Explain positive(checked items) below:     [] Constitutional  [] Eyes  [] Ear/Nose/Mouth/Throat  [] Respiratory  [] CV  [] GI  []   [] Musculoskeletal  [] Skin/Breast  [] Neurological  [] Endocrine  [] Heme/Lymph  [] Allergic/Immunologic    MEDICATIONS:    Current Outpatient Medications:     pantoprazole (PROTONIX) 40 MG tablet, Take 1 tablet by mouth daily, Disp: 30 tablet, Rfl: 2    ALPRAZolam (XANAX) 2 MG tablet, TAKE 1 TABLET BY MOUTH TWICE DAILY AS NEEDED FOR ANXIETY, Disp: , Rfl:     ALPRAZolam (XANAX) 2 MG tablet, TAKE 1 TABLET BY MOUTH TWICE DAILY AS NEEDED FOR ANXIETY, Disp: 60 tablet, Rfl: 0    lamoTRIgine (LAMICTAL) 100 MG tablet, Take 0.5 tablets by mouth 2 times daily, Disp: 30 tablet, Rfl: 3    busPIRone (BUSPAR) 15 MG tablet, Take 15 mg by mouth 3 times daily, Disp: 90 tablet, Rfl: 2    ramelteon (ROZEREM) 8 MG tablet, Take 1 tablet by mouth nightly as needed for Sleep, Disp: 30 tablet, Rfl: 3    Blood Pressure Monitoring (BLOOD PRESSURE KIT) ZELALEM, 1 Units by Does not apply route as needed (for BP monitoring at home for hypertension) Cuff for upper arm, Disp: 1 Device, Rfl: 0    metoprolol succinate (TOPROL XL) 25 MG extended release tablet, Take 1 tablet by mouth daily, Disp: 30 tablet, Rfl: 3    fluconazole (DIFLUCAN) 150 MG tablet, Take 1 tablet by mouth daily, Disp: 30 tablet, Rfl: 0    ketorolac (TORADOL) 10 MG tablet, Take 1 tablet by mouth every 6 hours as needed for Pain, Disp: 20 tablet, Rfl: 0    nystatin (MYCOSTATIN) 349351 UNIT/GM cream, APPLY EXTERNALLY TO THE AFFECTED AREA TWICE DAILY, Disp: 30 g, Rfl: 0    cycloSPORINE (RESTASIS) 0.05 % ophthalmic emulsion, Place 1 drop into both eyes 2 times daily, Disp: 1 vial, Rfl: 3    albuterol sulfate  (90 Base) MCG/ACT inhaler, Inhale 2 puffs into the lungs every 6 hours as needed for Wheezing, Disp: 1 Inhaler, Rfl: 2    albuterol (ACCUNEB) 1.25 MG/3ML nebulizer solution, Inhale 3 mLs into the lungs every 6 hours as needed for Wheezing, Disp: 360 mL, Rfl: 3    Examination:    Vitals: not taken in person, most recent vitals in chart reviewed  There were no vitals filed for this visit. Wt Readings from Last 3 Encounters:   10/16/20 231 lb (104.8 kg)   07/30/20 250 lb (113.4 kg)   07/01/20 230 lb (104.3 kg)       Labs:   no recent labs    Mental Status Examination:    Level of consciousness:  alert and oriented to person, place, and situation  Appearance:  well-appearing good grooming and good hygiene  Behavior/Motor:  no abnormalities noted  Attitude toward examiner:  attentive and good eye contact  Speech:  spontaneous, normal rate and normal volume   Mood: \"much better\"  Affect:  mood congruent  Thought processes:  linear and logical  Thought content:  Denies suicidal or homicidal ideation, denies auditory or visual hallucinations  Cognition:  no deficits in attention, concentration notable, recent memory grossly intact  Concentration intact  Memory intact  Insight good   Judgement fair   Fund of Knowledge adequate    Treatment Plan:  Reviewed current Medications with the patient.  Education provided on the compliance with treatment. Reviewed OARRs, no concerns identified     The anticipated benefits and side effects of the medications, including the anticipated results of not receiving the medication, and of alternatives to the medications were explained to the patient and their informed consent was obtained for starting medications as well as adjusting the doses (titration or tapering) as indicated. The above information was given by physician in verbal form and sufficient understanding was in evidence. The patient participated in discussion of the information and question and/or concerns were addressed before the medication was given. PSYCHOTHERAPY/COUNSELING:  Encourage patient to attend outpatient appointments and therapy. [x] Therapeutic interview  [] Supportive  [x] CBT  [x] Ongoing  [] Other    No follow-ups on file. Follow-up in 2 weeks, patient informed to call for follow-up    Please note this report has been partially produced using speech recognition software  And may cause contain errors related to that system including grammar, punctuation and spelling as well as words and phrases that may seem inappropriate. If there are questions or concerns please feel free to contact me to clarify. Desmond Harp MD  Electronically signed by Desmond Harp MD on 10/20/2020 at 3:12 PM  10/20/2020 3:12 PM    Psychiatry   Due to this being a TeleHealth encounter, evaluation of the following organ systems is limited: Vitals/Constitutional/EENT/Resp/CV/GI//MS/Neuro/Skin/Heme-Lymph-Imm. An  electronic signature was used to authenticate this note.   --Desmond Harp MD on 10/20/2020 at 3:12 PM    Pursuant to the emergency declaration under the [address] [place] [address] and the [employer] and [employer] Act, this Virtual  Visit was conducted, with patient's consent, to reduce the patient's risk of exposure to COVID-19 and provide continuity of care for an established patient Services were provided through a video synchronous discussion virtually to substitute for in-person clinic visit.

## 2020-11-04 ENCOUNTER — VIRTUAL VISIT (OUTPATIENT)
Dept: BEHAVIORAL/MENTAL HEALTH CLINIC | Age: 45
End: 2020-11-04
Payer: COMMERCIAL

## 2020-11-04 PROCEDURE — 99214 OFFICE O/P EST MOD 30 MIN: CPT | Performed by: PSYCHIATRY & NEUROLOGY

## 2020-11-04 RX ORDER — RAMELTEON 8 MG/1
8 TABLET ORAL NIGHTLY PRN
Qty: 30 TABLET | Refills: 3 | Status: SHIPPED | OUTPATIENT
Start: 2020-11-04 | End: 2020-12-01 | Stop reason: SDUPTHER

## 2020-11-04 RX ORDER — HYDROXYZINE HYDROCHLORIDE 25 MG/1
50 TABLET, FILM COATED ORAL NIGHTLY PRN
Qty: 60 TABLET | Refills: 3 | Status: SHIPPED | OUTPATIENT
Start: 2020-11-04 | End: 2020-12-01 | Stop reason: SDUPTHER

## 2020-11-04 RX ORDER — LAMOTRIGINE 100 MG/1
50 TABLET ORAL 2 TIMES DAILY
Qty: 30 TABLET | Refills: 3 | Status: SHIPPED | OUTPATIENT
Start: 2020-11-04 | End: 2020-12-01 | Stop reason: SDUPTHER

## 2020-11-04 RX ORDER — LEVOMEFOLATE CALCIUM 15 MG
15 TABLET ORAL DAILY
Qty: 30 TABLET | Refills: 3 | Status: SHIPPED | OUTPATIENT
Start: 2020-11-04 | End: 2020-12-01 | Stop reason: SDUPTHER

## 2020-11-04 RX ORDER — BUSPIRONE HYDROCHLORIDE 10 MG/1
20 TABLET ORAL 3 TIMES DAILY
Qty: 180 TABLET | Refills: 5 | Status: SHIPPED | OUTPATIENT
Start: 2020-11-04 | End: 2020-12-01 | Stop reason: SDUPTHER

## 2020-11-04 NOTE — PROGRESS NOTES
11/4/2020    TELEHEALTH PSYCHIATRY FOLLOWUP -- Audio/Visual (During KKITL-64 public health emergency)      Psychiatric Diagnoses:  1. Moderate episode of recurrent major depressive disorder (HCC)    2. Obsessive-compulsive disorder, unspecified type          Due to COVID 19 outbreak, patient's office visit was converted to a virtual visit. Patient was contacted and agreed to proceed with a virtual visit via DOXY  30 minutes with direct communication with patient for encounter The risks and benefits of converting to a virtual visit were discussed in light of the current infectious disease epidemic. Patient also understood that insurance coverage and co-pays are up to their individual insurance plans. Patient Location:        Patient's home address  Provider Location (City/State):        [place]        Assessment/Plan:   Start L-methylfolate per below     Medical Diagnoses:  Patient Active Problem List   Diagnosis    Anxiety and depression    PTSD (post-traumatic stress disorder)    Migraine headache    Nausea and vomiting    S/P abdominal hysterectomy    Severe recurrent major depressive disorder with psychotic features ([address]    Morbid obesity ([address]    Overactive bladder    Stress incontinence, female    Abdominal pain    Blood in the urine    Essential hypertension    Yeast infection    Vitamin D deficiency    Former smoker    Obsessive-compulsive disorder    Bilateral dry eyes    Shortness of breath    MARBELLA (obstructive sleep apnea)           DATE and changes made  · 8/17/20  ? STOP Vraylar   ? START LAMICTAL 25 mg QD to 25 mg BID   ? START Propranolol 10-20 mg TID   ? START Hydroxyzine 75 mg QHS   ? Minimize ambien use  · 9/1/20  ? Had to stop propranolol due to hypertension on home cuff and needed to switch to   ? Pt STOPPED hydroxyzine, causing headaches   ? STOPPED propranolol, on selective beta blocker for BP now and monitoring at home on wrist cuff.  Sending in rx to see if insurance will cover an arm cuff   ? 50 mg QD now, in 2 weeks if no rash will increase to 50 mg BID   ? lamictal 50 mg   ? rozerem 8 mg QHS   ? buspar 7.5 mg TID   · 9/15/20  ? Sleeping 6 hours  ? Lamictal 50 mg QD BID is current dose will increase to 50 mg BID   ? Rozerem 8 mg QHS   ? Buspar 7.5 mg TID   ? genesite ordered  · 10/6/20  ? Resend genesight   ? Buspar 7.5 mg TID   ? Buspar 15 mg TID   ? Rozerem 8 mg QHS   ? Lamictal 50 mg BID  · 10/20  ? Still waiting on genesite, patient will come to office for this   ? START HYDROXYZINE 25 mg QHS PRN sleep  · 11/4  ? Still waiting on genesite, patient will come to office for this   ? START HYDROXYZINE 25 mg QHS PRN sleep   ? CONT LAMICTAL 50 mg BID   CONT ROZEREM 8 mg QHS   ? L-methylfolate     AT TODAY'S VISIT     1. No Labs ordered today   2. Crisis plan reviewed and patient verbally contracts for safety. Go to ED with emergent symptoms or safety concerns. 3. Risks, benefits, side effects of medications, including any / all black box warnings, discussed with patient, who verbalizes their understanding. Pt is kings for safety and denies thoughts of SI/HI. Amenable to plan. No acute concerns to address regarding medications. Subjective:      Pt reports continued depressed mood and low energy  Difficulty sleeping, rozerem had been helpful for this   Would like to get Gene site   dneies SI   Apathy unchanged   Low interest in outside activities     Patient reports they have been compliant with current medication regimen and have not missed a dose. Patient denies medication side effects. At today's visit, patient denies thoughts of harm to self or others since last appointment, and denies auditory or visual hallucinations.      At today's visit, pt reports that since our last visit, their average MOOD has been (on a scale of 1-10, with 1 being severely depressed and 10 being not depressed at all)  Very depressed [] 1  [] 2  [x] 3  [] 4  [] 5  [] 6  [] 7 [] 8  [] 9  [] 10  Not depressed    At today's visit, pt reports that since our last visit, their average ANXIETY has been (on a scale of 1-10, with 10 being severely anxious and 1 being not anxious at all)  Not anxious [] 1     [] 2     [] 3     [] 4   [] 5    [] 6      [x] 7   [] 8   [] 9       [] 10  Very anxious       At today's visit, pt reports that since our last visit, their average APPETITE has been    [x] Decreased     [] Normal/Unchanged   [] Increased      At today's visit, pt reports that since our last visit, their average HOURS OF SLEEP (every 24 hours) has been    [] 0-3   [] 4-5    [x] 5-6    [] 6-7    [] 8-9    [] 10-11   [] 11+    At today's visit, pt reports that since our last visit, their average Energy has been     [x] Poor    [] Average  [] Increased         Aggression:  [] yes  [x] no    Patient is [x] Able to contract for safety  [] unable to CONTRACT FOR SAFETY     ROS:  [x] All negative/unchanged except if checked.  Explain positive(checked items) below:     [] Constitutional  [] Eyes  [] Ear/Nose/Mouth/Throat  [] Respiratory  [] CV  [] GI  []   [] Musculoskeletal  [] Skin/Breast  [] Neurological  [] Endocrine  [] Heme/Lymph  [] Allergic/Immunologic      MEDICATIONS:    Current Outpatient Medications:     L-Methylfolate 15 MG TABS, Take 15 mg by mouth daily, Disp: 30 tablet, Rfl: 3    busPIRone (BUSPAR) 10 MG tablet, Take 2 tablets by mouth 3 times daily, Disp: 180 tablet, Rfl: 5    hydrOXYzine (ATARAX) 25 MG tablet, Take 2 tablets by mouth nightly as needed for Anxiety (sleep), Disp: 60 tablet, Rfl: 3    lamoTRIgine (LAMICTAL) 100 MG tablet, Take 0.5 tablets by mouth 2 times daily, Disp: 30 tablet, Rfl: 3    ramelteon (ROZEREM) 8 MG tablet, Take 1 tablet by mouth nightly as needed for Sleep, Disp: 30 tablet, Rfl: 3    fluconazole (DIFLUCAN) 150 MG tablet, , Disp: , Rfl:     metoprolol succinate (TOPROL XL) 25 MG extended release tablet, Take 1 tablet by mouth daily, Disp: 30 tablet, Rfl: 3    [START ON 11/20/2020] ALPRAZolam (XANAX) 2 MG tablet, TAKE 1 TABLET BY MOUTH TWICE DAILY AS NEEDED FOR ANXIETY, Disp: 60 tablet, Rfl: 0    ibuprofen (ADVIL;MOTRIN) 800 MG tablet, Take 1 tablet by mouth every 6 hours as needed for Pain After toradol dosing complete, Disp: 120 tablet, Rfl: 3    nystatin (MYCOSTATIN) 991524 UNIT/GM cream, APPLY EXTERNALLY TO THE AFFECTED AREA TWICE DAILY, Disp: 30 g, Rfl: 0    pantoprazole (PROTONIX) 40 MG tablet, Take 1 tablet by mouth daily, Disp: 30 tablet, Rfl: 2    Blood Pressure Monitoring (BLOOD PRESSURE KIT) ZELALEM, 1 Units by Does not apply route as needed (for BP monitoring at home for hypertension) Cuff for upper arm, Disp: 1 Device, Rfl: 0    ketorolac (TORADOL) 10 MG tablet, Take 1 tablet by mouth every 6 hours as needed for Pain, Disp: 20 tablet, Rfl: 0    cycloSPORINE (RESTASIS) 0.05 % ophthalmic emulsion, Place 1 drop into both eyes 2 times daily, Disp: 1 vial, Rfl: 3    albuterol sulfate  (90 Base) MCG/ACT inhaler, Inhale 2 puffs into the lungs every 6 hours as needed for Wheezing, Disp: 1 Inhaler, Rfl: 2    albuterol (ACCUNEB) 1.25 MG/3ML nebulizer solution, Inhale 3 mLs into the lungs every 6 hours as needed for Wheezing, Disp: 360 mL, Rfl: 3    Examination:    Vitals: not taken in person, most recent vitals in chart reviewed  There were no vitals filed for this visit.     Wt Readings from Last 3 Encounters:   10/16/20 231 lb (104.8 kg)   07/30/20 250 lb (113.4 kg)   07/01/20 230 lb (104.3 kg)       Labs:   no recent labs    Mental Status Examination:    Level of consciousness:  alert and oriented to person, place, and situation  Appearance:  well-appearing good grooming and good hygiene  Behavior/Motor:  no abnormalities noted  Attitude toward examiner:  attentive and good eye contact  Speech:  spontaneous, normal rate and normal volume   Mood: \"down\" appears depressed  Affect:  mood congruent  Thought processes:  linear and used to authenticate this note. --Anisha Villanueva MD on 11/12/2020 at 12:27 PM    Pursuant to the emergency declaration under the [address] [place] [address] waiver authority and the [employer] and [employer] Act, this Virtual  Visit was conducted, with patient's consent, to reduce the patient's risk of exposure to COVID-19 and provide continuity of care for an established patient Services were provided through a video synchronous discussion virtually to substitute for in-person clinic visit.

## 2020-11-05 ENCOUNTER — VIRTUAL VISIT (OUTPATIENT)
Dept: FAMILY MEDICINE CLINIC | Age: 45
End: 2020-11-05
Payer: COMMERCIAL

## 2020-11-05 PROCEDURE — 99214 OFFICE O/P EST MOD 30 MIN: CPT | Performed by: NURSE PRACTITIONER

## 2020-11-05 RX ORDER — METOPROLOL SUCCINATE 25 MG/1
25 TABLET, EXTENDED RELEASE ORAL DAILY
Qty: 30 TABLET | Refills: 3 | Status: SHIPPED | OUTPATIENT
Start: 2020-11-05 | End: 2020-12-01 | Stop reason: SDUPTHER

## 2020-11-05 RX ORDER — IBUPROFEN 800 MG/1
800 TABLET ORAL EVERY 6 HOURS PRN
Qty: 120 TABLET | Refills: 3 | Status: SHIPPED | OUTPATIENT
Start: 2020-11-05 | End: 2020-12-01 | Stop reason: SDUPTHER

## 2020-11-05 RX ORDER — ALPRAZOLAM 2 MG/1
TABLET ORAL
Qty: 60 TABLET | Refills: 0 | Status: SHIPPED | OUTPATIENT
Start: 2020-11-20 | End: 2020-12-01 | Stop reason: SDUPTHER

## 2020-11-05 RX ORDER — FLUCONAZOLE 150 MG/1
TABLET ORAL
COMMUNITY
Start: 2020-11-04 | End: 2020-12-01 | Stop reason: ALTCHOICE

## 2020-11-05 ASSESSMENT — PATIENT HEALTH QUESTIONNAIRE - PHQ9
SUM OF ALL RESPONSES TO PHQ9 QUESTIONS 1 & 2: 2
SUM OF ALL RESPONSES TO PHQ QUESTIONS 1-9: 2
1. LITTLE INTEREST OR PLEASURE IN DOING THINGS: 1
SUM OF ALL RESPONSES TO PHQ QUESTIONS 1-9: 2
SUM OF ALL RESPONSES TO PHQ QUESTIONS 1-9: 2
2. FEELING DOWN, DEPRESSED OR HOPELESS: 1

## 2020-11-05 NOTE — PROGRESS NOTES
2020    TELEHEALTH EVALUATION -- Audio/Visual (During QPNQI-90 public health emergency)    Due to COVID 19 outbreak, patient's office visit was converted to a virtual visit. Patient was contacted and agreed to proceed with a virtual visit via Doxy. me  The risks and benefits of converting to a virtual visit were discussed in light of the current infectious disease epidemic. Patient also understood that insurance coverage and co-pays are up to their individual insurance plans. Jacki Forbes is a 41 y.o. female being evaluated by a Virtual Visit (video visit) encounter to address concerns as mentioned above. A caregiver was present when appropriate. Due to this being a TeleHealth encounter (During FVBGP-20 public health emergency), evaluation of the following organ systems was limited: Vitals/Constitutional/EENT/Resp/CV/GI//MS/Neuro/Skin/Heme-Lymph-Imm. Pursuant to the emergency declaration under the [address] [place] [address] authority and the [employer] and [employer] Act, this Virtual Visit was conducted with patient's (and/or legal guardian's) consent, to reduce the patient's risk of exposure to COVID-19 and provide necessary medical care. The patient (and/or legal guardian) has also been advised to contact this office for worsening conditions or problems, and seek emergency medical treatment and/or call 911 if deemed necessary. Patient identification was verified at the start of the visit: Yes    Total time spent for this encounter: Not billed by time    Services were provided through a video synchronous discussion virtually to substitute for in-person clinic visit. Patient and provider were located at their individual homes. The patient is talking with me virtually from her home and I am located at my office in [facility].          HPI:    Jacki Forbes (:  1975) has requested an audio/video evaluation for has been okay and has been eating a low-salt diet but has not been getting any physical activity. She states that she can almost always feel her heart beating because of how stressed she is but she has not felt lightheaded or dizzy. Review of Systems   This patient reports no chest pain or pressure. There is no shortness of breath or cough. The patient reports no nausea or vomiting. There is no heartburn or indigestion. There is no diarrhea or constipation. No black, bloody, mucusy or tarry stool noticed. The patient reports no bloating and no change in appetite. There is no numbness, tingling or swelling in the extremities. She states that she has had a headache all over her head that is described as a moderate ache sensation. No neurological symptoms reported. Some tension in her neck muscles. Prior to Visit Medications    Medication Sig Taking?  Authorizing Provider   fluconazole (DIFLUCAN) 150 MG tablet  Yes Historical Provider, MD   metoprolol succinate (TOPROL XL) 25 MG extended release tablet Take 1 tablet by mouth daily Yes ANA Sosa CNP   ALPRAZolam (XANAX) 2 MG tablet TAKE 1 TABLET BY MOUTH TWICE DAILY AS NEEDED FOR ANXIETY Yes ANA Welsh CNP   ibuprofen (ADVIL;MOTRIN) 800 MG tablet Take 1 tablet by mouth every 6 hours as needed for Pain After toradol dosing complete Yes ANA Sosa CNP   L-Methylfolate 15 MG TABS Take 15 mg by mouth daily Yes Ramsey Curtis MD   busPIRone (BUSPAR) 10 MG tablet Take 2 tablets by mouth 3 times daily Yes Ramsey Curtis MD   hydrOXYzine (ATARAX) 25 MG tablet Take 2 tablets by mouth nightly as needed for Anxiety (sleep) Yes Ramsey Curtis MD   lamoTRIgine (LAMICTAL) 100 MG tablet Take 0.5 tablets by mouth 2 times daily Yes Ramsey Curtis MD   ramelteon (ROZEREM) 8 MG tablet Take 1 tablet by mouth nightly as needed for Sleep Yes Ramsey Curtis MD   nystatin (MYCOSTATIN) 472524 UNIT/GM cream APPLY EXTERNALLY TO THE AFFECTED AREA TWICE DAILY Yes Buffy Henson MD   pantoprazole (PROTONIX) 40 MG tablet Take 1 tablet by mouth daily Yes ANA Gamboa CNP   Blood Pressure Monitoring (BLOOD PRESSURE KIT) ZELALEM 1 Units by Does not apply route as needed (for BP monitoring at home for hypertension) Cuff for upper arm Yes Donna Joens MD   ketorolac (TORADOL) 10 MG tablet Take 1 tablet by mouth every 6 hours as needed for Pain Yes Stephanie Chin MD   cycloSPORINE (RESTASIS) 0.05 % ophthalmic emulsion Place 1 drop into both eyes 2 times daily Yes ANA Gamboa CNP   albuterol sulfate  (90 Base) MCG/ACT inhaler Inhale 2 puffs into the lungs every 6 hours as needed for Wheezing Yes ANA Gamboa CNP   albuterol (ACCUNEB) 1.25 MG/3ML nebulizer solution Inhale 3 mLs into the lungs every 6 hours as needed for Wheezing Yes ANA Gamboa CNP       Social History     Tobacco Use    Smoking status: Former Smoker    Smokeless tobacco: Never Used   Substance Use Topics    Alcohol use: No    Drug use: No       PHYSICAL EXAMINATION:  [ INSTRUCTIONS:  \"[x]\" Indicates a positive item  \"[]\" Indicates a negative item  -- DELETE ALL ITEMS NOT EXAMINED]  [x] Alert  [x] Oriented to person/place/time    [x] No apparent distress  [] Toxic appearing    [] Face flushed appearing [] Sclera clear  [] Lips are cyanotic      [x] Breathing appears normal  [] Appears tachypneic      [] Rash on visible skin    [x] Cranial Nerves II-XII grossly intact    [x] Motor grossly intact in visible upper extremities    [] Motor grossly intact in visible lower extremities    [x] Normal Mood  [] Anxious appearing    [] Depressed appearing  [] Confused appearing      [] Poor short term memory  [] Poor long term memory    [] OTHER:      Due to this being a TeleHealth encounter, evaluation of the following organ systems is limited: Vitals/Constitutional/EENT/Resp/CV/GI//MS/Neuro/Skin/Heme-Lymph-Imm. ASSESSMENT/PLAN:   Diagnosis Orders   1. Palpitations  metoprolol succinate (TOPROL XL) 25 MG extended release tablet   2. Essential hypertension  metoprolol succinate (TOPROL XL) 25 MG extended release tablet   3. Severe recurrent major depressive disorder with psychotic features ([address]     4. PTSD (post-traumatic stress disorder)  ALPRAZolam (XANAX) 2 MG tablet   5. Panic attack  ALPRAZolam (XANAX) 2 MG tablet   6. Gastroesophageal reflux disease without esophagitis     7. Aching headache           Return in about 2 weeks (around 11/19/2020) for doxy- depression. 1.  2.  Continue current medication at current dose. No recent palpitations reported. 3.  4.  5.  She continues to follow closely with psychiatry. Is encouraged to contact psychiatrist or myself if mood becomes more unstable prior to next visits. Xanax refill given and she is reminded to take as sparingly as possible. We will want to wean dose in the future. 6.  No reported symptoms while taking proton pump inhibitor. 7.  Refill of anti-inflammatory medication given for likely tension headache. Controlled Substance Monitoring:    Acute and Chronic Pain Monitoring:   RX Monitoring 8/10/2020   Attestation -   Periodic Controlled Substance Monitoring Possible medication side effects, risk of tolerance/dependence & alternative treatments discussed. ;No signs of potential drug abuse or diversion identified. ;Assessed functional status. Chronic Pain > 80 MEDD -       Please note this report has been partially produced using speech recognition software and may cause contain errors related to that system including grammar, punctuation and spelling as well as words and phrases that may seem inappropriate. If there are questions or concerns please feel free to contact me to clarify. An  electronic signature was used to authenticate this note.     --Geovanna Montiel APRN - CNP on 11/6/2020 at 7:24 AM        Pursuant to the emergency declaration under the [address] [place] [address] and the [employer] and [employer] Act, this Virtual  Visit was conducted, with patient's consent, to reduce the patient's risk of exposure to COVID-19 and provide continuity of care for an established patient. Services were provided through a video synchronous discussion virtually to substitute for in-person clinic visit.

## 2020-11-11 ENCOUNTER — TELEPHONE (OUTPATIENT)
Dept: FAMILY MEDICINE CLINIC | Age: 45
End: 2020-11-11

## 2020-11-11 NOTE — TELEPHONE ENCOUNTER
Patient called to make her 2 week follow up appointment with Sabrina. No appointments are available. Please advise.

## 2020-11-12 DIAGNOSIS — E06.3 HASHIMOTO'S DISEASE: ICD-10-CM

## 2020-11-12 PROBLEM — F33.1 MODERATE EPISODE OF RECURRENT MAJOR DEPRESSIVE DISORDER (HCC): Status: ACTIVE | Noted: 2020-11-12

## 2020-11-12 LAB
T4 FREE: 1.22 NG/DL (ref 0.84–1.68)
TSH SERPL DL<=0.05 MIU/L-ACNC: 2.48 UIU/ML (ref 0.44–3.86)

## 2020-11-12 NOTE — TELEPHONE ENCOUNTER
We can double book my 57 next Friday for a doxy visit for depression. Does that work for the patient?  (November 20)

## 2020-11-16 LAB — THYROID PEROXIDASE (TPO) ABS: >1000 IU/ML (ref 0–35)

## 2020-11-16 NOTE — TELEPHONE ENCOUNTER
1st attempt to reach patient.  Called patient @ [phone]   and left message on machine for patient to return call during normal business hours of 8:30 AM and 5 PM @ [phone] option 3.mj

## 2020-11-19 ENCOUNTER — TELEPHONE (OUTPATIENT)
Dept: FAMILY MEDICINE CLINIC | Age: 45
End: 2020-11-19

## 2020-11-19 NOTE — TELEPHONE ENCOUNTER
----- Message from Yokasta Olivia sent at 11/19/2020  9:34 AM EST -----  Regarding: VV request  Pt missed her appointment yesterday. She thought it was today. Nothing available this month with [facility]. Patient wants to know if she can possibly squeeze her in for a VV. CB# [phone]. Please advise.

## 2020-11-19 NOTE — TELEPHONE ENCOUNTER
Yes please let her know that she continues to have quite a bit of antibodies against her thyroid but her thyroid function tests are still within normal range. We can discuss this further at her next visit.

## 2020-11-20 NOTE — TELEPHONE ENCOUNTER
1st attempt to reach patient.  Called patient @ [phone] and left message on machine for patient to return call during normal business hours of 8:30 AM and 5 PM @ [phone] option 3.mj

## 2020-12-01 ENCOUNTER — VIRTUAL VISIT (OUTPATIENT)
Dept: FAMILY MEDICINE CLINIC | Age: 45
End: 2020-12-01
Payer: COMMERCIAL

## 2020-12-01 PROCEDURE — 99214 OFFICE O/P EST MOD 30 MIN: CPT | Performed by: NURSE PRACTITIONER

## 2020-12-01 RX ORDER — KETOROLAC TROMETHAMINE 10 MG/1
10 TABLET, FILM COATED ORAL EVERY 6 HOURS PRN
Qty: 20 TABLET | Refills: 0 | Status: SHIPPED | OUTPATIENT
Start: 2020-12-01 | End: 2021-03-22 | Stop reason: SDUPTHER

## 2020-12-01 RX ORDER — LEVOMEFOLATE CALCIUM 15 MG
15 TABLET ORAL DAILY
Qty: 30 TABLET | Refills: 3 | Status: SHIPPED | OUTPATIENT
Start: 2020-12-01 | End: 2021-02-18

## 2020-12-01 RX ORDER — NYSTATIN 100000 U/G
CREAM TOPICAL
Qty: 30 G | Refills: 0 | Status: SHIPPED | OUTPATIENT
Start: 2020-12-01 | End: 2020-12-31 | Stop reason: SDUPTHER

## 2020-12-01 RX ORDER — RAMELTEON 8 MG/1
8 TABLET ORAL NIGHTLY PRN
Qty: 30 TABLET | Refills: 3 | Status: SHIPPED | OUTPATIENT
Start: 2020-12-01 | End: 2020-12-10 | Stop reason: SDUPTHER

## 2020-12-01 RX ORDER — HYDROXYZINE HYDROCHLORIDE 25 MG/1
50 TABLET, FILM COATED ORAL NIGHTLY PRN
Qty: 60 TABLET | Refills: 3 | Status: SHIPPED | OUTPATIENT
Start: 2020-12-01 | End: 2021-01-30

## 2020-12-01 RX ORDER — PANTOPRAZOLE SODIUM 40 MG/1
40 TABLET, DELAYED RELEASE ORAL DAILY
Qty: 30 TABLET | Refills: 2 | Status: SHIPPED | OUTPATIENT
Start: 2020-12-01 | End: 2021-01-07 | Stop reason: SDUPTHER

## 2020-12-01 RX ORDER — BUSPIRONE HYDROCHLORIDE 10 MG/1
20 TABLET ORAL 3 TIMES DAILY
Qty: 180 TABLET | Refills: 5 | Status: SHIPPED | OUTPATIENT
Start: 2020-12-01 | End: 2021-02-18 | Stop reason: SDUPTHER

## 2020-12-01 RX ORDER — METOPROLOL SUCCINATE 25 MG/1
25 TABLET, EXTENDED RELEASE ORAL DAILY
Qty: 30 TABLET | Refills: 3 | Status: SHIPPED | OUTPATIENT
Start: 2020-12-01 | End: 2021-01-07 | Stop reason: SDUPTHER

## 2020-12-01 RX ORDER — ALPRAZOLAM 2 MG/1
TABLET ORAL
Qty: 60 TABLET | Refills: 0 | Status: SHIPPED | OUTPATIENT
Start: 2020-12-14 | End: 2021-01-07 | Stop reason: SDUPTHER

## 2020-12-01 RX ORDER — LAMOTRIGINE 100 MG/1
50 TABLET ORAL 2 TIMES DAILY
Qty: 30 TABLET | Refills: 3 | Status: SHIPPED | OUTPATIENT
Start: 2020-12-01 | End: 2020-12-10 | Stop reason: SDUPTHER

## 2020-12-01 RX ORDER — IBUPROFEN 800 MG/1
800 TABLET ORAL EVERY 6 HOURS PRN
Qty: 120 TABLET | Refills: 3 | Status: SHIPPED | OUTPATIENT
Start: 2020-12-01 | End: 2021-03-22 | Stop reason: SDUPTHER

## 2020-12-01 RX ORDER — LIDOCAINE 50 MG/G
1 PATCH TOPICAL DAILY
Qty: 10 PATCH | Refills: 0 | Status: SHIPPED | OUTPATIENT
Start: 2020-12-01 | End: 2021-01-07 | Stop reason: SDUPTHER

## 2020-12-01 ASSESSMENT — PATIENT HEALTH QUESTIONNAIRE - PHQ9
SUM OF ALL RESPONSES TO PHQ QUESTIONS 1-9: 2
1. LITTLE INTEREST OR PLEASURE IN DOING THINGS: 1
SUM OF ALL RESPONSES TO PHQ QUESTIONS 1-9: 2
SUM OF ALL RESPONSES TO PHQ QUESTIONS 1-9: 2
SUM OF ALL RESPONSES TO PHQ9 QUESTIONS 1 & 2: 2
2. FEELING DOWN, DEPRESSED OR HOPELESS: 1

## 2020-12-01 NOTE — PATIENT INSTRUCTIONS
Patient Education        Back Strain: Care Instructions  Overview     A back strain happens when you overstretch, or pull, a muscle in your back. You may hurt your back in an accident or when you exercise or lift something. Sometimes you may not know how you hurt your back. Most back pain will get better with rest and time. You can take care of yourself at home to help your back heal.  Follow-up care is a key part of your treatment and safety. Be sure to make and go to all appointments, and call your doctor if you are having problems. It's also a good idea to know your test results and keep a list of the medicines you take. How can you care for yourself at home? · Try to stay as active as you can, but stop or reduce any activity that causes pain. · Put ice or a cold pack on the sore muscle for 10 to 20 minutes at a time to stop swelling. Try this every 1 to 2 hours for 3 days (when you are awake) or until the swelling goes down. Put a thin cloth between the ice pack and your skin. · After 2 or 3 days, apply a heating pad on low or a warm cloth to your back. Some doctors suggest that you go back and forth between hot and cold treatments. · Take pain medicines exactly as directed. ? If the doctor gave you a prescription medicine for pain, take it as prescribed. ? If you are not taking a prescription pain medicine, ask your doctor if you can take an over-the-counter medicine. · Try sleeping on your side with a pillow between your legs. Or put a pillow under your knees when you lie on your back. These measures can ease pain in your lower back. · Return to your usual level of activity slowly. When should you call for help? Call 911 anytime you think you may need emergency care. For example, call if:    · You are unable to move a leg at all. Call your doctor now or seek immediate medical care if:    · You have new or worse symptoms in your legs, belly, or buttocks.  Symptoms may include:  ? Numbness or tingling. ? Weakness. ? Pain.     · You lose bladder or bowel control. Watch closely for changes in your health, and be sure to contact your doctor if:    · You have a fever, lose weight, or don't feel well.     · You are not getting better as expected. Where can you learn more? Go to [email or web address] and sign in to your [employer] account. Enter Q939 in the [employer] box to learn more about \"Back Strain: Care Instructions. \"     If you do not have an account, please click on the \"Sign Up Now\" link. Current as of: March 2, 2020               Content Version: 12.6  © 2006-2020 [employer]. Care instructions adapted under license by [facility] ([facility]). If you have questions about a medical condition or this instruction, always ask your healthcare professional. [place] [address] any warranty or liability for your use of this information. Patient Education        Low Back Pain: Exercises  Introduction  Here are some examples of exercises for you to try. The exercises may be suggested for a condition or for rehabilitation. Start each exercise slowly. Ease off the exercises if you start to have pain. You will be told when to start these exercises and which ones will work best for you. How to do the exercises  Press-up   1. Lie on your stomach, supporting your body with your forearms. 2. Press your elbows down into the floor to raise your upper back. As you do this, relax your stomach muscles and allow your back to arch without using your back muscles. As your press up, do not let your hips or pelvis come off the floor. 3. Hold for 15 to 30 seconds, then relax. 4. Repeat 2 to 4 times. Alternate arm and leg (bird dog) exercise   Do this exercise slowly. Try to keep your body straight at all times, and do not let one hip drop lower than the other. 1. Start on the floor, on your hands and knees. 2. Tighten your belly muscles.   3. Raise one leg off the floor, and hold it straight out behind you. Be careful not to let your hip drop down, because that will twist your trunk. 4. Hold for about 6 seconds, then lower your leg and switch to the other leg. 5. Repeat 8 to 12 times on each leg. 6. Over time, work up to holding for 10 to 30 seconds each time. 7. If you feel stable and secure with your leg raised, try raising the opposite arm straight out in front of you at the same time. Knee-to-chest exercise   1. Lie on your back with your knees bent and your feet flat on the floor. 2. Bring one knee to your chest, keeping the other foot flat on the floor (or keeping the other leg straight, whichever feels better on your lower back). 3. Keep your lower back pressed to the floor. Hold for at least 15 to 30 seconds. 4. Relax, and lower the knee to the starting position. 5. Repeat with the other leg. Repeat 2 to 4 times with each leg. 6. To get more stretch, put your other leg flat on the floor while pulling your knee to your chest.    Curl-ups   1. Lie on the floor on your back with your knees bent at a 90-degree angle. Your feet should be flat on the floor, about 12 inches from your buttocks. 2. Cross your arms over your chest. If this bothers your neck, try putting your hands behind your neck (not your head), with your elbows spread apart. 3. Slowly tighten your belly muscles and raise your shoulder blades off the floor. 4. Keep your head in line with your body, and do not press your chin to your chest.  5. Hold this position for 1 or 2 seconds, then slowly lower yourself back down to the floor. 6. Repeat 8 to 12 times. Pelvic tilt exercise   1. Lie on your back with your knees bent. 2. \"Brace\" your stomach. This means to tighten your muscles by pulling in and imagining your belly button moving toward your spine. You should feel like your back is pressing to the floor and your hips and pelvis are rocking back.   3. Hold for about 6 seconds while you breathe smoothly. 4. Repeat 8 to 12 times. Heel dig bridging   1. Lie on your back with both knees bent and your ankles bent so that only your heels are digging into the floor. Your knees should be bent about 90 degrees. 2. Then push your heels into the floor, squeeze your buttocks, and lift your hips off the floor until your shoulders, hips, and knees are all in a straight line. 3. Hold for about 6 seconds as you continue to breathe normally, and then slowly lower your hips back down to the floor and rest for up to 10 seconds. 4. Do 8 to 12 repetitions. Hamstring stretch in doorway   1. Lie on your back in a doorway, with one leg through the open door. 2. Slide your leg up the wall to straighten your knee. You should feel a gentle stretch down the back of your leg. 3. Hold the stretch for at least 15 to 30 seconds. Do not arch your back, point your toes, or bend either knee. Keep one heel touching the floor and the other heel touching the wall. 4. Repeat with your other leg. 5. Do 2 to 4 times for each leg. Hip flexor stretch   1. Kneel on the floor with one knee bent and one leg behind you. Place your forward knee over your foot. Keep your other knee touching the floor. 2. Slowly push your hips forward until you feel a stretch in the upper thigh of your rear leg. 3. Hold the stretch for at least 15 to 30 seconds. Repeat with your other leg. 4. Do 2 to 4 times on each side. Wall sit   1. Stand with your back 10 to 12 inches away from a wall. 2. Lean into the wall until your back is flat against it. 3. Slowly slide down until your knees are slightly bent, pressing your lower back into the wall. 4. Hold for about 6 seconds, then slide back up the wall. 5. Repeat 8 to 12 times. Follow-up care is a key part of your treatment and safety. Be sure to make and go to all appointments, and call your doctor if you are having problems.  It's also a good idea to know your test results and keep a list of the medicines you take. Where can you learn more? Go to [email or web address] and sign in to your [employer] account. Enter K795 in the [employer] box to learn more about \"Low Back Pain: Exercises. \"     If you do not have an account, please click on the \"Sign Up Now\" link. Current as of: March 2, 2020               Content Version: 12.6  © 2006-2020 [email or web address]. Care instructions adapted under license by [facility] ([facility]). If you have questions about a medical condition or this instruction, always ask your healthcare professional. [address] any warranty or liability for your use of this information.

## 2020-12-01 NOTE — PROGRESS NOTES
2020    TELEHEALTH EVALUATION -- Audio/Visual (During [facility]-51 public health emergency)    Due to COVID 19 outbreak, patient's office visit was converted to a virtual visit. Patient was contacted and agreed to proceed with a virtual visit via Doxy. me  The risks and benefits of converting to a virtual visit were discussed in light of the current infectious disease epidemic. Patient also understood that insurance coverage and co-pays are up to their individual insurance plans. Hailee Rodriguez is a 41 y.o. female being evaluated by a Virtual Visit (video visit) encounter to address concerns as mentioned above. A caregiver was present when appropriate. Due to this being a TeleHealth encounter ([facility]), evaluation of the following organ systems was limited: Vitals/Constitutional/EENT/Resp/CV/GI//MS/Neuro/Skin/Heme-Lymph-Imm. Pursuant to the emergency declaration under the [address] [place] [address] and the [employer] Act, this Virtual Visit was conducted with patient's (and/or legal guardian's) consent, to reduce the patient's risk of exposure to COVID-19 and provide necessary medical care. The patient (and/or legal guardian) has also been advised to contact this office for worsening conditions or problems, and seek emergency medical treatment and/or call 911 if deemed necessary. Patient identification was verified at the start of the visit: Yes    Total time spent for this encounter: Not billed by time    Services were provided through a video synchronous discussion virtually to substitute for in-person clinic visit. Patient and provider were located at their individual homes. The patient is talking with me virtually from her home and I am located at my office in [facility].        HPI:    Hailee Rodriguez (:  1975) has requested an audio/video evaluation for the following concern(s):    Depression: He states that overall her mood has been doing a little bit better. She has not been as tearful all the time. Things have been better with her sister living with her. States that she has helped motivate to get her out of her room when she feels like hiding. She has not only left the house however. She will see Dr. Portia Lopez tomorrow. Feels that current combination of medications has been helpful. She has been getting a little bit of sleep at night at least.  Continues to have anxiety symptoms with occasional palpitation but no chest pain. He states that she has been taking medications as prescribed. No thoughts of self-harm or harm to others. States that she and her  are considering separation. The current ongoing issue with her son and his legal dixon have been very difficult on her family. Hashimoto's disease: She states that she recently had blood work done and does have a strong family history of thyroid disease. She has not had any change in voice or difficulty swallowing. Fatigue: She states that she has been feeling incredibly fatigued lately. Even when she feels that she has a better night sleep she still feels very tired. Feels weak and lethargic overall. She denies any swollen glands or fever. Low back pain: States that she has been hurting everywhere and feels like all of her muscles are stiff. Has been having a lot of pain in her lower back right in the middle does not seem to radiate down either leg. She knows that she has to be more active but states that it has been hard to get out of bed most days. She has been taking anti-inflammatory medication with some relief and is requesting medication refill. Review of Systems   This patient reports no chest pain or pressure. There is no shortness of breath or cough. The patient reports no nausea or vomiting. There is some worsening heartburn and indigestion.  Has not been taking the protonix lately. Symptoms are worse when she feels more anxious. There is no diarrhea or constipation. No black, bloody, mucusy or tarry stool noticed. The patient reports no bloating and no change in appetite. There is no numbness, tingling or swelling in the extremities. Prior to Visit Medications    Medication Sig Taking? Authorizing Provider   ramelteon (ROZEREM) 8 MG tablet Take 1 tablet by mouth nightly as needed for Sleep Yes ANA Sotelo CNP   pantoprazole (PROTONIX) 40 MG tablet Take 1 tablet by mouth daily Yes ANA Sotelo CNP   metoprolol succinate (TOPROL XL) 25 MG extended release tablet Take 1 tablet by mouth daily Yes ANA Sotelo CNP   nystatin (MYCOSTATIN) 612677 UNIT/GM cream APPLY EXTERNALLY TO THE AFFECTED AREA TWICE DAILY Yes ANA Welsh CNP   L-Methylfolate 15 MG TABS Take 15 mg by mouth daily Yes ANA Sotelo CNP   lamoTRIgine (LAMICTAL) 100 MG tablet Take 0.5 tablets by mouth 2 times daily Yes ANA Sotelo CNP   ketorolac (TORADOL) 10 MG tablet Take 1 tablet by mouth every 6 hours as needed for Pain Yes ANA Sotelo CNP   ibuprofen (ADVIL;MOTRIN) 800 MG tablet Take 1 tablet by mouth every 6 hours as needed for Pain After toradol dosing complete Yes ANA Sotelo CNP   hydrOXYzine (ATARAX) 25 MG tablet Take 2 tablets by mouth nightly as needed for Anxiety (sleep) Yes ANA Sotelo CNP   busPIRone (BUSPAR) 10 MG tablet Take 2 tablets by mouth 3 times daily Yes ANA Sotelo CNP   ALPRAZolam (XANAX) 2 MG tablet TAKE 1 TABLET BY MOUTH TWICE DAILY AS NEEDED FOR ANXIETY Yes ANA Welsh CNP   lidocaine (LIDODERM) 5 % Place 1 patch onto the skin daily for 10 days 12 hours on, 12 hours off.  Yes ANA Sotelo CNP   Blood Pressure Monitoring (BLOOD PRESSURE KIT) ZELALEM 1 Units by Does not apply route as needed (for BP monitoring at home for hypertension) Cuff for upper arm Yes Joaquin Stevenson MD   cycloSPORINE (RESTASIS) 0.05 % ophthalmic emulsion Place 1 drop into both eyes 2 times daily Yes ANA Schroeder CNP   albuterol sulfate  (90 Base) MCG/ACT inhaler Inhale 2 puffs into the lungs every 6 hours as needed for Wheezing Yes ANA Schroeder CNP   albuterol (ACCUNEB) 1.25 MG/3ML nebulizer solution Inhale 3 mLs into the lungs every 6 hours as needed for Wheezing Yes ANA Schroeder CNP       Social History     Tobacco Use    Smoking status: Former Smoker    Smokeless tobacco: Never Used   Substance Use Topics    Alcohol use: No    Drug use: No       PHYSICAL EXAMINATION:  [ INSTRUCTIONS:  \"[x]\" Indicates a positive item  \"[]\" Indicates a negative item  -- DELETE ALL ITEMS NOT EXAMINED]  [x] Alert  [x] Oriented to person/place/time    [x] No apparent distress  [] Toxic appearing    [] Face flushed appearing [] Sclera clear  [] Lips are cyanotic      [x] Breathing appears normal  [] Appears tachypneic      [] Rash on visible skin    [x] Cranial Nerves II-XII grossly intact    [x] Motor grossly intact in visible upper extremities    [] Motor grossly intact in visible lower extremities    [x] Normal Mood  [] Anxious appearing    [] Depressed appearing  [] Confused appearing      [] Poor short term memory  [] Poor long term memory    [] OTHER:      Due to this being a TeleHealth encounter, evaluation of the following organ systems is limited: Vitals/Constitutional/EENT/Resp/CV/GI//MS/Neuro/Skin/Heme-Lymph-Imm. ASSESSMENT/PLAN:   Diagnosis Orders   1. Severe recurrent major depressive disorder with psychotic features ([facility] [address]     2. PTSD (post-traumatic stress disorder)  ALPRAZolam (XANAX) 2 MG tablet   3. Panic attack  ALPRAZolam (XANAX) 2 MG tablet   4. Gastroesophageal reflux disease without esophagitis  pantoprazole (PROTONIX) 40 MG tablet   5. Palpitations  metoprolol succinate (TOPROL XL) 25 MG extended release tablet   6.  Essential hypertension  metoprolol succinate (TOPROL XL) 25 MG extended release tablet   7. Yeast infection  nystatin (MYCOSTATIN) 846243 UNIT/GM cream   8. Hashimoto's disease  CBC Auto Differential    Comprehensive Metabolic Panel    TSH without Reflex    T4, Free    Thyroid Peroxidase Antibody   9. Other fatigue     10. Chronic midline low back pain without sciatica  lidocaine (LIDODERM) 5 %         Return in about 1 month (around 1/1/2021) for depression- doxy. 1. 2. 3. Mood is becoming more stable over time. She is encouraged to continue to follow closely with psychiatry and take medication as prescribed. Make an attempt to change her scenery as much as possible even if only stepping outside for a few minutes. Notify me or psychiatry if mood becomes unstable prior to next visit. 4. Symptoms have been worse with anxiety. Will restart protonix. 5. 6. Symptoms are better with Toprol XL. Occasional palpitations with panic attacks. Continue xanax with plan to wean dose in the future. 7. Refill of diflucan given. 8. Discussed anti- peroxidase antibody >1000 but normal TSH and Free T4 will want to check levels periodically. 9. Discussed that depression can certainly contribute to increased fatigue. She is encouraged to increase physical activity in small amounts. Continue with good sleep hygiene. 10. Recommend stretching exercises. Instructions to be mailed to the patient. Can get x-rays in the future if no improvement in symptoms. Controlled Substance Monitoring:    Acute and Chronic Pain Monitoring:   RX Monitoring 12/1/2020   Attestation -   Periodic Controlled Substance Monitoring Possible medication side effects, risk of tolerance/dependence & alternative treatments discussed. ;No signs of potential drug abuse or diversion identified. ;Assessed functional status.    Chronic Pain > 80 MEDD -         Please note this report has been partially produced using speech recognition software and may cause contain errors related to that system including grammar, punctuation and spelling as well as words and phrases that may seem inappropriate. If there are questions or concerns please feel free to contact me to clarify. An  electronic signature was used to authenticate this note. --ANA Ny - CNP on 12/1/2020 at 4:50 PM        Pursuant to the emergency declaration under the [facility] waiver authority and the [employer] Act, this Virtual  Visit was conducted, with patient's consent, to reduce the patient's risk of exposure to COVID-19 and provide continuity of care for an established patient. Services were provided through a video synchronous discussion virtually to substitute for in-person clinic visit.

## 2020-12-10 ENCOUNTER — VIRTUAL VISIT (OUTPATIENT)
Dept: BEHAVIORAL/MENTAL HEALTH CLINIC | Age: 45
End: 2020-12-10
Payer: COMMERCIAL

## 2020-12-10 PROCEDURE — 99214 OFFICE O/P EST MOD 30 MIN: CPT | Performed by: PSYCHIATRY & NEUROLOGY

## 2020-12-10 RX ORDER — RAMELTEON 8 MG/1
8 TABLET ORAL NIGHTLY PRN
Qty: 30 TABLET | Refills: 3 | Status: SHIPPED | OUTPATIENT
Start: 2020-12-10 | End: 2021-01-16 | Stop reason: SDUPTHER

## 2020-12-10 RX ORDER — DESVENLAFAXINE 25 MG/1
25 TABLET, EXTENDED RELEASE ORAL DAILY
Qty: 30 TABLET | Refills: 2 | Status: SHIPPED | OUTPATIENT
Start: 2020-12-10 | End: 2021-01-14

## 2020-12-10 RX ORDER — LAMOTRIGINE 100 MG/1
100 TABLET ORAL 2 TIMES DAILY
Qty: 60 TABLET | Refills: 3 | Status: SHIPPED | OUTPATIENT
Start: 2020-12-10 | End: 2021-02-18 | Stop reason: SDUPTHER

## 2020-12-10 NOTE — PROGRESS NOTES
12/10/2020    TELEHEALTH PSYCHIATRY FOLLOWUP -- Audio/Visual (During [facility]-30 public health emergency)      Psychiatric Diagnoses:  1. Moderate episode of recurrent major depressive disorder (HCC)          Due to COVID 19 outbreak, patient's office visit was converted to a virtual visit. Patient was contacted and agreed to proceed with a virtual visit via DOXY  30 minutes with direct communication with patient for encounter The risks and benefits of converting to a virtual visit were discussed in light of the current infectious disease epidemic. Patient also understood that insurance coverage and co-pays are up to their individual insurance plans. Patient Location:        Patient's home address  Provider Location ([facility]/State):        [facility]        Assessment/Plan:   INCREASE LAMICTAL   START TRIAL OF PRISTIQ     Medical Diagnoses:  Patient Active Problem List   Diagnosis    Anxiety and depression    PTSD (post-traumatic stress disorder)    Migraine headache    Nausea and vomiting    S/P abdominal hysterectomy    Severe recurrent major depressive disorder with psychotic features ([address]    Morbid obesity ([address]    Overactive bladder    Stress incontinence, female    Abdominal pain    Blood in the urine    Essential hypertension    Yeast infection    Vitamin D deficiency    Former smoker    Obsessive-compulsive disorder    Bilateral dry eyes    Shortness of breath    MARBELLA (obstructive sleep apnea)    Moderate episode of recurrent major depressive disorder ([address]           DATE and changes made  HAS TRIED AND FAILED (for treatment of depression) EFFEXOR, TRINTELLIX, BUSPAR, lamictal, seroquel, zyprexa, abilify, geodon, lithium, depakote, and Melatonin,     · 8/17/20  ? STOP Vraylar   ? START LAMICTAL 25 mg QD to 25 mg BID   ? START Propranolol 10-20 mg TID   ? START Hydroxyzine 75 mg QHS   ? Minimize ambien use  · 9/1/20  ?  Had to stop propranolol due to hypertension on home cuff and needed to switch to   ? Pt STOPPED hydroxyzine, causing headaches   ? STOPPED propranolol, on selective beta blocker for BP now and monitoring at home on wrist cuff. Sending in rx to see if insurance will cover an arm cuff   ? 50 mg QD now, in 2 weeks if no rash will increase to 50 mg BID   ? lamictal 50 mg   ? rozerem 8 mg QHS   ? buspar 7.5 mg TID   · 9/15/20  ? Sleeping 6 hours  ? Lamictal 50 mg QD BID is current dose will increase to 50 mg BID   ? Rozerem 8 mg QHS   ? Buspar 7.5 mg TID   ? genesite ordered  · 10/6/20  ? Resend genesight   ? Buspar 7.5 mg TID   ? Buspar 15 mg TID   ? Rozerem 8 mg QHS   ? Lamictal 50 mg BID  · 10/20  ? Still waiting on genesite, patient will come to office for this   ? START HYDROXYZINE 25 mg QHS PRN sleep  · 11/4  ? Still waiting on genesite, patient will come to office for this   ? START HYDROXYZINE 25 mg QHS PRN sleep   ? CONT LAMICTAL 50 mg BID   CONT ROZEREM 8 mg QHS   ? L-methylfolate   · 12/10  ? CONT HYDROXYZINE 25 mg QHS PRN sleep   ? INCREASE LAMICTAL 50 mg BID to LAMICTAL 100 mg BID   CONT ROZEREM 8 mg QHS   ? START PRISTIQ  ? BUSPAR 20 mg TID     AT TODAY'S VISIT     1. No Labs ordered today   2. Crisis plan reviewed and patient verbally contracts for safety. Go to ED with emergent symptoms or safety concerns. 3. Risks, benefits, side effects of medications, including any / all black box warnings, discussed with patient, who verbalizes their understanding. Pt is kings for safety and denies thoughts of SI/HI. Amenable to plan. No acute concerns to address regarding medications. Subjective:      Pt reports sleeping better   Still has low energy   Low mood at times   Anhedonia  Not as interested in activities with kids, others  Feels she is less irritable overall     Patient reports they have been compliant with current medication regimen and have not missed a dose. Patient denies medication side effects.      At today's visit, patient denies thoughts of harm 40 MG tablet, Take 1 tablet by mouth daily, Disp: 30 tablet, Rfl: 2    metoprolol succinate (TOPROL XL) 25 MG extended release tablet, Take 1 tablet by mouth daily, Disp: 30 tablet, Rfl: 3    nystatin (MYCOSTATIN) 238315 UNIT/GM cream, APPLY EXTERNALLY TO THE AFFECTED AREA TWICE DAILY, Disp: 30 g, Rfl: 0    L-Methylfolate 15 MG TABS, Take 15 mg by mouth daily, Disp: 30 tablet, Rfl: 3    ketorolac (TORADOL) 10 MG tablet, Take 1 tablet by mouth every 6 hours as needed for Pain, Disp: 20 tablet, Rfl: 0    ibuprofen (ADVIL;MOTRIN) 800 MG tablet, Take 1 tablet by mouth every 6 hours as needed for Pain After toradol dosing complete, Disp: 120 tablet, Rfl: 3    hydrOXYzine (ATARAX) 25 MG tablet, Take 2 tablets by mouth nightly as needed for Anxiety (sleep), Disp: 60 tablet, Rfl: 3    busPIRone (BUSPAR) 10 MG tablet, Take 2 tablets by mouth 3 times daily, Disp: 180 tablet, Rfl: 5    [START ON 12/14/2020] ALPRAZolam (XANAX) 2 MG tablet, TAKE 1 TABLET BY MOUTH TWICE DAILY AS NEEDED FOR ANXIETY, Disp: 60 tablet, Rfl: 0    lidocaine (LIDODERM) 5 %, Place 1 patch onto the skin daily for 10 days 12 hours on, 12 hours off., Disp: 10 patch, Rfl: 0    Blood Pressure Monitoring (BLOOD PRESSURE KIT) ZELALEM, 1 Units by Does not apply route as needed (for BP monitoring at home for hypertension) Cuff for upper arm, Disp: 1 Device, Rfl: 0    cycloSPORINE (RESTASIS) 0.05 % ophthalmic emulsion, Place 1 drop into both eyes 2 times daily, Disp: 1 vial, Rfl: 3    albuterol sulfate  (90 Base) MCG/ACT inhaler, Inhale 2 puffs into the lungs every 6 hours as needed for Wheezing, Disp: 1 Inhaler, Rfl: 2    albuterol (ACCUNEB) 1.25 MG/3ML nebulizer solution, Inhale 3 mLs into the lungs every 6 hours as needed for Wheezing, Disp: 360 mL, Rfl: 3    Examination:    Vitals: not taken in person, most recent vitals in chart reviewed  There were no vitals filed for this visit.     Wt Readings from Last 3 Encounters:   10/16/20 231 lb (104.8 kg)   07/30/20 250 lb (113.4 kg)   07/01/20 230 lb (104.3 kg)       Labs:   no recent labs    Mental Status Examination:    Level of consciousness:  alert and oriented to person, place, and situation  Appearance:  well-appearing good grooming and good hygiene  Behavior/Motor:  no abnormalities noted  Attitude toward examiner:  attentive and good eye contact  Speech:  spontaneous, normal rate and normal volume   Mood: \"a little low\"  Affect:  mood congruent  Thought processes:  linear and logical  Thought content:  Denies suicidal or homicidal ideation, denies auditory or visual hallucinations  Cognition:  no deficits in attention, concentration notable, recent memory grossly intact  Concentration intact  Memory intact  Insight good   Judgement fair   Fund of Knowledge adequate    Treatment Plan:  Reviewed current Medications with the patient. Education provided on the compliance with treatment. Reviewed OARRs, no concerns identified     The anticipated benefits and side effects of the medications, including the anticipated results of not receiving the medication, and of alternatives to the medications were explained to the patient and their informed consent was obtained for starting medications as well as adjusting the doses (titration or tapering) as indicated. The above information was given by physician in verbal form and sufficient understanding was in evidence. The patient participated in discussion of the information and question and/or concerns were addressed before the medication was given. PSYCHOTHERAPY/COUNSELING:  Encourage patient to attend outpatient appointments and therapy. [x] Therapeutic interview  [] Supportive  [x] CBT  [x] Ongoing  [] Other    No follow-ups on file.  Follow-up in 2 weeks, patient informed to call for follow-up    Please note this report has been partially produced using speech recognition software  And may cause contain errors related to that system including grammar, punctuation and spelling as well as words and phrases that may seem inappropriate. If there are questions or concerns please feel free to contact me to clarify. Alysa Vargas MD  Electronically signed by Alysa Vargas MD on 12/10/2020 at 2:34 PM  12/10/2020 2:34 PM    Psychiatry   Due to this being a TeleHealth encounter, evaluation of the following organ systems is limited: Vitals/Constitutional/EENT/Resp/CV/GI//MS/Neuro/Skin/Heme-Lymph-Imm. An  electronic signature was used to authenticate this note. --Alysa Vargas MD on 12/10/2020 at 2:34 PM    Pursuant to the emergency declaration under the [facility] waiver authority and the [employer] Act, this Virtual  Visit was conducted, with patient's consent, to reduce the patient's risk of exposure to COVID-19 and provide continuity of care for an established patient Services were provided through a video synchronous discussion virtually to substitute for in-person clinic visit.

## 2020-12-17 ENCOUNTER — TELEPHONE (OUTPATIENT)
Dept: FAMILY MEDICINE CLINIC | Age: 45
End: 2020-12-17

## 2020-12-17 NOTE — TELEPHONE ENCOUNTER
[address] called to see if this Prior Authorization has been Received for     L-Methylfolate 15 MG TABS 15 mg, Oral, DAILY

## 2020-12-31 RX ORDER — CYCLOSPORINE 0.5 MG/ML
1 EMULSION OPHTHALMIC 2 TIMES DAILY
Qty: 1 VIAL | Refills: 3 | Status: SHIPPED | OUTPATIENT
Start: 2020-12-31 | End: 2022-08-30 | Stop reason: SDUPTHER

## 2020-12-31 RX ORDER — NYSTATIN 100000 U/G
CREAM TOPICAL
Qty: 30 G | Refills: 0 | Status: SHIPPED | OUTPATIENT
Start: 2020-12-31 | End: 2021-02-16

## 2020-12-31 NOTE — TELEPHONE ENCOUNTER
Patient is requesting medication refill.  Please approve or deny this request.    Rx requested:  Requested Prescriptions     Pending Prescriptions Disp Refills    nystatin (MYCOSTATIN) [id number] UNIT/GM cream 30 g 0     Sig: APPLY EXTERNALLY TO THE AFFECTED AREA TWICE DAILY         Last Office Visit:   12/1/2020      Next Visit Date:  Future Appointments   Date Time Provider [place]   1/7/2021  3:40 PM ANA Garza - CNP [address]   1/14/2021  2:00 PM Diaz Alvarez MD [facility]

## 2020-12-31 NOTE — TELEPHONE ENCOUNTER
Pharmacy is requesting medication refill.  Please approve or deny this request.    Rx requested:  Requested Prescriptions     Pending Prescriptions Disp Refills    cycloSPORINE (RESTASIS) 0.05 % ophthalmic emulsion 1 vial 3     Sig: Place 1 drop into both eyes 2 times daily         Last Office Visit:   12/1/2020      Next Visit Date:  Future Appointments   Date Time Provider [place]   1/7/2021  3:40 PM Jeremy Gomez APRN - CNP [address]   1/14/2021  2:00 PM Neyda Barrios MD [facility]

## 2021-01-07 ENCOUNTER — VIRTUAL VISIT (OUTPATIENT)
Dept: FAMILY MEDICINE CLINIC | Age: 46
End: 2021-01-07
Payer: COMMERCIAL

## 2021-01-07 DIAGNOSIS — M54.50 CHRONIC MIDLINE LOW BACK PAIN WITHOUT SCIATICA: ICD-10-CM

## 2021-01-07 DIAGNOSIS — F33.3 SEVERE RECURRENT MAJOR DEPRESSIVE DISORDER WITH PSYCHOTIC FEATURES (HCC): Primary | ICD-10-CM

## 2021-01-07 DIAGNOSIS — N39.46 MIXED STRESS AND URGE INCONTINENCE: ICD-10-CM

## 2021-01-07 DIAGNOSIS — F41.0 PANIC ATTACK: ICD-10-CM

## 2021-01-07 DIAGNOSIS — G89.29 CHRONIC MIDLINE LOW BACK PAIN WITHOUT SCIATICA: ICD-10-CM

## 2021-01-07 DIAGNOSIS — B37.9 YEAST INFECTION: ICD-10-CM

## 2021-01-07 DIAGNOSIS — N89.8 VAGINAL ODOR: ICD-10-CM

## 2021-01-07 DIAGNOSIS — K21.9 GASTROESOPHAGEAL REFLUX DISEASE WITHOUT ESOPHAGITIS: ICD-10-CM

## 2021-01-07 DIAGNOSIS — I10 ESSENTIAL HYPERTENSION: ICD-10-CM

## 2021-01-07 DIAGNOSIS — R00.2 PALPITATIONS: ICD-10-CM

## 2021-01-07 DIAGNOSIS — F43.10 PTSD (POST-TRAUMATIC STRESS DISORDER): ICD-10-CM

## 2021-01-07 PROCEDURE — 99214 OFFICE O/P EST MOD 30 MIN: CPT | Performed by: NURSE PRACTITIONER

## 2021-01-07 RX ORDER — PANTOPRAZOLE SODIUM 40 MG/1
40 TABLET, DELAYED RELEASE ORAL DAILY
Qty: 90 TABLET | Refills: 1 | Status: SHIPPED | OUTPATIENT
Start: 2021-01-07 | End: 2021-02-18 | Stop reason: SDUPTHER

## 2021-01-07 RX ORDER — GREEN TEA/HOODIA GORDONII 315-12.5MG
1 CAPSULE ORAL 2 TIMES DAILY
Qty: 60 TABLET | Refills: 0 | Status: SHIPPED | OUTPATIENT
Start: 2021-01-07 | End: 2021-02-06

## 2021-01-07 RX ORDER — ALPRAZOLAM 2 MG/1
TABLET ORAL
Qty: 60 TABLET | Refills: 0 | Status: CANCELLED | OUTPATIENT
Start: 2021-01-07 | End: 2021-02-06

## 2021-01-07 RX ORDER — LIDOCAINE 50 MG/G
1 PATCH TOPICAL DAILY
Qty: 90 PATCH | Refills: 1 | Status: SHIPPED | OUTPATIENT
Start: 2021-01-07 | End: 2021-04-20 | Stop reason: SDUPTHER

## 2021-01-07 RX ORDER — METOPROLOL SUCCINATE 25 MG/1
25 TABLET, EXTENDED RELEASE ORAL DAILY
Qty: 90 TABLET | Refills: 1 | Status: SHIPPED | OUTPATIENT
Start: 2021-01-07 | End: 2021-04-20 | Stop reason: SDUPTHER

## 2021-01-07 RX ORDER — ALPRAZOLAM 2 MG/1
TABLET ORAL
Qty: 60 TABLET | Refills: 0 | Status: SHIPPED | OUTPATIENT
Start: 2021-01-07 | End: 2021-02-06

## 2021-01-07 SDOH — ECONOMIC STABILITY: INCOME INSECURITY: HOW HARD IS IT FOR YOU TO PAY FOR THE VERY BASICS LIKE FOOD, HOUSING, MEDICAL CARE, AND HEATING?: NOT HARD AT ALL

## 2021-01-07 ASSESSMENT — PATIENT HEALTH QUESTIONNAIRE - PHQ9
1. LITTLE INTEREST OR PLEASURE IN DOING THINGS: 1
SUM OF ALL RESPONSES TO PHQ9 QUESTIONS 1 & 2: 2
2. FEELING DOWN, DEPRESSED OR HOPELESS: 1

## 2021-01-07 NOTE — PROGRESS NOTES
1/7/2021    TELEHEALTH EVALUATION -- Audio/Visual (During LHMMR-57 public health emergency)    Due to COVID 19 outbreak, patient's office visit was converted to a virtual visit. Patient was contacted and agreed to proceed with a virtual visit via Doxy. me  The risks and benefits of converting to a virtual visit were discussed in light of the current infectious disease epidemic. Patient also understood that insurance coverage and co-pays are up to their individual insurance plans. Kelile Adames is a 41 y.o. female being evaluated by a Virtual Visit (video visit) encounter to address concerns as mentioned above. A caregiver was present when appropriate. Due to this being a TeleHealth encounter (During SERDT-20 public health emergency), evaluation of the following organ systems was limited: Vitals/Constitutional/EENT/Resp/CV/GI//MS/Neuro/Skin/Heme-Lymph-Imm. Pursuant to the emergency declaration under the [address] [place] [address] and the [employer] and [employer] Act, this Virtual Visit was conducted with patient's (and/or legal guardian's) consent, to reduce the patient's risk of exposure to COVID-19 and provide necessary medical care. The patient (and/or legal guardian) has also been advised to contact this office for worsening conditions or problems, and seek emergency medical treatment and/or call 911 if deemed necessary. Patient identification was verified at the start of the visit: Yes    Total time spent for this encounter: Not billed by time    Services were provided through a video synchronous discussion virtually to substitute for in-person clinic visit. Patient and provider were located at their individual homes. The patient is talking with me virtually from her home and I am located at my office in [facility].        HPI: Shimon Traore (:  1975) has requested an audio/video evaluation for the following concern(s):    Depression/anxiety: She states that her depression has been better lately and still has her anxiety but she still continues to lack energy and motivation to do things. States that she is not having as many panic attacks but feels anxious much of the time. Her son is currently home from shelter but is awaiting court date and may have to return to shelter for up to 18 years. She continues to follow with psychiatry and states that she was recently started on low-dose Pristiq which she thinks is probably helping but is not able to tell completely yet. Has a follow-up appointment next week with psychiatry. No thoughts of self-harm or harm to others. She continues to take Xanax routinely for severe anxiety symptoms. She has not had any side effects with medication and the medication does help reduce her physical sensation of anxiety. Vaginal odor/urinary incontinence: She states that she has been having worsening issues over the past couple weeks with urinary incontinence. She has had ongoing issues and has been on Ditropan in the past but stopped working. She now seems to leak urine more with coughing and sneezing and even when her bladder gets full. She also has urinary urgency. Upon further questioning, she states that she continues to have issues with vaginal odor. She has been treated multiple times with yeast infection medication but no resolution of symptoms.   She states that she has not followed with a gynecologist since she had her hysterectomy in the past. HTN/palpitations: She continues to take Toprol with no side effects. States that while she continues to get occasional palpitations, there are no severe palpitations reported lately. She continues to consume a low-salt diet overall. Is not getting a lot of physical activity right now. We will plan to follow-up in office next month for blood pressure check. ROS: This patient reports no chest pain or pressure. There is no shortness of breath or cough. The patient reports no nausea or vomiting. There is no heartburn or indigestion. There is no diarrhea or constipation. No black, bloody, mucusy or tarry stool noticed. The patient reports no bloating and no change in appetite. There is no numbness, tingling or swelling in the extremities. She states that the Lidoderm patch did help with low back pain symptoms. She would like more supply. She states that she only had 10 patches last time and would like more if possible. Prior to Visit Medications    Medication Sig Taking? Authorizing Provider   ALPRAZolam (XANAX) 2 MG tablet TAKE 1 TABLET BY MOUTH TWICE DAILY AS NEEDED FOR ANXIETY Yes ANA Chang CNP   Probiotic Acidophilus (FLORANEX) TABS Take 1 tablet by mouth 2 times daily Yes ANA Chang CNP   lidocaine (LIDODERM) 5 % Place 1 patch onto the skin daily 12 hours on, 12 hours off.  Yes ANA Chang CNP   pantoprazole (PROTONIX) 40 MG tablet Take 1 tablet by mouth daily Yes ANA Chang CNP   metoprolol succinate (TOPROL XL) 25 MG extended release tablet Take 1 tablet by mouth daily Yes ANA Chang CNP   nystatin (MYCOSTATIN) 966757 UNIT/GM cream APPLY EXTERNALLY TO THE AFFECTED AREA TWICE DAILY Yes ANA Chang CNP   cycloSPORINE (RESTASIS) 0.05 % ophthalmic emulsion Place 1 drop into both eyes 2 times daily Yes ANA Soriano CNP desvenlafaxine succinate (PRISTIQ) 25 MG TB24 extended release tablet Take 1 tablet by mouth daily Yes Lester Feng MD   lamoTRIgine (LAMICTAL) 100 MG tablet Take 1 tablet by mouth 2 times daily Yes Lestre Feng MD   ramelteon (ROZEREM) 8 MG tablet Take 1 tablet by mouth nightly as needed for Sleep Yes Lester Feng MD   L-Methylfolate 15 MG TABS Take 15 mg by mouth daily Yes ANA Aldana CNP   ketorolac (TORADOL) 10 MG tablet Take 1 tablet by mouth every 6 hours as needed for Pain Yes ANA Aldana CNP   ibuprofen (ADVIL;MOTRIN) 800 MG tablet Take 1 tablet by mouth every 6 hours as needed for Pain After toradol dosing complete Yes ANA Aldana CNP   hydrOXYzine (ATARAX) 25 MG tablet Take 2 tablets by mouth nightly as needed for Anxiety (sleep) Yes ANA Aldana CNP   busPIRone (BUSPAR) 10 MG tablet Take 2 tablets by mouth 3 times daily Yes ANA Aldana CNP   Blood Pressure Monitoring (BLOOD PRESSURE KIT) ZELALEM 1 Units by Does not apply route as needed (for BP monitoring at home for hypertension) Cuff for upper arm Yes Lester Feng MD   albuterol sulfate  (90 Base) MCG/ACT inhaler Inhale 2 puffs into the lungs every 6 hours as needed for Wheezing Yes ANA Aldana CNP   albuterol (ACCUNEB) 1.25 MG/3ML nebulizer solution Inhale 3 mLs into the lungs every 6 hours as needed for Wheezing Yes ANA Aldana CNP       Social History     Tobacco Use    Smoking status: Former Smoker    Smokeless tobacco: Never Used   Substance Use Topics    Alcohol use: No    Drug use: No        PHYSICAL EXAMINATION:  [ INSTRUCTIONS:  \"[x]\" Indicates a positive item  \"[]\" Indicates a negative item  -- DELETE ALL ITEMS NOT EXAMINED]  [x] Alert  [x] Oriented to person/place/time    [x] No apparent distress  [] Toxic appearing    [] Face flushed appearing [] Sclera clear  [] Lips are cyanotic 7.  Lidoderm patch refill given. Medication has been helpful in reducing severity of symptoms. 8.  Refill of medication given for 90 days per patient request.    9.  10. She has had less palpitations on the Toprol. Will check blood pressure at next visit. 90-day supply of medication given for this refill as well. Controlled Substance Monitoring:    Acute and Chronic Pain Monitoring:   RX Monitoring 1/7/2021   Attestation -   Periodic Controlled Substance Monitoring Possible medication side effects, risk of tolerance/dependence & alternative treatments discussed. ;No signs of potential drug abuse or diversion identified. ;Assessed functional status. Chronic Pain > 80 MEDD -       Please note this report has been partially produced using speech recognition software and may cause contain errors related to that system including grammar, punctuation and spelling as well as words and phrases that may seem inappropriate. If there are questions or concerns please feel free to contact me to clarify. An  electronic signature was used to authenticate this note. --ANA Murray - CNP on 1/7/2021 at 4:48 PM        Pursuant to the emergency declaration under the [facility] waiver authority and the [employer] and [employer] Act, this Virtual  Visit was conducted, with patient's consent, to reduce the patient's risk of exposure to COVID-19 and provide continuity of care for an established patient. Services were provided through a video synchronous discussion virtually to substitute for in-person clinic visit.

## 2021-01-14 ENCOUNTER — VIRTUAL VISIT (OUTPATIENT)
Dept: BEHAVIORAL/MENTAL HEALTH CLINIC | Age: 46
End: 2021-01-14
Payer: COMMERCIAL

## 2021-01-14 DIAGNOSIS — F43.10 PTSD (POST-TRAUMATIC STRESS DISORDER): ICD-10-CM

## 2021-01-14 DIAGNOSIS — F33.1 MODERATE EPISODE OF RECURRENT MAJOR DEPRESSIVE DISORDER (HCC): Primary | ICD-10-CM

## 2021-01-14 PROCEDURE — 99214 OFFICE O/P EST MOD 30 MIN: CPT | Performed by: PSYCHIATRY & NEUROLOGY

## 2021-01-14 RX ORDER — FLUOXETINE HYDROCHLORIDE 20 MG/1
20 CAPSULE ORAL DAILY
Qty: 30 CAPSULE | Refills: 3 | Status: SHIPPED | OUTPATIENT
Start: 2021-01-14 | End: 2021-02-01 | Stop reason: SDUPTHER

## 2021-01-14 NOTE — PROGRESS NOTES
1/14/2021    TELEHEALTH PSYCHIATRY FOLLOWUP -- Audio/Visual (During PAOAV-73 public health emergency)      Psychiatric Diagnoses:  1. Moderate episode of recurrent major depressive disorder ([address]    2. PTSD (post-traumatic stress disorder)          Due to COVID 19 outbreak, patient's office visit was converted to a virtual visit. Patient was contacted and agreed to proceed with a virtual visit via DOXY  30 minutes with direct communication with patient for encounter The risks and benefits of converting to a virtual visit were discussed in light of the current infectious disease epidemic. Patient also understood that insurance coverage and co-pays are up to their individual insurance plans. Patient Location:        Patient's home address  Provider Location (City/State):        [address]      Assessment/Plan:   START PROZAC for depression, irritability and stop Pristiq. Will see back in two weeks, plan for rapid taper beyond former dose to higher dose more likely to achieve therapeutic benefit. Medical Diagnoses:  Patient Active Problem List   Diagnosis    Anxiety and depression    PTSD (post-traumatic stress disorder)    Migraine headache    Nausea and vomiting    S/P abdominal hysterectomy    Severe recurrent major depressive disorder with psychotic features ([address]    Morbid obesity ([address]    Overactive bladder    Stress incontinence, female    Abdominal pain    Blood in the urine    Essential hypertension    Yeast infection    Vitamin D deficiency    Former smoker    Obsessive-compulsive disorder    Bilateral dry eyes    Shortness of breath    MARBELLA (obstructive sleep apnea)    Moderate episode of recurrent major depressive disorder ([address]           DATE and changes made Previous medications include, cymbalta, paxil, brintellix, elavil, seroquel, abilify. · 8/17/20  ? STOP Vraylar   ? START LAMICTAL 25 mg QD to 25 mg BID   ? START Propranolol 10-20 mg TID   ?  START Hydroxyzine 75 mg QHS ? Minimize ambien use  · 9/1/20  ? Had to stop propranolol due to hypertension on home cuff and needed to switch to   ? Pt STOPPED hydroxyzine, causing headaches   ? STOPPED propranolol, on selective beta blocker for BP now and monitoring at home on wrist cuff. Sending in rx to see if insurance will cover an arm cuff   ? 50 mg QD now, in 2 weeks if no rash will increase to 50 mg BID   ? lamictal 50 mg   ? rozerem 8 mg QHS   ? buspar 7.5 mg TID   · 9/15/20  ? Sleeping 6 hours  ? Lamictal 50 mg QD BID is current dose will increase to 50 mg BID   ? Rozerem 8 mg QHS   ? Buspar 7.5 mg TID   ? genesite ordered  · 10/6/20  ? Resend genesight   ? Buspar 7.5 mg TID   ? Buspar 15 mg TID   ? Rozerem 8 mg QHS   ? Lamictal 50 mg BID  · 10/20  ? Still waiting on genesite, patient will come to office for this   ? START HYDROXYZINE 25 mg QHS PRN sleep  · 11/4  ? Still waiting on genesite, patient will come to office for this   ? START HYDROXYZINE 25 mg QHS PRN sleep   ? CONT LAMICTAL 50 mg BID   CONT ROZEREM 8 mg QHS   ? L-methylfolate   · 12/10  ? CONT HYDROXYZINE 25 mg QHS PRN sleep   ? INCREASE LAMICTAL 50 mg BID to LAMICTAL 100 mg BID   CONT ROZEREM 8 mg QHS   ? START PRISTIQ  ? BUSPAR 20 mg TID   · 1/14/21  ? CONT HYDROXYZINE 25 mg QHS PRN sleep   ? LAMICTAL 100 mg BID   CONT ROZEREM 8 mg QHS   ? STOP PRISTIQ   ? START PROZAC 20 mg QD   ? BUSPAR 20 mg TID         AT TODAY'S VISIT     1. No Labs ordered today   2. Crisis plan reviewed and patient verbally contracts for safety. Go to ED with emergent symptoms or safety concerns. 3. Risks, benefits, side effects of medications, including any / all black box warnings, discussed with patient, who verbalizes their understanding. Pt is kings for safety and denies thoughts of SI/HI. Amenable to plan. No acute concerns to address regarding medications.        Subjective:      Pt reports more irritable for the past 2 weeks Anxiety is about the same, well controlled she says   Depression continues to be an issue     Patient reports they have been compliant with current medication regimen and have not missed a dose. Patient denies medication side effects. At today's visit, patient denies thoughts of harm to self or others since last appointment, and denies auditory or visual hallucinations. At today's visit, pt reports that since our last visit, their average MOOD has been (on a scale of 1-10, with 1 being severely depressed and 10 being not depressed at all)  Very depressed [] 1  [] 2  [x] 3  [] 4  [] 5  [] 6  [] 7  [] 8  [] 9  [] 10  Not depressed    At today's visit, pt reports that since our last visit, their average ANXIETY has been (on a scale of 1-10, with 10 being severely anxious and 1 being not anxious at all)  Not anxious [] 1     [] 2     [] 3     [] 4   [] 5    [] 6      [x] 7   [] 8   [x] 9       [] 10  Very anxious       At today's visit, pt reports that since our last visit, their average APPETITE has been    [] Decreased     [x] Normal/Unchanged   [] Increased      At today's visit, pt reports that since our last visit, their average HOURS OF SLEEP (every 24 hours) has been    [] 0-3   [] 4-5    [] 5-6    [] 6-7    [x] 8-9    [] 10-11   [] 11+    At today's visit, pt reports that since our last visit, their average Energy has been     [] Poor    [x] Average  [] Increased         Aggression:  [] yes  [x] no    Patient is [x] Able to contract for safety  [] unable to CONTRACT FOR SAFETY     ROS:  [x] All negative/unchanged except if checked.  Explain positive(checked items) below:     [] Constitutional  [] Eyes  [] Ear/Nose/Mouth/Throat  [] Respiratory  [] CV  [] GI  []   [] Musculoskeletal  [] Skin/Breast  [] Neurological  [] Endocrine  [] Heme/Lymph  [] Allergic/Immunologic      MEDICATIONS:    Current Outpatient Medications:   FLUoxetine (PROZAC) 40 MG capsule, Take 1 capsule by mouth daily, Disp: 30 capsule, Rfl: 3    ramelteon (ROZEREM) 8 MG tablet, Take 1 tablet by mouth nightly as needed for Sleep, Disp: 30 tablet, Rfl: 3    ALPRAZolam (XANAX) 2 MG tablet, TAKE 1 TABLET BY MOUTH TWICE DAILY AS NEEDED FOR ANXIETY, Disp: 60 tablet, Rfl: 0    Probiotic Acidophilus (FLORANEX) TABS, Take 1 tablet by mouth 2 times daily, Disp: 60 tablet, Rfl: 0    lidocaine (LIDODERM) 5 %, Place 1 patch onto the skin daily 12 hours on, 12 hours off., Disp: 90 patch, Rfl: 1    pantoprazole (PROTONIX) 40 MG tablet, Take 1 tablet by mouth daily, Disp: 90 tablet, Rfl: 1    metoprolol succinate (TOPROL XL) 25 MG extended release tablet, Take 1 tablet by mouth daily, Disp: 90 tablet, Rfl: 1    nystatin (MYCOSTATIN) 094308 UNIT/GM cream, APPLY EXTERNALLY TO THE AFFECTED AREA TWICE DAILY, Disp: 30 g, Rfl: 0    cycloSPORINE (RESTASIS) 0.05 % ophthalmic emulsion, Place 1 drop into both eyes 2 times daily, Disp: 1 vial, Rfl: 3    lamoTRIgine (LAMICTAL) 100 MG tablet, Take 1 tablet by mouth 2 times daily, Disp: 60 tablet, Rfl: 3    L-Methylfolate 15 MG TABS, Take 15 mg by mouth daily, Disp: 30 tablet, Rfl: 3    ketorolac (TORADOL) 10 MG tablet, Take 1 tablet by mouth every 6 hours as needed for Pain, Disp: 20 tablet, Rfl: 0    ibuprofen (ADVIL;MOTRIN) 800 MG tablet, Take 1 tablet by mouth every 6 hours as needed for Pain After toradol dosing complete, Disp: 120 tablet, Rfl: 3    busPIRone (BUSPAR) 10 MG tablet, Take 2 tablets by mouth 3 times daily, Disp: 180 tablet, Rfl: 5    Blood Pressure Monitoring (BLOOD PRESSURE KIT) ZELALEM, 1 Units by Does not apply route as needed (for BP monitoring at home for hypertension) Cuff for upper arm, Disp: 1 Device, Rfl: 0    albuterol sulfate  (90 Base) MCG/ACT inhaler, Inhale 2 puffs into the lungs every 6 hours as needed for Wheezing, Disp: 1 Inhaler, Rfl: 2   albuterol (ACCUNEB) 1.25 MG/3ML nebulizer solution, Inhale 3 mLs into the lungs every 6 hours as needed for Wheezing, Disp: 360 mL, Rfl: 3    Examination:    Vitals: not taken in person, most recent vitals in chart reviewed  There were no vitals filed for this visit. Wt Readings from Last 3 Encounters:   10/16/20 231 lb (104.8 kg)   07/30/20 250 lb (113.4 kg)   07/01/20 230 lb (104.3 kg)       Labs:   no recent labs    Mental Status Examination:    Level of consciousness:  alert and oriented to person, place, and situation  Appearance:  well-appearing good grooming and good hygiene  Behavior/Motor:  no abnormalities noted  Attitude toward examiner: friendly, pleasant and cooperative attentive and good eye contact  Speech:  spontaneous, normal rate and normal volume   Mood: \"good\"  Affect:  mood congruent  Thought processes:  linear and logical  Thought content:  Denies suicidal or homicidal ideation, denies auditory or visual hallucinations  Cognition:  no deficits in attention, concentration notable, recent memory grossly intact  Concentration intact  Memory intact  Insight good   Judgement fair   Fund of Knowledge adequate    Treatment Plan:  Reviewed current Medications with the patient. Education provided on the compliance with treatment. Reviewed OARRs, no concerns identified     The anticipated benefits and side effects of the medications, including the anticipated results of not receiving the medication, and of alternatives to the medications were explained to the patient and their informed consent was obtained for starting medications as well as adjusting the doses (titration or tapering) as indicated. The above information was given by physician in verbal form and sufficient understanding was in evidence. The patient participated in discussion of the information and question and/or concerns were addressed before the medication was given.        PSYCHOTHERAPY/COUNSELING: Encourage patient to attend outpatient appointments and therapy. [x] Therapeutic interview  [] Supportive  [x] CBT  [x] Ongoing  [] Other    No follow-ups on file. Follow-up in 2 weeks, patient informed to call for follow-up    Please note this report has been partially produced using speech recognition software  And may cause contain errors related to that system including grammar, punctuation and spelling as well as words and phrases that may seem inappropriate. If there are questions or concerns please feel free to contact me to clarify. Dawna Soni MD  Electronically signed by Dawna Soni MD on 2/1/2021 at 10:32 AM  2/1/2021 10:32 AM    Psychiatry   Due to this being a TeleHealth encounter, evaluation of the following organ systems is limited: Vitals/Constitutional/EENT/Resp/CV/GI//MS/Neuro/Skin/Heme-Lymph-Imm. An  electronic signature was used to authenticate this note. --Dawna Soni MD on 2/1/2021 at 10:32 AM    Pursuant to the emergency declaration under the [facility] waiver authority and the [employer] and [employer] Act, this Virtual  Visit was conducted, with patient's consent, to reduce the patient's risk of exposure to COVID-19 and provide continuity of care for an established patient Services were provided through a video synchronous discussion virtually to substitute for in-person clinic visit.

## 2021-01-16 DIAGNOSIS — G47.33 OSA (OBSTRUCTIVE SLEEP APNEA): Primary | ICD-10-CM

## 2021-01-19 RX ORDER — RAMELTEON 8 MG/1
8 TABLET ORAL NIGHTLY PRN
Qty: 30 TABLET | Refills: 3 | Status: SHIPPED | OUTPATIENT
Start: 2021-01-19 | End: 2021-02-16 | Stop reason: SDUPTHER

## 2021-02-01 ENCOUNTER — VIRTUAL VISIT (OUTPATIENT)
Dept: BEHAVIORAL/MENTAL HEALTH CLINIC | Age: 46
End: 2021-02-01
Payer: COMMERCIAL

## 2021-02-01 DIAGNOSIS — F33.1 MODERATE EPISODE OF RECURRENT MAJOR DEPRESSIVE DISORDER (HCC): Primary | ICD-10-CM

## 2021-02-01 DIAGNOSIS — F43.10 PTSD (POST-TRAUMATIC STRESS DISORDER): ICD-10-CM

## 2021-02-01 PROCEDURE — 99214 OFFICE O/P EST MOD 30 MIN: CPT | Performed by: PSYCHIATRY & NEUROLOGY

## 2021-02-01 RX ORDER — FLUOXETINE HYDROCHLORIDE 40 MG/1
40 CAPSULE ORAL DAILY
Qty: 30 CAPSULE | Refills: 3 | Status: SHIPPED | OUTPATIENT
Start: 2021-02-01 | End: 2021-02-18

## 2021-02-01 NOTE — PROGRESS NOTES
2/1/2021    TELEHEALTH 896/149 Shannan Michaels (During NFPWD-04 public health emergency)      Psychiatric Diagnoses:  1. Moderate episode of recurrent major depressive disorder ([facility] [address]    2. PTSD (post-traumatic stress disorder)          Due to COVID 19 outbreak, patient's office visit was converted to a virtual visit. Patient was contacted and agreed to proceed with a virtual visit via DOXY  30 minutes with direct communication with patient for encounter The risks and benefits of converting to a virtual visit were discussed in light of the current infectious disease epidemic. Patient also understood that insurance coverage and co-pays are up to their individual insurance plans. Patient Location:        Patient's home address  Provider Location ([facility]):        [facility]    Psychotherapy note: ???????????????????????????????__30_ Minutes of psychotherapy  ? ? ? Supportive psychotherapy, Patient discussed certain situational and personal stressors ongoing in life at this time, weight management d/w the patient. Sleep hygiene d/w patient. Patient allowed to vent out his/her emotions. Scenarios were reviewed using role playing and CBT techniques in order to increase insight and decrease anxiety. Assessment/Plan:   Patient tolerating current medication regimen but will proceed with changes below in order to address continued mood symptoms. Medication has been helpful so far and patient has had no side effects other than those listed in the subjective portion of this note. Patient is less depressed only slightly motivated to complete work. More anxiety throughout the day, however able to attend to ADLs. Continue to encourage physical activity and adherence to medication regimen. No acute concerns voiced.       Medical Diagnoses:  Patient Active Problem List   Diagnosis    Anxiety and depression    PTSD (post-traumatic stress disorder)    Migraine headache    Nausea and vomiting  S/P abdominal hysterectomy    Severe recurrent major depressive disorder with psychotic features ([facility] [address]    Morbid obesity (HCC)    Overactive bladder    Stress incontinence, female    Abdominal pain    Blood in the urine    Essential hypertension    Yeast infection    Vitamin D deficiency    Former smoker    Obsessive-compulsive disorder    Bilateral dry eyes    Shortness of breath    MARBELLA (obstructive sleep apnea)    Moderate episode of recurrent major depressive disorder ([facility] [address]           DATE and changes made  · 8/17/20  ? STOP Vraylar   ? START LAMICTAL 25 mg QD to 25 mg BID   ? START Propranolol 10-20 mg TID   ? START Hydroxyzine 75 mg QHS   ? Minimize ambien use  · 9/1/20  ? Had to stop propranolol due to hypertension on home cuff and needed to switch to   ? Pt STOPPED hydroxyzine, causing headaches   ? STOPPED propranolol, on selective beta blocker for BP now and monitoring at home on wrist cuff. Sending in rx to see if insurance will cover an arm cuff   ? 50 mg QD now, in 2 weeks if no rash will increase to 50 mg BID   ? lamictal 50 mg   ? rozerem 8 mg QHS   ? buspar 7.5 mg TID   · 9/15/20  ? Sleeping 6 hours  ? Lamictal 50 mg QD BID is current dose will increase to 50 mg BID   ? Rozerem 8 mg QHS   ? Buspar 7.5 mg TID   ? genesite ordered  · 10/6/20  ? Resend genesight   ? Buspar 7.5 mg TID   ? Buspar 15 mg TID   ? Rozerem 8 mg QHS   ? Lamictal 50 mg BID  · 10/20  ? Still waiting on genesite, patient will come to office for this   ? START HYDROXYZINE 25 mg QHS PRN sleep  · 11/4  ? Still waiting on genesite, patient will come to office for this   ? START HYDROXYZINE 25 mg QHS PRN sleep   ? CONT LAMICTAL 50 mg BID   CONT ROZEREM 8 mg QHS   ? L-methylfolate   · 12/10  ? CONT HYDROXYZINE 25 mg QHS PRN sleep   ? INCREASE LAMICTAL 50 mg BID to LAMICTAL 100 mg BID   CONT ROZEREM 8 mg QHS   ? START PRISTIQ  ? BUSPAR 20 mg TID   · 1/14/21  ?  CONT HYDROXYZINE 25 mg QHS PRN sleep   ? LAMICTAL 100 mg BID  CONT ROZEREM 8 mg QHS   ? STOP PRISTIQ   ? START PROZAC 20 mg QD   ? BUSPAR 20 mg TID  · 2/1/21  ? Prozac 20 mg QD to 40 mg QD (has tolerated well, attempting more rapid taper for persistent depressive symptoms and sig irritability)   ? Cont Rozerem 8 mg QHS   ? Stop pristiq  ? Lamictal 100 mg BID   ? Buspar continue     AT TODAY'S VISIT     1. No Labs ordered today   2. Crisis plan reviewed and patient verbally contracts for safety. Go to ED with emergent symptoms or safety concerns. 3. Risks, benefits, side effects of medications, including any / all black box warnings, discussed with patient, who verbalizes their understanding. Pt is kings for safety and denies thoughts of SI/HI. Amenable to plan. No acute concerns to address regarding medications. Subjective:      Pt reports medication helping a little with depressed mood   Family hasnt noticed a difference   She does report better sleep     Patient reports they have been compliant with current medication regimen and have not missed a dose. Patient denies medication side effects. At today's visit, patient denies thoughts of harm to self or others since last appointment, and denies auditory or visual hallucinations.      At today's visit, pt reports that since our last visit, their average MOOD has been (on a scale of 1-10, with 1 being severely depressed and 10 being not depressed at all)  Very depressed [] 1  [] 2  [] 3  [x] 4  [] 5  [] 6  [] 7  [] 8  [] 9  [] 10  Not depressed    At today's visit, pt reports that since our last visit, their average ANXIETY has been (on a scale of 1-10, with 10 being severely anxious and 1 being not anxious at all)  Not anxious [] 1     [] 2     [] 3     [] 4   [] 5    [] 6      [x] 7   [] 8   [] 9       [] 10  Very anxious       At today's visit, pt reports that since our last visit, their average APPETITE has been    [] Decreased     [x] Normal/Unchanged   [] Increased At today's visit, pt reports that since our last visit, their average HOURS OF SLEEP (every 24 hours) has been    [] 0-3   [] 4-5    [] 5-6    [] 6-7    [x] 8-9    [] 10-11   [] 11+     At today's visit, pt reports that since our last visit, their average Energy has been     [] Poor    [x] Average  [] Increased         Aggression:  [] yes  [x] no    Patient is [x] Able to contract for safety  [] unable to CONTRACT FOR SAFETY     ROS:  [x] All negative/unchanged except if checked.  Explain positive(checked items) below:     [] Constitutional  [] Eyes  [] Ear/Nose/Mouth/Throat  [] Respiratory  [] CV  [] GI  []   [] Musculoskeletal  [] Skin/Breast  [] Neurological  [] Endocrine  [] Heme/Lymph  [] Allergic/Immunologic      MEDICATIONS:    Current Outpatient Medications:     ramelteon (ROZEREM) 8 MG tablet, Take 1 tablet by mouth nightly as needed for Sleep, Disp: 30 tablet, Rfl: 3    FLUoxetine (PROZAC) 20 MG capsule, Take 1 capsule by mouth daily, Disp: 30 capsule, Rfl: 3    ALPRAZolam (XANAX) 2 MG tablet, TAKE 1 TABLET BY MOUTH TWICE DAILY AS NEEDED FOR ANXIETY, Disp: 60 tablet, Rfl: 0    Probiotic Acidophilus (FLORANEX) TABS, Take 1 tablet by mouth 2 times daily, Disp: 60 tablet, Rfl: 0    lidocaine (LIDODERM) 5 %, Place 1 patch onto the skin daily 12 hours on, 12 hours off., Disp: 90 patch, Rfl: 1    pantoprazole (PROTONIX) 40 MG tablet, Take 1 tablet by mouth daily, Disp: 90 tablet, Rfl: 1    metoprolol succinate (TOPROL XL) 25 MG extended release tablet, Take 1 tablet by mouth daily, Disp: 90 tablet, Rfl: 1    nystatin (MYCOSTATIN) 194882 UNIT/GM cream, APPLY EXTERNALLY TO THE AFFECTED AREA TWICE DAILY, Disp: 30 g, Rfl: 0    cycloSPORINE (RESTASIS) 0.05 % ophthalmic emulsion, Place 1 drop into both eyes 2 times daily, Disp: 1 vial, Rfl: 3    lamoTRIgine (LAMICTAL) 100 MG tablet, Take 1 tablet by mouth 2 times daily, Disp: 60 tablet, Rfl: 3   L-Methylfolate 15 MG TABS, Take 15 mg by mouth daily, Disp: 30 tablet, Rfl: 3    ketorolac (TORADOL) 10 MG tablet, Take 1 tablet by mouth every 6 hours as needed for Pain, Disp: 20 tablet, Rfl: 0    ibuprofen (ADVIL;MOTRIN) 800 MG tablet, Take 1 tablet by mouth every 6 hours as needed for Pain After toradol dosing complete, Disp: 120 tablet, Rfl: 3    busPIRone (BUSPAR) 10 MG tablet, Take 2 tablets by mouth 3 times daily, Disp: 180 tablet, Rfl: 5    Blood Pressure Monitoring (BLOOD PRESSURE KIT) ZELALEM, 1 Units by Does not apply route as needed (for BP monitoring at home for hypertension) Cuff for upper arm, Disp: 1 Device, Rfl: 0    albuterol sulfate  (90 Base) MCG/ACT inhaler, Inhale 2 puffs into the lungs every 6 hours as needed for Wheezing, Disp: 1 Inhaler, Rfl: 2    albuterol (ACCUNEB) 1.25 MG/3ML nebulizer solution, Inhale 3 mLs into the lungs every 6 hours as needed for Wheezing, Disp: 360 mL, Rfl: 3    Examination:    Vitals: not taken in person, most recent vitals in chart reviewed  There were no vitals filed for this visit.     Wt Readings from Last 3 Encounters:   10/16/20 231 lb (104.8 kg)   07/30/20 250 lb (113.4 kg)   07/01/20 230 lb (104.3 kg)       Labs:   no recent labs    Mental Status Examination:    Level of consciousness:  alert and oriented to person, place, and situation  Appearance:  well-appearing good grooming and good hygiene  Behavior/Motor:  no abnormalities noted  Attitude toward examiner: friendly, pleasant and cooperative attentive and good eye contact  Speech:  spontaneous, normal rate and normal volume   Mood: \"depressed\"  Affect:  mood congruent  Thought processes:  linear and logical  Thought content:  Denies suicidal or homicidal ideation, denies auditory or visual hallucinations  Cognition:  no deficits in attention, concentration notable, recent memory grossly intact  Concentration intact  Memory intact  Insight good   Judgement fair   Fund of Knowledge adequate Treatment Plan:  Reviewed current Medications with the patient. Education provided on the compliance with treatment. Reviewed OARRs, no concerns identified     The anticipated benefits and side effects of the medications, including the anticipated results of not receiving the medication, and of alternatives to the medications were explained to the patient and their informed consent was obtained for starting medications as well as adjusting the doses (titration or tapering) as indicated. The above information was given by physician in verbal form and sufficient understanding was in evidence. The patient participated in discussion of the information and question and/or concerns were addressed before the medication was given. PSYCHOTHERAPY/COUNSELING:  Encourage patient to attend outpatient appointments and therapy. [x] Therapeutic interview  [] Supportive  [x] CBT  [x] Ongoing  [] Other    No follow-ups on file. Follow-up in 2 weeks, patient informed to call for follow-up    Please note this report has been partially produced using speech recognition software  And may cause contain errors related to that system including grammar, punctuation and spelling as well as words and phrases that may seem inappropriate. If there are questions or concerns please feel free to contact me to clarify. Yolanda Chang MD  Electronically signed by Yolanda Chang MD on 2/1/2021 at 10:29 AM  2/1/2021 10:29 AM    Psychiatry   Due to this being a TeleHealth encounter, evaluation of the following organ systems is limited: Vitals/Constitutional/EENT/Resp/CV/GI//MS/Neuro/Skin/Heme-Lymph-Imm. An  electronic signature was used to authenticate this note.   --Yolanda Chang MD on 2/1/2021 at 10:29 AM

## 2021-02-16 ENCOUNTER — TELEPHONE (OUTPATIENT)
Dept: FAMILY MEDICINE CLINIC | Age: 46
End: 2021-02-16

## 2021-02-16 ENCOUNTER — VIRTUAL VISIT (OUTPATIENT)
Dept: FAMILY MEDICINE CLINIC | Age: 46
End: 2021-02-16
Payer: COMMERCIAL

## 2021-02-16 DIAGNOSIS — F33.3 SEVERE RECURRENT MAJOR DEPRESSIVE DISORDER WITH PSYCHOTIC FEATURES (HCC): ICD-10-CM

## 2021-02-16 DIAGNOSIS — M79.2 NERVE PAIN: ICD-10-CM

## 2021-02-16 DIAGNOSIS — R00.2 PALPITATIONS: ICD-10-CM

## 2021-02-16 DIAGNOSIS — I10 ESSENTIAL HYPERTENSION: ICD-10-CM

## 2021-02-16 DIAGNOSIS — F41.0 PANIC ATTACK: ICD-10-CM

## 2021-02-16 DIAGNOSIS — G47.00 INSOMNIA, UNSPECIFIED TYPE: ICD-10-CM

## 2021-02-16 DIAGNOSIS — F41.0 PANIC ATTACK: Primary | ICD-10-CM

## 2021-02-16 DIAGNOSIS — E06.3 HASHIMOTO'S DISEASE: ICD-10-CM

## 2021-02-16 DIAGNOSIS — M25.50 POLYARTHRALGIA: ICD-10-CM

## 2021-02-16 DIAGNOSIS — R07.9 CHEST PAIN, UNSPECIFIED TYPE: ICD-10-CM

## 2021-02-16 PROCEDURE — 99214 OFFICE O/P EST MOD 30 MIN: CPT | Performed by: NURSE PRACTITIONER

## 2021-02-16 RX ORDER — ALPRAZOLAM 2 MG/1
2 TABLET ORAL NIGHTLY PRN
Qty: 60 TABLET | Refills: 0 | Status: SHIPPED | OUTPATIENT
Start: 2021-02-16 | End: 2021-02-16 | Stop reason: SDUPTHER

## 2021-02-16 RX ORDER — RAMELTEON 8 MG/1
8 TABLET ORAL NIGHTLY PRN
Qty: 30 TABLET | Refills: 3 | Status: SHIPPED | OUTPATIENT
Start: 2021-02-16 | End: 2021-02-18 | Stop reason: SDUPTHER

## 2021-02-16 RX ORDER — ALPRAZOLAM 2 MG/1
2 TABLET ORAL 2 TIMES DAILY PRN
Qty: 60 TABLET | Refills: 0 | Status: SHIPPED | OUTPATIENT
Start: 2021-02-16 | End: 2021-03-22 | Stop reason: SDUPTHER
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
## 2021-02-16 NOTE — TELEPHONE ENCOUNTER
ALPRAZolam (XANAX) 2 MG tablet  1 tab -per day at night. For up to 30 days  Total dispense Quantity 60. Please clarify  Are they dispensing 30 tabs or 30 with 1 Refill?   Jose Odell.

## 2021-02-18 ENCOUNTER — TELEPHONE (OUTPATIENT)
Dept: FAMILY MEDICINE CLINIC | Age: 46
End: 2021-02-18

## 2021-02-18 ENCOUNTER — VIRTUAL VISIT (OUTPATIENT)
Dept: BEHAVIORAL/MENTAL HEALTH CLINIC | Age: 46
End: 2021-02-18
Payer: COMMERCIAL

## 2021-02-18 DIAGNOSIS — F33.1 MODERATE EPISODE OF RECURRENT MAJOR DEPRESSIVE DISORDER (HCC): Primary | ICD-10-CM

## 2021-02-18 DIAGNOSIS — G47.00 INSOMNIA, UNSPECIFIED TYPE: ICD-10-CM

## 2021-02-18 DIAGNOSIS — F43.10 PTSD (POST-TRAUMATIC STRESS DISORDER): ICD-10-CM

## 2021-02-18 DIAGNOSIS — K21.9 GASTROESOPHAGEAL REFLUX DISEASE WITHOUT ESOPHAGITIS: ICD-10-CM

## 2021-02-18 PROCEDURE — 99214 OFFICE O/P EST MOD 30 MIN: CPT | Performed by: PSYCHIATRY & NEUROLOGY

## 2021-02-18 RX ORDER — RAMELTEON 8 MG/1
8 TABLET ORAL NIGHTLY PRN
Qty: 90 TABLET | Refills: 3 | Status: SHIPPED | OUTPATIENT
Start: 2021-02-18 | End: 2021-04-20 | Stop reason: SDUPTHER

## 2021-02-18 RX ORDER — LAMOTRIGINE 100 MG/1
100 TABLET ORAL 2 TIMES DAILY
Qty: 180 TABLET | Refills: 3 | Status: SHIPPED | OUTPATIENT
Start: 2021-02-18 | End: 2021-04-20 | Stop reason: SDUPTHER

## 2021-02-18 RX ORDER — FLUOXETINE HYDROCHLORIDE 20 MG/1
60 CAPSULE ORAL DAILY
Qty: 90 CAPSULE | Refills: 3 | Status: SHIPPED | OUTPATIENT
Start: 2021-02-18 | End: 2021-04-20 | Stop reason: SDUPTHER

## 2021-02-18 RX ORDER — BUSPIRONE HYDROCHLORIDE 10 MG/1
20 TABLET ORAL 3 TIMES DAILY
Qty: 1080 TABLET | Refills: 0 | Status: SHIPPED | OUTPATIENT
Start: 2021-02-18 | End: 2021-04-20 | Stop reason: SDUPTHER

## 2021-02-18 RX ORDER — HYDROXYZINE HYDROCHLORIDE 25 MG/1
75 TABLET, FILM COATED ORAL NIGHTLY
Qty: 270 TABLET | Refills: 2 | Status: SHIPPED | OUTPATIENT
Start: 2021-02-18 | End: 2021-05-24 | Stop reason: SDUPTHER

## 2021-02-18 NOTE — TELEPHONE ENCOUNTER
Patient is requesting medication refill.  Please approve or deny this request.    Rx requested:  Requested Prescriptions     Pending Prescriptions Disp Refills    pantoprazole (PROTONIX) 40 MG tablet 90 tablet 1     Sig: Take 1 tablet by mouth daily         Last Office Visit:   2/16/2021      Next Visit Date:  Future Appointments   Date Time Provider [place]   3/18/2021  1:00 PM Osvaldo Melvin MD [facility]  Veyo Miller

## 2021-02-18 NOTE — PROGRESS NOTES
? START LAMICTAL 25 mg QD to 25 mg BID   ? START Propranolol 10-20 mg TID   ? START Hydroxyzine 75 mg QHS   ? Minimize ambien use  · 9/1/20  ? Had to stop propranolol due to hypertension on home cuff and needed to switch to   ? Pt STOPPED hydroxyzine, causing headaches   ? STOPPED propranolol, on selective beta blocker for BP now and monitoring at home on wrist cuff. Sending in rx to see if insurance will cover an arm cuff   ? 50 mg QD now, in 2 weeks if no rash will increase to 50 mg BID   ? lamictal 50 mg   ? rozerem 8 mg QHS   ? buspar 7.5 mg TID   · 9/15/20  ? Sleeping 6 hours  ? Lamictal 50 mg QD BID is current dose will increase to 50 mg BID   ? Rozerem 8 mg QHS   ? Buspar 7.5 mg TID   ? genesite ordered  · 10/6/20  ? Resend genesight   ? Buspar 7.5 mg TID   ? Buspar 15 mg TID   ? Rozerem 8 mg QHS   ? Lamictal 50 mg BID  · 10/20  ? Still waiting on genesite, patient will come to office for this   ? START HYDROXYZINE 25 mg QHS PRN sleep  · 11/4  ? Still waiting on genesite, patient will come to office for this   ? START HYDROXYZINE 25 mg QHS PRN sleep   ? CONT LAMICTAL 50 mg BID   CONT ROZEREM 8 mg QHS   ? L-methylfolate   · 12/10  ? CONT HYDROXYZINE 25 mg QHS PRN sleep   ? INCREASE LAMICTAL 50 mg BID to LAMICTAL 100 mg BID   CONT ROZEREM 8 mg QHS   ? START PRISTIQ   ? BUSPAR 20 mg TID   · 1/14/21  ? CONT HYDROXYZINE 25 mg QHS PRN sleep   ? LAMICTAL 100 mg BID   CONT ROZEREM 8 mg QHS   ? STOP PRISTIQ   ? START PROZAC 20 mg QD   ? BUSPAR 20 mg TID  · 2/1/21  ? Prozac 20 mg QD to 40 mg QD (has tolerated well, attempting more rapid taper for persistent depressive symptoms and sig irritability)   ? Cont Rozerem 8 mg QHS   ? Stop pristiq  ? Lamictal 100 mg BID   ? Buspar continue   · 2/18/21  ? Lamictal 100 mg BID   ? Ramelteon 8 mg QHS   ? Buspar 20 mg TID   ? Hydroxyzine 25 mg QHS   ? Prozac 40 mg QD         AT TODAY'S VISIT     1.  No Labs ordered today 2. Crisis plan reviewed and patient verbally contracts for safety. Go to ED with emergent symptoms or safety concerns. 3. Risks, benefits, side effects of medications, including any / all black box warnings, discussed with patient, who verbalizes their understanding. Pt is kings for safety and denies thoughts of SI/HI. Amenable to plan. No acute concerns to address regarding medications. Subjective:      Patient denies medication side effects apart from those mentioned in the assessment and plan. Patient reports they have been compliant with current medication regimen and have not missed a dose. At today's visit, patient denies thoughts of harm to self or others since last appointment, and denies auditory or visual hallucinations.      At today's visit, pt reports that since our last visit, their average MOOD has been (on a scale of 1-10, with 1 being severely depressed and 10 being not depressed at all          Very depressed [] 1  [] 2  [] 3  [] 4  [] 5  [] 6  [x] 7  [] 8  [] 9  [] 10  Not depressed    At today's visit, pt reports that since our last visit, their average ANXIETY has been (on a scale of 1-10, with 10 being severely anxious and 1 being not anxious at all)            Not anxious [] 1     [] 2     [] 3     [x] 4   [] 5    [] 6      [] 7   [] 8   [] 9       [] 10  Very anxious     At today's visit, pt reports that since our last visit, their average APPETITE has been  [] Decreased     [x] Normal/Unchanged   [] Increased      At today's visit, pt reports that since our last visit, their average HOURS OF SLEEP (every 24 hours) has been    [] 0-3   [] 4-5    [] 5-6    [] 6-7    [x] 8-9    [] 10-11   [] 11+    At today's visit, pt reports that since our last visit, their average Energy has been   [] Poor    [x] Average  [] Increased         Aggression:  [] yes  [x] no    Patient is [x] Able to contract for safety  [] unable to [facility] ROS:  [x] All negative/unchanged except if checked. Explain positive(checked items) below:     Physically feeling well  [] Constitutional  [] Eyes  [] Ear/Nose/Mouth/Throat  [] Respiratory  [] CV  [] GI  []   [] Musculoskeletal  [] Skin/Breast  [] Neurological  [] Endocrine  [] Heme/Lymph  [] Allergic/Immunologic      MEDICATIONS:    Current Outpatient Medications:     lamoTRIgine (LAMICTAL) 100 MG tablet, Take 1 tablet by mouth 2 times daily, Disp: 180 tablet, Rfl: 3    busPIRone (BUSPAR) 10 MG tablet, Take 2 tablets by mouth 3 times daily, Disp: 1080 tablet, Rfl: 0    ramelteon (ROZEREM) 8 MG tablet, Take 1 tablet by mouth nightly as needed for Sleep, Disp: 90 tablet, Rfl: 3    FLUoxetine (PROZAC) 20 MG capsule, Take 3 capsules by mouth daily, Disp: 90 capsule, Rfl: 3    hydrOXYzine (ATARAX) 25 MG tablet, Take 3 tablets by mouth nightly, Disp: 270 tablet, Rfl: 2    ALPRAZolam (XANAX) 2 MG tablet, Take 1 tablet by mouth 2 times daily as needed for Sleep or Anxiety for up to 30 days. , Disp: 60 tablet, Rfl: 0    lidocaine (LIDODERM) 5 %, Place 1 patch onto the skin daily 12 hours on, 12 hours off., Disp: 90 patch, Rfl: 1    pantoprazole (PROTONIX) 40 MG tablet, Take 1 tablet by mouth daily, Disp: 90 tablet, Rfl: 1    metoprolol succinate (TOPROL XL) 25 MG extended release tablet, Take 1 tablet by mouth daily, Disp: 90 tablet, Rfl: 1    cycloSPORINE (RESTASIS) 0.05 % ophthalmic emulsion, Place 1 drop into both eyes 2 times daily, Disp: 1 vial, Rfl: 3    ketorolac (TORADOL) 10 MG tablet, Take 1 tablet by mouth every 6 hours as needed for Pain, Disp: 20 tablet, Rfl: 0    ibuprofen (ADVIL;MOTRIN) 800 MG tablet, Take 1 tablet by mouth every 6 hours as needed for Pain After toradol dosing complete, Disp: 120 tablet, Rfl: 3    Blood Pressure Monitoring (BLOOD PRESSURE KIT) ZELALEM, 1 Units by Does not apply route as needed (for BP monitoring at home for hypertension) Cuff for upper arm, Disp: 1 Device, Rfl: 0   albuterol sulfate  (90 Base) MCG/ACT inhaler, Inhale 2 puffs into the lungs every 6 hours as needed for Wheezing, Disp: 1 Inhaler, Rfl: 2    Examination:    Vitals: not taken in person, most recent vitals in chart reviewed  There were no vitals filed for this visit. Wt Readings from Last 3 Encounters:   10/16/20 231 lb (104.8 kg)   07/30/20 250 lb (113.4 kg)   07/01/20 230 lb (104.3 kg)       Labs:   no recent labs    Mental Status Examination:    Level of consciousness:  alert and oriented to person, place, and situation  Appearance:  well-appearing good grooming and good hygiene  Behavior/Motor:  no abnormalities noted  Attitude toward examiner: friendly, pleasant and cooperative, attentive and good eye contact  Speech:  spontaneous, normal rate and normal volume   Mood: \"better but still down at times \"  Affect:  mood congruent  Thought processes:  linear and logical  Thought content:  Denies suicidal or homicidal ideation, denies auditory or visual hallucinations  Cognition:  no deficits in attention, concentration notable, recent memory grossly intact  Concentration intact  Memory intact  Insight good   Judgement fair   Fund of Knowledge adequate    Treatment Plan:  Reviewed current Medications with the patient. Education provided on the compliance with treatment. Reviewed OARRs, no concerns identified     The anticipated benefits and side effects of the medications, including the anticipated results of not receiving the medication, and of alternatives to the medications were explained to the patient and their informed consent was obtained for starting medications as well as adjusting the doses (titration or tapering) as indicated. The above information was given by physician in verbal form and sufficient understanding was in evidence. The patient participated in discussion of the information and question and/or concerns were addressed before the medication was given. PSYCHOTHERAPY/COUNSELING:  Encourage patient to attend outpatient appointments and therapy. [x] Therapeutic interview  [] Supportive  [x] CBT  [x] Ongoing  [] Other    No follow-ups on file. Follow-up in 2 weeks, patient informed to call for follow-up    Please note this report has been partially produced using speech recognition software  And may cause contain errors related to that system including grammar, punctuation and spelling as well as words and phrases that may seem inappropriate. If there are questions or concerns please feel free to contact me to clarify. Jade Engel MD  Electronically signed by Jade Engel MD on 2/18/2021 at 2:55 PM  2/18/2021 2:55 PM    Psychiatry   Due to this being a TeleHealth encounter, evaluation of the following organ systems is limited: Vitals/Constitutional/EENT/Resp/CV/GI//MS/Neuro/Skin/Heme-Lymph-Imm. An  electronic signature was used to authenticate this note. --Jade Engel MD on 2/18/2021 at 2:55 PM    Pursuant to the emergency declaration under the [facility], 1135 waiver authority and the [employer] and [employer] Act, this Virtual  Visit was conducted, with patient's consent, to reduce the patient's risk of exposure to COVID-19 and provide continuity of care for an established patient Services were provided through a video synchronous discussion virtually to substitute for in-person clinic visit.

## 2021-02-18 NOTE — TELEPHONE ENCOUNTER
Pharmacy called regarding Ramelton 8 mg. The prior auth for the medication has been denied. The insurance company prefers that the patient try Tempie Macleod. Or Ambien. Please submit one of these medications to the pharmacy.  Sb

## 2021-02-19 RX ORDER — PANTOPRAZOLE SODIUM 40 MG/1
40 TABLET, DELAYED RELEASE ORAL DAILY
Qty: 90 TABLET | Refills: 1 | Status: SHIPPED | OUTPATIENT
Start: 2021-02-19 | End: 2021-04-20 | Stop reason: SDUPTHER

## 2021-03-09 ENCOUNTER — HOSPITAL ENCOUNTER (OUTPATIENT)
Dept: LAB | Age: 46
Discharge: HOME | End: 2021-03-09
Payer: COMMERCIAL

## 2021-03-09 DIAGNOSIS — E06.3 HASHIMOTO'S DISEASE: ICD-10-CM

## 2021-03-09 DIAGNOSIS — M79.2 NERVE PAIN: ICD-10-CM

## 2021-03-09 LAB
ALBUMIN SERPL-MCNC: 4.3 G/DL (ref 3.5–4.6)
ALP BLD-CCNC: 97 U/L (ref 40–130)
ALT SERPL-CCNC: 10 U/L (ref 0–33)
ANION GAP SERPL CALCULATED.3IONS-SCNC: 14 MEQ/L (ref 9–15)
AST SERPL-CCNC: 17 U/L (ref 0–35)
BASOPHILS ABSOLUTE: 0 K/UL (ref 0–0.2)
BASOPHILS RELATIVE PERCENT: 0.3 %
BILIRUB SERPL-MCNC: <0.2 MG/DL (ref 0.2–0.7)
BUN BLDV-MCNC: 10 MG/DL (ref 6–20)
C-REACTIVE PROTEIN: 10.6 MG/L (ref 0–5)
CALCIUM SERPL-MCNC: 9 MG/DL (ref 8.5–9.9)
CHLORIDE BLD-SCNC: 106 MEQ/L (ref 95–107)
CO2: 23 MEQ/L (ref 20–31)
CREAT SERPL-MCNC: 0.89 MG/DL (ref 0.5–0.9)
EOSINOPHILS ABSOLUTE: 0.1 K/UL (ref 0–0.7)
EOSINOPHILS RELATIVE PERCENT: 1.3 %
GFR AFRICAN AMERICAN: >60
GFR NON-AFRICAN AMERICAN: >60
GLOBULIN: 2.9 G/DL (ref 2.3–3.5)
GLUCOSE BLD-MCNC: 116 MG/DL (ref 70–99)
HCT VFR BLD CALC: 42 % (ref 37–47)
HEMOGLOBIN: 13.5 G/DL (ref 12–16)
LYMPHOCYTES ABSOLUTE: 3.2 K/UL (ref 1–4.8)
LYMPHOCYTES RELATIVE PERCENT: 28.9 %
MCH RBC QN AUTO: 28.1 PG (ref 27–31.3)
MCHC RBC AUTO-ENTMCNC: 32.1 % (ref 33–37)
MCV RBC AUTO: 87.3 FL (ref 82–100)
MONOCYTES ABSOLUTE: 0.7 K/UL (ref 0.2–0.8)
MONOCYTES RELATIVE PERCENT: 6.5 %
NEUTROPHILS ABSOLUTE: 7.1 K/UL (ref 1.4–6.5)
NEUTROPHILS RELATIVE PERCENT: 63 %
PDW BLD-RTO: 14.5 % (ref 11.5–14.5)
PLATELET # BLD: 404 K/UL (ref 130–400)
POTASSIUM SERPL-SCNC: 4.2 MEQ/L (ref 3.4–4.9)
RBC # BLD: 4.82 M/UL (ref 4.2–5.4)
RHEUMATOID FACTOR: <10 IU/ML (ref 0–14)
SEDIMENTATION RATE, ERYTHROCYTE: 25 MM (ref 0–20)
SODIUM BLD-SCNC: 143 MEQ/L (ref 135–144)
T4 FREE: 1.25 NG/DL (ref 0.84–1.68)
TOTAL PROTEIN: 7.2 G/DL (ref 6.3–8)
TSH SERPL DL<=0.05 MIU/L-ACNC: 3.23 UIU/ML (ref 0.44–3.86)
WBC # BLD: 11.2 K/UL (ref 4.8–10.8)

## 2021-03-09 PROCEDURE — 86140 C-REACTIVE PROTEIN: CPT

## 2021-03-09 PROCEDURE — 84443 ASSAY THYROID STIM HORMONE: CPT

## 2021-03-09 PROCEDURE — 80053 COMPREHEN METABOLIC PANEL: CPT

## 2021-03-09 PROCEDURE — 85025 COMPLETE CBC W/AUTO DIFF WBC: CPT

## 2021-03-09 PROCEDURE — 36415 COLL VENOUS BLD VENIPUNCTURE: CPT

## 2021-03-09 PROCEDURE — 86038 ANTINUCLEAR ANTIBODIES: CPT

## 2021-03-09 PROCEDURE — 86376 MICROSOMAL ANTIBODY EACH: CPT

## 2021-03-09 PROCEDURE — 84439 ASSAY OF FREE THYROXINE: CPT

## 2021-03-09 PROCEDURE — 85652 RBC SED RATE AUTOMATED: CPT

## 2021-03-09 PROCEDURE — 86431 RHEUMATOID FACTOR QUANT: CPT

## 2021-03-12 ENCOUNTER — TELEPHONE (OUTPATIENT)
Dept: FAMILY MEDICINE CLINIC | Age: 46
End: 2021-03-12

## 2021-03-12 LAB
ANA IGG, ELISA: NORMAL
THYROID PEROXIDASE (TPO) ABS: 769 IU/ML (ref 0–25)

## 2021-03-15 NOTE — RESULT ENCOUNTER NOTE
Please notify Pamela Melendez that EVIN test results are normal. Follow up as scheduled and as needed.

## 2021-03-22 ENCOUNTER — VIRTUAL VISIT (OUTPATIENT)
Dept: FAMILY MEDICINE CLINIC | Age: 46
End: 2021-03-22
Payer: COMMERCIAL

## 2021-03-22 DIAGNOSIS — M25.50 POLYARTHRALGIA: ICD-10-CM

## 2021-03-22 DIAGNOSIS — E06.3 HASHIMOTO'S DISEASE: ICD-10-CM

## 2021-03-22 DIAGNOSIS — F41.0 PANIC ATTACK: Primary | ICD-10-CM

## 2021-03-22 PROCEDURE — 99213 OFFICE O/P EST LOW 20 MIN: CPT | Performed by: NURSE PRACTITIONER

## 2021-03-22 RX ORDER — ALPRAZOLAM 2 MG/1
2 TABLET ORAL 2 TIMES DAILY PRN
Qty: 60 TABLET | Refills: 0 | Status: SHIPPED | OUTPATIENT
Start: 2021-03-22 | End: 2021-04-20 | Stop reason: SDUPTHER

## 2021-03-22 RX ORDER — IBUPROFEN 800 MG/1
800 TABLET ORAL EVERY 6 HOURS PRN
Qty: 120 TABLET | Refills: 3 | Status: SHIPPED | OUTPATIENT
Start: 2021-03-22 | End: 2021-04-20 | Stop reason: SDUPTHER

## 2021-03-22 RX ORDER — KETOROLAC TROMETHAMINE 10 MG/1
10 TABLET, FILM COATED ORAL EVERY 6 HOURS PRN
Qty: 20 TABLET | Refills: 0 | Status: SHIPPED | OUTPATIENT
Start: 2021-03-22 | End: 2021-04-20 | Stop reason: SDUPTHER

## 2021-03-22 ASSESSMENT — PATIENT HEALTH QUESTIONNAIRE - PHQ9
1. LITTLE INTEREST OR PLEASURE IN DOING THINGS: 1
SUM OF ALL RESPONSES TO PHQ QUESTIONS 1-9: 2

## 2021-03-22 NOTE — PROGRESS NOTES
3/22/2021    TELEHEALTH EVALUATION -- Audio/Visual (During UNNEM-44 public health emergency)    Due to [place] 19 outbreak, patient's office visit was converted to a virtual visit. Patient was contacted and agreed to proceed with a virtual visit via Doxy. me  The risks and benefits of converting to a virtual visit were discussed in light of the current infectious disease epidemic. Patient also understood that insurance coverage and co-pays are up to their individual insurance plans. Judythe Osler, was evaluated through a synchronous (real-time) audio-video encounter. The patient (or guardian if applicable) is aware that this is a billable service. Verbal consent to proceed has been obtained within the past 12 months. The visit was conducted pursuant to the emergency declaration under the [address] [place] [address], [address] [place] [address] authority and the [employer] and [employer] Act. Patient identification was verified, and a caregiver was present when appropriate. The patient was located in a state where the provider was credentialed to provide care. Total time spent for this encounter: Not billed by time    The patient is talking with me virtually from her home and I am located at my office in [facility]. HPI:    Judythe Osler (:  1975) has requested an audio/video evaluation for the following concern(s): Anxiety/panic attack: She states that the court date for her son was today and all charges against him were dropped. She feels an incredible weight lifted off of her shoulders. She is not sure how she will feel once her nerves settle down but she thinks that things will be a lot better. She continues to follow routinely with psychiatry and is taking Xanax rather routinely. She does not report any side effects with current medication. Hashimoto's disease: She does not report any change in voice or difficulty swallowing. Recently had blood work done and would like to review. Does have a strong family history of thyroid disorder. Joint pain: She states that she would like to review recent inflammatory test results. She does not report any severe pain today but does have a longstanding history of generalized pain. Prior to Visit Medications    Medication Sig Taking? Authorizing Provider   ibuprofen (ADVIL;MOTRIN) 800 MG tablet Take 1 tablet by mouth every 6 hours as needed for Pain After toradol dosing complete Yes ANA Santiago CNP   ALPRAZolam Eppie Irineo) 2 MG tablet Take 1 tablet by mouth 2 times daily as needed for Sleep or Anxiety for up to 30 days. Yes ANA Santiago CNP   ketorolac (TORADOL) 10 MG tablet Take 1 tablet by mouth every 6 hours as needed for Pain Yes ANA Santiago CNP   pantoprazole (PROTONIX) 40 MG tablet Take 1 tablet by mouth daily Yes ANA Santiago CNP   lamoTRIgine (LAMICTAL) 100 MG tablet Take 1 tablet by mouth 2 times daily Yes Jeannie Fagan MD   busPIRone (BUSPAR) 10 MG tablet Take 2 tablets by mouth 3 times daily Yes Jeanine Fagan MD   ramelteon (ROZEREM) 8 MG tablet Take 1 tablet by mouth nightly as needed for Sleep Yes Jeannie Fagan MD   FLUoxetine (PROZAC) 20 MG capsule Take 3 capsules by mouth daily Yes Jeannie Fagan MD   hydrOXYzine (ATARAX) 25 MG tablet Take 3 tablets by mouth nightly Yes Jeannie Fagan MD   lidocaine (LIDODERM) 5 % Place 1 patch onto the skin daily 12 hours on, 12 hours off.  Yes ANA Santiago CNP   metoprolol succinate (TOPROL XL) 25 MG extended release tablet Take 1 tablet by mouth daily Yes ANA Santiago CNP   cycloSPORINE (RESTASIS) 0.05 % ophthalmic emulsion Place 1 drop into both eyes 2 times daily Yes ANA Santiago CNP   Blood Pressure Monitoring (BLOOD PRESSURE KIT) ZELALEM 1 Units by Does not apply route as needed (for BP monitoring at home for hypertension) Cuff for be secondary to stress and we should recheck in the next few months. She will notify me or psychiatry if mood becomes unstable prior to next visit. Controlled Substance Monitoring:    Acute and Chronic Pain Monitoring:   RX Monitoring 3/22/2021   Attestation -   Periodic Controlled Substance Monitoring Possible medication side effects, risk of tolerance/dependence & alternative treatments discussed. ;No signs of potential drug abuse or diversion identified. ;Assessed functional status. Chronic Pain > 80 MEDD -       Please note this report has been partially produced using speech recognition software and may cause contain errors related to that system including grammar, punctuation and spelling as well as words and phrases that may seem inappropriate. If there are questions or concerns please feel free to contact me to clarify. An  electronic signature was used to authenticate this note. --ANA Fry - CNP on 3/22/2021 at 2:27 PM        Pursuant to the emergency declaration under the [facility], 1135 waiver authority and the [employer] and [employer] Act, this Virtual  Visit was conducted, with patient's consent, to reduce the patient's risk of exposure to COVID-19 and provide continuity of care for an established patient. Services were provided through a video synchronous discussion virtually to substitute for in-person clinic visit.

## 2021-04-15 ENCOUNTER — TELEPHONE (OUTPATIENT)
Dept: FAMILY MEDICINE CLINIC | Age: 46
End: 2021-04-15

## 2021-04-20 ENCOUNTER — HOSPITAL ENCOUNTER (OUTPATIENT)
Age: 46
Setting detail: SPECIMEN
Discharge: HOME | End: 2021-04-20
Payer: COMMERCIAL

## 2021-04-20 ENCOUNTER — OFFICE VISIT (OUTPATIENT)
Dept: FAMILY MEDICINE CLINIC | Age: 46
End: 2021-04-20
Payer: COMMERCIAL

## 2021-04-20 VITALS
HEART RATE: 82 BPM | RESPIRATION RATE: 14 BRPM | OXYGEN SATURATION: 97 % | HEIGHT: 57 IN | BODY MASS INDEX: 49.84 KG/M2 | TEMPERATURE: 97.7 F | DIASTOLIC BLOOD PRESSURE: 70 MMHG | WEIGHT: 231 LBS | SYSTOLIC BLOOD PRESSURE: 104 MMHG

## 2021-04-20 DIAGNOSIS — I10 ESSENTIAL HYPERTENSION: ICD-10-CM

## 2021-04-20 DIAGNOSIS — G89.29 CHRONIC MIDLINE LOW BACK PAIN WITHOUT SCIATICA: ICD-10-CM

## 2021-04-20 DIAGNOSIS — Z79.899 HIGH RISK MEDICATION USE: ICD-10-CM

## 2021-04-20 DIAGNOSIS — G47.00 INSOMNIA, UNSPECIFIED TYPE: ICD-10-CM

## 2021-04-20 DIAGNOSIS — K21.9 GASTROESOPHAGEAL REFLUX DISEASE WITHOUT ESOPHAGITIS: ICD-10-CM

## 2021-04-20 DIAGNOSIS — F41.0 PANIC ATTACK: Primary | ICD-10-CM

## 2021-04-20 DIAGNOSIS — R00.2 PALPITATIONS: ICD-10-CM

## 2021-04-20 DIAGNOSIS — H04.123 BILATERAL DRY EYES: ICD-10-CM

## 2021-04-20 DIAGNOSIS — H04.201 WATERING OF RIGHT EYE: ICD-10-CM

## 2021-04-20 DIAGNOSIS — M54.50 CHRONIC MIDLINE LOW BACK PAIN WITHOUT SCIATICA: ICD-10-CM

## 2021-04-20 PROCEDURE — 80307 DRUG TEST PRSMV CHEM ANLYZR: CPT

## 2021-04-20 PROCEDURE — 99214 OFFICE O/P EST MOD 30 MIN: CPT | Performed by: NURSE PRACTITIONER

## 2021-04-20 RX ORDER — RAMELTEON 8 MG/1
8 TABLET ORAL NIGHTLY PRN
Qty: 90 TABLET | Refills: 3 | Status: SHIPPED | OUTPATIENT
Start: 2021-04-20 | End: 2021-04-23

## 2021-04-20 RX ORDER — ALPRAZOLAM 2 MG/1
2 TABLET ORAL 2 TIMES DAILY PRN
Qty: 60 TABLET | Refills: 2 | Status: SHIPPED | OUTPATIENT
Start: 2021-04-20 | End: 2021-07-22 | Stop reason: SDUPTHER

## 2021-04-20 RX ORDER — FLUOXETINE HYDROCHLORIDE 20 MG/1
60 CAPSULE ORAL DAILY
Qty: 90 CAPSULE | Refills: 3 | Status: SHIPPED | OUTPATIENT
Start: 2021-04-20 | End: 2021-05-24

## 2021-04-20 RX ORDER — PANTOPRAZOLE SODIUM 40 MG/1
40 TABLET, DELAYED RELEASE ORAL DAILY
Qty: 90 TABLET | Refills: 1 | Status: SHIPPED | OUTPATIENT
Start: 2021-04-20 | End: 2021-10-11 | Stop reason: SDUPTHER

## 2021-04-20 RX ORDER — IBUPROFEN 800 MG/1
800 TABLET ORAL EVERY 6 HOURS PRN
Qty: 120 TABLET | Refills: 3 | Status: SHIPPED | OUTPATIENT
Start: 2021-04-20 | End: 2021-07-22 | Stop reason: SDUPTHER

## 2021-04-20 RX ORDER — KETOROLAC TROMETHAMINE 10 MG/1
10 TABLET, FILM COATED ORAL EVERY 6 HOURS PRN
Qty: 20 TABLET | Refills: 0 | Status: SHIPPED | OUTPATIENT
Start: 2021-04-20 | End: 2021-08-03 | Stop reason: ALTCHOICE

## 2021-04-20 RX ORDER — METOPROLOL SUCCINATE 25 MG/1
25 TABLET, EXTENDED RELEASE ORAL DAILY
Qty: 90 TABLET | Refills: 1 | Status: SHIPPED | OUTPATIENT
Start: 2021-04-20 | End: 2021-07-22 | Stop reason: SDUPTHER

## 2021-04-20 RX ORDER — LAMOTRIGINE 100 MG/1
100 TABLET ORAL 2 TIMES DAILY
Qty: 180 TABLET | Refills: 3 | Status: SHIPPED | OUTPATIENT
Start: 2021-04-20 | End: 2021-04-23 | Stop reason: SDUPTHER

## 2021-04-20 RX ORDER — BUSPIRONE HYDROCHLORIDE 10 MG/1
20 TABLET ORAL 3 TIMES DAILY
Qty: 1080 TABLET | Refills: 0 | Status: SHIPPED | OUTPATIENT
Start: 2021-04-20 | End: 2021-05-24

## 2021-04-20 RX ORDER — LIDOCAINE 50 MG/G
1 PATCH TOPICAL DAILY
Qty: 90 PATCH | Refills: 1 | Status: SHIPPED | OUTPATIENT
Start: 2021-04-20 | End: 2021-07-19

## 2021-04-20 ASSESSMENT — PATIENT HEALTH QUESTIONNAIRE - PHQ9
1. LITTLE INTEREST OR PLEASURE IN DOING THINGS: 1
SUM OF ALL RESPONSES TO PHQ QUESTIONS 1-9: 2

## 2021-04-20 NOTE — PROGRESS NOTES
The patient presents today with anxiety. She has had issues with anxiety for several years. The patient has been treated in the past. Previous medications include Prozac, atarax, Buspar and xanax/lamictal.   The patient does have a history of panic attacks. There are not suicidal thoughts or homicidal thoughts present. She continues to follow routinely with psychiatry and will be following with Dr. Ranae Skiff every month. Continues to take Xanax as she has been with split dosing throughout the day. She reports improvement in severity of panic attacks after her son was released from half-way. Continues to have some issues with her marriage but states that overall things have been better. Insomnia: She states that she has been sleeping better at night with a combination of Rozerem and her improved state of anxiety. HTN: She states that she has been taking beta-blocker still for blood pressure but knows that her blood pressure has been running good lately. She is not sure if she has to continue this medication. Upon further questioning, she states that she does continue to have issues with palpitations on occasion but no other cardiac symptoms. She does not seem to have any side effects with the Toprol. GERD: medication has been helpful. She states that she has not had any significant reflux symptoms while taking the Protonix and feels that this medication has been working better for her. Low back pain: She states that she continues to have issues with pain in the middle of her lower back. Symptoms have improved with the Lidoderm patches and she recently received word from her insurance company that the patches have been approved. Dry eye syndrome and watering of the right eye: She states that she has been on Restasis for quite some time and has followed with optometry. She was told that there is nothing else to be done for her chronic watering of the right eye.   She states that it is bothersome and can be uncomfortable at times. She would like a second opinion from a different eye specialist if possible. She has not noticed any change in her vision. ROS:  This patient reports no chest pain or pressure. There is no shortness of breath or cough. The patient reports no nausea or vomiting. There is no heartburn or indigestion. There is no diarrhea or constipation. No black, bloody, mucusy or tarry stool noticed. The patient reports no bloating and no change in appetite. There is no numbness, tingling or swelling in the extremities. EXAM:  Constitutional Blood pressure 104/70, pulse 82, temperature 97.7 °F (36.5 °C), temperature source Temporal, resp. rate 14, height 4' 9\" (1.448 m), weight 231 lb (104.8 kg), last menstrual period 06/15/2015, SpO2 97 %, not currently breastfeeding. She has a normal affect, no acute distress, appears well developed and well nourished. Neck:  neck- supple, no mass, non-tender and no bruits  Lungs:  Normal expansion. Clear to auscultation. No rales, rhonchi, or wheezing., No chest wall tenderness. Heart:  Heart sounds are normal.  Regular rate and rhythm without murmur, gallop or rub. Abdomen:  Soft, non-tender, normal bowel sounds. No bruits, organomegaly or masses. Extremities: Extremities warm to touch, pink, with no edema. DIAGNOSIS:    Diagnosis Orders   1. Panic attack  ALPRAZolam (XANAX) 2 MG tablet   2. Insomnia, unspecified type  ramelteon (ROZEREM) 8 MG tablet   3. Palpitations  metoprolol succinate (TOPROL XL) 25 MG extended release tablet   4. High risk medication use  Pain Management Drug Screen   5. Gastroesophageal reflux disease without esophagitis  pantoprazole (PROTONIX) 40 MG tablet   6. Essential hypertension  metoprolol succinate (TOPROL XL) 25 MG extended release tablet   7. Chronic midline low back pain without sciatica  lidocaine (LIDODERM) 5 %   8. Bilateral dry eyes  External Referral to Optometry   9.  Watering of right eye External Referral to Optometry         PLAN: Include orders in the DX section. Follow up: 3 months and as needed. .  2.  3.  4.  She continues to have ongoing issues with anxiety but symptoms have not improved since her son is back home. She continues to follow-up with psychiatry monthly and feels that her mood has been stable on current commendation of medications. We will want to slowly wean dose of Xanax over time. 5.  Symptoms have been well managed with current dosing of proton pump inhibitor. Continue the same. 6.  Blood pressure has been well controlled. Discussed indication for beta-blocker given her history of palpitations and tachycardia. She verbalizes understanding and does not have any symptoms of hypotension. 7.  Continue lidocaine patches for chronic back pain symptoms. Medication has been effective. 8.  9.  Discussed option of second opinion for optometry and referral given for Dr. Marco Magaña in [place]. Controlled Substance Monitoring:    Acute and Chronic Pain Monitoring:   RX Monitoring 4/20/2021   Attestation -   Periodic Controlled Substance Monitoring Possible medication side effects, risk of tolerance/dependence & alternative treatments discussed. ;No signs of potential drug abuse or diversion identified. ;Assessed functional status. ;Random urine drug screen sent today. Chronic Pain > 80 MEDD -         Please note this report has been partially produced using speech recognition software and may cause contain errors related to that system including grammar, punctuation and spelling as well as words and phrases that may seem inappropriate. If there are questions or concerns please feel free to contact me to clarify.       Electronically signed by Savanna DE LEÓN, 2:07 PM 4/20/21

## 2021-04-23 ENCOUNTER — VIRTUAL VISIT (OUTPATIENT)
Dept: BEHAVIORAL/MENTAL HEALTH CLINIC | Age: 46
End: 2021-04-23
Payer: COMMERCIAL

## 2021-04-23 DIAGNOSIS — F43.10 PTSD (POST-TRAUMATIC STRESS DISORDER): ICD-10-CM

## 2021-04-23 DIAGNOSIS — F41.1 GAD (GENERALIZED ANXIETY DISORDER): ICD-10-CM

## 2021-04-23 DIAGNOSIS — F33.1 MODERATE EPISODE OF RECURRENT MAJOR DEPRESSIVE DISORDER (HCC): Primary | ICD-10-CM

## 2021-04-23 LAB
6-ACETYLMORPHINE: NOT DETECTED
7-AMINOCLONAZEPAM: NOT DETECTED
ALPHA-OH-ALPRAZOLAM: PRESENT
ALPHA-OH-MIDAZOLAM, URINE: NOT DETECTED
ALPRAZOLAM: PRESENT
AMPHETAMINE: NOT DETECTED
BARBITURATES: NOT DETECTED
BENZOYLECGONINE: NOT DETECTED
BUPRENORPHINE: NOT DETECTED
CARISOPRODOL: NOT DETECTED
CLONAZEPAM: NOT DETECTED
CODEINE: NOT DETECTED
CREATININE URINE: 208.3 MG/DL (ref 20–400)
DIAZEPAM: NOT DETECTED
EER PAIN MGT DRUG PANEL, HIGH RES/EMIT U: NORMAL
ETHYL GLUCURONIDE: NOT DETECTED
FENTANYL: NOT DETECTED
GABAPENTIN: NOT DETECTED
HYDROCODONE: NOT DETECTED
HYDROMORPHONE: NOT DETECTED
LORAZEPAM: NOT DETECTED
MARIJUANA METABOLITE: NOT DETECTED
MDA: NOT DETECTED
MDEA: NOT DETECTED
MDMA URINE: NOT DETECTED
MEPERIDINE: NOT DETECTED
METHADONE: NOT DETECTED
METHAMPHETAMINE: NOT DETECTED
METHYLPHENIDATE: NOT DETECTED
MIDAZOLAM: NOT DETECTED
MORPHINE: NOT DETECTED
NALOXONE: NOT DETECTED
NORBUPRENORPHINE, FREE: NOT DETECTED
NORDIAZEPAM: NOT DETECTED
NORFENTANYL: NOT DETECTED
NORHYDROCODONE, URINE: NOT DETECTED
NOROXYCODONE: NOT DETECTED
NOROXYMORPHONE, URINE: NOT DETECTED
OXAZEPAM: NOT DETECTED
OXYCODONE: NOT DETECTED
OXYMORPHONE: NOT DETECTED
PAIN MANAGEMENT DRUG PANEL: NORMAL
PCP: NOT DETECTED
PHENTERMINE: NOT DETECTED
PREGABALIN: NOT DETECTED
TAPENTADOL, URINE: NOT DETECTED
TAPENTADOL-O-SULFATE, URINE: NOT DETECTED
TEMAZEPAM: NOT DETECTED
TRAMADOL: NOT DETECTED
ZOLPIDEM: NOT DETECTED

## 2021-04-23 PROCEDURE — 99215 OFFICE O/P EST HI 40 MIN: CPT | Performed by: PSYCHIATRY & NEUROLOGY

## 2021-04-23 RX ORDER — PROPRANOLOL HYDROCHLORIDE 10 MG/1
10 TABLET ORAL 3 TIMES DAILY PRN
Qty: 90 TABLET | Refills: 3 | Status: SHIPPED | OUTPATIENT
Start: 2021-04-23 | End: 2021-05-24

## 2021-04-23 RX ORDER — LAMOTRIGINE 100 MG/1
100 TABLET ORAL 2 TIMES DAILY
Qty: 180 TABLET | Refills: 3 | Status: SHIPPED | OUTPATIENT
Start: 2021-04-23 | End: 2021-05-24 | Stop reason: SDUPTHER

## 2021-04-23 NOTE — PROGRESS NOTES
4/23/2021    TELEHEALTH PSYCHIATRY FOLLOWUP -- Audio/Visual (During RGN-51 public health emergency)      Psychiatric Diagnoses:  1. Moderate episode of recurrent major depressive disorder ([facility] [address]    2. PTSD (post-traumatic stress disorder)    3. ALFREDO (generalized anxiety disorder)          Due to COVID 23 outbreak, patient's office visit was converted to a virtual visit. Patient was contacted and agreed to proceed with a virtual visit via [employer]  40 mins total for this encounter = 30 minutes with direct communication with patient for encounter as well as 10 mins spent on chart review, and in the ordering of all necessary labs and/or medications  The risks and benefits of converting to a virtual visit were discussed in light of the current infectious disease epidemic. Patient also understood that insurance coverage and co-pays are up to their individual insurance plans. Patient Location:        Patient's home address  Provider Location (City/State):        [facility] [address]        Assessment/Plan:     Medication changes needed: Patient tolerating current medication regimen but will proceed with changes below in order to address continued symptoms. Medication has been helpful so far and patient has had no side effects other than those listed in the subjective portion of this note. Additionally, patient denies auditory or visual hallucinations. No paranoid thoughts, no delusional thought content expressed in this appointment. No thoughts of harm to self or others voiced. MOOD:    Patient reports improvement in symptoms of low mood, low energy and low motivation, as well as anhedonia feels able to attend to ADLs. Denies suicidal ideation. Denies any thoughts of wishing they were dead. SLEEP:     Sleeping better, and reports is soundly through the night. No nightmares, nighttime awakenings. Well rested in the morning. ANXIETY:     Continues to struggle with anxiety.  Irritability, taking alprazolam (Xanax) 2 mg three times daily. Reports buspirone (Buspar) has been sedating. Patient plans to decrease this to 10 mg three times daily from 20 mg three times daily if still sedated after starting propranolol hcl (Inderal). COUNSELING or THERAPY:   Continue to encourage therapy as part of ongoing treatment of their mental health concerns. Continue to encourage physical activity and adherence to medication regimen. No acute concerns voiced. DATE and changes made  · 8/17/20  ? STOP Vraylar   ? START LAMICTAL 25 mg QD to 25 mg BID   ? START Propranolol 10-20 mg TID   ? START Hydroxyzine 75 mg QHS   ? Minimize ambien use  · 9/1/20  ? Had to stop propranolol due to hypertension on home cuff and needed to switch to   ? Pt STOPPED hydroxyzine, causing headaches   ? STOPPED propranolol, on selective beta blocker for BP now and monitoring at home on wrist cuff. Sending in rx to see if insurance will cover an arm cuff   ? 50 mg QD now, in 2 weeks if no rash will increase to 50 mg BID   ? lamictal 50 mg   ? rozerem 8 mg QHS   ? buspar 7.5 mg TID   · 9/15/20  ? Sleeping 6 hours  ? Lamictal 50 mg QD BID is current dose will increase to 50 mg BID   ? Rozerem 8 mg QHS   ? Buspar 7.5 mg TID   ? genesite ordered  · 10/6/20  ? Resend genesight   ? Buspar 7.5 mg TID   ? Buspar 15 mg TID   ? Rozerem 8 mg QHS   ? Lamictal 50 mg BID  · 10/20  ? Still waiting on genesite, patient will come to office for this   ? START HYDROXYZINE 25 mg QHS PRN sleep  · 11/4  ? Still waiting on genesite, patient will come to office for this   ? START HYDROXYZINE 25 mg QHS PRN sleep   ? CONT LAMICTAL 50 mg BID   CONT ROZEREM 8 mg QHS   ? L-methylfolate   · 12/10  ? CONT HYDROXYZINE 25 mg QHS PRN sleep   ? INCREASE LAMICTAL 50 mg BID to LAMICTAL 100 mg BID   CONT ROZEREM 8 mg QHS   ? START PRISTIQ   ? BUSPAR 20 mg TID   · 1/14/21  ? CONT HYDROXYZINE 25 mg QHS PRN sleep   ? LAMICTAL 100 mg BID   CONT ROZEREM 8 mg QHS   ? STOP PRISTIQ   ?  START PROZAC 20 mg QD   ? BUSPAR 20 mg TID  · 2/1/21  ? Prozac 20 mg QD to 40 mg QD (has tolerated well, attempting more rapid taper for persistent depressive symptoms and sig irritability)   ? Cont Rozerem 8 mg QHS   ? Stop pristiq  ? Lamictal 100 mg BID   ? Buspar continue   · 2/18/21  ? Lamictal 100 mg BID   ? Ramelteon 8 mg QHS   ? Buspar 20 mg TID   ? Hydroxyzine 25 mg QHS   ? Prozac 40 mg QD increased to 60 mg daily   · 4/23/21  ? fluoxetine (Prozac) 60 mg   ? buspirone (Buspar) 20 mg three times daily   ? Hydroxyzine hcl (Atarax) 25 mg every night   ? Lamotrigine (Lamictal) 100 mg twice daily   ? ramelteon discontinued due to cost   ? Will try propranolol hcl (Inderal) 10 mg three times daily as needed anxiety, had tried in past discussed risk of hypotension and patient is already on metoprolol for hypertension, but we need to try to wean patient off of her alprazolam (Xanax), and this may help with anxiety symptoms which are primarily somatic        Medical Diagnoses:  Patient Active Problem List   Diagnosis    Anxiety and depression    PTSD (post-traumatic stress disorder)    Migraine headache    Nausea and vomiting    S/P abdominal hysterectomy    Severe recurrent major depressive disorder with psychotic features ([address]    Morbid obesity ([address]    Overactive bladder    Stress incontinence, female    Abdominal pain    Blood in the urine    Essential hypertension    Yeast infection    Vitamin D deficiency    Former smoker    Obsessive-compulsive disorder    Bilateral dry eyes    Shortness of breath    MARBELLA (obstructive sleep apnea)    Moderate episode of recurrent major depressive disorder ([address]    ALFREDO (generalized anxiety disorder)         AT TODAY'S VISIT     1. No Labs ordered today  2. Crisis plan reviewed and patient verbally contracts for safety. Go to ED with emergent symptoms or safety concerns.   3. Risks, benefits, side effects of medications, including any / all black box warnings, discussed 10/16/20 231 lb (104.8 kg)   07/30/20 250 lb (113.4 kg)       BP Readings from Last 3 Encounters:   04/20/21 104/70   10/16/20 130/80   07/30/20 (!) 150/81           Labs:   no recent labs    Mental Status Examination:    Level of consciousness:  alert and oriented to person, place, and situation  Appearance:  well-appearing good grooming and good hygiene  Behavior/Motor:  no abnormalities noted  Attitude toward examiner: friendly, pleasant and cooperative, attentive and good eye contact  Speech:  spontaneous, normal rate and normal volume   Mood: \"anxious only\"  Affect:  mood congruent  Thought processes:  linear and logical  Thought content:  Denies suicidal or homicidal ideation, denies auditory or visual hallucinations  Cognition:  no deficits in attention, concentration notable, recent memory grossly intact  Concentration intact  Memory intact  Insight good   Judgement fair   Fund of Knowledge adequate    Treatment Plan:  Reviewed current Medications with the patient. Education provided on the compliance with treatment. Reviewed OARRs, no concerns identified     The anticipated benefits and side effects of the medications, including the anticipated results of not receiving the medication, and of alternatives to the medications were explained to the patient and their informed consent was obtained for starting medications as well as adjusting the doses (titration or tapering) as indicated. The above information was given by physician in verbal form and sufficient understanding was in evidence. The patient participated in discussion of the information and question and/or concerns were addressed before the medication was given. PSYCHOTHERAPY/COUNSELING:  Encourage patient to attend outpatient appointments and therapy. [x] Therapeutic interview  [] Supportive  [x] CBT  [x] Ongoing  [] Other    No follow-ups on file.  patient informed to call for follow-up or to reschedule appointment     Please note this report has been partially produced using speech recognition software  And may cause contain errors related to that system including grammar, punctuation and spelling as well as words and phrases that may seem inappropriate. If there are questions or concerns please feel free to contact me to clarify. Kim Palencia MD  Electronically signed by iKm Palencia MD on 4/23/2021 at 1:54 PM  4/23/2021 1:54 PM    Psychiatry   Due to this being a TeleHealth encounter, evaluation of the following organ systems is limited: Vitals/Constitutional/EENT/Resp/CV/GI//MS/Neuro/Skin/Heme-Lymph-Imm. An  electronic signature was used to authenticate this note. --Kim Palencia MD on 4/23/2021 at 1:54 PM    Pursuant to the emergency declaration under the [facility] waiver authority and the [employer] Act, this Virtual  Visit was conducted, with patient's consent, to reduce the patient's risk of exposure to COVID-19 and provide continuity of care for an established patient Services were provided through a video synchronous discussion virtually to substitute for in-person clinic visit.

## 2021-05-24 ENCOUNTER — VIRTUAL VISIT (OUTPATIENT)
Dept: BEHAVIORAL/MENTAL HEALTH CLINIC | Age: 46
End: 2021-05-24
Payer: COMMERCIAL

## 2021-05-24 DIAGNOSIS — F41.1 GAD (GENERALIZED ANXIETY DISORDER): Primary | ICD-10-CM

## 2021-05-24 DIAGNOSIS — F33.1 MODERATE EPISODE OF RECURRENT MAJOR DEPRESSIVE DISORDER (HCC): ICD-10-CM

## 2021-05-24 PROCEDURE — 99215 OFFICE O/P EST HI 40 MIN: CPT | Performed by: PSYCHIATRY & NEUROLOGY

## 2021-05-24 RX ORDER — HYDROXYZINE HYDROCHLORIDE 25 MG/1
75 TABLET, FILM COATED ORAL NIGHTLY
Qty: 270 TABLET | Refills: 2 | Status: SHIPPED | OUTPATIENT
Start: 2021-05-24 | End: 2022-01-24 | Stop reason: SDUPTHER

## 2021-05-24 RX ORDER — LAMOTRIGINE 100 MG/1
100 TABLET ORAL 2 TIMES DAILY
Qty: 180 TABLET | Refills: 3 | Status: SHIPPED | OUTPATIENT
Start: 2021-05-24 | End: 2022-01-24 | Stop reason: SDUPTHER

## 2021-05-24 RX ORDER — FLUOXETINE HYDROCHLORIDE 40 MG/1
80 CAPSULE ORAL
Qty: 60 CAPSULE | Refills: 3 | Status: SHIPPED | OUTPATIENT
Start: 2021-05-24 | End: 2022-01-10

## 2021-05-24 NOTE — PROGRESS NOTES
5/24/2021    TELEHEALTH PSYCHIATRY FOLLOWUP -- Audio/Visual (During HXRES-20 public health emergency)    Psychiatric Diagnoses:  1. ALFREDO (generalized anxiety disorder)    2. Moderate episode of recurrent major depressive disorder (HCC)        Assessment/Plan:   Patient is \"getting out more\" getting more exercise. Is planning a trip to [place]. Patient denies thoughts of harm to self or others. No acute safety concerns voiced at this appointment. Discussed COVID-19 vaccine and patient says she will     ASSESSMENT/PLAN: Medication changes needed: Patient tolerating current medication regimen but will proceed with changes below in order to address continued symptoms. Medication has been helpful so far and patient has had no side effects other than those listed in the subjective portion of this note. Additionally, patient denies auditory or visual hallucinations. No paranoid thoughts, no delusional thought content expressed in this appointment. No thoughts of harm to self or others voiced. MOOD: IMPROVEMENT: Patient reports improvement in symptoms of low mood, low energy and low motivation, as well as anhedonia feels able to attend to ADLs. continues to have some intermittent depressive symptoms. SLEEP: GOOD/IMPROVED: Sleeping better, and reports is soundly through the night. No nightmares, nighttime awakenings. Well rested in the morning. Sleeping 8+ hours a night. ATTENTION AND FOCUS: Patient denies any concerns for inattention or issues with working memory. ANXIETY: CONTINUED SIGNIFICANT CONCERNS: Patient reports continued symptoms of anxiety, which are largely unchanged. Anxiety continues to cause difficulty in interpersonal relationships and with performing her duties at work. however, increase in fluoxetine (Prozac) has helped.      BIPOLAR ROBIN: patient has no history of robin symptoms    SUBSTANCE USE: patient denies any history of substance use, and no concerns today    COUNSELING or THERAPY: Continue to encourage therapy as part of ongoing treatment of their mental health concerns. Continue to encourage physical activity and adherence to medication regimen. DATE and changes made  · 8/17/20  ? STOP Vraylar   ? START LAMICTAL 25 mg QD to 25 mg BID   ? START Propranolol 10-20 mg TID   ? START Hydroxyzine 75 mg QHS   ? Minimize ambien use  · 9/1/20  ? Had to stop propranolol due to hypertension on home cuff and needed to switch to   ? Pt STOPPED hydroxyzine, causing headaches   ? STOPPED propranolol, on selective beta blocker for BP now and monitoring at home on wrist cuff. Sending in rx to see if insurance will cover an arm cuff   ? 50 mg QD now, in 2 weeks if no rash will increase to 50 mg BID   ? lamictal 50 mg   ? rozerem 8 mg QHS   ? buspar 7.5 mg TID   · 9/15/20  ? Sleeping 6 hours  ? Lamictal 50 mg QD BID is current dose will increase to 50 mg BID   ? Rozerem 8 mg QHS   ? Buspar 7.5 mg TID   ? genesite ordered  · 10/6/20  ? Resend genesight   ? Buspar 7.5 mg TID   § Buspar 15 mg TID   ? Rozerem 8 mg QHS   ? Lamictal 50 mg BID  · 10/20  ? Still waiting on genesite, patient will come to office for this   ? START HYDROXYZINE 25 mg QHS PRN sleep  · 11/4  ? Still waiting on genesite, patient will come to office for this   ? START HYDROXYZINE 25 mg QHS PRN sleep   ? CONT LAMICTAL 50 mg BID   CONT ROZEREM 8 mg QHS   ? L-methylfolate   · 12/10  ? CONT HYDROXYZINE 25 mg QHS PRN sleep   ? INCREASE LAMICTAL 50 mg BID to LAMICTAL 100 mg BID   CONT ROZEREM 8 mg QHS   ? START PRISTIQ   ? BUSPAR 20 mg TID   · 1/14/21  ? CONT HYDROXYZINE 25 mg QHS PRN sleep   ? LAMICTAL 100 mg BID   CONT ROZEREM 8 mg QHS   ? STOP PRISTIQ   ? START PROZAC 20 mg QD   ? BUSPAR 20 mg TID  · 2/1/21  ? Prozac 20 mg QD to 40 mg QD (has tolerated well, attempting more rapid taper for persistent depressive symptoms and sig irritability)   ? Cont Rozerem 8 mg QHS   ? Stop pristiq  ? Lamictal 100 mg BID   ? Buspar continue   · 2/18/21  ? Lamictal 100 mg BID   ? Ramelteon 8 mg QHS   ? Buspar 20 mg TID   ? Hydroxyzine 25 mg QHS   ? Prozac 40 mg daily  · 4/23/21  ? fluoxetine (Prozac) 60 mg   ? buspirone (Buspar) 20 mg three times daily   ? Hydroxyzine hcl (Atarax) 25 mg every night   ? Lamotrigine (Lamictal) 100 mg twice daily   ? ramelteon discontinued due to cost   ? Will try propranolol hcl (Inderal) 10 mg three times daily as needed anxiety, had tried in past discussed risk of hypotension and patient is already on metoprolol for hypertension, but we need to try to wean patient off of her alprazolam (Xanax), and this may help with anxiety symptoms which are primarily somatic  · 5/24/21  · Lamotrigine (Lamictal) 100 mg twice daily continue   · Ramelteon (Rozerem) 8 mg every night   · Buspirone (Buspar) 20 mg three times daily patient has stopped this    · hydroxyzine hcl (Atarax) 25 mg every night                                           · fluoxetine (Prozac) 60 mg daily will increase 80 mg daily                   Medical Diagnoses:  Patient Active Problem List   Diagnosis    Anxiety and depression    PTSD (post-traumatic stress disorder)    Migraine headache    Nausea and vomiting    S/P abdominal hysterectomy    Severe recurrent major depressive [facility] with psychotic features ([facility])    Morbid obesity ([facility])    Overactive bladder    Stress incontinence, female    Abdominal pain    Blood in the urine    Essential hypertension    Yeast infection    Vitamin D deficiency    Former smoker    Obsessive-compulsive disorder    Bilateral dry eyes    Shortness of breath    MARBELLA (obstructive sleep apnea)    Moderate episode of recurrent major depressive disorder ([facility])    ALFREDO (generalized anxiety disorder)         AT TODAY'S VISIT     Crisis plan reviewed and patient verbally contracts for safety. Go to ED with emergent symptoms or safety concerns.   Risks, benefits, side effects of medications, including any / all black box warnings, discussed with patient, who verbalizes their understanding. Pt is kings for safety and denies thoughts of SI/HI. Amenable to plan. No acute concerns to address regarding medications. Subjective:      Patient denies medication side effects apart from those mentioned in the assessment and plan. Patient reports they have been compliant with current medication regimen and have not missed a dose. At today's visit, patient denies thoughts of harm to self or others since last appointment, and denies auditory or visual hallucinations. ROS:  [x] All negative/unchanged except if checked.  Explain positive(checked items) below:     Denies any new or changed physical symptoms other than those noted in the subjective portion of this note   [] Constitutional  [] Eyes  [] Ear/Nose/Mouth/Throat  [] Respiratory  [] CV  [] GI  []   [] Musculoskeletal  [] Skin/Breast  [] Neurological  [] Endocrine  [] Heme/Lymph  [] Allergic/Immunologic    MEDICATIONS:    Current Outpatient Medications:     FLUoxetine (PROZAC) 40 MG capsule, Take 2 capsules by mouth every morning (before breakfast), Disp: 60 capsule, Rfl: 3    lamoTRIgine (LAMICTAL) 100 MG tablet, Take 1 tablet by mouth 2 times daily, Disp: 180 tablet, Rfl: 3    hydrOXYzine (ATARAX) 25 MG tablet, Take 3 tablets by mouth nightly, Disp: 270 tablet, Rfl: 2    pantoprazole (PROTONIX) 40 MG tablet, Take 1 tablet by mouth daily, Disp: 90 tablet, Rfl: 1    metoprolol succinate (TOPROL XL) 25 MG extended release tablet, Take 1 tablet by mouth daily, Disp: 90 tablet, Rfl: 1    ketorolac (TORADOL) 10 MG tablet, Take 1 tablet by mouth every 6 hours as needed for Pain, Disp: 20 tablet, Rfl: 0    ibuprofen (ADVIL;MOTRIN) 800 MG tablet, Take 1 tablet by mouth every 6 hours as needed for Pain After toradol dosing complete, Disp: 120 tablet, Rfl: 3    lidocaine (LIDODERM) 5 %, Place 1 patch onto the skin daily 12 hours on, 12 hours off., Disp: 90 patch, Rfl: 1   cycloSPORINE (RESTASIS) 0.05 % ophthalmic emulsion, Place 1 drop into both eyes 2 times daily, Disp: 1 vial, Rfl: 3    Blood Pressure Monitoring (BLOOD PRESSURE KIT) ZELALEM, 1 Units by Does not apply route as needed (for BP monitoring at home for hypertension) Cuff for upper arm, Disp: 1 Device, Rfl: 0    albuterol sulfate  (90 Base) MCG/ACT inhaler, Inhale 2 puffs into the lungs every 6 hours as needed for Wheezing, Disp: 1 Inhaler, Rfl: 2    Examination:    Vitals: not taken in person, most recent vitals in chart reviewed  There were no vitals filed for this visit. Wt Readings from Last 3 Encounters:   04/20/21 231 lb (104.8 kg)   10/16/20 231 lb (104.8 kg)   07/30/20 250 lb (113.4 kg)       BP Readings from Last 3 Encounters:   04/20/21 104/70   10/16/20 130/80   07/30/20 (!) 150/81       Mental Status Examination:    Level of consciousness:  alert and oriented to person, place, and situation  Appearance:  well-appearing good grooming and good hygiene  Behavior/Motor:  no abnormalities noted  Attitude toward examiner: friendly, pleasant and cooperative, attentive and good eye contact  Speech:  spontaneous, normal rate and normal volume   Mood: \"good\"  Affect:  mood congruent  Thought processes:  linear and logical  Thought content:  Denies suicidal or homicidal ideation, denies auditory or visual hallucinations  Cognition:  no deficits in attention, concentration notable, recent memory grossly intact  Concentration intact  Memory intact  Insight good   Judgement fair   Fund of Knowledge adequate    PSYCHOTHERAPY/COUNSELING:  Encourage patient to attend outpatient appointments and therapy. [x] Therapeutic interview  [] Supportive  [x] CBT  [x] Ongoing  [] Other    No follow-ups on file.  patient informed to call for follow-up or to reschedule appointment     Please note this report has been partially produced using speech recognition software  And may cause contain errors related to that system including grammar, punctuation and spelling as well as words and phrases that may seem inappropriate. If there are questions or concerns please feel free to contact me to clarify. Lupe Schrader MD  Electronically signed by Lupe Schrader MD on 5/24/2021 at 5:05 PM  5/24/2021 5:05 PM    Psychiatry   Due to this being a TeleHealth encounter, evaluation of the following organ systems is limited: Vitals/Constitutional/EENT/Resp/CV/GI//MS/Neuro/Skin/Heme-Lymph-Imm. An  electronic signature was used to authenticate this note. --Lupe Schrader MD on 5/24/2021 at 5:05 PM    Pursuant to the emergency declaration under the [address] [place] [address], [facility] waiver authority and the [employer] Act, this Virtual  Visit was conducted, with patient's consent, to reduce the patient's risk of exposure to COVID-19 and provide continuity of care for an established patient Services were provided through a video synchronous discussion virtually to substitute for in-person clinic visit. Treatment Plan:  Reviewed current Medications with the patient. Education provided on the compliance with treatment. Reviewed OARRs, no concerns identified     The anticipated benefits and side effects of the medications, including the anticipated results of not receiving the medication, and of alternatives to the medications were explained to the patient and their informed consent was obtained for starting medications as well as adjusting the doses (titration or tapering) as indicated. The above information was given by physician in verbal form and sufficient understanding was in evidence. The patient participated in discussion of the information and question and/or concerns were addressed before the medication was given. Due to COVID 19 outbreak, patient's office visit was converted to a virtual visit.   Patient was contacted and agreed to proceed with a virtual visit via [employer]  40 mins total for this encounter = 30 minutes with direct communication with patient for encounter as well as 10 mins spent on chart review, and in the ordering of all necessary labs and/or medications  The risks and benefits of converting to a virtual visit were discussed in light of the current infectious disease epidemic. Patient also understood that insurance coverage and co-pays are up to their individual insurance plans.   Patient Location:        Patient's home address  Provider Location ([facility]        [facility]

## 2021-07-22 ENCOUNTER — VIRTUAL VISIT (OUTPATIENT)
Dept: FAMILY MEDICINE CLINIC | Age: 46
End: 2021-07-22

## 2021-07-22 DIAGNOSIS — F33.3 SEVERE RECURRENT MAJOR DEPRESSIVE DISORDER WITH PSYCHOTIC FEATURES (HCC): ICD-10-CM

## 2021-07-22 DIAGNOSIS — K21.9 GASTROESOPHAGEAL REFLUX DISEASE WITHOUT ESOPHAGITIS: ICD-10-CM

## 2021-07-22 DIAGNOSIS — G47.00 INSOMNIA, UNSPECIFIED TYPE: ICD-10-CM

## 2021-07-22 DIAGNOSIS — R00.2 PALPITATIONS: ICD-10-CM

## 2021-07-22 DIAGNOSIS — F41.0 PANIC ATTACK: Primary | ICD-10-CM

## 2021-07-22 DIAGNOSIS — I10 ESSENTIAL HYPERTENSION: ICD-10-CM

## 2021-07-22 PROCEDURE — 99214 OFFICE O/P EST MOD 30 MIN: CPT | Performed by: NURSE PRACTITIONER

## 2021-07-22 RX ORDER — ALPRAZOLAM 2 MG/1
2 TABLET ORAL 2 TIMES DAILY PRN
COMMUNITY
End: 2022-04-27 | Stop reason: SDUPTHER

## 2021-07-22 RX ORDER — IBUPROFEN 800 MG/1
800 TABLET ORAL EVERY 6 HOURS PRN
Qty: 120 TABLET | Refills: 3 | Status: SHIPPED | OUTPATIENT
Start: 2021-07-22 | End: 2021-10-11 | Stop reason: SDUPTHER

## 2021-07-22 RX ORDER — ALPRAZOLAM 2 MG/1
2 TABLET ORAL 2 TIMES DAILY PRN
Qty: 60 TABLET | Refills: 2 | Status: SHIPPED | OUTPATIENT
Start: 2021-07-22 | End: 2021-10-11 | Stop reason: SDUPTHER

## 2021-07-22 RX ORDER — METOPROLOL SUCCINATE 25 MG/1
25 TABLET, EXTENDED RELEASE ORAL DAILY
Qty: 90 TABLET | Refills: 1 | Status: SHIPPED | OUTPATIENT
Start: 2021-07-22 | End: 2021-10-11 | Stop reason: SDUPTHER

## 2021-07-22 RX ORDER — ALPRAZOLAM 2 MG/1
2 TABLET ORAL 2 TIMES DAILY PRN
Status: CANCELLED | OUTPATIENT
Start: 2021-07-22

## 2021-07-22 ASSESSMENT — PATIENT HEALTH QUESTIONNAIRE - PHQ9
SUM OF ALL RESPONSES TO PHQ QUESTIONS 1-9: 0
1. LITTLE INTEREST OR PLEASURE IN DOING THINGS: 0
SUM OF ALL RESPONSES TO PHQ QUESTIONS 1-9: 0
SUM OF ALL RESPONSES TO PHQ QUESTIONS 1-9: 0
2. FEELING DOWN, DEPRESSED OR HOPELESS: 0
SUM OF ALL RESPONSES TO PHQ9 QUESTIONS 1 & 2: 0

## 2021-07-22 NOTE — PROGRESS NOTES
2021    TELEHEALTH EVALUATION -- Audio/Visual (During NCFQO-42 public health emergency)    Due to [place] 19 outbreak, patient's office visit was converted to a virtual visit. Patient was contacted and agreed to proceed with a virtual visit via Doxy. me  The risks and benefits of converting to a virtual visit were discussed in light of the current infectious disease epidemic. Patient also understood that insurance coverage and co-pays are up to their individual insurance plans. Fadia Toledo, was evaluated through a synchronous (real-time) audio-video encounter. The patient (or guardian if applicable) is aware that this is a billable service. Verbal consent to proceed has been obtained within the past 12 months. The visit was conducted pursuant to the emergency declaration under the [address] [place] [address], [address] [place] [address] authority and the [employer] and [employer] Act. Patient identification was verified, and a caregiver was present when appropriate. The patient was located in a state where the provider was credentialed to provide care. Total time spent for this encounter: Not billed by time    The patient is talking with me virtually from her home and I am located at my office in [facility]. HPI:    Fadia Toledo (:  1975) has requested an audio/video evaluation for the following concern(s): Anxiety/depression: Geneva Madrid reports being in a good mood that is stable. The patient is not reporting insomnia, difficulty concentrating and usual interest in activities. This patient is not homicidal or suicidal.  States that she still has been taking the Xanax twice a day but she has not had any significant panic attack or palpitations lately. She feels comfortable taking the medication twice a day because this is the first time in a long time that her mood has been stable.   She states that things in her life have been better lately but she has been more concerned about her  who has been having severe low back pain that keeps him up at night and brings him to tears often. He is also been incontinent of urine at bedtime. She states that she has been sleeping okay at night overall. GERD: She states that she has not had any significant acid reflux symptoms while taking the Protonix routinely. No abdominal discomfort reported either. HTN: She states that she continues to take Toprol-XL routinely and has not had any lightheadedness or dizziness. She feels that her blood pressure has been well controlled lately. Continues low-salt diet. Review of Systems   This patient reports no chest pain or pressure. There is no shortness of breath or cough. The patient reports no nausea or vomiting. There is no heartburn or indigestion. There is no diarrhea or constipation. No black, bloody, mucusy or tarry stool noticed. The patient reports no bloating and no change in appetite. There is no numbness, tingling or swelling in the extremities. Prior to Visit Medications    Medication Sig Taking? Authorizing Provider   ALPRAZolam Samira Anne) 2 MG tablet Take 2 mg by mouth 2 times daily as needed for Sleep. Yes Historical Provider, MD   metoprolol succinate (TOPROL XL) 25 MG extended release tablet Take 1 tablet by mouth daily Yes ANA Leos CNP   ibuprofen (ADVIL;MOTRIN) 800 MG tablet Take 1 tablet by mouth every 6 hours as needed for Pain After toradol dosing complete Yes ANA Leos CNP   ALPRAZolam Samira Anne) 2 MG tablet Take 1 tablet by mouth 2 times daily as needed for Sleep or Anxiety for up to 30 days.  Yes ANA Leos CNP   FLUoxetine (PROZAC) 40 MG capsule Take 2 capsules by mouth every morning (before breakfast) Yes Haydee Jennings MD   lamoTRIgine (LAMICTAL) 100 MG tablet Take 1 tablet by mouth 2 times daily Yes Haydee Jennings MD   hydrOXYzine (ATARAX) 25 MG tablet Take 3 tablets by mouth nightly Yes Latrell Jordan MD   pantoprazole (PROTONIX) 40 MG tablet Take 1 tablet by mouth daily Yes ANA Louis CNP   ketorolac (TORADOL) 10 MG tablet Take 1 tablet by mouth every 6 hours as needed for Pain Yes ANA Louis CNP   cycloSPORINE (RESTASIS) 0.05 % ophthalmic emulsion Place 1 drop into both eyes 2 times daily Yes ANA Louis CNP   Blood Pressure Monitoring (BLOOD PRESSURE KIT) ZELALEM 1 Units by Does not apply route as needed (for BP monitoring at home for hypertension) Cuff for upper arm Yes Latrell Jordan MD   albuterol sulfate  (90 Base) MCG/ACT inhaler Inhale 2 puffs into the lungs every 6 hours as needed for Wheezing Yes ANA Louis CNP       Social History     Tobacco Use    Smoking status: Former Smoker    Smokeless tobacco: Never Used   Vaping Use    Vaping Use: Never used   Substance Use Topics    Alcohol use: No    Drug use: No       PHYSICAL EXAMINATION:  [ INSTRUCTIONS:  \"[x]\" Indicates a positive item  \"[]\" Indicates a negative item  -- DELETE ALL ITEMS NOT EXAMINED]  [x] Alert  [x] Oriented to person/place/time    [x] No apparent distress  [] Toxic appearing    [] Face flushed appearing [] Sclera clear  [] Lips are cyanotic      [x] Breathing appears normal  [] Appears tachypneic      [] Rash on visible skin    [x] Cranial Nerves II-XII grossly intact    [x] Motor grossly intact in visible upper extremities    [] Motor grossly intact in visible lower extremities    [x] Normal Mood  [] Anxious appearing    [] Depressed appearing  [] Confused appearing      [] Poor short term memory  [] Poor long term memory    [] OTHER:      Due to this being a TeleHealth encounter, evaluation of the following organ systems is limited: Vitals/Constitutional/EENT/Resp/CV/GI//MS/Neuro/Skin/Heme-Lymph-Imm. ASSESSMENT/PLAN:   Diagnosis Orders   1. Panic attack  ALPRAZolam (XANAX) 2 MG tablet   2.  Palpitations  metoprolol succinate (TOPROL XL) 25 MG extended release tablet   3. Gastroesophageal reflux disease without esophagitis     4. Essential hypertension  metoprolol succinate (TOPROL XL) 25 MG extended release tablet   5. Insomnia, unspecified type     6. Severe recurrent major depressive disorder with psychotic features ([facility] [address]           Return in about 3 months (around 10/22/2021) for anxiety. 1.  2.  5.  6.   Symptoms have been well managed with current combination of medications. Mood has been stable overall. Discussed recommendation to slowly wean dosing of Xanax over time. It is likely that she is experiencing rebound anxiety from length of time from taking the medication routinely at higher doses. 3.  No symptoms reported while taking Protonix routinely. Continue the same. 4.  Blood pressure is well controlled on current medication. No recent palpitations. Controlled Substance Monitoring:    Acute and Chronic Pain Monitoring:   RX Monitoring 7/22/2021   Attestation -   Periodic Controlled Substance Monitoring Possible medication side effects, risk of tolerance/dependence & alternative treatments discussed. ;No signs of potential drug abuse or diversion identified. ;Assessed functional status. Chronic Pain > 80 MEDD -       Please note this report has been partially produced using speech recognition software and may cause contain errors related to that system including grammar, punctuation and spelling as well as words and phrases that may seem inappropriate. If there are questions or concerns please feel free to contact me to clarify. An  electronic signature was used to authenticate this note.     --ANA Dimas - CNP on 7/22/2021 at 2:49 PM        Pursuant to the emergency declaration under the [facility] waiver authority and the [employer] and [employer] Act, this Virtual  Visit was conducted, with patient's consent, to reduce the patient's risk of exposure to COVID-19 and provide continuity of care for an established patient. Services were provided through a video synchronous discussion virtually to substitute for in-person clinic visit. [phone]

## 2021-08-03 ENCOUNTER — HOSPITAL ENCOUNTER (EMERGENCY)
Age: 46
Discharge: HOME | End: 2021-08-03
Attending: EMERGENCY MEDICINE
Payer: COMMERCIAL

## 2021-08-03 VITALS
HEART RATE: 70 BPM | SYSTOLIC BLOOD PRESSURE: 116 MMHG | OXYGEN SATURATION: 97 % | RESPIRATION RATE: 18 BRPM | DIASTOLIC BLOOD PRESSURE: 98 MMHG | TEMPERATURE: 98.6 F | WEIGHT: 240 LBS | HEIGHT: 60 IN | BODY MASS INDEX: 47.12 KG/M2

## 2021-08-03 DIAGNOSIS — H60.331 ACUTE SWIMMER'S EAR OF RIGHT SIDE: Primary | ICD-10-CM

## 2021-08-03 PROCEDURE — 99283 EMERGENCY DEPT VISIT LOW MDM: CPT

## 2021-08-03 ASSESSMENT — PAIN DESCRIPTION - LOCATION
LOCATION: EAR
LOCATION: EAR

## 2021-08-03 ASSESSMENT — PAIN DESCRIPTION - FREQUENCY: FREQUENCY: CONTINUOUS

## 2021-08-03 ASSESSMENT — PAIN DESCRIPTION - PAIN TYPE
TYPE: ACUTE PAIN
TYPE: ACUTE PAIN

## 2021-08-03 ASSESSMENT — PAIN DESCRIPTION - PROGRESSION: CLINICAL_PROGRESSION: GRADUALLY WORSENING

## 2021-08-03 ASSESSMENT — PAIN DESCRIPTION - DESCRIPTORS
DESCRIPTORS: ACHING;THROBBING
DESCRIPTORS: THROBBING

## 2021-08-03 ASSESSMENT — PAIN SCALES - GENERAL
PAINLEVEL_OUTOF10: 8
PAINLEVEL_OUTOF10: 9

## 2021-08-03 ASSESSMENT — PAIN DESCRIPTION - ORIENTATION
ORIENTATION: RIGHT
ORIENTATION: RIGHT

## 2021-08-03 ASSESSMENT — PAIN DESCRIPTION - ONSET: ONSET: ON-GOING

## 2021-08-03 NOTE — ED PROVIDER NOTES
CC/HPI: 60-year-old female to the emergency department chief complaint of right ear pain x2 days. Patient states she was camping and believes she got fluid and water in her ear. No nausea no vomiting no dizziness. No URI symptoms or other congestion. No drainage no history of similar. Patient had ear tubes when she was little      VITALS/PMH/PSH: Reviewed per nurses notes    REVIEW OF SYSTEMS: As in chief complaint history of present illness, otherwise all other systems are reviewed and negative the total 10 systems reviewed    PHYSICAL EXAM:  GEN: Pt alert and oriented, no acute distress  HEENT:         Normocephalic/Atramatic        PERRL, EOMI        EACs clear bilaterally. Left tympanic membrane without erythema. Examination of the right EAC shows mild swelling with moderate erythema and areas of whitish moisture. Patient with tenderness with manipulation of external ear. No redness swelling or tenderness of mastoid process       Throat non-edematous. No erythema noted. No exudates noted. Moist membranes  NECK: Nontender, no signs of trauma, no lymphadenopathy  HEART: Reg S1/S2, without murmer, rub or gallop  LUNGS: Clear to auscultation bilaterally, respirations even and unlabored  MUSCULOSKELETAL/EXTREMITITES:  No signs of trauma, cyanosis or edema. LYMPH: no peripheral lympadenopathy noted  SKIN:  Warm & dry, no rash  NEUROLOGIC:  Alert and oriented. Speech clear    Medical decision making/ED course;  Patient remained stable throughout the course of emergency department stay. Clinical impression;  1) acute otitis externa right ear    Disposition/plan;   Patient discharged home in stable condition given discharge instructions on otitis externa. Patient given prescription for Cortisporin otic solution. Patient to follow-up with primary care physician in 3 to 5 days unless symptoms resolved. Over-the-counter ibuprofen as needed for discomfort.   Encouraged to return for worsening and or changes to symptoms     Abhi Garcia,   08/03/21 [id number]

## 2021-08-03 NOTE — ED NOTES
Pt is given d/c instructions and one script. Pt voiced understanding of d/c instructions without further questions.       Telly Durant RN  08/03/21 [id number]

## 2021-08-06 ENCOUNTER — TELEPHONE (OUTPATIENT)
Dept: FAMILY MEDICINE CLINIC | Age: 46
End: 2021-08-06

## 2021-08-06 NOTE — TELEPHONE ENCOUNTER
It sounds like she may have fluid in her ears or maybe even an ear infection. Is she able to come to the ready care to have this evaluated? I think that she should be ok to go ahead and get her 2nd vaccine.

## 2021-08-06 NOTE — TELEPHONE ENCOUNTER
Patient called said she received the vaccine on July 29 she had gotten [employer]  and since then she has not been herself she feels like she is in a tunnel feels like everything is in slow motion and she cant hear people talking to her and she cant stand noise she was in bed for 3 days the fan ever blowing is bothering her. She is supposed to get the second one on the 34 of August and wanted to know if she should get this. cp

## 2021-08-09 ENCOUNTER — TELEPHONE (OUTPATIENT)
Dept: FAMILY MEDICINE CLINIC | Age: 46
End: 2021-08-09

## 2021-08-09 NOTE — TELEPHONE ENCOUNTER
Pt is calling in today wanting to find out if it is safe to get second covid vaccine shot. Pt states that she thinks had a bad reaction to the first shot  ( Pt states she had some hearing loss and felt like she was in a tunnel,)    Pt states that she is due to get her second dose tomorrow     Pt states that she does not want to get the shot unless she hears from pcp.      Please advise

## 2021-08-10 ENCOUNTER — OFFICE VISIT (OUTPATIENT)
Dept: BEHAVIORAL/MENTAL HEALTH CLINIC | Age: 46
End: 2021-08-10
Payer: COMMERCIAL

## 2021-08-10 VITALS
DIASTOLIC BLOOD PRESSURE: 67 MMHG | BODY MASS INDEX: 45.36 KG/M2 | SYSTOLIC BLOOD PRESSURE: 133 MMHG | HEIGHT: 59 IN | HEART RATE: 79 BPM | WEIGHT: 225 LBS

## 2021-08-10 DIAGNOSIS — E03.8 OTHER SPECIFIED HYPOTHYROIDISM: ICD-10-CM

## 2021-08-10 DIAGNOSIS — F41.1 GAD (GENERALIZED ANXIETY DISORDER): ICD-10-CM

## 2021-08-10 DIAGNOSIS — F33.1 MODERATE EPISODE OF RECURRENT MAJOR DEPRESSIVE DISORDER (HCC): Primary | ICD-10-CM

## 2021-08-10 PROCEDURE — 99214 OFFICE O/P EST MOD 30 MIN: CPT | Performed by: PSYCHIATRY & NEUROLOGY

## 2021-08-10 RX ORDER — LIOTHYRONINE SODIUM 25 UG/1
TABLET ORAL
Qty: 21 TABLET | Refills: 0 | Status: SHIPPED | OUTPATIENT
Start: 2021-08-10 | End: 2021-09-23

## 2021-08-10 NOTE — TELEPHONE ENCOUNTER
I would still advise getting the second shot. She may have developed some fluid behind the ears as an immune response. This can happen as well as swollen lymph nodes.

## 2021-08-10 NOTE — PROGRESS NOTES
8/10/2021    IN PERSON Appointment PSYCHIATRY FOLLOWUP       Psychiatric Diagnoses:  1. Moderate episode of recurrent major depressive disorder ([facility] [address]    2. Other specified hypothyroidism    3. ALFREDO (generalized anxiety disorder)            30 mins total for this encounter = time in minutes with direct communication with patient face to face for appointment at [facility] point office location     Patient and Provider location: [address] [place] [address]     Assessment/Plan:   Patient denies thoughts of harm to self or others. No acute safety concerns voiced at this appointment. ASSESSMENT/PLAN: Medication changes needed given the current presentation of symptoms. Patient was amenable to plan related to medications and endorsed understanding of the risks and benefits, which were explained and discussed. No acute concerns. No thoughts of harm to self or others voiced. MOOD: CONTINUED LOW MOOD: Patient continues to experience symptoms of low mood, low energy and low motivation, as well as anhedonia, but still motivated to complete necessary work and attend to activities of daily living. SLEEP: UNCHANGED: Continues to struggle with sleep, which is unchanged. Sleeping 8 hours a night. Says she has not been using CPAP, does have machine but says it is uncomfortable. ATTENTION AND FOCUS: WELL-CONTROLLED (on medication): Patient reports improvement in attention and focus. Medication has helped with memory, impulse-control, concentration, focus, and attentiveness. Has not had side effects from this (denies tachycardia, palpitations, or changes in appetite or sleep). ANXIETY: WELL-CONTROLLED: Significant improvement in anxiety. Able to utilize coping skills effectively to manage anxiety. Patient reports minimal anxiety throughout the day and is able to accomplish goals and attend to activities of daily living.       BIPOLAR ROBIN: NO ROBIN/HYPOMANIA REPORTED: Patient denies recent symptoms of robin including racing thoughts, increased goal-directed activity, decreased need for sleep, increased energy, persistently elevated or irritable mood, impulsivity, grandiosity, paranoid thoughts or other signs of tahira. COUNSELING or THERAPY:   Continue to encourage therapy as part of ongoing treatment of their mental health concerns. Continue to encourage physical activity and adherence to medication regimen. DATE and changes made  · 8/17/20  ? STOP Vraylar   ? START LAMICTAL 25 mg QD to 25 mg BID   ? START Propranolol 10-20 mg TID   ? START Hydroxyzine 75 mg QHS   ? Minimize ambien use  · 9/1/20  ? Had to stop propranolol due to hypertension on home cuff and needed to switch to   ? Pt STOPPED hydroxyzine, causing headaches   ? STOPPED propranolol, on selective beta blocker for BP now and monitoring at home on wrist cuff. Sending in rx to see if insurance will cover an arm cuff   ? 50 mg QD now, in 2 weeks if no rash will increase to 50 mg BID   ? lamictal 50 mg   ? rozerem 8 mg QHS   ? buspar 7.5 mg TID   · 9/15/20  ? Sleeping 6 hours  ? Lamictal 50 mg QD BID is current dose will increase to 50 mg BID   ? Rozerem 8 mg QHS   ? Buspar 7.5 mg TID   ? genesite ordered  · 10/6/20  ? Resend genesight   ? Buspar 7.5 mg TID   § Buspar 15 mg TID   ? Rozerem 8 mg QHS   ? Lamictal 50 mg BID  · 10/20  ? Still waiting on genesite, patient will come to office for this   ? START HYDROXYZINE 25 mg QHS PRN sleep  · 11/4  ? Still waiting on genesite, patient will come to office for this   ? START HYDROXYZINE 25 mg QHS PRN sleep   ? CONT LAMICTAL 50 mg BID   CONT ROZEREM 8 mg QHS   ? L-methylfolate   · 12/10  ? CONT HYDROXYZINE 25 mg QHS PRN sleep   ? INCREASE LAMICTAL 50 mg BID to LAMICTAL 100 mg BID   CONT ROZEREM 8 mg QHS   ? START PRISTIQ   ? BUSPAR 20 mg TID   · 1/14/21  ? CONT HYDROXYZINE 25 mg QHS PRN sleep   ? LAMICTAL 100 mg BID   CONT ROZEREM 8 mg QHS   ? STOP PRISTIQ   ?  START PROZAC 20 mg QD   ? BUSPAR 20 mg TID  · 2/1/21  ? Prozac 20 mg QD to 40 mg QD (has tolerated well, attempting more rapid taper for persistent depressive symptoms and sig irritability)   ? Cont Rozerem 8 mg QHS   ? Stop pristiq  ? Lamictal 100 mg BID   ? Buspar continue   · 2/18/21  ? Lamictal 100 mg BID   ? Ramelteon 8 mg QHS   ? Buspar 20 mg TID   ? Hydroxyzine 25 mg QHS   ? Prozac 40 mg daily  · 4/23/21  ? fluoxetine (Prozac) 60 mg   ? buspirone (Buspar) 20 mg three times daily   ? Hydroxyzine hcl (Atarax) 25 mg every night   ? Lamotrigine (Lamictal) 100 mg twice daily   ? ramelteon discontinued due to cost   ? Will try propranolol hcl (Inderal) 10 mg three times daily as needed anxiety, had tried in past discussed risk of hypotension and patient is already on metoprolol for hypertension, but we need to try to wean patient off of her alprazolam (Xanax), and this may help with anxiety symptoms which are primarily somatic  · 5/24/21  ? Lamotrigine (Lamictal) 100 mg twice daily continue   ? Ramelteon (Rozerem) 8 mg every night   ?  Buspirone (Buspar) 20 mg three times daily patient has stopped this    ? hydroxyzine hcl (Atarax) 25 mg every night                                           ? fluoxetine (Prozac) 60 mg daily will increase 80 mg daily  · 8/10/21  · hydroxyzine hcl (Atarax) 75 mg every night  · fluoxetine (Prozac) 80 mg daily   · lamotrigine (Lamictal) 100 mg twice daily   · Has not been taking ramelteon (Rozerem) due to cost   · START liothyronine (Cytomel) 12.5 mcg for 2 weeks then 25 mcg for augmentation of antidepressant therapy   · Thyroid labs         Medical Diagnoses:  Patient Active Problem List   Diagnosis    Anxiety and depression    PTSD (post-traumatic stress disorder)    Migraine headache    Nausea and vomiting    S/P abdominal hysterectomy    Severe recurrent major depressive disorder with psychotic features ([facility] [address]    Morbid obesity ([facility] [address]    Overactive bladder    Stress incontinence, female    Abdominal pain    Blood in the urine    Essential hypertension    Yeast infection    Vitamin D deficiency    Former smoker    Obsessive-compulsive disorder    Bilateral dry eyes    Shortness of breath    MARBELLA (obstructive sleep apnea)    Moderate episode of recurrent major depressive disorder (HCC)    ALFREDO (generalized anxiety disorder)         AT TODAY'S VISIT     1. No Labs ordered today  2. Crisis plan reviewed and patient verbally contracts for safety. Go to ED with emergent symptoms or safety concerns. 3. Risks, benefits, side effects of medications, including any / all black box warnings, discussed with patient, who verbalizes their understanding. Pt is kings for safety and denies thoughts of SI/HI. Amenable to plan. No acute concerns to address regarding medications. Subjective:      Patient denies medication side effects apart from those mentioned in the assessment and plan. Patient reports they have been compliant with current medication regimen and have not missed a dose. At today's visit, patient denies thoughts of harm to self or others since last appointment, and denies auditory or visual hallucinations.      At today's visit, pt reports that since our last visit, their average MOOD has been (on a scale of 1-10, with 1 being severely depressed and 10 being not depressed at all          Very depressed [] 1  [] 2  [] 3  [] 4  [] 5  [] 6  [x] 7  [] 8  [] 9  [] 10  Not depressed    At today's visit, pt reports that since our last visit, their average ANXIETY has been (on a scale of 1-10, with 10 being severely anxious and 1 being not anxious at all)            Not anxious [] 1     [] 2     [] 3     [x] 4   [] 5    [] 6      [] 7   [] 8   [] 9       [] 10  Very anxious     At today's visit, pt reports that since our last visit, their average APPETITE has been  [] Decreased     [x] Normal/Unchanged   [] Increased      At today's visit, pt reports that since our last visit, their average Energy has been   [] Poor    [x] Average  [] Increased         Aggression:  [] yes  [x] no    Patient is [x] Able to contract for safety  [] unable to CONTRACT FOR SAFETY     ROS:  [x] All negative/unchanged except if checked. Explain positive(checked items) below:     Denies any new or changed physical symptoms other than those noted in the subjective portion of this note   [] Constitutional  [] Eyes  [] Ear/Nose/Mouth/Throat  [] Respiratory  [] CV  [] GI  []   [] Musculoskeletal  [] Skin/Breast  [] Neurological  [] Endocrine  [] Heme/Lymph  [] Allergic/Immunologic    MEDICATIONS:    Current Outpatient Medications:     liothyronine (CYTOMEL) 25 MCG tablet, Take 0.5 tablets by mouth daily for 14 days, THEN 1 tablet daily for 14 days. , Disp: 21 tablet, Rfl: 0    neomycin-polymyxin-hydrocortisone (CORTISPORIN) 3.5-09683-6 otic solution, Place 4 drops into the right ear 3 times daily for 10 days Instill into right ear, Disp: 1 Bottle, Rfl: 1    ALPRAZolam (XANAX) 2 MG tablet, Take 2 mg by mouth 2 times daily as needed for Sleep., Disp: , Rfl:     metoprolol succinate (TOPROL XL) 25 MG extended release tablet, Take 1 tablet by mouth daily, Disp: 90 tablet, Rfl: 1    ibuprofen (ADVIL;MOTRIN) 800 MG tablet, Take 1 tablet by mouth every 6 hours as needed for Pain After toradol dosing complete, Disp: 120 tablet, Rfl: 3    ALPRAZolam (XANAX) 2 MG tablet, Take 1 tablet by mouth 2 times daily as needed for Sleep or Anxiety for up to 30 days. , Disp: 60 tablet, Rfl: 2    FLUoxetine (PROZAC) 40 MG capsule, Take 2 capsules by mouth every morning (before breakfast), Disp: 60 capsule, Rfl: 3    lamoTRIgine (LAMICTAL) 100 MG tablet, Take 1 tablet by mouth 2 times daily, Disp: 180 tablet, Rfl: 3    hydrOXYzine (ATARAX) 25 MG tablet, Take 3 tablets by mouth nightly, Disp: 270 tablet, Rfl: 2    pantoprazole (PROTONIX) 40 MG tablet, Take 1 tablet by mouth daily, Disp: 90 tablet, Rfl: 1    cycloSPORINE (RESTASIS) 0.05 % ophthalmic emulsion, Place 1 drop into both eyes 2 times daily, Disp: 1 vial, Rfl: 3    Blood Pressure Monitoring (BLOOD PRESSURE KIT) ZELALEM, 1 Units by Does not apply route as needed (for BP monitoring at home for hypertension) Cuff for upper arm, Disp: 1 Device, Rfl: 0    albuterol sulfate  (90 Base) MCG/ACT inhaler, Inhale 2 puffs into the lungs every 6 hours as needed for Wheezing, Disp: 1 Inhaler, Rfl: 2    Examination:    Vitals: not taken in person, most recent vitals in chart reviewed  Vitals:    08/10/21 1512   BP: 133/67   Pulse: 79       Wt Readings from Last 3 Encounters:   08/10/21 225 lb (102.1 kg)   08/03/21 240 lb (108.9 kg)   04/20/21 231 lb (104.8 kg)       BP Readings from Last 3 Encounters:   08/10/21 133/67   08/03/21 (!) 116/98   04/20/21 104/70           Labs:   no recent labs    Mental Status Examination:    Level of consciousness:  alert and oriented to person, place, and situation  Appearance:  well-appearing good grooming and good hygiene  Behavior/Motor:  no abnormalities noted  Attitude toward examiner: friendly, pleasant and cooperative, attentive and good eye contact  Speech:  spontaneous, normal rate and normal volume   Mood: \"down a bit, tired\"  Affect:  mood congruent  Thought processes:  linear and logical  Thought content:  Denies suicidal or homicidal ideation, denies auditory or visual hallucinations  Cognition:  no deficits in attention, concentration notable, recent memory grossly intact  Concentration intact  Memory intact  Insight good   Judgement fair   Fund of Knowledge adequate    Treatment Plan:  Reviewed current Medications with the patient. Education provided on the compliance with treatment.    Reviewed OARRs, no concerns identified     The anticipated benefits and side effects of the medications, including the anticipated results of not receiving the medication, and of alternatives to the medications were explained to the patient and their informed consent was obtained

## 2021-08-12 NOTE — TELEPHONE ENCOUNTER
Please attempt contact with patient x3 and then send a letter instructing her to call the office if she is not able to be reached by phone.

## 2021-09-23 ENCOUNTER — APPOINTMENT (OUTPATIENT)
Dept: GENERAL RADIOLOGY | Age: 46
End: 2021-09-23
Payer: COMMERCIAL

## 2021-09-23 ENCOUNTER — VIRTUAL VISIT (OUTPATIENT)
Dept: BEHAVIORAL/MENTAL HEALTH CLINIC | Age: 46
End: 2021-09-23
Payer: COMMERCIAL

## 2021-09-23 ENCOUNTER — HOSPITAL ENCOUNTER (EMERGENCY)
Age: 46
Discharge: HOME | End: 2021-09-23
Attending: EMERGENCY MEDICINE
Payer: COMMERCIAL

## 2021-09-23 VITALS
HEIGHT: 57 IN | WEIGHT: 220 LBS | OXYGEN SATURATION: 98 % | HEART RATE: 75 BPM | BODY MASS INDEX: 47.46 KG/M2 | DIASTOLIC BLOOD PRESSURE: 91 MMHG | TEMPERATURE: 98.3 F | SYSTOLIC BLOOD PRESSURE: 124 MMHG | RESPIRATION RATE: 18 BRPM

## 2021-09-23 DIAGNOSIS — F41.1 GAD (GENERALIZED ANXIETY DISORDER): Primary | ICD-10-CM

## 2021-09-23 DIAGNOSIS — F33.1 MODERATE EPISODE OF RECURRENT MAJOR DEPRESSIVE DISORDER (HCC): ICD-10-CM

## 2021-09-23 DIAGNOSIS — R06.02 SHORTNESS OF BREATH: ICD-10-CM

## 2021-09-23 DIAGNOSIS — B34.9 VIRAL ILLNESS: Primary | ICD-10-CM

## 2021-09-23 PROCEDURE — 71045 X-RAY EXAM CHEST 1 VIEW: CPT

## 2021-09-23 PROCEDURE — 99214 OFFICE O/P EST MOD 30 MIN: CPT | Performed by: PSYCHIATRY & NEUROLOGY

## 2021-09-23 PROCEDURE — 99283 EMERGENCY DEPT VISIT LOW MDM: CPT

## 2021-09-23 PROCEDURE — U0003 INFECTIOUS AGENT DETECTION BY NUCLEIC ACID (DNA OR RNA); SEVERE ACUTE RESPIRATORY SYNDROME CORONAVIRUS 2 (SARS-COV-2) (CORONAVIRUS DISEASE [COVID-19]), AMPLIFIED PROBE TECHNIQUE, MAKING USE OF HIGH THROUGHPUT TECHNOLOGIES AS DESCRIBED BY CMS-2020-01-R: HCPCS

## 2021-09-23 PROCEDURE — 6370000000 HC RX 637 (ALT 250 FOR IP): Performed by: EMERGENCY MEDICINE

## 2021-09-23 PROCEDURE — U0005 INFEC AGEN DETEC AMPLI PROBE: HCPCS

## 2021-09-23 PROCEDURE — 93005 ELECTROCARDIOGRAM TRACING: CPT

## 2021-09-23 RX ORDER — PREDNISONE 20 MG/1
40 TABLET ORAL DAILY
Qty: 8 TABLET | Refills: 0 | Status: SHIPPED | OUTPATIENT
Start: 2021-09-23 | End: 2021-09-27

## 2021-09-23 RX ORDER — ALBUTEROL SULFATE 90 UG/1
2 AEROSOL, METERED RESPIRATORY (INHALATION) 4 TIMES DAILY PRN
Qty: 18 G | Refills: 0 | Status: SHIPPED | OUTPATIENT
Start: 2021-09-23 | End: 2022-01-24 | Stop reason: SDUPTHER

## 2021-09-23 RX ORDER — PREDNISONE 20 MG/1
40 TABLET ORAL ONCE
Status: COMPLETED | OUTPATIENT
Start: 2021-09-23 | End: 2021-09-23

## 2021-09-23 RX ADMIN — PREDNISONE 40 MG: 20 TABLET ORAL at 19:38

## 2021-09-23 ASSESSMENT — ENCOUNTER SYMPTOMS
ABDOMINAL PAIN: 0
COLOR CHANGE: 0
BACK PAIN: 0
NAUSEA: 0
SINUS PRESSURE: 1
VOMITING: 0
EYE REDNESS: 0
EYE DISCHARGE: 0
SHORTNESS OF BREATH: 1
CHEST TIGHTNESS: 1
SORE THROAT: 1
DIARRHEA: 0
COUGH: 1

## 2021-09-23 ASSESSMENT — PAIN SCALES - GENERAL: PAINLEVEL_OUTOF10: 4

## 2021-09-23 ASSESSMENT — PAIN DESCRIPTION - DESCRIPTORS
DESCRIPTORS: ACHING
DESCRIPTORS: ACHING

## 2021-09-23 ASSESSMENT — PAIN DESCRIPTION - ONSET
ONSET: ON-GOING
ONSET: ON-GOING

## 2021-09-23 ASSESSMENT — PAIN DESCRIPTION - PROGRESSION
CLINICAL_PROGRESSION: GRADUALLY WORSENING
CLINICAL_PROGRESSION: GRADUALLY IMPROVING

## 2021-09-23 ASSESSMENT — PAIN DESCRIPTION - PAIN TYPE
TYPE: ACUTE PAIN
TYPE: ACUTE PAIN

## 2021-09-23 ASSESSMENT — PAIN DESCRIPTION - FREQUENCY
FREQUENCY: CONTINUOUS
FREQUENCY: CONTINUOUS

## 2021-09-23 ASSESSMENT — PAIN DESCRIPTION - LOCATION
LOCATION: GENERALIZED
LOCATION: CHEST

## 2021-09-23 NOTE — ED PROVIDER NOTES
[facility] ED  EMERGENCY DEPARTMENT ENCOUNTER      Pt Name: Rajiv Boykin  MRN: [id number]  [place] 1975  Date of evaluation: 9/23/2021  Provider: Amberly Barragan DO    CHIEF COMPLAINT       Chief Complaint   Patient presents with    Chest Pain     x 3 days    Pharyngitis    Otalgia     Chief complaint: I've been sick  History of chief complaint: This 43-year-old female presents the emergency department complaining of starting up 3 days ago with nasal congestion, frontal headache, sore throat. Patient states she has progressively worsened with some coughing nonproductive diffuse anterior chest soreness worse with cough. Shortness of breath, bilateral ear aching, generalized body aches and fatigue. Patient states her daughter was diagnosed positive with Covid infection yesterday she states she does live with her. Patient states she is generally healthy states she does have a history of pneumonia when she was a child. Patient denies any history of DVT PE COPD no history of coronary artery disease or congestive heart failure. She is not a diabetic. Patient does admit to occasional smoking. Patient denies any leg pain or swelling no recent travel history. No abdominal pain vomiting or diarrhea. Patient denies the possibility of pregnancy previous hysterectomy    Nursing Notes were reviewed. REVIEW OF SYSTEMS    (2-9 systems for level 4, 10 or more for level 5)     Review of Systems   Constitutional: Positive for fatigue. Negative for diaphoresis and fever. HENT: Positive for congestion, ear pain, sinus pressure and sore throat. Eyes: Negative for discharge, redness and visual disturbance. Respiratory: Positive for cough, chest tightness and shortness of breath. Cardiovascular: Negative for palpitations and leg swelling. Gastrointestinal: Negative for abdominal pain, diarrhea, nausea and vomiting. Genitourinary: Negative for dysuria and flank pain.    Musculoskeletal: Positive for myalgias. Negative for back pain and neck pain. Skin: Negative for color change and rash. Neurological: Negative for weakness, numbness and headaches. Hematological: Negative for adenopathy. Except as noted above the remainder of the review of systems was reviewed and negative. PAST MEDICAL HISTORY     Past Medical History:   Diagnosis Date    Anxiety and depression     Anxiety and depression     Asthma     Essential hypertension 5/18/2017    Headache(784.0)     Obesity     MARBELLA (obstructive sleep apnea) 4/29/2020    PTSD (post-traumatic stress disorder)     Urinary incontinence          SURGICAL HISTORY       Past Surgical History:   Procedure Laterality Date    CHOLECYSTECTOMY      ENDOMETRIAL ABLATION      HYSTERECTOMY, TOTAL ABDOMINAL  7/15/15    DR. BECKHAM    TONSILLECTOMY      TUBAL LIGATION           CURRENT MEDICATIONS       Previous Medications    ALBUTEROL SULFATE  (90 BASE) MCG/ACT INHALER    Inhale 2 puffs into the lungs every 6 hours as needed for Wheezing    ALPRAZOLAM (XANAX) 2 MG TABLET    Take 2 mg by mouth 2 times daily as needed for Sleep.     BLOOD PRESSURE MONITORING (BLOOD PRESSURE KIT) ZELALEM    1 Units by Does not apply route as needed (for BP monitoring at home for hypertension) Cuff for upper arm    CYCLOSPORINE (RESTASIS) 0.05 % OPHTHALMIC EMULSION    Place 1 drop into both eyes 2 times daily    FLUOXETINE (PROZAC) 40 MG CAPSULE    Take 2 capsules by mouth every morning (before breakfast)    HYDROXYZINE (ATARAX) 25 MG TABLET    Take 3 tablets by mouth nightly    IBUPROFEN (ADVIL;MOTRIN) 800 MG TABLET    Take 1 tablet by mouth every 6 hours as needed for Pain After toradol dosing complete    LAMOTRIGINE (LAMICTAL) 100 MG TABLET    Take 1 tablet by mouth 2 times daily    METOPROLOL SUCCINATE (TOPROL XL) 25 MG EXTENDED RELEASE TABLET    Take 1 tablet by mouth daily    PANTOPRAZOLE (PROTONIX) 40 MG TABLET    Take 1 tablet by mouth daily       ALLERGIES Aspirin, Codeine, Lithium, Naproxen, and Trazodone and nefazodone    FAMILY HISTORY     History reviewed. No pertinent family history. SOCIAL HISTORY       Social History     Socioeconomic History    Marital status:      Spouse name: None    Number of children: None    Years of education: None    Highest education level: None   Occupational History    None   Tobacco Use    Smoking status: Former Smoker    Smokeless tobacco: Never Used   Vaping Use    Vaping Use: Never used   Substance and Sexual Activity    Alcohol use: No    Drug use: No    Sexual activity: Yes   Other Topics Concern    None   Social History Narrative    None     Social Determinants of Health     Financial Resource Strain: Low Risk     Difficulty of Paying Living Expenses: Not hard at all   Food Insecurity: No Food Insecurity    Worried About Running Out of Food in the Last Year: Never true    Elli of Food in the Last Year: Never true   Transportation Needs: No Transportation Needs    Lack of Transportation (Medical): No    Lack of Transportation (Non-Medical):  No   Physical Activity:     Days of Exercise per Week:     Minutes of Exercise per Session:    Stress:     Feeling of Stress :    Social Connections:     Frequency of Communication with Friends and Family:     Frequency of Social Gatherings with Friends and Family:     Attends Holiness Services:     Active Member of Clubs or Organizations:     Attends Club or Organization Meetings:     Marital Status:    Intimate Partner Violence:     Fear of Current or Ex-Partner:     Emotionally Abused:     Physically Abused:     Sexually Abused:          PHYSICAL EXAM    (up to 7 for level 4, 8 or more for level 5)     ED Triage Vitals [09/23/21 1909]   BP Temp Temp Source Pulse Resp SpO2 Height Weight   (!) 126/90 98.4 °F (36.9 °C) Oral 79 16 97 % 4' 9\" (1.448 m) 220 lb (99.8 kg)       Physical Exam   General appearance: Patient is awake alert interactive appropriate nontoxic in no acute distress  Head is atraumatic normocephalic  Eyes pupils are equal and reactive sclera white conjunctive are pink  Oral pharyngeal cavity is pink with good moisture, mild posterior erythema no exudates or ulcerations no asymmetry, the airway is widely patent  Ears: External auditory canals are clear tympanic membranes are clear and intact bilaterally  Neck: Supple no meningeal signs no adenopathy no JVD  Heart: Regular rate and rhythm no gross murmurs rubs or clicks  Lungs: Breath sounds are clear with good air movement throughout no active wheezes rales or rhonchi no respiratory distress no use of accessory muscles or conversational dyspnea. .  No active cough during examination. Pulse ox 97% on room air  Chest wall: Mild diffuse anterior chest wall tenderness to palpation strength is full and symmetric. Abdomen: Soft nontender with good bowel sounds no rebound rigidity or guarding no firm or pulsatile masses, no gross distention, femoral pulses full and symmetric  Back: Nontender to palpation no costovertebral angle tenderness  Skin: Warm and dry without rashes  Lower extremities: No edema or calf tenderness or asymmetry. DIAGNOSTIC RESULTS     EKG: All EKG's are interpreted by the Emergency Department Physician who either signs or Co-signs this chart in the absence of a cardiologist.    EKG interpreted by ED physician indication shortness of breath: Normal sinus rhythm at 75 with no significant ST-T change no acute infarction pattern    RADIOLOGY:   Non-plain film images such as CT, Ultrasound and MRI are read by the radiologist. Plain radiographic images are visualized and preliminarily interpreted by the emergency physician with the below findings:    Chest x-ray 1 view interpreted by ED physician indication cough: Heart and mediastinum are within normal limits the lung fields are clear there were no acute consolidation scratch vascular congestion or pneumothorax appreciated. Official radiology report is pending    Interpretation per the Radiologist below, if available at the time of this note:    XR CHEST PORTABLE    (Results Pending)       LABS:  Labs Reviewed   COVID-19   COVID-19     COVID-19 testing sent and pending    All other labs were within normal range or not returned as of this dictation. EMERGENCY DEPARTMENT COURSE and DIFFERENTIAL DIAGNOSIS/MDM:   Vitals:    Vitals:    09/23/21 1909   BP: (!) 126/90   Pulse: 79   Resp: 16   Temp: 98.4 °F (36.9 °C)   TempSrc: Oral   SpO2: 97%   Weight: 220 lb (99.8 kg)   Height: 4' 9\" (1.448 m)     Treatment and course:  given prednisone 40 mg p.o. initiated here. Patient resting comfortably no distress vital signs stable. FINAL IMPRESSION      1. Viral illness          DISPOSITION/PLAN   DISPOSITION    Patient discharged home I did discuss with patient symptoms consistent with COVID-19 infection with known exposure within the home needs to quarantine home-going prescription for short course of prednisone as well as an albuterol MDI was written as needed for cough or wheezing. Patient advised to monitor closely return if any change or worsening increased difficulty breathing persistent vomiting not keeping down fluids weak or dizzy. Patient to follow-up with primary physician for repeat assessment in the next 2 to 3 days    PATIENT REFERRED TO:  ANA Malhotra CNP  [facility] [address] [address] [phone]    In 3 days        DISCHARGE MEDICATIONS:  New Prescriptions    No medications on file     Controlled Substances Monitoring:     RX Monitoring 7/22/2021   Attestation -   Periodic Controlled Substance Monitoring Possible medication side effects, risk of tolerance/dependence & alternative treatments discussed. ;No signs of potential drug abuse or diversion identified. ;Assessed functional status.    Chronic Pain > 80 MEDD -       (Please note that portions of this note were completed with a voice recognition program.  Efforts were made to edit the dictations but occasionally words are mis-transcribed.)    Yamil Munoz DO (electronically signed)  Attending Emergency Physician            Yamil Munoz DO  09/23/21 2024

## 2021-09-23 NOTE — PROGRESS NOTES
9/23/2021    30 mins total for this encounter =     20 minutes with direct communication with patient for encounter     10 mins (in addition) spent on chart review, and in the ordering of all necessary labs and/or medications      The risks and benefits of converting to a virtual visit were discussed in light of the current infectious disease epidemic. Patient also understood that insurance coverage and co-pays are up to their individual insurance plans. Patient Location:        Patient's home address  Provider Location ([facility]/State):        [facility] [address] [place] [address] (During [facility] public health emergency)    Psychiatric Diagnoses:  1. ALFREDO (generalized anxiety disorder)    2. Moderate episode of recurrent major depressive disorder [facility])        Assessment/Plan:   Patient denies thoughts of harm to self or others. No acute safety concerns voiced at this appointment. MOOD: STABLE: Patient reports mood as having been relatively good and stable. Appropriately responding to life stressors, and feels they are still able to still find enjoyment in things. SLEEP: SLEEP DURATION: sleeping 8 hours a night. and INSOMNIA: Patient reports not sleeping well. Difficulty falling/staying asleep. Medication does help, she does not want to adjust medications at this time. Patient reports they have been exercising/walking on a daily basis. ATTENTION AND FOCUS: ATTENTION CONCERNS IN THE CONTEXT OF POOR SLEEP: Patient does report concerns related to attention, focus, and concentration. Although patient may have true Attention Deficit Hyperactivity Disorder, at this time, will prioritize minimizing confounding factors related to lifestyle. Disordered sleep is the most predominant of these lifestyle factors. Patient reports attention and focus concerns which are most obvious in the middle of the day, when she is more tired. . Discussed with patient that optimizing sleep routine, utilizing good \"sleep hygiene\", and staying compliant with any medications to facilitate sleep, will likely provide significant improvement in attention and focus on a daily basis. ANXIETY: SOME IMPROVEMENT: Patient reports improvement in symptoms of anxiety/panic. Able to function in daily life better, with less feelings of worry, than previously. Anxiety is not as impactful on activities, but continues to have some intermittent concerns related to anxiety. BIPOLAR ROBIN: No concerns. No history of robin symptoms    SUBSTANCE USE: No concerns related to substance use. Not applicable. ASSESSMENT/PLAN: Medication changes needed given the current presentation of symptoms. Patient was amenable to plan related to medications and endorsed understanding of the risks and benefits, which were explained and discussed. No acute concerns. No thoughts of harm to self or others voiced. and Will STOP cytomel as this has been ineffective. COUNSELING or THERAPY:   Continue to encourage therapy as part of ongoing treatment of their mental health concerns. Continue to encourage physical activity and adherence to medication regimen. DATE and changes made  · 8/17/20  ? STOP Vraylar   ? START LAMICTAL 25 mg QD to 25 mg BID   ? START Propranolol 10-20 mg TID   ? START Hydroxyzine 75 mg QHS   ? Minimize ambien use  · 9/1/20  ? Had to stop propranolol due to hypertension on home cuff and needed to switch to   ? Pt STOPPED hydroxyzine, causing headaches   ? STOPPED propranolol, on selective beta blocker for BP now and monitoring at home on wrist cuff. Sending in rx to see if insurance will cover an arm cuff   ? 50 mg QD now, in 2 weeks if no rash will increase to 50 mg BID   ? lamictal 50 mg   ? rozerem 8 mg QHS   ? buspar 7.5 mg TID   · 9/15/20  ? Sleeping 6 hours  ? Lamictal 50 mg QD BID is current dose will increase to 50 mg BID   ? Rozerem 8 mg QHS   ? Buspar 7.5 mg TID   ? genesite ordered  · 10/6/20  ?  Resend 80 mg daily  · 8/10/21  ? hydroxyzine hcl (Atarax) 75 mg every night  ? fluoxetine (Prozac) 80 mg daily   ? lamotrigine (Lamictal) 100 mg twice daily   ? Has not been taking ramelteon (Rozerem) due to cost   ? START liothyronine (Cytomel) 12.5 mcg for 2 weeks then 25 mcg for augmentation of antidepressant therapy   § Thyroid labs   · 9/23/21  · Is now working in healthcare   · STOP liothyronine (Cytomel) (will go to 12.5 mg daily for next 10 days then off)   · Has had no benefit from medication, not worsening anxiety either   · fluoxetine (Prozac) 80 mg daily   · lamotrigine (Lamictal) 100 mg twice daily   · Hydroxyzine 75 mg every night          Medical Diagnoses:  Patient Active Problem List   Diagnosis    Anxiety and depression    PTSD (post-traumatic stress disorder)    Migraine headache    Nausea and vomiting    S/P abdominal hysterectomy    Severe recurrent major depressive disorder with psychotic features ([address]    Morbid obesity ([address]    Overactive bladder    Stress incontinence, female    Abdominal pain    Blood in the urine    Essential hypertension    Yeast infection    Vitamin D deficiency    Former smoker    Obsessive-compulsive disorder    Bilateral dry eyes    Shortness of breath    MARBELLA (obstructive sleep apnea)    Moderate episode of recurrent major depressive disorder ([address]    ALFREDO (generalized anxiety disorder)         AT TODAY'S VISIT     Crisis plan reviewed and patient verbally contracts for safety. Go to ED with emergent symptoms or safety concerns. Risks, benefits, side effects of medications, including any / all black box warnings, discussed with patient, who verbalizes their understanding. Pt is kings for safety and denies thoughts of SI/HI. Amenable to plan. No acute concerns to address regarding medications. Subjective:      Patient denies medication side effects apart from those mentioned in the assessment and plan.    Patient reports they have been compliant with current medication regimen and have not missed a dose. At today's visit, patient denies thoughts of harm to self or others since last appointment, and denies auditory or visual hallucinations. ROS:  [x] All negative/unchanged except if checked. Explain positive(checked items) below:       Denies any new or changed physical symptoms other than those noted in the subjective portion of this note   [] Constitutional  [] Eyes  [] Ear/Nose/Mouth/Throat  [] Respiratory  [] CV  [] GI  []   [] Musculoskeletal  [] Skin/Breast  [] Neurological  [] Endocrine  [] Heme/Lymph  [] Allergic/Immunologic    OBJECTIVE:   No results found for this or any previous visit (from the past 1008 hour(s)).     MEDICATIONS:    Current Outpatient Medications:     ALPRAZolam (XANAX) 2 MG tablet, Take 2 mg by mouth 2 times daily as needed for Sleep., Disp: , Rfl:     metoprolol succinate (TOPROL XL) 25 MG extended release tablet, Take 1 tablet by mouth daily, Disp: 90 tablet, Rfl: 1    ibuprofen (ADVIL;MOTRIN) 800 MG tablet, Take 1 tablet by mouth every 6 hours as needed for Pain After toradol dosing complete, Disp: 120 tablet, Rfl: 3    FLUoxetine (PROZAC) 40 MG capsule, Take 2 capsules by mouth every morning (before breakfast), Disp: 60 capsule, Rfl: 3    lamoTRIgine (LAMICTAL) 100 MG tablet, Take 1 tablet by mouth 2 times daily, Disp: 180 tablet, Rfl: 3    hydrOXYzine (ATARAX) 25 MG tablet, Take 3 tablets by mouth nightly, Disp: 270 tablet, Rfl: 2    pantoprazole (PROTONIX) 40 MG tablet, Take 1 tablet by mouth daily, Disp: 90 tablet, Rfl: 1    cycloSPORINE (RESTASIS) 0.05 % ophthalmic emulsion, Place 1 drop into both eyes 2 times daily, Disp: 1 vial, Rfl: 3    Blood Pressure Monitoring (BLOOD PRESSURE KIT) ZELALEM, 1 Units by Does not apply route as needed (for BP monitoring at home for hypertension) Cuff for upper arm, Disp: 1 Device, Rfl: 0    albuterol sulfate  (90 Base) MCG/ACT inhaler, Inhale 2 puffs encounter, evaluation of the following organ systems is limited: Vitals/Constitutional/EENT/Resp/CV/GI//MS/Neuro/Skin/Heme-Lymph-Imm. An  electronic signature was used to authenticate this note. --Abbey Galvan MD on 9/23/2021 at 12:40 PM    Pursuant to the emergency declaration under the [address] [place] [address] [facility] authority and the [employer] Act, this Virtual  Visit was conducted, with patient's consent, to reduce the patient's risk of exposure to COVID-19 and provide continuity of care for an established patient Services were provided through a video synchronous discussion virtually to substitute for in-person clinic visit. Treatment Plan:  Reviewed current Medications with the patient. Education provided on the compliance with treatment. Reviewed OARRs, no concerns identified     The anticipated benefits and side effects of the medications, including the anticipated results of not receiving the medication, and of alternatives to the medications were explained to the patient and their informed consent was obtained for starting medications as well as adjusting the doses (titration or tapering) as indicated. The above information was given by physician in verbal form and sufficient understanding was in evidence. The patient participated in discussion of the information and question and/or concerns were addressed before the medication was given. Due to COVID 19 outbreak, patient's office visit was converted to a virtual visit.   Patient was contacted and agreed to proceed with a virtual visit via DOXY

## 2021-09-23 NOTE — ED TRIAGE NOTES
Pt feeling weak with chest pain that is getting worse. She also c/o cough . Her whole family at home has covid.

## 2021-09-24 ENCOUNTER — CARE COORDINATION (OUTPATIENT)
Dept: CARE COORDINATION | Age: 46
End: 2021-09-24

## 2021-09-24 LAB
EKG ATRIAL RATE: 75 BPM
EKG P AXIS: 29 DEGREES
EKG P-R INTERVAL: 130 MS
EKG Q-T INTERVAL: 424 MS
EKG QRS DURATION: 84 MS
EKG QTC CALCULATION (BAZETT): 473 MS
EKG R AXIS: 42 DEGREES
EKG T AXIS: 36 DEGREES
EKG VENTRICULAR RATE: 75 BPM

## 2021-09-24 PROCEDURE — 93010 ELECTROCARDIOGRAM REPORT: CPT | Performed by: INTERNAL MEDICINE

## 2021-09-24 NOTE — CARE COORDINATION
Attempted to contact patient for post ED COVID-19 Monitoring. Unable to reach patient by phone. Message left regarding the purpose of this call.   umber provided and call back requested.

## 2021-09-24 NOTE — ED NOTES
Contacted Dr. Kevin Ortiz answering service to send out another Dr to Dr collier.       Dwaine Felty  09/23/21 2001

## 2021-09-25 ENCOUNTER — CARE COORDINATION (OUTPATIENT)
Dept: CARE COORDINATION | Age: 46
End: 2021-09-25

## 2021-09-25 LAB
SARS-COV-2: NOT DETECTED
SOURCE: NORMAL

## 2021-10-11 ENCOUNTER — VIRTUAL VISIT (OUTPATIENT)
Dept: FAMILY MEDICINE CLINIC | Age: 46
End: 2021-10-11
Payer: COMMERCIAL

## 2021-10-11 DIAGNOSIS — M54.50 CHRONIC MIDLINE LOW BACK PAIN WITHOUT SCIATICA: ICD-10-CM

## 2021-10-11 DIAGNOSIS — K21.9 GASTROESOPHAGEAL REFLUX DISEASE WITHOUT ESOPHAGITIS: ICD-10-CM

## 2021-10-11 DIAGNOSIS — G47.00 INSOMNIA, UNSPECIFIED TYPE: ICD-10-CM

## 2021-10-11 DIAGNOSIS — R00.2 PALPITATIONS: ICD-10-CM

## 2021-10-11 DIAGNOSIS — G89.29 CHRONIC MIDLINE LOW BACK PAIN WITHOUT SCIATICA: ICD-10-CM

## 2021-10-11 DIAGNOSIS — F41.0 PANIC ATTACK: ICD-10-CM

## 2021-10-11 DIAGNOSIS — I10 ESSENTIAL HYPERTENSION: ICD-10-CM

## 2021-10-11 DIAGNOSIS — F41.9 ANXIETY: Primary | ICD-10-CM

## 2021-10-11 DIAGNOSIS — F33.3 SEVERE RECURRENT MAJOR DEPRESSIVE DISORDER WITH PSYCHOTIC FEATURES (HCC): ICD-10-CM

## 2021-10-11 PROCEDURE — 99214 OFFICE O/P EST MOD 30 MIN: CPT | Performed by: NURSE PRACTITIONER

## 2021-10-11 RX ORDER — IBUPROFEN 800 MG/1
800 TABLET ORAL EVERY 6 HOURS PRN
Qty: 120 TABLET | Refills: 3 | Status: SHIPPED | OUTPATIENT
Start: 2021-10-11 | End: 2022-01-24 | Stop reason: SDUPTHER

## 2021-10-11 RX ORDER — PANTOPRAZOLE SODIUM 40 MG/1
40 TABLET, DELAYED RELEASE ORAL DAILY
Qty: 90 TABLET | Refills: 1 | Status: SHIPPED | OUTPATIENT
Start: 2021-10-11 | End: 2022-01-24 | Stop reason: SDUPTHER

## 2021-10-11 RX ORDER — ALPRAZOLAM 2 MG/1
2 TABLET ORAL 2 TIMES DAILY PRN
Qty: 60 TABLET | Refills: 2 | Status: SHIPPED | OUTPATIENT
Start: 2021-10-18 | End: 2022-01-24 | Stop reason: SDUPTHER

## 2021-10-11 RX ORDER — METOPROLOL SUCCINATE 25 MG/1
25 TABLET, EXTENDED RELEASE ORAL DAILY
Qty: 90 TABLET | Refills: 1 | Status: SHIPPED | OUTPATIENT
Start: 2021-10-11 | End: 2022-01-24 | Stop reason: SDUPTHER

## 2021-10-11 NOTE — PROGRESS NOTES
10/11/2021    TELEHEALTH EVALUATION -- Audio/Visual (During RXSPR-90 public health emergency)    Due to COVID 19 outbreak, patient's office visit was converted to a virtual visit. Patient was contacted and agreed to proceed with a virtual visit via Doxy. me  The risks and benefits of converting to a virtual visit were discussed in light of the current infectious disease epidemic. Patient also understood that insurance coverage and co-pays are up to their individual insurance plans. Robin Rivera, was evaluated through a synchronous (real-time) audio-video encounter. The patient (or guardian if applicable) is aware that this is a billable service. Verbal consent to proceed has been obtained within the past 12 months. The visit was conducted pursuant to the emergency declaration under the [address] [place] [address], [address] [place] [address] authority and the [employer] Act. Patient identification was verified, and a caregiver was present when appropriate. The patient was located in a state where the provider was credentialed to provide care. Total time spent for this encounter: Not billed by time    The patient is talking with me virtually from her home and I am located at my office in [facility]. HPI:    Robin Rivera (:  1975) has requested an audio/video evaluation for the following concern(s): Anxiety/depression/insomnia: States that her mood has been doing much better lately. She continues to follow Dr. Rajwinder Walters routinely and has been taking medication as prescribed. 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 Her son has been occluded of all charges that he was facing. She states that her daughter also has a job. This is the best that she has felt in quite some time.   She continues to take the Xanax to help with ongoing physical symptoms of anxiety but states that she has not been having the Pierre Yip) 2 MG tablet Take 2 mg by mouth 2 times daily as needed for Sleep. Historical Provider, MD   FLUoxetine (PROZAC) 40 MG capsule Take 2 capsules by mouth every morning (before breakfast)  Darlyn Shrestha MD   lamoTRIgine (LAMICTAL) 100 MG tablet Take 1 tablet by mouth 2 times daily  Darlyn Shrestha MD   hydrOXYzine (ATARAX) 25 MG tablet Take 3 tablets by mouth nightly  Darlyn Shrestha MD   cycloSPORINE (RESTASIS) 0.05 % ophthalmic emulsion Place 1 drop into both eyes 2 times daily  Nallely Beasley, APRN - CNP   Blood Pressure Monitoring (BLOOD PRESSURE KIT) ZLEALEM 1 Units by Does not apply route as needed (for BP monitoring at home for hypertension) Cuff for upper arm  Darlyn Shrestha MD       Social History     Tobacco Use    Smoking status: Former Smoker    Smokeless tobacco: Never Used   Vaping Use    Vaping Use: Never used   Substance Use Topics    Alcohol use: No    Drug use: No        PHYSICAL EXAMINATION:  [ INSTRUCTIONS:  \"[x]\" Indicates a positive item  \"[]\" Indicates a negative item  -- DELETE ALL ITEMS NOT EXAMINED]  [x] Alert  [x] Oriented to person/place/time    [x] No apparent distress  [] Toxic appearing    [] Face flushed appearing [] Sclera clear  [] Lips are cyanotic      [x] Breathing appears normal  [] Appears tachypneic      [] Rash on visible skin    [x] Cranial Nerves II-XII grossly intact    [x] Motor grossly intact in visible upper extremities    [] Motor grossly intact in visible lower extremities    [x] Normal Mood  [] Anxious appearing    [] Depressed appearing  [] Confused appearing      [] Poor short term memory  [] Poor long term memory    [] OTHER:      Due to this being a TeleHealth encounter, evaluation of the following organ systems is limited: Vitals/Constitutional/EENT/Resp/CV/GI//MS/Neuro/Skin/Heme-Lymph-Imm. ASSESSMENT/PLAN:   Diagnosis Orders   1. Anxiety  ALPRAZolam (XANAX) 2 MG tablet   2. Panic attack  ALPRAZolam (XANAX) 2 MG tablet   3. Insomnia, unspecified type     4. Severe recurrent major depressive disorder with psychotic features (HCC)     5. Palpitations  metoprolol succinate (TOPROL XL) 25 MG extended release tablet   6. Essential hypertension  metoprolol succinate (TOPROL XL) 25 MG extended release tablet   7. Gastroesophageal reflux disease without esophagitis  pantoprazole (PROTONIX) 40 MG tablet   8. Chronic midline low back pain without sciatica  ibuprofen (ADVIL;MOTRIN) 800 MG tablet         Return in about 3 months (around 1/11/2022) for anxiety- in office. 1.  2.  3.  4.  Mood has been much more stable lately with recent medications. She continues to follow routinely with psychiatry. Life situations have improved over time. We will want to consider slowly weaning dose of Xanax in the future. 5.  6.  No recent symptoms reported while taking beta-blocker routinely. 7.  No symptoms reported while taking proton pump inhibitor routinely. 8.  Continue ibuprofen as needed for back pain issues. Notify me if symptoms worsen. Take medication with food. Controlled Substance Monitoring:    Acute and Chronic Pain Monitoring:   RX Monitoring 10/11/2021   Attestation -   Periodic Controlled Substance Monitoring Possible medication side effects, risk of tolerance/dependence & alternative treatments discussed. ;No signs of potential drug abuse or diversion identified. ;Assessed functional status. Chronic Pain > 80 MEDD -       Please note this report has been partially produced using speech recognition software and may cause contain errors related to that system including grammar, punctuation and spelling as well as words and phrases that may seem inappropriate. If there are questions or concerns please feel free to contact me to clarify. An  electronic signature was used to authenticate this note.     --Ebony Fabry, APRN - CNP on 10/11/2021 at 10:07 AM        Pursuant to the emergency declaration under the [facility] Act and the [address] [facility] authority and the [employer] Act, this Virtual  Visit was conducted, with patient's consent, to reduce the patient's risk of exposure to COVID-19 and provide continuity of care for an established patient. Services were provided through a video synchronous discussion virtually to substitute for in-person clinic visit.

## 2021-11-13 ENCOUNTER — VIRTUAL VISIT (OUTPATIENT)
Dept: FAMILY MEDICINE CLINIC | Age: 46
End: 2021-11-13
Payer: COMMERCIAL

## 2021-11-13 ENCOUNTER — HOSPITAL ENCOUNTER (OUTPATIENT)
Age: 46
Setting detail: SPECIMEN
Discharge: HOME | End: 2021-11-13
Payer: COMMERCIAL

## 2021-11-13 DIAGNOSIS — J00 NASOPHARYNGITIS ACUTE: ICD-10-CM

## 2021-11-13 DIAGNOSIS — J06.9 URI WITH COUGH AND CONGESTION: Primary | ICD-10-CM

## 2021-11-13 LAB
INFLUENZA A ANTIBODY: NORMAL
INFLUENZA B ANTIBODY: NORMAL
Lab: NORMAL
PERFORMING INSTRUMENT: NORMAL
QC PASS/FAIL: NORMAL
S PYO AG THROAT QL: NORMAL
SARS-COV-2, POC: NORMAL

## 2021-11-13 PROCEDURE — 87804 INFLUENZA ASSAY W/OPTIC: CPT | Performed by: NURSE PRACTITIONER

## 2021-11-13 PROCEDURE — 87070 CULTURE OTHR SPECIMN AEROBIC: CPT

## 2021-11-13 PROCEDURE — 87426 SARSCOV CORONAVIRUS AG IA: CPT | Performed by: NURSE PRACTITIONER

## 2021-11-13 PROCEDURE — 87880 STREP A ASSAY W/OPTIC: CPT | Performed by: NURSE PRACTITIONER

## 2021-11-13 PROCEDURE — 99441 PR PHYS/QHP TELEPHONE EVALUATION 5-10 MIN: CPT | Performed by: NURSE PRACTITIONER

## 2021-11-13 ASSESSMENT — ENCOUNTER SYMPTOMS
RHINORRHEA: 1
SORE THROAT: 1
NAUSEA: 0
ABDOMINAL PAIN: 0
VOMITING: 0
DIARRHEA: 0

## 2021-11-13 NOTE — PATIENT INSTRUCTIONS
1. In office test results were negative for strep, negative for COVID-19, and negative for Flu A+B. 2. Throat culture sent- we'll call with results. 3. Wait-and-see antibiotic prescription was sent to your pharmacy. 4. Try over the counter treatments and self-care before starting antibiotics. 5. If your symptoms are caused by a viral infection, note that antibiotics are not usually effective against viruses. Supportive Care:  · Increase fluid intake and get plenty of rest.  · May take over the counter acetaminophen (brand name: Tylenol) and/or ibuprofen (brand name: Motrin/ Advil) as needed for pain, fever. · For sore throat-  try gargling warm salt water every hour or so as needed and/or mixing 1.5 to 2 Tbsp of honey in with a warm glass of water/ tea and stir it well. Drink several times/day as needed. Honey should not be given to infants under the age of one. · Use saline nasal sprays to moisturize and clear debris from nasal passages. · Keep upright when sleeping to help with drainage as needed. · Steam showers, use vaporizer or mist to humidify environment, heat application/compresses to sinuses for comfort. · No specific dietary restrictions are needed-> avoid orange juice, grapefruit juice, lemonade, other acidic beverages, which can irritate a sore throat. Soft, cold foods (e.g. ice cream, popsicles) are more easily tolerated.       Test Results:   Recent Results (from the past 4 hour(s))   POCT rapid strep A    Collection Time: 11/13/21  1:33 PM   Result Value Ref Range    Strep A Ag None Detected None Detected   POCT COVID-19, Antigen    Collection Time: 11/13/21  1:33 PM   Result Value Ref Range    SARS-COV-2, POC Not-Detected Not Detected    Lot Number [id number]     QC Pass/Fail PASS     Performing Instrument BD Veritor    POCT Influenza A/B    Collection Time: 11/13/21  1:33 PM   Result Value Ref Range    Influenza A Ab Neg     Influenza B Ab Neg      Thank you for choosing us for your not smoke or allow others to smoke around you. If you need help quitting, talk to your doctor about stop-smoking programs and medicines. These can increase your chances of quitting for good. When should you call for help? Call 911 anytime you think you may need emergency care. For example, call if:    · You have severe trouble breathing. Call your doctor now or seek immediate medical care if:    · You seem to be getting much sicker.     · You have new or worse trouble breathing.     · You have a new or higher fever.     · You have a new rash. Watch closely for changes in your health, and be sure to contact your doctor if:    · You have a new symptom, such as a sore throat, an earache, or sinus pain.     · You cough more deeply or more often, especially if you notice more mucus or a change in the color of your mucus.     · You do not get better as expected. Where can you learn more? Go to https://Perficient.Troodon. org and sign in to your Volofy account. Enter P604 in the Dodonation box to learn more about \"Upper Respiratory Infection (Cold): Care Instructions. \"     If you do not have an account, please click on the \"Sign Up Now\" link. Current as of: July 6, 2021               Content Version: 13.1  © 2006-2021 Healthwise, Incorporated. Care instructions adapted under license by [facility] ([facility]). If you have questions about a medical condition or this instruction, always ask your healthcare professional. [place] [address] any warranty or liability for your use of this information.

## 2021-11-13 NOTE — PROGRESS NOTES
[address]    TELEHEALTH VISIT -- Audio Only     SUBJECTIVE:    Jeremias Camara is a 40 y.o. female evaluated via telephone on 11/13/2021. Consent:  Moustapha Cecille and/or health care decision maker is aware that she may receive a bill for this telephone service, depending on her insurance coverage, and has provided verbal consent to proceed: Yes    Documentation:  I communicated with the patient about:  Chief Complaint   Patient presents with    URI     patient c/o sore throat, coughing up mucous, fatigue. denies chills, body aches, sob. patient states no one at home is sick and no known exposure to covid. patient states she is fully vaccinated. patient states sx started yesterday. URI   This is a new problem. The current episode started yesterday. The problem has been gradually worsening. There has been no fever. Associated symptoms include congestion, coughing (productive), ear pain, headaches, rhinorrhea and a sore throat. Pertinent negatives include no abdominal pain, chest pain, diarrhea, nausea, vomiting or wheezing. COVID-29 exposure source: denies any sick contacts. no known exposures to covid. Works with a 79 y/o who just got over PNA    Relevant PMH: asthma, HTN, MARBELLA. Non-smoker. No recent travel. Fully vaccinated for COVID-19. Review of Systems   Constitutional: Positive for fatigue. Negative for chills and fever. HENT: Positive for congestion, ear pain, postnasal drip, rhinorrhea and sore throat. Respiratory: Positive for cough (productive). Negative for shortness of breath and wheezing. Cardiovascular: Negative for chest pain. Gastrointestinal: Negative for abdominal pain, diarrhea, nausea and vomiting. Musculoskeletal: Negative for myalgias. Neurological: Positive for headaches. Negative for light-headedness.      OBJECTIVE:     Vital Signs: (As obtained by: pt or caregiver)  Patient-Reported Vitals 11/13/2021 Patient-Reported Weight 223 lbs   Patient-Reported Height 4'11   Patient-Reported Temperature 97.1      Exam: N/A  (telehealth audio visit)     POC Testing Today:   SARS-COV-2, POC   Date Value Ref Range Status   11/13/2021 Not-Detected Not Detected Final     Results for POC orders placed in visit on 11/13/21   POCT Influenza A/B   Result Value Ref Range    Influenza A Ab Neg     Influenza B Ab Neg    POCT rapid strep A   Result Value Ref Range    Strep A Ag None Detected None Detected     TELEHEALTH ASSESSMENT & PLAN:   Details of this discussion including any medical advice provided:     40 y.o. female with cough, congestion, malaise x 1 day. 1. URI with cough and congestion  Comments:  negative rapid tests for SARS-CoV-2, Flu A+B, GAS. likely other, uncomplicated viral URI. wait-and-see antibiotic Rx provided- begin if sxs persist/worsen. Orders:  -     azithromycin (ZITHROMAX) 250 MG tablet; 500 mg on day 1, then 250 mg days 2-5., Disp-6 tablet, R-0Normal  2. Nasopharyngitis acute  Comments:  negative rapid strep- culture sent. continue symptomatic tx, observation, wait-and-see abx provided to begin if throat cx positive or sxs persist/worsen. Orders:  -     POCT rapid strep A  -     POCT COVID-19, Antigen  -     POCT Influenza A/B  -     Culture, Throat; Future    -      Labs as ordered. - Analgesia and fever control with OTC acetaminophen, ibuprofen prn.  - Discussed dx/tx and normal progression of viral infections. - Advised on symptomatic care- fluids, rest, upright position, intranasal saline prn congestion, heat application to sinuses prn for comfort. - Antibiotic as ordered- begin if sxs refractory to conservative measures or worsening over the next 72 hrs- emphasized importance of avoiding unnecessary abx. - Red flags and expected time course for resolution were reviewed. Return for follow-up with your PCP within one week if your symptoms persist or worsen.         I affirm this is a Patient Initiated Episode with an Established Patient who has not had a related appointment within my department in the past 7 days or scheduled within the next 24 hours. Total Time: minutes: 5-10 minutes    TELEHEALTH EVALUATION -- Audio/Telephone (During [facility] public health emergency)  This service was provided through telehealth, by ANA Chaudhry CNP and the patient in his/her home via telephone call. 5:39 minutes were spent on the phone with patient. Provider performed history of present illness and review of systems. Diagnosis and treatment plan was discussed with patient. Pharmacy of choice was reviewed along with past medical history, medication allergies, and current medications. Education provided to patient or patient parents/guardian with current illness diagnosis as well as when to seek additional healthcare due to changing or for worsening symptoms. Patient voiced understanding.      Electronically signed by ANA Chaudhry CNP on 11/13/2021 at 2:59 PM

## 2021-11-14 RX ORDER — AZITHROMYCIN 250 MG/1
TABLET, FILM COATED ORAL
Qty: 6 TABLET | Refills: 0 | Status: SHIPPED | OUTPATIENT
Start: 2021-11-14 | End: 2021-11-19

## 2021-11-15 ENCOUNTER — OFFICE VISIT (OUTPATIENT)
Dept: FAMILY MEDICINE CLINIC | Age: 46
End: 2021-11-15
Payer: COMMERCIAL

## 2021-11-15 VITALS
HEART RATE: 84 BPM | TEMPERATURE: 97 F | HEIGHT: 57 IN | DIASTOLIC BLOOD PRESSURE: 85 MMHG | BODY MASS INDEX: 48.11 KG/M2 | OXYGEN SATURATION: 98 % | SYSTOLIC BLOOD PRESSURE: 120 MMHG | WEIGHT: 223 LBS

## 2021-11-15 DIAGNOSIS — R53.83 OTHER FATIGUE: Primary | ICD-10-CM

## 2021-11-15 DIAGNOSIS — J01.90 ACUTE BACTERIAL SINUSITIS: ICD-10-CM

## 2021-11-15 DIAGNOSIS — B96.89 ACUTE BACTERIAL SINUSITIS: ICD-10-CM

## 2021-11-15 DIAGNOSIS — Z72.0 TOBACCO ABUSE: ICD-10-CM

## 2021-11-15 LAB — HETEROPHILE ANTIBODIES: NORMAL

## 2021-11-15 PROCEDURE — 86308 HETEROPHILE ANTIBODY SCREEN: CPT | Performed by: NURSE PRACTITIONER

## 2021-11-15 PROCEDURE — 99213 OFFICE O/P EST LOW 20 MIN: CPT | Performed by: NURSE PRACTITIONER

## 2021-11-15 RX ORDER — CETIRIZINE HYDROCHLORIDE 10 MG/1
10 TABLET ORAL DAILY
Qty: 30 TABLET | Refills: 0 | Status: SHIPPED | OUTPATIENT
Start: 2021-11-15 | End: 2021-12-15

## 2021-11-15 RX ORDER — FLUTICASONE PROPIONATE 50 MCG
1 SPRAY, SUSPENSION (ML) NASAL DAILY
Qty: 1 EACH | Refills: 0 | Status: SHIPPED | OUTPATIENT
Start: 2021-11-15 | End: 2022-01-24 | Stop reason: SDUPTHER

## 2021-11-15 ASSESSMENT — ENCOUNTER SYMPTOMS
SHORTNESS OF BREATH: 0
DIARRHEA: 0
EYE ITCHING: 0
SINUS PAIN: 1
COUGH: 1
EYE REDNESS: 0
NAUSEA: 0
EYE DISCHARGE: 0
ABDOMINAL PAIN: 0
BACK PAIN: 1
SINUS PRESSURE: 1
VOMITING: 0
SORE THROAT: 1
WHEEZING: 0
RHINORRHEA: 1

## 2021-11-15 NOTE — LETTER
[address]  Phone: [phone]  Fax: [phone]    ANA Lord CNP        November 15, 2021     Patient: Reba Easley   YOB: 1975   Date of Visit: 11/15/2021       To Whom it May Concern:    Reba Easley was seen in my clinic on 11/15/2021. She can not return to work until 11/22/2021 and has improvement of symptoms. If you have any questions or concerns, please don't hesitate to call.     Sincerely,         ANA Lord CNP

## 2021-11-15 NOTE — PROGRESS NOTES
Subjective  Reba Easley, 40 y.o. female presents today with:  Chief Complaint   Patient presents with    Pharyngitis     Pt presents to the clinic today with c/c of worsening symptoms of sore throat and cough. Pt was seen here 11/13 and was tested negative for flu and covid. Pt states throat is hurting, losing her voice, and body aches. Pt states having ear pain. Pt was told that she could have had a false negative and would need to get re tested again. HPI  Patient recently seen 11/13 for similar symptoms that are now worse  She was negative for flue and covid  And strep , a throat culture was sent that has showed no growth in 24 hrs     She has had symptoms for 3 days left work can came her for testing flu, covid strep   Provider recommended that she be retested with PCR  She does not want repeat covid test  Using mucinex   She was prescribed a zpak but did not start it yet  Feels worse today wants an antibiotic shot  Concerned due to fatigue she could have mono  Review of Systems   Constitutional: Positive for appetite change, chills and fatigue. Negative for fever. HENT: Positive for congestion, ear pain (left ear), postnasal drip, rhinorrhea, sinus pressure, sinus pain and sore throat. Eyes: Negative for discharge, redness and itching. Respiratory: Positive for cough. Negative for shortness of breath and wheezing. Cardiovascular: Negative for palpitations. Gastrointestinal: Negative for abdominal pain, diarrhea, nausea and vomiting. Musculoskeletal: Positive for back pain. Arthralgias: from coughing. Neurological: Positive for headaches (from coughing ).        Past Medical History:   Diagnosis Date    Anxiety and depression     Anxiety and depression     Asthma     Essential hypertension 5/18/2017    Headache(784.0)     Obesity     MARBELLA (obstructive sleep apnea) 4/29/2020    PTSD (post-traumatic stress disorder)     Urinary incontinence      Past Surgical History: Procedure Laterality Date    CHOLECYSTECTOMY      ENDOMETRIAL ABLATION      HYSTERECTOMY, TOTAL ABDOMINAL  7/15/15    DR. Hoffman 667    TONSILLECTOMY      TUBAL LIGATION       Social History     Socioeconomic History    Marital status:      Spouse name: Not on file    Number of children: Not on file    Years of education: Not on file    Highest education level: Not on file   Occupational History    Not on file   Tobacco Use    Smoking status: Former Smoker    Smokeless tobacco: Never Used   Vaping Use    Vaping Use: Never used   Substance and Sexual Activity    Alcohol use: No    Drug use: No    Sexual activity: Yes   Other Topics Concern    Not on file   Social History Narrative    Not on file     Social Determinants of Health     Financial Resource Strain: Low Risk     Difficulty of Paying Living Expenses: Not hard at all   Food Insecurity: No Food Insecurity    Worried About [address] in the Last Year: Never true    [address] [employer] in the Last Year: Never true   Transportation Needs: No Transportation Needs    Lack of Transportation (Medical): No    Lack of Transportation (Non-Medical):  No   Physical Activity:     Days of Exercise per Week: Not on file    Minutes of Exercise per Session: Not on file   Stress:     Feeling of Stress : Not on file   Social Connections:     Frequency of Communication with Friends and Family: Not on file    Frequency of Social Gatherings with Friends and Family: Not on file    Attends Mu-ism Services: Not on file    Active Member of Clubs or Organizations: Not on file    Attends Club or Organization Meetings: Not on file    Marital Status: Not on file   Intimate Partner Violence:     Fear of Current or Ex-Partner: Not on file    Emotionally Abused: Not on file    Physically Abused: Not on file    Sexually Abused: Not on file   Housing Stability:     Unable to Pay for Housing in the Last Year: Not on file    Number of [facility] medications for this visit. PMH, Surgical Hx, Family Hx, and Social Hx reviewed and updated. Health Maintenance reviewed. Objective  Vitals:    11/15/21 1343   BP: 120/85   Site: Right Upper Arm   Position: Sitting   Cuff Size: Large Adult   Pulse: 84   Temp: 97 °F (36.1 °C)   TempSrc: Temporal   SpO2: 98%   Weight: 223 lb (101.2 kg)   Height: 4' 9\" (1.448 m)     BP Readings from Last 3 Encounters:   11/15/21 120/85   09/23/21 (!) 124/91   08/10/21 133/67     Wt Readings from Last 3 Encounters:   11/15/21 223 lb (101.2 kg)   09/23/21 220 lb (99.8 kg)   08/10/21 225 lb (102.1 kg)     Physical Exam  Constitutional:       Appearance: Normal appearance. HENT:      Head: Normocephalic. Right Ear: A middle ear effusion is present. Left Ear: A middle ear effusion is present. Nose: Congestion present. Right Sinus: Maxillary sinus tenderness and frontal sinus tenderness present. Left Sinus: Maxillary sinus tenderness and frontal sinus tenderness present. Mouth/Throat:      Lips: Pink. Mouth: Mucous membranes are moist.      Pharynx: Oropharyngeal exudate (PND+) and posterior oropharyngeal erythema present. Tonsils: No tonsillar abscesses. Cardiovascular:      Rate and Rhythm: Normal rate and regular rhythm. Heart sounds: Normal heart sounds. Pulmonary:      Effort: Pulmonary effort is normal. No respiratory distress. Breath sounds: Normal breath sounds. No wheezing, rhonchi or rales. Genitourinary:     Comments: deferred  Musculoskeletal:         General: Normal range of motion. Cervical back: Normal range of motion. Lymphadenopathy:      Cervical: No cervical adenopathy. Skin:     General: Skin is warm and dry. Neurological:      General: No focal deficit present. Mental Status: She is alert and oriented to person, place, and time.       Gait: Gait normal.   Psychiatric:         Mood and Affect: Mood normal.         Behavior: Behavior normal.

## 2021-11-15 NOTE — PATIENT INSTRUCTIONS
Symptoms worsen or do not improve return or see PCP   Patient Education        Sinusitis: Care Instructions  Your Care Instructions     Sinusitis is an infection of the lining of the sinus cavities in your head. Sinusitis often follows a cold. It causes pain and pressure in your head and face. In most cases, sinusitis gets better on its own in 1 to 2 weeks. But some mild symptoms may last for several weeks. Sometimes antibiotics are needed. Follow-up care is a key part of your treatment and safety. Be sure to make and go to all appointments, and call your doctor if you are having problems. It's also a good idea to know your test results and keep a list of the medicines you take. How can you care for yourself at home? · Take an over-the-counter pain medicine, such as acetaminophen (Tylenol), ibuprofen (Advil, Motrin), or naproxen (Aleve). Read and follow all instructions on the label. · If the doctor prescribed antibiotics, take them as directed. Do not stop taking them just because you feel better. You need to take the full course of antibiotics. · Be careful when taking over-the-counter cold or flu medicines and Tylenol at the same time. Many of these medicines have acetaminophen, which is Tylenol. Read the labels to make sure that you are not taking more than the recommended dose. Too much acetaminophen (Tylenol) can be harmful. · Breathe warm, moist air from a steamy shower, a hot bath, or a sink filled with hot water. Avoid cold, dry air. Using a humidifier in your home may help. Follow the directions for cleaning the machine. · Use saline (saltwater) nasal washes. This can help keep your nasal passages open and wash out mucus and bacteria. You can buy saline nose drops at a grocery store or drugstore. Or you can make your own at home by adding 1 teaspoon of salt and 1 teaspoon of baking soda to 2 cups of distilled water.  If you make your own, fill a bulb syringe with the solution, insert the tip into your nostril, and squeeze gently. Leigh Vaca your nose. · Put a hot, wet towel or a warm gel pack on your face 3 or 4 times a day for 5 to 10 minutes each time. · Try a decongestant nasal spray like oxymetazoline (Afrin). Do not use it for more than 3 days in a row. Using it for more than 3 days can make your congestion worse. When should you call for help? Call your doctor now or seek immediate medical care if:    · You have new or worse swelling or redness in your face or around your eyes.     · You have a new or higher fever. Watch closely for changes in your health, and be sure to contact your doctor if:    · You have new or worse facial pain.     · The mucus from your nose becomes thicker (like pus) or has new blood in it.     · You are not getting better as expected. Where can you learn more? Go to https://Risk Ident.Qool. org and sign in to your [employer] account. Enter P262 in the reMail box to learn more about \"Sinusitis: Care Instructions. \"     If you do not have an account, please click on the \"Sign Up Now\" link. Current as of: December 2, 2020               Content Version: 13.0  © 2006-2021 Healthwise, [employer]. Care instructions adapted under license by [facility] [place] ([facility]). If you have questions about a medical condition or this instruction, always ask your healthcare professional. [place] [address] any warranty or liability for your use of this information. Patient Education        Stopping Smoking: Care Instructions  Your Care Instructions     Cigarette smokers crave the nicotine in cigarettes. Giving it up is much harder than simply changing a habit. Your body has to stop craving the nicotine. It is hard to quit, but you can do it. There are many tools that people use to quit smoking. You may find that combining tools works best for you. There are several steps to quitting. First you get ready to quit. Then you get support to help you.  After that, you learn new skills and behaviors to become a nonsmoker. For many people, a necessary step is getting and using medicine. Your doctor will help you set up the plan that best meets your needs. You may want to attend a smoking cessation program to help you quit smoking. When you choose a program, look for one that has proven success. Ask your doctor for ideas. You will greatly increase your chances of success if you take medicine as well as get counseling or join a cessation program.  Some of the changes you feel when you first quit tobacco are uncomfortable. Your body will miss the nicotine at first, and you may feel short-tempered and grumpy. You may have trouble sleeping or concentrating. Medicine can help you deal with these symptoms. You may struggle with changing your smoking habits and rituals. The last step is the tricky one: Be prepared for the smoking urge to continue for a time. This is a lot to deal with, but keep at it. You will feel better. Follow-up care is a key part of your treatment and safety. Be sure to make and go to all appointments, and call your doctor if you are having problems. It's also a good idea to know your test results and keep a list of the medicines you take. How can you care for yourself at home? · Ask your family, friends, and coworkers for support. You have a better chance of quitting if you have help and support. · Join a support group, such as Nicotine Anonymous, for people who are trying to quit smoking. · Consider signing up for a smoking cessation program, such as the American Lung Association's Freedom from Smoking program.  · Get text messaging support. Go to the website at www.smokefree. gov to sign up for the [facility] program.  · Set a quit date. Pick your date carefully so that it is not right in the middle of a big deadline or stressful time. Once you quit, do not even take a puff. Get rid of all ashtrays and lighters after your last cigarette.  Clean your house and your clothes so that they do not smell of smoke. · Learn how to be a nonsmoker. Think about ways you can avoid those things that make you reach for a cigarette. ? Avoid situations that put you at greatest risk for smoking. For some people, it is hard to have a drink with friends without smoking. For others, they might skip a coffee break with coworkers who smoke. ? Change your daily routine. Take a different route to work or eat a meal in a different place. · Cut down on stress. Calm yourself or release tension by doing an activity you enjoy, such as reading a book, taking a hot bath, or gardening. · Talk to your doctor or pharmacist about nicotine replacement therapy, which replaces the nicotine in your body. You still get nicotine but you do not use tobacco. Nicotine replacement products help you slowly reduce the amount of nicotine you need. These products come in several forms, many of them available over-the-counter:  ? Nicotine patches  ? Nicotine gum and lozenges  ? Nicotine inhaler  · Ask your doctor about bupropion (Wellbutrin) or varenicline (Chantix), which are prescription medicines. They do not contain nicotine. They help you by reducing withdrawal symptoms, such as stress and anxiety. · Some people find hypnosis, acupuncture, and massage helpful for ending the smoking habit. · Eat a healthy diet and get regular exercise. Having healthy habits will help your body move past its craving for nicotine. · Be prepared to keep trying. Most people are not successful the first few times they try to quit. Do not get mad at yourself if you smoke again. Make a list of things you learned and think about when you want to try again, such as next week, next month, or next year. Where can you learn more? Go to https://dorothy.Meal Mantra. org and sign in to your [employer] account. Enter S953 in the [facility] box to learn more about \"Stopping Smoking: Care Instructions. \"     If you do not have an account, please click on the \"Sign Up Now\" link. Current as of: February 11, 2021               Content Version: 13.0  © 2981-7180 Healthwise, Incorporated. Care instructions adapted under license by [facility] [place] ([facility]). If you have questions about a medical condition or this instruction, always ask your healthcare professional. [address] any warranty or liability for your use of this information.

## 2021-11-16 LAB — THROAT CULTURE: NORMAL

## 2021-12-22 ENCOUNTER — HOSPITAL ENCOUNTER (EMERGENCY)
Age: 46
Discharge: HOME | End: 2021-12-22
Attending: EMERGENCY MEDICINE
Payer: COMMERCIAL

## 2021-12-22 ENCOUNTER — APPOINTMENT (OUTPATIENT)
Dept: CT IMAGING | Age: 46
End: 2021-12-22
Payer: COMMERCIAL

## 2021-12-22 VITALS
HEART RATE: 81 BPM | TEMPERATURE: 96.8 F | SYSTOLIC BLOOD PRESSURE: 122 MMHG | WEIGHT: 220 LBS | HEIGHT: 57 IN | BODY MASS INDEX: 47.46 KG/M2 | DIASTOLIC BLOOD PRESSURE: 97 MMHG | RESPIRATION RATE: 18 BRPM | OXYGEN SATURATION: 100 %

## 2021-12-22 DIAGNOSIS — G43.109 MIGRAINE EQUIVALENT: Primary | ICD-10-CM

## 2021-12-22 DIAGNOSIS — J01.10 ACUTE FRONTAL SINUSITIS, RECURRENCE NOT SPECIFIED: ICD-10-CM

## 2021-12-22 LAB — STREP GRP A PCR: NEGATIVE

## 2021-12-22 PROCEDURE — 99283 EMERGENCY DEPT VISIT LOW MDM: CPT

## 2021-12-22 PROCEDURE — 87651 STREP A DNA AMP PROBE: CPT

## 2021-12-22 PROCEDURE — 96372 THER/PROPH/DIAG INJ SC/IM: CPT

## 2021-12-22 PROCEDURE — 6370000000 HC RX 637 (ALT 250 FOR IP): Performed by: EMERGENCY MEDICINE

## 2021-12-22 PROCEDURE — 70450 CT HEAD/BRAIN W/O DYE: CPT

## 2021-12-22 PROCEDURE — 6360000002 HC RX W HCPCS: Performed by: EMERGENCY MEDICINE

## 2021-12-22 RX ORDER — KETOROLAC TROMETHAMINE 30 MG/ML
60 INJECTION, SOLUTION INTRAMUSCULAR; INTRAVENOUS ONCE
Status: COMPLETED | OUTPATIENT
Start: 2021-12-22 | End: 2021-12-22

## 2021-12-22 RX ORDER — ONDANSETRON 4 MG/1
4 TABLET, ORALLY DISINTEGRATING ORAL ONCE
Status: COMPLETED | OUTPATIENT
Start: 2021-12-22 | End: 2021-12-22

## 2021-12-22 RX ORDER — AZITHROMYCIN 250 MG/1
TABLET, FILM COATED ORAL
Qty: 6 TABLET | Refills: 0 | Status: SHIPPED | OUTPATIENT
Start: 2021-12-22 | End: 2022-01-01

## 2021-12-22 RX ADMIN — ONDANSETRON 4 MG: 4 TABLET, ORALLY DISINTEGRATING ORAL at 16:55

## 2021-12-22 RX ADMIN — KETOROLAC TROMETHAMINE 60 MG: 30 INJECTION, SOLUTION INTRAMUSCULAR at 16:55

## 2021-12-22 ASSESSMENT — ENCOUNTER SYMPTOMS
STRIDOR: 0
SORE THROAT: 0
EYE DISCHARGE: 0
FACIAL SWELLING: 0
VOICE CHANGE: 0
TROUBLE SWALLOWING: 0
EYE PAIN: 0
CHOKING: 0
COUGH: 0
SHORTNESS OF BREATH: 0
BACK PAIN: 0
CHEST TIGHTNESS: 0
WHEEZING: 0
VOMITING: 0
SINUS PRESSURE: 0
BLOOD IN STOOL: 0
DIARRHEA: 0
CONSTIPATION: 0
ABDOMINAL PAIN: 0
EYE REDNESS: 0

## 2021-12-22 ASSESSMENT — PAIN DESCRIPTION - LOCATION: LOCATION: OTHER (COMMENT);HEAD

## 2021-12-22 ASSESSMENT — PAIN DESCRIPTION - DESCRIPTORS: DESCRIPTORS: ACHING;THROBBING

## 2021-12-22 ASSESSMENT — PAIN SCALES - GENERAL
PAINLEVEL_OUTOF10: 10
PAINLEVEL_OUTOF10: 9

## 2021-12-22 ASSESSMENT — PAIN DESCRIPTION - FREQUENCY: FREQUENCY: CONTINUOUS

## 2021-12-22 ASSESSMENT — PAIN DESCRIPTION - PAIN TYPE: TYPE: ACUTE PAIN

## 2021-12-22 NOTE — ED NOTES
Pt provided with DC and medication education; verbalized understanding. Pt ambulated from Ed with steady gait and all belongings.         Terry Bryan RN  12/22/21 0948

## 2021-12-22 NOTE — ED PROVIDER NOTES
[facility] ED  eMERGENCY dEPARTMENT eNCOUnter      Pt Name: Lee Segovia  MRN: [id number]  [place] 1975  Date of evaluation: 12/22/2021  Provider: Tamy Lunsford MD    [address] [place] [address]       Chief Complaint   Patient presents with    Headache     x 1 day    Pharyngitis     x 1 day         HISTORY OF PRESENT ILLNESS   (Location/Symptom, Timing/Onset,Context/Setting, Quality, Duration, Modifying Factors, Severity)  Note limiting factors. Lee Segovia is a 40 y.o. female who presents to the emergency department patient with a sore throat other family member child is sick and seeing a pediatrician as per patient feels she did headache but this is a different headache and frontal headache making her uncomfortable no injury no fall no neck pain no dizziness no passing out    HPI    NursingNotes were reviewed. REVIEW OF SYSTEMS    (2-9 systems for level 4, 10 or more for level 5)     Review of Systems   Constitutional: Positive for activity change and appetite change. Negative for fever. HENT: Positive for congestion. Negative for drooling, facial swelling, mouth sores, nosebleeds, sinus pressure, sore throat, trouble swallowing and voice change. Eyes: Negative for pain, discharge, redness and visual disturbance. Respiratory: Negative for cough, choking, chest tightness, shortness of breath, wheezing and stridor. Cardiovascular: Negative for chest pain, palpitations and leg swelling. Gastrointestinal: Negative for abdominal pain, blood in stool, constipation, diarrhea and vomiting. Endocrine: Negative for cold intolerance, polyphagia and polyuria. Genitourinary: Negative for dysuria, flank pain, frequency, genital sores and urgency. Musculoskeletal: Negative for back pain, joint swelling, neck pain and neck stiffness. Skin: Negative for pallor and rash. Neurological: Positive for headaches. Negative for tremors, seizures, syncope, weakness and numbness.    Hematological: Negative for adenopathy. Does not bruise/bleed easily. Psychiatric/Behavioral: Negative for agitation, behavioral problems, hallucinations and sleep disturbance. The patient is not hyperactive. All other systems reviewed and are negative. Except as noted above the remainder of the review of systems was reviewed and negative. PAST MEDICAL HISTORY     Past Medical History:   Diagnosis Date    Anxiety and depression     Anxiety and depression     Asthma     Essential hypertension 5/18/2017    Headache(784.0)     Obesity     MARBELLA (obstructive sleep apnea) 4/29/2020    PTSD (post-traumatic stress disorder)     Urinary incontinence          SURGICALHISTORY       Past Surgical History:   Procedure Laterality Date    CHOLECYSTECTOMY      ENDOMETRIAL ABLATION      HYSTERECTOMY, TOTAL ABDOMINAL  7/15/15    DR. BECKHAM    TONSILLECTOMY      TUBAL LIGATION           CURRENT MEDICATIONS       Previous Medications    ALBUTEROL SULFATE HFA (VENTOLIN HFA) 108 (90 BASE) MCG/ACT INHALER    Inhale 2 puffs into the lungs 4 times daily as needed for Wheezing (cough) Dispense 1 inhaler    ALPRAZOLAM (XANAX) 2 MG TABLET    Take 2 mg by mouth 2 times daily as needed for Sleep.     BLOOD PRESSURE MONITORING (BLOOD PRESSURE KIT) ZELALEM    1 Units by Does not apply route as needed (for BP monitoring at home for hypertension) Cuff for upper arm    CYCLOSPORINE (RESTASIS) 0.05 % OPHTHALMIC EMULSION    Place 1 drop into both eyes 2 times daily    FLUOXETINE (PROZAC) 40 MG CAPSULE    Take 2 capsules by mouth every morning (before breakfast)    FLUTICASONE (FLONASE) 50 MCG/ACT NASAL SPRAY    1 spray by Nasal route daily    HYDROXYZINE (ATARAX) 25 MG TABLET    Take 3 tablets by mouth nightly    IBUPROFEN (ADVIL;MOTRIN) 800 MG TABLET    Take 1 tablet by mouth every 6 hours as needed for Pain After toradol dosing complete    LAMOTRIGINE (LAMICTAL) 100 MG TABLET    Take 1 tablet by mouth 2 times daily    METOPROLOL SUCCINATE (TOPROL XL) 25 MG EXTENDED RELEASE TABLET    Take 1 tablet by mouth daily    PANTOPRAZOLE (PROTONIX) 40 MG TABLET    Take 1 tablet by mouth daily       ALLERGIES     Aspirin, Codeine, Lithium, Naproxen, and Trazodone and nefazodone    FAMILY HISTORY     History reviewed. No pertinent family history. SOCIAL HISTORY       Social History     Socioeconomic History    Marital status:      Spouse name: None    Number of children: None    Years of education: None    Highest education level: None   Occupational History    None   Tobacco Use    Smoking status: Current Every Day Smoker     Packs/day: 0.50     Types: Cigarettes    Smokeless tobacco: Never Used   Vaping Use    Vaping Use: Never used   Substance and Sexual Activity    Alcohol use: No    Drug use: No    Sexual activity: Yes   Other Topics Concern    None   Social History Narrative    None     Social Determinants of Health     Financial Resource Strain: Low Risk     Difficulty of Paying Living Expenses: Not hard at all   Food Insecurity: No Food Insecurity    Worried About Running Out of Food in the Last Year: Never true    Elli of Food in the Last Year: Never true   Transportation Needs: No Transportation Needs    Lack of Transportation (Medical): No    Lack of Transportation (Non-Medical):  No   Physical Activity:     Days of Exercise per Week: Not on file    Minutes of Exercise per Session: Not on file   Stress:     Feeling of Stress : Not on file   Social Connections:     Frequency of Communication with Friends and Family: Not on file    Frequency of Social Gatherings with Friends and Family: Not on file    Attends Synagogue Services: Not on file    Active Member of Clubs or Organizations: Not on file    Attends Club or Organization Meetings: Not on file    Marital Status: Not on file   Intimate Partner Violence:     Fear of Current or Ex-Partner: Not on file    Emotionally Abused: Not on file    Physically Abused: Not on file    Sexually Abused: Not on file   Housing Stability:     Unable to Pay for Housing in the Last Year: Not on file    Number of Places Lived in the Last Year: Not on file    Unstable Housing in the Last Year: Not on file       SCREENINGS   NIH Stroke Scale  Interval: Baseline  Level of Consciousness (1a. ): Alert  LOC Questions (1b. ): Answers both correctly  LOC Commands (1c. ): Performs both tasks correctly  Best Gaze (2. ): Normal  Visual (3. ): No visual loss  Facial Palsy (4. ): Normal symmetrical movement  Motor Arm, Left (5a. ): No drift  Motor Arm, Right (5b. ): No drift  Motor Leg, Left (6a. ): No drift  Motor Leg, Right (6b. ): No drift  Limb Ataxia (7. ): Absent  Sensory (8. ): Normal  Best Language (9. ): No aphasia  Dysarthria (10. ): Normal  Extinction and Inattention (11): No abnormality  Total: 0  @FLOW([id number])@      PHYSICAL EXAM    (up to 7 for level 4, 8 or more for level 5)     ED Triage Vitals [12/22/21 1543]   BP Temp Temp Source Pulse Resp SpO2 Height Weight   (!) 122/97 96.8 °F (36 °C) Temporal 81 18 100 % 4' 9\" (1.448 m) 220 lb (99.8 kg)       Physical Exam  Constitutional:       General: She is not in acute distress. Appearance: She is well-developed. She is not ill-appearing or diaphoretic. HENT:      Head: Normocephalic and atraumatic. Right Ear: No drainage, swelling or tenderness. No middle ear effusion. Tympanic membrane is not erythematous. Left Ear: No drainage, swelling or tenderness. No middle ear effusion. Tympanic membrane is not erythematous. Mouth/Throat: Tonsils: No tonsillar exudate or tonsillar abscesses. Eyes:      Extraocular Movements:      Right eye: Normal extraocular motion. Conjunctiva/sclera: Conjunctivae normal.      Pupils: Pupils are equal, round, and reactive to light. Cardiovascular:      Rate and Rhythm: Normal rate. Heart sounds: Normal heart sounds. No murmur heard. No gallop.     Pulmonary: Effort: No respiratory distress. Breath sounds: Normal breath sounds. No wheezing. Abdominal:      General: Bowel sounds are normal.      Palpations: Abdomen is soft. There is no mass. Tenderness: There is no rebound. Musculoskeletal:         General: No tenderness. Normal range of motion. Cervical back: Normal range of motion and neck supple. Skin:     General: Skin is warm. Capillary Refill: Capillary refill takes less than 2 seconds. Findings: No erythema or rash. Neurological:      General: No focal deficit present. Mental Status: She is alert and oriented to person, place, and time. Cranial Nerves: No cranial nerve deficit. Motor: No abnormal muscle tone. Psychiatric:         Behavior: Behavior normal.         Thought Content: Thought content normal.         DIAGNOSTIC RESULTS     EKG: All EKG's are interpreted by the Emergency Department Physician who either signs or Co-signsthis chart in the absence of a cardiologist.        RADIOLOGY:   [place] such as CT, Ultrasound and MRI are read by the radiologist. Plain radiographic images are visualized and preliminarily interpreted by the emergency physician with the below findings:        Interpretation per the Radiologist below, if available at the time ofthis note:    CT Head WO Contrast   Final Result   NO ACUTE INTRACRANIAL PATHOLOGY. ED BEDSIDE ULTRASOUND:   Performed by ED Physician - none    LABS:  Labs Reviewed   RAPID STREP SCREEN       All other labs were within normal range or not returned as of this dictation.     EMERGENCY DEPARTMENT COURSE and DIFFERENTIAL DIAGNOSIS/MDM:   Vitals:    Vitals:    12/22/21 1543   BP: (!) 122/97   Pulse: 81   Resp: 18   Temp: 96.8 °F (36 °C)   TempSrc: Temporal   SpO2: 100%   Weight: 220 lb (99.8 kg)   Height: 4' 9\" (1.448 m)         MDM    CRITICAL CARE TIME   Total Critical Care time was  minutes, excluding separately reportableprocedures. There was a high probability of clinicallysignificant/life threatening deterioration in the patient's condition which required my urgent intervention. CONSULTS:  None    PROCEDURES:  Unless otherwise noted below, none     Procedures    FINAL IMPRESSION      1. Migraine equivalent    2.  Acute frontal sinusitis, recurrence not specified          DISPOSITION/PLAN   DISPOSITION        PATIENT REFERRED TO:  ANA Macias - [address], [address] [facility]  [phone]    In 1 week        DISCHARGE MEDICATIONS:  New Prescriptions    AZITHROMYCIN (ZITHROMAX) 250 MG TABLET    2 TABS DAY 1 THEN 1 TAB DAYS 2-5          (Please note that portions of this note were completed with a voice recognition program.  Efforts were made to edit the dictations but occasionally words are mis-transcribed.)    Serene Byrne MD (electronically signed)  Attending Emergency Physician       Serene Byrne MD  12/22/21 [phone]

## 2021-12-22 NOTE — Clinical Note
Robin Rivera was seen and treated in our emergency department on 12/22/2021. She may return to work on 12/24/2021. If you have any questions or concerns, please don't hesitate to call.       Gisselle Iraheta MD

## 2021-12-22 NOTE — Clinical Note
Darrion Hercules was seen and treated in our emergency department on 12/22/2021. She may return to work on 12/24/2021. If you have any questions or concerns, please don't hesitate to call.       Niranjan Frost MD

## 2022-01-10 RX ORDER — FLUOXETINE HYDROCHLORIDE 40 MG/1
CAPSULE ORAL
Qty: 60 CAPSULE | Refills: 0 | Status: SHIPPED | OUTPATIENT
Start: 2022-01-10 | End: 2022-01-24 | Stop reason: SDUPTHER

## 2022-01-10 NOTE — TELEPHONE ENCOUNTER
requesting medication refill. Rx requested:  Requested Prescriptions     Pending Prescriptions Disp Refills    FLUoxetine (PROZAC) 40 MG capsule [Pharmacy Med Name: FLUoxetine HCl 40 MG Oral Capsule] 60 capsule 0     Sig: TAKE 2 CAPSULES BY MOUTH IN THE MORNING BEFORE BREAKFAST       Last Office Visit:   9/23/2021    Last Tox screen:        Last Medication contract:    Last refill sent: 5/24/2021    LM asking patient if she is till taking this medication, due to time in between fills.     Next Visit Date:  Future Appointments   Date Time Provider [place]   1/24/2022  9:30 AM Nav Morrow, APRN - CNP [address]

## 2022-01-14 ASSESSMENT — ENCOUNTER SYMPTOMS
WHEEZING: 0
SHORTNESS OF BREATH: 0
COUGH: 1

## 2022-01-24 ENCOUNTER — TELEMEDICINE (OUTPATIENT)
Dept: BEHAVIORAL/MENTAL HEALTH CLINIC | Age: 47
End: 2022-01-24
Payer: COMMERCIAL

## 2022-01-24 ENCOUNTER — VIRTUAL VISIT (OUTPATIENT)
Dept: FAMILY MEDICINE CLINIC | Age: 47
End: 2022-01-24
Payer: COMMERCIAL

## 2022-01-24 DIAGNOSIS — J30.89 NON-SEASONAL ALLERGIC RHINITIS DUE TO OTHER ALLERGIC TRIGGER: ICD-10-CM

## 2022-01-24 DIAGNOSIS — J45.20 MILD INTERMITTENT REACTIVE AIRWAY DISEASE WITHOUT COMPLICATION: ICD-10-CM

## 2022-01-24 DIAGNOSIS — F41.0 PANIC ATTACK: ICD-10-CM

## 2022-01-24 DIAGNOSIS — G47.00 INSOMNIA, UNSPECIFIED TYPE: ICD-10-CM

## 2022-01-24 DIAGNOSIS — K21.9 GASTROESOPHAGEAL REFLUX DISEASE WITHOUT ESOPHAGITIS: ICD-10-CM

## 2022-01-24 DIAGNOSIS — G43.909 MIGRAINE WITHOUT STATUS MIGRAINOSUS, NOT INTRACTABLE, UNSPECIFIED MIGRAINE TYPE: ICD-10-CM

## 2022-01-24 DIAGNOSIS — R00.2 PALPITATIONS: ICD-10-CM

## 2022-01-24 DIAGNOSIS — G89.29 CHRONIC MIDLINE LOW BACK PAIN WITHOUT SCIATICA: ICD-10-CM

## 2022-01-24 DIAGNOSIS — F41.9 ANXIETY: ICD-10-CM

## 2022-01-24 DIAGNOSIS — I10 ESSENTIAL HYPERTENSION: ICD-10-CM

## 2022-01-24 DIAGNOSIS — F31.9 BIPOLAR 1 DISORDER (HCC): Primary | ICD-10-CM

## 2022-01-24 DIAGNOSIS — M54.50 CHRONIC MIDLINE LOW BACK PAIN WITHOUT SCIATICA: ICD-10-CM

## 2022-01-24 DIAGNOSIS — I10 ESSENTIAL HYPERTENSION: Primary | ICD-10-CM

## 2022-01-24 PROCEDURE — 99215 OFFICE O/P EST HI 40 MIN: CPT | Performed by: PSYCHIATRY & NEUROLOGY

## 2022-01-24 PROCEDURE — 99214 OFFICE O/P EST MOD 30 MIN: CPT | Performed by: NURSE PRACTITIONER

## 2022-01-24 RX ORDER — LAMOTRIGINE 100 MG/1
100 TABLET ORAL 2 TIMES DAILY
Qty: 180 TABLET | Refills: 3 | Status: SHIPPED | OUTPATIENT
Start: 2022-01-24 | End: 2022-03-07

## 2022-01-24 RX ORDER — ALPRAZOLAM 2 MG/1
2 TABLET ORAL 2 TIMES DAILY PRN
Qty: 60 TABLET | Refills: 2 | Status: SHIPPED | OUTPATIENT
Start: 2022-01-24 | End: 2022-02-23

## 2022-01-24 RX ORDER — PANTOPRAZOLE SODIUM 40 MG/1
40 TABLET, DELAYED RELEASE ORAL DAILY
Qty: 90 TABLET | Refills: 1 | Status: SHIPPED | OUTPATIENT
Start: 2022-01-24 | End: 2022-01-24 | Stop reason: SDUPTHER

## 2022-01-24 RX ORDER — FLUTICASONE PROPIONATE 50 MCG
1 SPRAY, SUSPENSION (ML) NASAL DAILY
Qty: 1 EACH | Refills: 0 | Status: SHIPPED | OUTPATIENT
Start: 2022-01-24 | End: 2022-08-30 | Stop reason: SDUPTHER

## 2022-01-24 RX ORDER — ALBUTEROL SULFATE 90 UG/1
2 AEROSOL, METERED RESPIRATORY (INHALATION) 4 TIMES DAILY PRN
Qty: 18 G | Refills: 0 | Status: SHIPPED | OUTPATIENT
Start: 2022-01-24 | End: 2022-08-30 | Stop reason: SDUPTHER

## 2022-01-24 RX ORDER — FLUTICASONE PROPIONATE 50 MCG
1 SPRAY, SUSPENSION (ML) NASAL DAILY
Qty: 1 EACH | Refills: 0 | Status: SHIPPED | OUTPATIENT
Start: 2022-01-24 | End: 2022-01-24 | Stop reason: SDUPTHER

## 2022-01-24 RX ORDER — HYDROXYZINE HYDROCHLORIDE 25 MG/1
75 TABLET, FILM COATED ORAL NIGHTLY
Qty: 270 TABLET | Refills: 2 | Status: SHIPPED | OUTPATIENT
Start: 2022-01-24 | End: 2022-08-30 | Stop reason: SDUPTHER

## 2022-01-24 RX ORDER — ALPRAZOLAM 2 MG/1
2 TABLET ORAL 2 TIMES DAILY PRN
Status: CANCELLED | OUTPATIENT
Start: 2022-01-24

## 2022-01-24 RX ORDER — FLUOXETINE HYDROCHLORIDE 40 MG/1
CAPSULE ORAL
Qty: 180 CAPSULE | Refills: 2 | Status: SHIPPED | OUTPATIENT
Start: 2022-01-24 | End: 2022-03-07

## 2022-01-24 RX ORDER — LAMOTRIGINE 100 MG/1
100 TABLET ORAL 2 TIMES DAILY
Qty: 180 TABLET | Refills: 3 | Status: SHIPPED | OUTPATIENT
Start: 2022-01-24 | End: 2022-01-24 | Stop reason: SDUPTHER

## 2022-01-24 RX ORDER — IBUPROFEN 800 MG/1
800 TABLET ORAL EVERY 6 HOURS PRN
Qty: 120 TABLET | Refills: 3 | Status: SHIPPED | OUTPATIENT
Start: 2022-01-24 | End: 2022-04-25 | Stop reason: SDUPTHER

## 2022-01-24 RX ORDER — ALBUTEROL SULFATE 90 UG/1
2 AEROSOL, METERED RESPIRATORY (INHALATION) 4 TIMES DAILY PRN
Qty: 18 G | Refills: 0 | Status: SHIPPED | OUTPATIENT
Start: 2022-01-24 | End: 2022-01-24 | Stop reason: SDUPTHER

## 2022-01-24 RX ORDER — SUMATRIPTAN 50 MG/1
50 TABLET, FILM COATED ORAL
Qty: 9 TABLET | Refills: 3 | Status: SHIPPED | OUTPATIENT
Start: 2022-01-24 | End: 2022-08-30 | Stop reason: SDUPTHER

## 2022-01-24 RX ORDER — FLUOXETINE HYDROCHLORIDE 40 MG/1
CAPSULE ORAL
Qty: 180 CAPSULE | Refills: 2 | Status: SHIPPED | OUTPATIENT
Start: 2022-01-24 | End: 2022-01-24 | Stop reason: SDUPTHER

## 2022-01-24 RX ORDER — PANTOPRAZOLE SODIUM 40 MG/1
40 TABLET, DELAYED RELEASE ORAL DAILY
Qty: 90 TABLET | Refills: 1 | Status: SHIPPED | OUTPATIENT
Start: 2022-01-24 | End: 2022-04-23 | Stop reason: SDUPTHER

## 2022-01-24 RX ORDER — ALPRAZOLAM 2 MG/1
2 TABLET ORAL 2 TIMES DAILY PRN
Qty: 60 TABLET | Refills: 2 | Status: SHIPPED | OUTPATIENT
Start: 2022-01-24 | End: 2022-01-24 | Stop reason: SDUPTHER

## 2022-01-24 RX ORDER — SUMATRIPTAN 50 MG/1
50 TABLET, FILM COATED ORAL
Qty: 9 TABLET | Refills: 3 | Status: SHIPPED | OUTPATIENT
Start: 2022-01-24 | End: 2022-01-24 | Stop reason: SDUPTHER

## 2022-01-24 RX ORDER — METOPROLOL SUCCINATE 25 MG/1
25 TABLET, EXTENDED RELEASE ORAL DAILY
Qty: 90 TABLET | Refills: 1 | Status: SHIPPED | OUTPATIENT
Start: 2022-01-24 | End: 2022-01-24 | Stop reason: SDUPTHER

## 2022-01-24 RX ORDER — METOPROLOL SUCCINATE 25 MG/1
25 TABLET, EXTENDED RELEASE ORAL DAILY
Qty: 90 TABLET | Refills: 1 | Status: SHIPPED | OUTPATIENT
Start: 2022-01-24 | End: 2022-08-30 | Stop reason: SDUPTHER

## 2022-01-24 SDOH — SOCIAL STABILITY: SOCIAL NETWORK: HOW OFTEN DO YOU GET TOGETHER WITH FRIENDS OR RELATIVES?: PATIENT DECLINED

## 2022-01-24 SDOH — SOCIAL STABILITY: SOCIAL INSECURITY
WITHIN THE LAST YEAR, HAVE TO BEEN RAPED OR FORCED TO HAVE ANY KIND OF SEXUAL ACTIVITY BY YOUR PARTNER OR EX-PARTNER?: PATIENT DECLINED

## 2022-01-24 SDOH — SOCIAL STABILITY: SOCIAL NETWORK
DO YOU BELONG TO ANY CLUBS OR ORGANIZATIONS SUCH AS CHURCH GROUPS UNIONS, FRATERNAL OR ATHLETIC GROUPS, OR SCHOOL GROUPS?: PATIENT DECLINED

## 2022-01-24 SDOH — ECONOMIC STABILITY: HOUSING INSECURITY
IN THE LAST 12 MONTHS, WAS THERE A TIME WHEN YOU DID NOT HAVE A STEADY PLACE TO SLEEP OR SLEPT IN A SHELTER (INCLUDING NOW)?: PATIENT REFUSED

## 2022-01-24 SDOH — HEALTH STABILITY: PHYSICAL HEALTH
ON AVERAGE, HOW MANY DAYS PER WEEK DO YOU ENGAGE IN MODERATE TO STRENUOUS EXERCISE (LIKE A BRISK WALK)?: PATIENT DECLINED

## 2022-01-24 SDOH — SOCIAL STABILITY: SOCIAL INSECURITY: WITHIN THE LAST YEAR, HAVE YOU BEEN AFRAID OF YOUR PARTNER OR EX-PARTNER?: PATIENT DECLINED

## 2022-01-24 SDOH — SOCIAL STABILITY: SOCIAL INSECURITY
WITHIN THE LAST YEAR, HAVE YOU BEEN KICKED, HIT, SLAPPED, OR OTHERWISE PHYSICALLY HURT BY YOUR PARTNER OR EX-PARTNER?: PATIENT DECLINED

## 2022-01-24 SDOH — HEALTH STABILITY: MENTAL HEALTH
STRESS IS WHEN SOMEONE FEELS TENSE, NERVOUS, ANXIOUS, OR CAN'T SLEEP AT NIGHT BECAUSE THEIR MIND IS TROUBLED. HOW STRESSED ARE YOU?: PATIENT DECLINED

## 2022-01-24 SDOH — ECONOMIC STABILITY: TRANSPORTATION INSECURITY
IN THE PAST 12 MONTHS, HAS THE LACK OF TRANSPORTATION KEPT YOU FROM MEDICAL APPOINTMENTS OR FROM GETTING MEDICATIONS?: PATIENT DECLINED

## 2022-01-24 SDOH — SOCIAL STABILITY: SOCIAL NETWORK: ARE YOU MARRIED, WIDOWED, DIVORCED, SEPARATED, NEVER MARRIED, OR LIVING WITH A PARTNER?: PATIENT DECLINED

## 2022-01-24 SDOH — HEALTH STABILITY: MENTAL HEALTH: HOW OFTEN DO YOU HAVE A DRINK CONTAINING ALCOHOL?: PATIENT DECLINED

## 2022-01-24 SDOH — HEALTH STABILITY: MENTAL HEALTH: HOW MANY STANDARD DRINKS CONTAINING ALCOHOL DO YOU HAVE ON A TYPICAL DAY?: PATIENT DECLINED

## 2022-01-24 SDOH — ECONOMIC STABILITY: INCOME INSECURITY: IN THE LAST 12 MONTHS, WAS THERE A TIME WHEN YOU WERE NOT ABLE TO PAY THE MORTGAGE OR RENT ON TIME?: PATIENT REFUSED

## 2022-01-24 SDOH — SOCIAL STABILITY: SOCIAL NETWORK: HOW OFTEN DO YOU ATTENT MEETINGS OF THE CLUB OR ORGANIZATION YOU BELONG TO?: PATIENT DECLINED

## 2022-01-24 SDOH — ECONOMIC STABILITY: FOOD INSECURITY: WITHIN THE PAST 12 MONTHS, YOU WORRIED THAT YOUR FOOD WOULD RUN OUT BEFORE YOU GOT MONEY TO BUY MORE.: PATIENT DECLINED

## 2022-01-24 SDOH — SOCIAL STABILITY: SOCIAL NETWORK: IN A TYPICAL WEEK, HOW MANY TIMES DO YOU TALK ON THE PHONE WITH FAMILY, FRIENDS, OR NEIGHBORS?: PATIENT DECLINED

## 2022-01-24 SDOH — ECONOMIC STABILITY: TRANSPORTATION INSECURITY
IN THE PAST 12 MONTHS, HAS LACK OF TRANSPORTATION KEPT YOU FROM MEETINGS, WORK, OR FROM GETTING THINGS NEEDED FOR DAILY LIVING?: PATIENT DECLINED

## 2022-01-24 SDOH — ECONOMIC STABILITY: INCOME INSECURITY: HOW HARD IS IT FOR YOU TO PAY FOR THE VERY BASICS LIKE FOOD, HOUSING, MEDICAL CARE, AND HEATING?: PATIENT DECLINED

## 2022-01-24 SDOH — ECONOMIC STABILITY: FOOD INSECURITY: WITHIN THE PAST 12 MONTHS, THE FOOD YOU BOUGHT JUST DIDN'T LAST AND YOU DIDN'T HAVE MONEY TO GET MORE.: PATIENT DECLINED

## 2022-01-24 SDOH — SOCIAL STABILITY: SOCIAL INSECURITY
WITHIN THE LAST YEAR, HAVE YOU BEEN HUMILIATED OR EMOTIONALLY ABUSED IN OTHER WAYS BY YOUR PARTNER OR EX-PARTNER?: PATIENT DECLINED

## 2022-01-24 SDOH — SOCIAL STABILITY: SOCIAL NETWORK: HOW OFTEN DO YOU ATTEND CHURCH OR RELIGIOUS SERVICES?: PATIENT DECLINED

## 2022-01-24 SDOH — HEALTH STABILITY: PHYSICAL HEALTH: ON AVERAGE, HOW MANY MINUTES DO YOU ENGAGE IN EXERCISE AT THIS LEVEL?: PATIENT DECLINED

## 2022-01-24 NOTE — PROGRESS NOTES
1/24/2022    40 mins total for this encounter =     30 minutes with direct communication with patient for encounter     10 mins (in addition) spent on chart review, and in the ordering of all necessary labs and/or medications      The risks and benefits of converting to a virtual visit were discussed in light of the current infectious disease epidemic. Patient also understood that insurance coverage and co-pays are up to their individual insurance plans. Patient Location:        Patient's home address  Provider Location ([facility]/State):        [address] (OUTPATIENT)   Audio/Visual (During [facility] public health emergency)          Psychiatric Diagnoses:  1. Bipolar 1 disorder ([facility] [address]    2. Panic attack    3. Anxiety    4. Insomnia, unspecified type        Assessment/Plan:   Having racing thoughts, decrease need for sleep, consistent with current manic episode   START lurasidone (Latuda) 20 mg,   Patient denies thoughts of harm to self or others. No acute safety concerns voiced at this appointment. MOOD: STRESSORS WORSENING MOOD: Patient reports worsening of symptoms of depression: low mood , anhedonia (difficulty finding enjoyment in things) , anergia (low energy)  and irritability/agitation. Reports that these symptoms have been present partly as a result of increased stress at work and increased stress with finances. From patient's description, patient's worsening mood symptoms more likely represent a response to this increased environmental stressor. SLEEP: IMPROVED: Sleeping better, continues to struggle with sleep somewhat. Sleeping 6 hours a night. ATTENTION AND FOCUS: No concerns. No recent issues related to difficulty with attention or focus. Occasionally going for walks. ANXIETY: ANXIETY CONTINUES TO BE A CONCERN: Patient reports frequent worrying throughout the day is affecting ability to perform day-to-day activities and/or interpersonal relationships. BIPOLAR ROBIN: RECENT MANIC SYMPTOMS: Patient endorses for the past several week(s) they have experienced racing thoughts, decreased need for sleep, sleeping less/difficulty sleeping, speaking much faster/much more than usual, feeling anxious/ on edge, persistently irritable mood , persistently elevated mood, increased goal directed activity and increased energy. SUBSTANCE USE: No concerns related to substance use. Not applicable. and No concerns for ongoing substance use. Patient denies any recent substance use    ASSESSMENT/PLAN: Medication changes needed given the current presentation of symptoms. Patient was amenable to plan related to medications and endorsed understanding of the risks and benefits, which were explained and discussed. No acute concerns. No thoughts of harm to self or others voiced. COUNSELING or THERAPY:   Continue to encourage therapy as part of ongoing treatment of their mental health concerns. Continue to encourage physical activity and adherence to medication regimen. DATE and changes made  lamictal, seroquel, zyprexa, abilify, geodon, lithium, depakote and lamictal. Melatonin     8/17/20  -STOP Vraylar   -START LAMICTAL 25 mg QD to 25 mg BID   -START Propranolol 10-20 mg TID   -START Hydroxyzine 75 mg QHS   -Minimize ambien use  · 9/1/20  -Had to stop propranolol due to hypertension on home cuff and needed to switch to   -Pt STOPPED hydroxyzine, causing headaches   -STOPPED propranolol, on selective beta blocker for BP now and monitoring at home on wrist cuff.  Sending in rx to see if insurance will cover an arm cuff   -50 mg QD now, in 2 weeks if no rash will increase to 50 mg BID   -lamictal 50 mg   -rozerem 8 mg QHS   -buspar 7.5 mg TID   · 9/15/20  -Sleeping 6 hours  -Lamictal 50 mg QD BID is current dose will increase to 50 mg BID   -Rozerem 8 mg QHS   -Buspar 7.5 mg TID   -genesite ordered  · 10/6/20  -Resend genesight   -Buspar 15 mg TID   -Rozerem 8 mg QHS -Lamictal 50 mg BID  · 10/20  -Still waiting on genesite, patient will come to office for this   -START HYDROXYZINE 25 mg QHS PRN sleep  · 11/4  -Still waiting on genesite, patient will come to office for this   -START HYDROXYZINE 25 mg QHS PRN sleep   -CONT LAMICTAL 50 mg BID   -CONT ROZEREM 8 mg QHS   -L-methylfolate   · 12/10  -CONT HYDROXYZINE 25 mg QHS PRN sleep   -INCREASE LAMICTAL 50 mg BID to LAMICTAL 100 mg BID   -CONT ROZEREM 8 mg QHS   -START PRISTIQ   -BUSPAR 20 mg TID   · 1/14/21  -CONT HYDROXYZINE 25 mg QHS PRN sleep   -LAMICTAL 100 mg BID   -CONT ROZEREM 8 mg QHS   -STOP PRISTIQ   -START PROZAC 20 mg QD   -BUSPAR 20 mg TID  · 2/1/21  -Prozac 20 mg QD to 40 mg QD (has tolerated well, attempting more rapid taper for persistent depressive symptoms and sig irritability)   -Cont Rozerem 8 mg QHS   -Stop pristiq  -Lamictal 100 mg BID   -Buspar continue   · 2/18/21  -Lamictal 100 mg BID   -Ramelteon 8 mg QHS   -Buspar 20 mg TID   -Hydroxyzine 25 mg QHS   -Prozac 40 mg daily  · 4/23/21  -fluoxetine (Prozac) 60 mg   -buspirone (Buspar) 20 mg three times daily   -Hydroxyzine hcl (Atarax) 25 mg every night   -Lamotrigine (Lamictal) 100 mg twice daily   -ramelteon discontinued due to cost   -Will try propranolol hcl (Inderal) 10 mg three times daily as needed anxiety, had tried in past discussed risk of hypotension and patient is already on metoprolol for hypertension, but we need to try to wean patient off of her alprazolam (Xanax), and this may help with anxiety symptoms which are primarily somatic  · 5/24/21  -Lamotrigine (Lamictal) 100 mg twice daily continue   -Ramelteon (Rozerem) 8 mg every night   -Buspirone (Buspar) 20 mg three times daily patient has stopped this    -hydroxyzine hcl (Atarax) 25 mg every night                                           -fluoxetine (Prozac) 60 mg daily will increase 80 mg daily  · 8/10/21  -hydroxyzine hcl (Atarax) 75 mg every night  -fluoxetine (Prozac) 80 mg daily -lamotrigine (Lamictal) 100 mg twice daily   -Has not been taking ramelteon (Rozerem) due to cost   -START liothyronine (Cytomel) 12.5 mcg for 2 weeks then 25 mcg for augmentation of antidepressant therapy   -Thyroid labs   · 9/23/21  -Is now working in healthcare   -STOP liothyronine (Cytomel) (will go to 12.5 mg daily for next 10 days then off)   -Has had no benefit from medication, not worsening anxiety either   -fluoxetine (Prozac) 80 mg daily   -lamotrigine (Lamictal) 100 mg twice daily   -Hydroxyzine 75 mg every night                          1/24/22  -lamotrigine (Lamictal) 100 mg twice daily   -had been prescribed lurasidone (Latuda) in the past but it was too expensive   -lurasidone (Latuda) 20 mg daily after a week to 40 mg daily                 Medical Diagnoses:  Patient Active Problem List   Diagnosis    Anxiety and depression    PTSD (post-traumatic stress disorder)    Migraine headache    Nausea and vomiting    S/P abdominal hysterectomy    Severe recurrent major depressive disorder with psychotic features ([address]    Morbid obesity ([address]    Overactive bladder    Stress incontinence, female    Abdominal pain    Blood in the urine    Essential hypertension    Yeast infection    Vitamin D deficiency    Former smoker    Obsessive-compulsive disorder    Bilateral dry eyes    Shortness of breath    MARBELLA (obstructive sleep apnea)    Moderate episode of recurrent major depressive disorder ([address]    ALFREDO (generalized anxiety disorder)         AT TODAY'S VISIT     Crisis plan reviewed and patient verbally contracts for safety. Go to ED with emergent symptoms or safety concerns. Risks, benefits, side effects of medications, including any / all black box warnings, discussed with patient, who verbalizes their understanding. Pt is kings for safety and denies thoughts of SI/HI. Amenable to plan. No acute concerns to address regarding medications.      Subjective:      Patient denies medication side effects apart from those mentioned in the assessment and plan. Patient reports they have been compliant with current medication regimen and have not missed a dose. At today's visit, patient denies thoughts of harm to self or others since last appointment, and denies auditory or visual hallucinations. ROS:  [x] All negative/unchanged except if checked.  Explain positive(checked items) below:       Denies any new or changed physical symptoms other than those noted in the subjective portion of this note   [] Constitutional  [] Eyes  [] Ear/Nose/Mouth/Throat  [] Respiratory  [] CV  [] GI  []   [] Musculoskeletal  [] Skin/Breast  [] Neurological  [] Endocrine  [] Heme/Lymph  [] Allergic/Immunologic    OBJECTIVE:   Recent Results (from the past 1008 hour(s))   Rapid Strep Screen    Collection Time: 12/22/21  4:13 PM    Specimen: Throat   Result Value Ref Range    Strep Grp A PCR Negative        MEDICATIONS:    Current Outpatient Medications:     lurasidone (LATUDA) 20 MG TABS tablet, Take 1 tablet by mouth daily, Disp: 30 tablet, Rfl: 2    lamoTRIgine (LAMICTAL) 100 MG tablet, Take 1 tablet by mouth 2 times daily, Disp: 180 tablet, Rfl: 3    FLUoxetine (PROZAC) 40 MG capsule, TAKE 2 CAPSULES BY MOUTH IN THE MORNING BEFORE BREAKFAST, Disp: 180 capsule, Rfl: 2    pantoprazole (PROTONIX) 40 MG tablet, Take 1 tablet by mouth daily, Disp: 90 tablet, Rfl: 1    metoprolol succinate (TOPROL XL) 25 MG extended release tablet, Take 1 tablet by mouth daily, Disp: 90 tablet, Rfl: 1    ibuprofen (ADVIL;MOTRIN) 800 MG tablet, Take 1 tablet by mouth every 6 hours as needed for Pain After toradol dosing complete, Disp: 120 tablet, Rfl: 3    hydrOXYzine (ATARAX) 25 MG tablet, Take 3 tablets by mouth nightly, Disp: 270 tablet, Rfl: 2    fluticasone (FLONASE) 50 MCG/ACT nasal spray, 1 spray by Nasal route daily, Disp: 1 each, Rfl: 0    albuterol sulfate HFA (VENTOLIN HFA) 108 (90 Base) MCG/ACT inhaler, Inhale 2 puffs into the lungs 4 times daily as needed for Wheezing (cough) Dispense 1 inhaler, Disp: 18 g, Rfl: 0    ALPRAZolam (XANAX) 2 MG tablet, Take 1 tablet by mouth 2 times daily as needed for Sleep or Anxiety for up to 30 days. , Disp: 60 tablet, Rfl: 2    SUMAtriptan (IMITREX) 50 MG tablet, Take 1 tablet by mouth once as needed for Migraine, Disp: 9 tablet, Rfl: 3    ALPRAZolam (XANAX) 2 MG tablet, Take 2 mg by mouth 2 times daily as needed for Sleep., Disp: , Rfl:     cycloSPORINE (RESTASIS) 0.05 % ophthalmic emulsion, Place 1 drop into both eyes 2 times daily, Disp: 1 vial, Rfl: 3    Blood Pressure Monitoring (BLOOD PRESSURE KIT) ZELALEM, 1 Units by Does not apply route as needed (for BP monitoring at home for hypertension) Cuff for upper arm, Disp: 1 Device, Rfl: 0    Examination:    Vitals: not taken in person, most recent vitals in chart reviewed  There were no vitals filed for this visit.     Wt Readings from Last 3 Encounters:   12/22/21 220 lb (99.8 kg)   11/15/21 223 lb (101.2 kg)   09/23/21 220 lb (99.8 kg)       BP Readings from Last 3 Encounters:   12/22/21 (!) 122/97   11/15/21 120/85   09/23/21 (!) 124/91         Mental Status Examination:    Level of consciousness:  alert and oriented to person, place, and situation  Appearance:  well-appearing good grooming and good hygiene  Behavior/Motor:  no abnormalities noted  Attitude toward examiner: friendly, pleasant and cooperative, attentive and good eye contact  Speech:  spontaneous, normal rate and normal volume   Mood: \"better\"  Affect:  mood congruent  Thought processes:  linear and logical  Thought content:  Denies suicidal or homicidal ideation, denies auditory or visual hallucinations  Cognition:  no deficits in attention, concentration notable, recent memory grossly intact  Concentration intact  Memory intact  Insight good   Judgement fair   Fund of Knowledge adequate    PSYCHOTHERAPY/COUNSELING:  Encourage patient to attend outpatient appointments and therapy. [x] Therapeutic interview  [] Supportive  [x] CBT  [x] Ongoing  [] Other    No follow-ups on file. patient informed to call for follow-up or to reschedule appointment     Please note this report has been partially produced using speech recognition software  And may cause contain errors related to that system including grammar, punctuation and spelling as well as words and phrases that may seem inappropriate. If there are questions or concerns please feel free to contact me to clarify. Tammie Slade MD  Electronically signed by Tammie Slade MD on 1/24/2022 at 12:57 PM  1/24/2022 12:57 PM    Psychiatry   Due to this being a TeleHealth encounter, evaluation of the following organ systems is limited: Vitals/Constitutional/EENT/Resp/CV/GI//MS/Neuro/Skin/Heme-Lymph-Imm. An  electronic signature was used to authenticate this note. --Tammie Slade MD on 1/24/2022 at 12:57 PM    Pursuant to the emergency declaration under the [facility], [facility] waiver authority and the [employer] Act, this Virtual  Visit was conducted, with patient's consent, to reduce the patient's risk of exposure to COVID-19 and provide continuity of care for an established patient Services were provided through a video synchronous discussion virtually to substitute for in-person clinic visit. Treatment Plan:  Reviewed current Medications with the patient. Education provided on the compliance with treatment. Reviewed OARRs, no concerns identified     The anticipated benefits and side effects of the medications, including the anticipated results of not receiving the medication, and of alternatives to the medications were explained to the patient and their informed consent was obtained for starting medications as well as adjusting the doses (titration or tapering) as indicated.  The above information was given by physician in verbal form and sufficient understanding was in evidence. The patient participated in discussion of the information and question and/or concerns were addressed before the medication was given. Due to COVID 19 outbreak, patient's office visit was converted to a virtual visit.   Patient was contacted and agreed to proceed with a virtual visit via DOXY

## 2022-01-24 NOTE — TELEPHONE ENCOUNTER
John from [place] said he didn't get these refills the e-scribe system went down can you send them again. cp      Rx requested:  Requested Prescriptions      No prescriptions requested or ordered in this encounter         Last Office Visit:   1/24/2022      Next Visit Date:  Future Appointments   Date Time Provider [place]   2/17/2022 12:00 PM Josselin Londono MD [facility][note 3]  G[place]ham Miller   4/22/2022  9:50 AM Alej[facility]

## 2022-01-24 NOTE — PROGRESS NOTES
2022    TELEHEALTH EVALUATION -- Audio/Visual (During CXFMD-57 public health emergency)    Due to [place] 19 outbreak, patient's office visit was converted to a virtual visit. Patient was contacted and agreed to proceed with a virtual visit via Doxy. me  The risks and benefits of converting to a virtual visit were discussed in light of the current infectious disease epidemic. Patient also understood that insurance coverage and co-pays are up to their individual insurance plans. Chandrika Moctezuma, was evaluated through a synchronous (real-time) audio-video encounter. The patient (or guardian if applicable) is aware that this is a billable service, which includes applicable co-pays. This Virtual Visit was conducted with patient's (and/or legal guardian's) consent. The visit was conducted pursuant to the emergency declaration under the [address] [place] [address], [address] [place] [address] authority and the [employer] and [employer] General Act. Patient identification was verified, and a caregiver was present when appropriate. The patient was located at home in a state where the provider was licensed to provide care. Total time spent for this encounter: Not billed by time    The patient is talking with me virtually from her home and I am located at my office in [facility]. HPI:    Chandrika Moctezuma (:  1975) has requested an audio/video evaluation for the following concern(s): medication refills. Anxiety/depression/insomnia: Kelechi Quarles reports being in a fair mood that is stable. The patient is not reporting insomnia, difficulty concentrating and usual interest in activities. This patient is not homicidal or suicidal.  She states that her mood had been stable for quite some period of time until her  recently lost a job a couple weeks ago. He is currently looking for a new job but this has created more stress for her family.   She continues to take Xanax as needed for situational anxiety but states that she is taking at least daily. She is also following with psychiatry on a routine basis. Has an appointment later today. HTN/palpitations:  Blood pressure has been running good lately. No recent heart palpitations. She states that she continues to take Toprol-XL routinely with no reported side effects. Low-salt diet overall and does get some routine physical activity. Migraine headaches: she has been getting 4-5 headaches per month. Lasts several hours. She feels that symptoms did get worse when she felt more stress secondary to her  losing his job. Before that her symptoms have been stable. One of her family members was seen in neurology and started on injection for migraines and she would like to discuss that option today. She is currently taking over-the-counter medications only for migraine headaches. Review of Systems   This patient reports no chest pain or pressure. There is no shortness of breath or cough. The patient reports no nausea or vomiting. There is no heartburn or indigestion. There is no diarrhea or constipation. No black, bloody, mucusy or tarry stool noticed. The patient reports no bloating and no change in appetite. There is no numbness, tingling or swelling in the extremities. Prior to Visit Medications    Medication Sig Taking?  Authorizing Provider   pantoprazole (PROTONIX) 40 MG tablet Take 1 tablet by mouth daily Yes ANA Gunn CNP   metoprolol succinate (TOPROL XL) 25 MG extended release tablet Take 1 tablet by mouth daily Yes ANA Gunn CNP   ibuprofen (ADVIL;MOTRIN) 800 MG tablet Take 1 tablet by mouth every 6 hours as needed for Pain After toradol dosing complete Yes ANA Gunn CNP   hydrOXYzine (ATARAX) 25 MG tablet Take 3 tablets by mouth nightly Yes ANA Gunn CNP   fluticasone (FLONASE) 50 MCG/ACT nasal spray 1 spray by Nasal route daily Yes ANA Gunn CNP   albuterol sulfate HFA (VENTOLIN HFA) 108 (90 Base) MCG/ACT inhaler Inhale 2 puffs into the lungs 4 times daily as needed for Wheezing (cough) Dispense 1 inhaler Yes ANA Gunn CNP   ALPRAZolam Christia Priestly) 2 MG tablet Take 1 tablet by mouth 2 times daily as needed for Sleep or Anxiety for up to 30 days. Yes ANA Gunn CNP   SUMAtriptan (IMITREX) 50 MG tablet Take 1 tablet by mouth once as needed for Migraine Yes ANA Gunn CNP   ALPRAZolam Christia Priestly) 2 MG tablet Take 2 mg by mouth 2 times daily as needed for Sleep.  Yes Historical Provider, MD   cycloSPORINE (RESTASIS) 0.05 % ophthalmic emulsion Place 1 drop into both eyes 2 times daily Yes ANA Gunn CNP   Blood Pressure Monitoring (BLOOD PRESSURE KIT) ZELALEM 1 Units by Does not apply route as needed (for BP monitoring at home for hypertension) Cuff for upper arm Yes Comfort Kuhn MD   lurasidone (LATUDA) 20 MG TABS tablet Take 1 tablet by mouth daily  Comfort Kuhn MD   lamoTRIgine (LAMICTAL) 100 MG tablet Take 1 tablet by mouth 2 times daily  Comfort Kuhn MD   FLUoxetine (PROZAC) 40 MG capsule TAKE 2 CAPSULES BY MOUTH IN THE MORNING BEFORE BREAKFAST  Comfort Kuhn MD       Social History     Tobacco Use    Smoking status: Current Every Day Smoker     Packs/day: 0.50     Types: Cigarettes    Smokeless tobacco: Never Used   Vaping Use    Vaping Use: Never used   Substance Use Topics    Alcohol use: No    Drug use: No        PHYSICAL EXAMINATION:  [ INSTRUCTIONS:  \"[x]\" Indicates a positive item  \"[]\" Indicates a negative item  -- DELETE ALL ITEMS NOT EXAMINED]  [x] Alert  [x] Oriented to person/place/time    [x] No apparent distress  [] Toxic appearing    [] Face flushed appearing [] Sclera clear  [] Lips are cyanotic      [x] Breathing appears normal  [] Appears tachypneic      [] Rash on visible skin    [x] Cranial Nerves II-XII grossly intact    [] Motor grossly intact in visible upper extremities    [] Motor grossly intact in visible lower extremities    [x] Normal Mood  [] Anxious appearing    [] Depressed appearing  [] Confused appearing      [] Poor short term memory  [] Poor long term memory    [] OTHER:      Due to this being a TeleHealth encounter, evaluation of the following organ systems is limited: Vitals/Constitutional/EENT/Resp/CV/GI//MS/Neuro/Skin/Heme-Lymph-Imm. ASSESSMENT/PLAN:   Diagnosis Orders   1. Essential hypertension  metoprolol succinate (TOPROL XL) 25 MG extended release tablet   2. Anxiety  hydrOXYzine (ATARAX) 25 MG tablet    ALPRAZolam (XANAX) 2 MG tablet    DISCONTINUED: lamoTRIgine (LAMICTAL) 100 MG tablet    DISCONTINUED: FLUoxetine (PROZAC) 40 MG capsule   3. Panic attack  ALPRAZolam (XANAX) 2 MG tablet    DISCONTINUED: lamoTRIgine (LAMICTAL) 100 MG tablet    DISCONTINUED: FLUoxetine (PROZAC) 40 MG capsule   4. Palpitations  metoprolol succinate (TOPROL XL) 25 MG extended release tablet   5. Gastroesophageal reflux disease without esophagitis  pantoprazole (PROTONIX) 40 MG tablet   6. Chronic midline low back pain without sciatica  ibuprofen (ADVIL;MOTRIN) 800 MG tablet   7. Insomnia, unspecified type  DISCONTINUED: FLUoxetine (PROZAC) 40 MG capsule   8. Non-seasonal allergic rhinitis due to other allergic trigger  fluticasone (FLONASE) 50 MCG/ACT nasal spray   9. Migraine without status migrainosus, not intractable, unspecified migraine type  SUMAtriptan (IMITREX) 50 MG tablet   10. Mild intermittent reactive airway disease without complication  albuterol sulfate HFA (VENTOLIN HFA) 108 (90 Base) MCG/ACT inhaler           Return in about 3 months (around 4/24/2022) for medication review- level 2.     1.  Blood pressure is reported to be well controlled on current medication. No side effects reported. Continue the same. 2.  3.  4.  7.  Symptoms are well managed with current medication. No side effects reported.   Continue the same.    5.  No recent exacerbation of symptoms while taking Protonix. Can trial weaning medication in the future but so far medication is beneficial with no issue. 6.  Continue anti-inflammatory medication for chronic pain. Can refer to specialist if needed in the future. 8.  10.  Symptoms have been stable on current medication. No recent exacerbation. 9.  Discussed option for referral to neurology for migraine management. She defers at this time and will prefer to start medication advised by me. Notify me if symptoms not improve or if they worsen. Controlled Substance Monitoring:    Acute and Chronic Pain Monitoring:   RX Monitoring 1/24/2022   Attestation -   Periodic Controlled Substance Monitoring Possible medication side effects, risk of tolerance/dependence & alternative treatments discussed. ;No signs of potential drug abuse or diversion identified. ;Assessed functional status. Chronic Pain > 80 MEDD -       Please note this report has been partially produced using speech recognition software and may cause contain errors related to that system including grammar, punctuation and spelling as well as words and phrases that may seem inappropriate. If there are questions or concerns please feel free to contact me to clarify. An  electronic signature was used to authenticate this note. --ANA Ellington - CNP on 1/24/2022 at 1:42 PM        Pursuant to the emergency declaration under the [facility], [facility] waiver authority and the [employer] and [employer] Act, this Virtual  Visit was conducted, with patient's consent, to reduce the patient's risk of exposure to COVID-19 and provide continuity of care for an established patient. Services were provided through a video synchronous discussion virtually to substitute for in-person clinic visit.

## 2022-02-07 ENCOUNTER — TELEPHONE (OUTPATIENT)
Dept: FAMILY MEDICINE CLINIC | Age: 47
End: 2022-02-07

## 2022-02-07 NOTE — TELEPHONE ENCOUNTER
Patient called said she fell down the last 3 of her steps and hurt her tailbone and it is sore she was wanting to know if she could get some prednisone or should she go to the [facility] to be seen. cp

## 2022-03-07 ENCOUNTER — TELEMEDICINE (OUTPATIENT)
Dept: BEHAVIORAL/MENTAL HEALTH CLINIC | Age: 47
End: 2022-03-07
Payer: COMMERCIAL

## 2022-03-07 DIAGNOSIS — F41.1 GAD (GENERALIZED ANXIETY DISORDER): ICD-10-CM

## 2022-03-07 DIAGNOSIS — F43.10 PTSD (POST-TRAUMATIC STRESS DISORDER): ICD-10-CM

## 2022-03-07 DIAGNOSIS — F33.2 SEVERE EPISODE OF RECURRENT MAJOR DEPRESSIVE DISORDER, WITHOUT PSYCHOTIC FEATURES (HCC): Primary | ICD-10-CM

## 2022-03-07 PROCEDURE — 99214 OFFICE O/P EST MOD 30 MIN: CPT | Performed by: PSYCHIATRY & NEUROLOGY

## 2022-03-07 RX ORDER — NORTRIPTYLINE HYDROCHLORIDE 25 MG/1
25 CAPSULE ORAL NIGHTLY
Qty: 30 CAPSULE | Refills: 3 | Status: SHIPPED | OUTPATIENT
Start: 2022-03-07 | End: 2022-06-13 | Stop reason: SDUPTHER

## 2022-03-07 NOTE — PROGRESS NOTES
3/7/2022    30 minutes with direct communication with patient for encounter face-to-face time     The risks and benefits of converting to a virtual visit were discussed in light of the current infectious disease epidemic. Patient also understood that insurance coverage and co-pays are up to their individual insurance plans. Patient Location:        Patient's home address  Provider Location ([facility]/State):        [address] (OUTPATIENT)   Audio/Visual (During XQKOX-54 public health emergency)          Psychiatric Diagnoses:  1. Severe episode of recurrent major depressive disorder, without psychotic features ([facility]    2. PTSD (post-traumatic stress disorder)    3. ALFREDO (generalized anxiety disorder)        Assessment/Plan:   Has gone off all medication over a month ago. Had gone off lamotrigine (Lamictal) a month ago. lurasidone (Latuda) went off a month ago. alprazolam (Xanax) she has been taking   Has tried vortioxetine (Trintellix), lamotrigine (Lamictal), lurasidone (Latuda), fluoxetine (Prozac), ramelteon (Rozerem), desvenlafaxine (Pristiq), buspirone (Buspar), cariprazine (Alfrieda Kole), venlafaxine XR (Effexor XR), Lithium, Valproic Acid (Depakote), melatonin. buproprion XL (Wellbutrin XL), escitalopram (Lexapro)      RESTART lurasidone (Latuda) 40 mg daily   ADD  nortriptyline (Pamelor) 25 mg every night for mood and depression      Patient denies thoughts of harm to self or others. No acute safety concerns voiced at this appointment. MOOD: LOW MOOD: Recently, patient has been experiencing symptoms of low mood , anhedonia (difficulty finding enjoyment in things) , anergia (low energy) , irritability/agitation, tearful, crying easily, feelings of worthlessness and feelings of hopelessness. SLEEP: HYPERSOMNIA: Patient reports sleeping too long. sleeping 14 hours a night. ATTENTION AND FOCUS: No concerns. No recent issues related to difficulty with attention or focus. Patient reports they have been exercising/walking on a daily basis. ANXIETY: ANXIETY CONTINUES TO BE A CONCERN: Patient reports frequent worrying throughout the day is affecting ability to perform day-to-day activities and/or interpersonal relationships. BIPOLAR ROBIN: NO ROBIN/HYPOMANIA REPORTED: Patient denies recent symptoms of robin including racing thoughts, increased goal-directed activity, decreased need for sleep, increased energy, persistently elevated or irritable mood, impulsivity, grandiosity, paranoid thoughts or other signs of robin. SUBSTANCE USE: No concerns related to substance use. Not applicable. and No concerns for ongoing substance use. Patient denies any recent substance use    ASSESSMENT/PLAN: Medication changes needed given the current presentation of symptoms. Patient was amenable to plan related to medications and endorsed understanding of the risks and benefits, which were explained and discussed. No acute concerns. No thoughts of harm to self or others voiced. COUNSELING or THERAPY:   Continue to encourage therapy as part of ongoing treatment of their mental health concerns. Continue to encourage physical activity and adherence to medication regimen.      DATE and changes made  HPI            Medical Diagnoses:  Patient Active Problem List   Diagnosis    Anxiety and depression    PTSD (post-traumatic stress disorder)    Migraine headache    Nausea and vomiting    S/P abdominal hysterectomy    Severe recurrent major depressive disorder with psychotic features ([address]    Morbid obesity ([address]    Overactive bladder    Stress incontinence, female    Abdominal pain    Blood in the urine    Essential hypertension    Yeast infection    Vitamin D deficiency    Former smoker    Obsessive-compulsive disorder    Bilateral dry eyes    Shortness of breath    MARBELAL (obstructive sleep apnea)    Moderate episode of recurrent major depressive disorder ([address]    ALFREDO (generalized anxiety disorder)         AT TODAY'S VISIT     Crisis plan reviewed and patient verbally contracts for safety. Go to ED with emergent symptoms or safety concerns. Risks, benefits, side effects of medications, including any / all black box warnings, discussed with patient, who verbalizes their understanding. Pt is kings for safety and denies thoughts of SI/HI. Amenable to plan. No acute concerns to address regarding medications. Subjective:      Patient denies medication side effects apart from those mentioned in the assessment and plan. Patient reports they have been compliant with current medication regimen and have not missed a dose. At today's visit, patient denies thoughts of harm to self or others since last appointment, and denies auditory or visual hallucinations. ROS:  [x] All negative/unchanged except if checked. Explain positive(checked items) below:       Denies any new or changed physical symptoms other than those noted in the subjective portion of this note   [] Constitutional  [] Eyes  [] Ear/Nose/Mouth/Throat  [] Respiratory  [] CV  [] GI  []   [] Musculoskeletal  [] Skin/Breast  [] Neurological  [] Endocrine  [] Heme/Lymph  [] Allergic/Immunologic    OBJECTIVE:   No results found for this or any previous visit (from the past 1008 hour(s)).     MEDICATIONS:    Current Outpatient Medications:     nortriptyline (PAMELOR) 25 MG capsule, Take 1 capsule by mouth nightly, Disp: 30 capsule, Rfl: 3    ibuprofen (ADVIL;MOTRIN) 800 MG tablet, Take 1 tablet by mouth every 6 hours as needed for Pain After toradol dosing complete, Disp: 120 tablet, Rfl: 3    hydrOXYzine (ATARAX) 25 MG tablet, Take 3 tablets by mouth nightly, Disp: 270 tablet, Rfl: 2    albuterol sulfate HFA (VENTOLIN HFA) 108 (90 Base) MCG/ACT inhaler, Inhale 2 puffs into the lungs 4 times daily as needed for Wheezing (cough) Dispense 1 inhaler, Disp: 18 g, Rfl: 0    fluticasone (FLONASE) 50 MCG/ACT nasal spray, 1 spray by Nasal route daily, Disp: 1 each, Rfl: 0    pantoprazole (PROTONIX) 40 MG tablet, Take 1 tablet by mouth daily, Disp: 90 tablet, Rfl: 1    metoprolol succinate (TOPROL XL) 25 MG extended release tablet, Take 1 tablet by mouth daily, Disp: 90 tablet, Rfl: 1    SUMAtriptan (IMITREX) 50 MG tablet, Take 1 tablet by mouth once as needed for Migraine, Disp: 9 tablet, Rfl: 3    ALPRAZolam (XANAX) 2 MG tablet, Take 2 mg by mouth 2 times daily as needed for Sleep., Disp: , Rfl:     cycloSPORINE (RESTASIS) 0.05 % ophthalmic emulsion, Place 1 drop into both eyes 2 times daily, Disp: 1 vial, Rfl: 3    Blood Pressure Monitoring (BLOOD PRESSURE KIT) ZELALEM, 1 Units by Does not apply route as needed (for BP monitoring at home for hypertension) Cuff for upper arm, Disp: 1 Device, Rfl: 0    Examination:    Vitals: not taken in person, most recent vitals in chart reviewed  There were no vitals filed for this visit.     Wt Readings from Last 3 Encounters:   12/22/21 220 lb (99.8 kg)   11/15/21 223 lb (101.2 kg)   09/23/21 220 lb (99.8 kg)       BP Readings from Last 3 Encounters:   12/22/21 (!) 122/97   11/15/21 120/85   09/23/21 (!) 124/91         Mental Status Examination:    Level of consciousness:  alert and oriented to person, place, and situation  Appearance:  well-appearing good grooming and good hygiene  Behavior/Motor:  no abnormalities noted  Attitude toward examiner: friendly, pleasant and cooperative, attentive and good eye contact  Speech:  spontaneous, normal rate and normal volume   Mood: \"irritable\"  Affect:  mood congruent  Thought processes:  linear and logical  Thought content:  Denies suicidal or homicidal ideation, denies auditory or visual hallucinations  Cognition:  no deficits in attention, concentration notable, recent memory grossly intact  Concentration intact  Memory intact  Insight good   Judgement fair   Fund of Knowledge adequate    PSYCHOTHERAPY/COUNSELING:  Encourage patient to attend outpatient appointments and therapy. [x] Therapeutic interview  [] Supportive  [x] CBT  [x] Ongoing  [] Other    No follow-ups on file. patient informed to call for follow-up or to reschedule appointment     Please note this report has been partially produced using speech recognition software  And may cause contain errors related to that system including grammar, punctuation and spelling as well as words and phrases that may seem inappropriate. If there are questions or concerns please feel free to contact me to clarify. Sarah Moctezuma MD  Electronically signed by Sarah Moctezuma MD on 3/7/2022 at 1:51 PM  3/7/2022 1:51 PM    Psychiatry   Due to this being a TeleHealth encounter, evaluation of the following organ systems is limited: Vitals/Constitutional/EENT/Resp/CV/GI//MS/Neuro/Skin/Heme-Lymph-Imm. An  electronic signature was used to authenticate this note. --Sarah Moctezuma MD on 3/7/2022 at 1:51 PM    Pursuant to the emergency declaration under the [facility], [facility] waiver authority and the [employer] Act, this Virtual  Visit was conducted, with patient's consent, to reduce the patient's risk of exposure to COVID-19 and provide continuity of care for an established patient Services were provided through a video synchronous discussion virtually to substitute for in-person clinic visit. Treatment Plan:  Reviewed current Medications with the patient. Education provided on the compliance with treatment. Reviewed OARRs, no concerns identified     The anticipated benefits and side effects of the medications, including the anticipated results of not receiving the medication, and of alternatives to the medications were explained to the patient and their informed consent was obtained for starting medications as well as adjusting the doses (titration or tapering) as indicated.  The above information was given by physician in verbal form and sufficient understanding was in evidence. The patient participated in discussion of the information and question and/or concerns were addressed before the medication was given. Due to COVID 19 outbreak, patient's office visit was converted to a virtual visit.   Patient was contacted and agreed to proceed with a virtual visit via DOXY

## 2022-04-23 DIAGNOSIS — K21.9 GASTROESOPHAGEAL REFLUX DISEASE WITHOUT ESOPHAGITIS: ICD-10-CM

## 2022-04-25 DIAGNOSIS — M54.50 CHRONIC MIDLINE LOW BACK PAIN WITHOUT SCIATICA: ICD-10-CM

## 2022-04-25 DIAGNOSIS — G89.29 CHRONIC MIDLINE LOW BACK PAIN WITHOUT SCIATICA: ICD-10-CM

## 2022-04-25 RX ORDER — IBUPROFEN 800 MG/1
800 TABLET ORAL EVERY 6 HOURS PRN
Qty: 120 TABLET | Refills: 3 | Status: SHIPPED | OUTPATIENT
Start: 2022-04-25 | End: 2022-06-13

## 2022-04-25 RX ORDER — PANTOPRAZOLE SODIUM 40 MG/1
40 TABLET, DELAYED RELEASE ORAL DAILY
Qty: 90 TABLET | Refills: 1 | Status: SHIPPED | OUTPATIENT
Start: 2022-04-25 | End: 2022-08-05 | Stop reason: SDUPTHER

## 2022-04-25 NOTE — TELEPHONE ENCOUNTER
Patient is requesting medication refill.  Please approve or deny this request.    Rx requested:  Requested Prescriptions     Pending Prescriptions Disp Refills    pantoprazole (PROTONIX) 40 MG tablet 90 tablet 1     Sig: Take 1 tablet by mouth daily         Last Office Visit:   1/24/2022      Next Visit Date:  Future Appointments   Date Time Provider [place]   5/19/2022  1:40 PM Havne Beasley, APRN - CNP [address]

## 2022-04-25 NOTE — TELEPHONE ENCOUNTER
Comments:     Last Office Visit (last PCP visit):   1/24/2022    Next Visit Date:  Future Appointments   Date Time Provider [place]   5/19/2022  1:40 PM Silvio Beasley, APRN - CNP [address]       **If hasn't been seen in over a year OR hasn't followed up according to last diabetes/ADHD visit, make appointment for patient before sending refill to provider.     Rx requested:  Requested Prescriptions     Pending Prescriptions Disp Refills    ibuprofen (ADVIL;MOTRIN) 800 MG tablet 120 tablet 3     Sig: Take 1 tablet by mouth every 6 hours as needed for Pain After toradol dosing complete

## 2022-04-27 DIAGNOSIS — F41.0 PANIC ATTACK: Primary | ICD-10-CM

## 2022-04-27 RX ORDER — ALPRAZOLAM 2 MG/1
2 TABLET ORAL 2 TIMES DAILY PRN
Qty: 60 TABLET | Refills: 0 | Status: SHIPPED | OUTPATIENT
Start: 2022-04-27 | End: 2022-05-19 | Stop reason: SDUPTHER

## 2022-05-16 DIAGNOSIS — R13.19 ESOPHAGEAL DYSPHAGIA: ICD-10-CM

## 2022-05-16 DIAGNOSIS — I10 ESSENTIAL HYPERTENSION: ICD-10-CM

## 2022-05-16 DIAGNOSIS — E06.3 HASHIMOTO'S DISEASE: Primary | ICD-10-CM

## 2022-05-17 ENCOUNTER — HOSPITAL ENCOUNTER (OUTPATIENT)
Dept: LAB | Age: 47
Discharge: HOME | End: 2022-05-17
Payer: COMMERCIAL

## 2022-05-17 DIAGNOSIS — E06.3 HASHIMOTO'S DISEASE: ICD-10-CM

## 2022-05-17 DIAGNOSIS — I10 ESSENTIAL HYPERTENSION: ICD-10-CM

## 2022-05-17 LAB
ALBUMIN SERPL-MCNC: 4.1 G/DL (ref 3.5–4.6)
ALP BLD-CCNC: 78 U/L (ref 40–130)
ALT SERPL-CCNC: 11 U/L (ref 0–33)
ANION GAP SERPL CALCULATED.3IONS-SCNC: 11 MEQ/L (ref 9–15)
AST SERPL-CCNC: 14 U/L (ref 0–35)
BASOPHILS ABSOLUTE: 0.1 K/UL (ref 0–0.2)
BASOPHILS RELATIVE PERCENT: 0.6 %
BILIRUB SERPL-MCNC: <0.2 MG/DL (ref 0.2–0.7)
BUN BLDV-MCNC: 9 MG/DL (ref 6–20)
CALCIUM SERPL-MCNC: 9 MG/DL (ref 8.5–9.9)
CHLORIDE BLD-SCNC: 101 MEQ/L (ref 95–107)
CO2: 25 MEQ/L (ref 20–31)
CREAT SERPL-MCNC: 0.69 MG/DL (ref 0.5–0.9)
EOSINOPHILS ABSOLUTE: 0.1 K/UL (ref 0–0.7)
EOSINOPHILS RELATIVE PERCENT: 1.4 %
GFR AFRICAN AMERICAN: >60
GFR NON-AFRICAN AMERICAN: >60
GLOBULIN: 2.9 G/DL (ref 2.3–3.5)
GLUCOSE BLD-MCNC: 119 MG/DL (ref 70–99)
HCT VFR BLD CALC: 42.4 % (ref 37–47)
HEMOGLOBIN: 13.7 G/DL (ref 12–16)
LYMPHOCYTES ABSOLUTE: 2.8 K/UL (ref 1–4.8)
LYMPHOCYTES RELATIVE PERCENT: 27.7 %
MCH RBC QN AUTO: 29.8 PG (ref 27–31.3)
MCHC RBC AUTO-ENTMCNC: 32.4 % (ref 33–37)
MCV RBC AUTO: 92 FL (ref 82–100)
MONOCYTES ABSOLUTE: 0.9 K/UL (ref 0.2–0.8)
MONOCYTES RELATIVE PERCENT: 9.5 %
NEUTROPHILS ABSOLUTE: 6 K/UL (ref 1.4–6.5)
NEUTROPHILS RELATIVE PERCENT: 60.8 %
PDW BLD-RTO: 15.1 % (ref 11.5–14.5)
PLATELET # BLD: 346 K/UL (ref 130–400)
POTASSIUM SERPL-SCNC: 4.4 MEQ/L (ref 3.4–4.9)
RBC # BLD: 4.61 M/UL (ref 4.2–5.4)
SODIUM BLD-SCNC: 137 MEQ/L (ref 135–144)
T4 FREE: 1.02 NG/DL (ref 0.84–1.68)
TOTAL PROTEIN: 7 G/DL (ref 6.3–8)
TSH SERPL DL<=0.05 MIU/L-ACNC: 7.61 UIU/ML (ref 0.44–3.86)
WBC # BLD: 9.9 K/UL (ref 4.8–10.8)

## 2022-05-17 PROCEDURE — 84432 ASSAY OF THYROGLOBULIN: CPT

## 2022-05-17 PROCEDURE — 86376 MICROSOMAL ANTIBODY EACH: CPT

## 2022-05-17 PROCEDURE — 36415 COLL VENOUS BLD VENIPUNCTURE: CPT

## 2022-05-17 PROCEDURE — 80053 COMPREHEN METABOLIC PANEL: CPT

## 2022-05-17 PROCEDURE — 84443 ASSAY THYROID STIM HORMONE: CPT

## 2022-05-17 PROCEDURE — 84439 ASSAY OF FREE THYROXINE: CPT

## 2022-05-17 PROCEDURE — 85025 COMPLETE CBC W/AUTO DIFF WBC: CPT

## 2022-05-18 LAB — THYROID PEROXIDASE (TPO) ABS: >1542 IU/ML (ref 0–25)

## 2022-05-19 ENCOUNTER — TELEMEDICINE (OUTPATIENT)
Dept: FAMILY MEDICINE CLINIC | Age: 47
End: 2022-05-19
Payer: COMMERCIAL

## 2022-05-19 DIAGNOSIS — F41.9 ANXIETY: ICD-10-CM

## 2022-05-19 DIAGNOSIS — B36.9 FUNGAL DERMATITIS: ICD-10-CM

## 2022-05-19 DIAGNOSIS — I10 ESSENTIAL HYPERTENSION: ICD-10-CM

## 2022-05-19 DIAGNOSIS — F41.0 PANIC ATTACK: Primary | ICD-10-CM

## 2022-05-19 DIAGNOSIS — B37.9 YEAST INFECTION: ICD-10-CM

## 2022-05-19 DIAGNOSIS — R73.09 ELEVATED GLUCOSE: ICD-10-CM

## 2022-05-19 DIAGNOSIS — E03.8 HYPOTHYROIDISM DUE TO HASHIMOTO'S THYROIDITIS: ICD-10-CM

## 2022-05-19 DIAGNOSIS — E06.3 HYPOTHYROIDISM DUE TO HASHIMOTO'S THYROIDITIS: ICD-10-CM

## 2022-05-19 DIAGNOSIS — E55.9 VITAMIN D DEFICIENCY: ICD-10-CM

## 2022-05-19 PROCEDURE — G8427 DOCREV CUR MEDS BY ELIG CLIN: HCPCS | Performed by: NURSE PRACTITIONER

## 2022-05-19 PROCEDURE — 4004F PT TOBACCO SCREEN RCVD TLK: CPT | Performed by: NURSE PRACTITIONER

## 2022-05-19 PROCEDURE — 99214 OFFICE O/P EST MOD 30 MIN: CPT | Performed by: NURSE PRACTITIONER

## 2022-05-19 PROCEDURE — G8417 CALC BMI ABV UP PARAM F/U: HCPCS | Performed by: NURSE PRACTITIONER

## 2022-05-19 RX ORDER — ALPRAZOLAM 2 MG/1
2 TABLET ORAL 2 TIMES DAILY PRN
Qty: 60 TABLET | Refills: 2 | Status: SHIPPED | OUTPATIENT
Start: 2022-05-19 | End: 2022-08-05 | Stop reason: SDUPTHER

## 2022-05-19 RX ORDER — LEVOTHYROXINE SODIUM 0.03 MG/1
25 TABLET ORAL DAILY
Qty: 90 TABLET | Refills: 1 | Status: SHIPPED | OUTPATIENT
Start: 2022-05-19 | End: 2022-08-30 | Stop reason: SDUPTHER

## 2022-05-19 RX ORDER — NYSTATIN 100000 U/G
CREAM TOPICAL
Qty: 30 G | Refills: 2 | Status: SHIPPED | OUTPATIENT
Start: 2022-05-19 | End: 2022-08-30 | Stop reason: SDUPTHER

## 2022-05-19 RX ORDER — FLUCONAZOLE 150 MG/1
150 TABLET ORAL
Qty: 24 TABLET | Refills: 3 | Status: SHIPPED | OUTPATIENT
Start: 2022-05-19 | End: 2022-08-30 | Stop reason: SDUPTHER

## 2022-05-19 NOTE — PROGRESS NOTES
2022    TELEHEALTH EVALUATION -- Audio/Visual (During [facility] public health emergency)    Due to [place] 19 outbreak, patient's office visit was converted to a virtual visit. Patient was contacted and agreed to proceed with a virtual visit via Doxy. me  The risks and benefits of converting to a virtual visit were discussed in light of the current infectious disease epidemic. Patient also understood that insurance coverage and co-pays are up to their individual insurance plans. Diane Julien, was evaluated through a synchronous (real-time) audio-video encounter. The patient (or guardian if applicable) is aware that this is a billable service, which includes applicable co-pays. This Virtual Visit was conducted with patient's (and/or legal guardian's) consent. The visit was conducted pursuant to the emergency declaration under the [address] [place] [address], [address] [place] [address] authority and the [employer] Act. Patient identification was verified, and a caregiver was present when appropriate. The patient was located at home in a state where the provider was licensed to provide care. Total time spent for this encounter: Not billed by time    The patient is talking with me virtually from her home and I am located at my office in [facility]. HPI:    Diane Julien (:  1975) has requested an audio/video evaluation for the following concern(s): Anxiety: She states that her anxiety symptoms have been ongoing but stable overall. Emmanuel Watson seems to be working well with no reported side effects. She is still taking Xanax on average up to 2 times a day and does not report any rebound anxiety. States that the medication has been helpful in preventing physical symptoms of anxiety when she feels overstressed. States that her  has been more irritable lately and this has caused her to feel more anxiety.   She has not noticed side effects with the Xanax. Hypothyroidism: The patient reports no heat or cold intolerance, fair energy levels and no hair loss or excessive skin dryness. He had some blood work done and would like to review results. She has been diagnosed with Hashimoto's disease in the past but thyroid levels have been normal.    HTN: She reports no issues with blood pressure or recent heart palpitations. She has been taking the Toprol-XL with no reported side effects. Continues low-salt diet and has been trying to get more physical activity. Recurrent yeast infections: He states that she continues to have ongoing issues with yeast infections. She states that the infections completely cleared up when she was taking Diflucan routinely but then when she stopped her symptoms returned in about 2 weeks. Reports some skin irritation externally as well as internal itching and discharge and odor. She did not have any side effects with the Diflucan. Review of Systems   This patient reports no chest pain or pressure. There is no shortness of breath or cough. The patient reports no nausea or vomiting. There is no heartburn or indigestion. There is no diarrhea or constipation. No black, bloody, mucusy or tarry stool noticed. The patient reports no bloating and no change in appetite. There is no numbness, tingling or swelling in the extremities. Prior to Visit Medications    Medication Sig Taking? Authorizing Provider   levothyroxine (SYNTHROID) 25 MCG tablet Take 1 tablet by mouth daily Yes ANA Baron CNP   ALPRAZolam Omid Ket) 2 MG tablet Take 1 tablet by mouth 2 times daily as needed for Sleep for up to 30 days. Yes ANA Baron CNP   nystatin (MYCOSTATIN) 604495 UNIT/GM cream Apply topically 2 times daily.  Yes ANA Baron CNP   fluconazole (DIFLUCAN) 150 MG tablet Take 1 tablet by mouth Twice a Week Yes ANA Baron CNP   pantoprazole (PROTONIX) 40 MG tablet Take 1 tablet by mouth daily Yes ANA Sotelo CNP   ibuprofen (ADVIL;MOTRIN) 800 MG tablet Take 1 tablet by mouth every 6 hours as needed for Pain After toradol dosing complete Yes ANA Sotelo CNP   nortriptyline (PAMELOR) 25 MG capsule Take 1 capsule by mouth nightly Yes Viri Underwood MD   lurasidone (LATUDA) 40 MG TABS tablet Take 1 tablet by mouth daily Yes Viri Underwood MD   hydrOXYzine (ATARAX) 25 MG tablet Take 3 tablets by mouth nightly Yes ANA Sotelo CNP   albuterol sulfate HFA (VENTOLIN HFA) 108 (90 Base) MCG/ACT inhaler Inhale 2 puffs into the lungs 4 times daily as needed for Wheezing (cough) Dispense 1 inhaler Yes ANA Sotelo CNP   fluticasone (FLONASE) 50 MCG/ACT nasal spray 1 spray by Nasal route daily Yes ANA Sotelo CNP   metoprolol succinate (TOPROL XL) 25 MG extended release tablet Take 1 tablet by mouth daily Yes ANA Sotelo CNP   SUMAtriptan (IMITREX) 50 MG tablet Take 1 tablet by mouth once as needed for Migraine Yes ANA Sotelo CNP   cycloSPORINE (RESTASIS) 0.05 % ophthalmic emulsion Place 1 drop into both eyes 2 times daily Yes ANA Sotelo CNP   Blood Pressure Monitoring (BLOOD PRESSURE KIT) ZELALEM 1 Units by Does not apply route as needed (for BP monitoring at home for hypertension) Cuff for upper arm Yes Viri Underwood MD       Social History     Tobacco Use    Smoking status: Current Every Day Smoker     Packs/day: 0.50     Types: Cigarettes    Smokeless tobacco: Never Used   Vaping Use    Vaping Use: Never used   Substance Use Topics    Alcohol use: No    Drug use: No       PHYSICAL EXAMINATION:  [ INSTRUCTIONS:  \"[x]\" Indicates a positive item  \"[]\" Indicates a negative item  -- DELETE ALL ITEMS NOT EXAMINED]  [x] Alert  [x] Oriented to person/place/time    [x] No apparent distress  [] Toxic appearing    [] Face flushed appearing [] Sclera clear  [] Lips are cyanotic      [x] Breathing appears normal  [] Appears tachypneic      [] Rash on visible skin    [x] Cranial Nerves II-XII grossly intact    [x] Motor grossly intact in visible upper extremities    [] Motor grossly intact in visible lower extremities    [x] Normal Mood  [] Anxious appearing    [] Depressed appearing  [] Confused appearing      [] Poor short term memory  [] Poor long term memory    [] OTHER:      Due to this being a TeleHealth encounter, evaluation of the following organ systems is limited: Vitals/Constitutional/EENT/Resp/CV/GI//MS/Neuro/Skin/Heme-Lymph-Imm. ASSESSMENT/PLAN:   Diagnosis Orders   1. Panic attack  ALPRAZolam (XANAX) 2 MG tablet   2. Anxiety  Vitamin B12 & Folate   3. Hypothyroidism due to Hashimoto's thyroiditis  levothyroxine (SYNTHROID) 25 MCG tablet    TSH    T4, Free    Thyroid Peroxidase Antibody    Thyroglobulin   4. Essential hypertension  Lipid Panel    CBC with Auto Differential    Comprehensive Metabolic Panel   5. Elevated glucose  Hemoglobin A1C   6. Vitamin D deficiency  Vitamin D 25 Hydroxy   7. Fungal dermatitis  nystatin (MYCOSTATIN) 276789 UNIT/GM cream   8. Yeast infection  fluconazole (DIFLUCAN) 150 MG tablet         Return in about 3 months (around 8/19/2022) for review labs and medication in office- level 2.     1.  2.  Depression symptoms have been well managed with medication prescribed by psychiatry. She continues to have issues with panic attacks on occasion and is taking Xanax 2 mg twice a day. We have discussed recommendation to slowly wean this dose down over time. Notify me of any rebound anxiety symptoms. 3.  We discussed most recent blood test results with further elevation of antiperoxidase antibody. TSH is also elevated. Recommend starting low-dose levothyroxine. We will add additional thyroid testing onto next lab order. Can consider updated ultrasound of the thyroid based on those lab results.     4.  No recent issues with blood pressure or palpitations reported. Continue medication. No side effects. 5.  6.  Recommend fasting blood work to be done prior to next visit. Continue vitamin D supplementation and daily weightbearing activity. 7.  8.  We will trial twice weekly Diflucan tablets to see if this helps prevent yeast infection. Topical medication ordered as well. Notify me if symptoms do not improve/resolve. Controlled Substance Monitoring:    Acute and Chronic Pain Monitoring:   RX Monitoring 5/19/2022   Attestation -   Periodic Controlled Substance Monitoring Possible medication side effects, risk of tolerance/dependence & alternative treatments discussed. ;No signs of potential drug abuse or diversion identified. ;Assessed functional status. Chronic Pain > 80 MEDD -       Please note this report has been partially produced using speech recognition software and may cause contain errors related to that system including grammar, punctuation and spelling as well as words and phrases that may seem inappropriate. If there are questions or concerns please feel free to contact me to clarify. An  electronic signature was used to authenticate this note. --ANA Dominguez - CNP on 5/20/2022 at 10:46 AM        Pursuant to the emergency declaration under the [facility], [facility] waiver authority and the [employer] Act, this Virtual  Visit was conducted, with patient's consent, to reduce the patient's risk of exposure to COVID-19 and provide continuity of care for an established patient. Services were provided through a video synchronous discussion virtually to substitute for in-person clinic visit.

## 2022-05-31 LAB — THYROGLOBULIN BY LC-MS/MS, SERUM/PLASMA: 5.4 NG/ML (ref 1.3–31.8)

## 2022-06-12 DIAGNOSIS — M54.50 CHRONIC MIDLINE LOW BACK PAIN WITHOUT SCIATICA: ICD-10-CM

## 2022-06-12 DIAGNOSIS — G89.29 CHRONIC MIDLINE LOW BACK PAIN WITHOUT SCIATICA: ICD-10-CM

## 2022-06-13 ENCOUNTER — TELEMEDICINE (OUTPATIENT)
Dept: BEHAVIORAL/MENTAL HEALTH CLINIC | Age: 47
End: 2022-06-13
Payer: COMMERCIAL

## 2022-06-13 DIAGNOSIS — F41.1 GAD (GENERALIZED ANXIETY DISORDER): ICD-10-CM

## 2022-06-13 DIAGNOSIS — F33.1 MODERATE EPISODE OF RECURRENT MAJOR DEPRESSIVE DISORDER (HCC): Primary | ICD-10-CM

## 2022-06-13 DIAGNOSIS — F43.10 PTSD (POST-TRAUMATIC STRESS DISORDER): ICD-10-CM

## 2022-06-13 PROCEDURE — 99215 OFFICE O/P EST HI 40 MIN: CPT | Performed by: PSYCHIATRY & NEUROLOGY

## 2022-06-13 RX ORDER — IBUPROFEN 800 MG/1
800 TABLET ORAL EVERY 8 HOURS PRN
Qty: 21 TABLET | Refills: 0 | Status: SHIPPED | OUTPATIENT
Start: 2022-06-13

## 2022-06-13 NOTE — TELEPHONE ENCOUNTER
Pharmacy is requesting medication refill.  Please approve or deny this request.    Rx requested:  Requested Prescriptions     Pending Prescriptions Disp Refills    ibuprofen (ADVIL;MOTRIN) 800 MG tablet [Pharmacy Med Name: IBUPROFEN 800 MG TABLET] 120 tablet 3     Sig: Take 1 tablet by mouth every 6 hours as needed for Pain         Last Office Visit:   5/19/2022      Next Visit Date:  Future Appointments   Date Time Provider [place]   6/13/2022  2:30 PM Valentina Butt MD [facility]  [place]   8/30/2022  2:20 PM Nate Capellan, APRN - CNP [facility]

## 2022-06-13 NOTE — PROGRESS NOTES
6/13/2022    40 mins total for this encounter =     30 minutes with direct communication with patient for encounter     10 mins (in addition) spent on chart review, and in the ordering of all necessary labs and/or medications      The risks and benefits of converting to a virtual visit were discussed in light of the current infectious disease epidemic. Patient also understood that insurance coverage and co-pays are up to their individual insurance plans. Patient Location:        Patient's home address  Provider Location ([facility]):        [facility], [facility]  Chief complaint No chief complaint on file. TELEHEALTH PSYCHIATRY FOLLOWUP (OUTPATIENT)   Audio/Visual (During [facility]-54 public health emergency)          Psychiatric Diagnoses:  1. Moderate episode of recurrent major depressive disorder ([address]    2. ALFREDO (generalized anxiety disorder)    3. PTSD (post-traumatic stress disorder)        Assessment/Plan:   Has not been taking lurasidone Illhalle De Guzman), this caused significant GI distress   \"Very argumentative\" at times, more irritability   Feels this is probably related to not sleeping well. Would like some medication that might help more effectively with both sleep and high levels of anxiety. Discussed her potential bipolar history and discussed that she has not ever really had a true manic episode in the past so we could cautiously try a tricyclic antidepressant. Discussed the risk and benefits of this including cardiac risk and risk in overdose and patient is aware of the risks and amenable to the plan to try a low-dose of Pamelor starting at 25 mg for a week monitoring for the emergence of any hypomanic symptoms which we did discuss and then she will increase to 50 mg after 1 week. Patient denies thoughts of harm to self or others. No acute safety concerns voiced at this appointment.      MOOD: CONCERNS RELATED TO IRRITABILITY: Patient reports significant symptoms of irritability which has affected interpersonal relationships. SLEEP: SLEEP DURATION: sleeping 6 hours a night. ATTENTION AND FOCUS: No concerns. No recent issues related to difficulty with attention or focus. Occasionally going for walks. ANXIETY: SEVERE ANXIETY: Reports severe anxiety symptoms which have significantly limited functioning intrusive the past few weeks. BIPOLAR ROBIN: NO ROBIN/HYPOMANIA REPORTED: Patient denies recent symptoms of robin including racing thoughts, increased goal-directed activity, decreased need for sleep, increased energy, persistently elevated or irritable mood, impulsivity, grandiosity, paranoid thoughts or other signs of robin. SUBSTANCE USE: No concerns related to substance use. Not applicable. and No concerns for ongoing substance use. Patient denies any recent substance use    ASSESSMENT/PLAN: Medication changes needed given the current presentation of symptoms. Patient was amenable to plan related to medications and endorsed understanding of the risks and benefits, which were explained and discussed. No acute concerns. No thoughts of harm to self or others voiced. COUNSELING or THERAPY:   Continue to encourage therapy as part of ongoing treatment of their mental health concerns. Continue to encourage physical activity and adherence to medication regimen.      DATE and changes made                       6/13/22  -Has not been taking lurasidone (Latuda), this caused significant GI distress   \"Very argumentative\" at times, more irritability   -lurasidone (Latuda) 40 mg daily STOP this and try nortriptyline (Pamelor)                 Medical Diagnoses:  Patient Active Problem List   Diagnosis    Anxiety and depression    PTSD (post-traumatic stress disorder)    Migraine headache    Nausea and vomiting    S/P abdominal hysterectomy    Severe recurrent major depressive disorder with psychotic features ([address]    Morbid [facility]    Overactive bladder    Stress incontinence, female    Abdominal pain    Blood in the urine    Essential hypertension    Yeast infection    Vitamin D deficiency    Former smoker    Obsessive-compulsive disorder    Bilateral dry eyes    Shortness of breath    MARBELLA (obstructive sleep apnea)    Moderate episode of recurrent major depressive disorder (HCC)    ALFREDO (generalized anxiety disorder)         AT TODAY'S VISIT     Crisis plan reviewed and patient verbally contracts for safety. Go to ED with emergent symptoms or safety concerns. Risks, benefits, side effects of medications, including any / all black box warnings, discussed with patient, who verbalizes their understanding. Pt is kings for safety and denies thoughts of SI/HI. Amenable to plan. No acute concerns to address regarding medications. Subjective:      Patient denies medication side effects apart from those mentioned in the assessment and plan. Patient reports they have been compliant with current medication regimen and have not missed a dose. At today's visit, patient denies thoughts of harm to self or others since last appointment, and denies auditory or visual hallucinations. ROS:  [x] All negative/unchanged except if checked.  Explain positive(checked items) below:       Denies any new or changed physical symptoms other than those noted in the subjective portion of this note   [] Constitutional  [] Eyes  [] Ear/Nose/Mouth/Throat  [] Respiratory  [] CV  [] GI  []   [] Musculoskeletal  [] Skin/Breast  [] Neurological  [] Endocrine  [] Heme/Lymph  [] Allergic/Immunologic    OBJECTIVE:   Recent Results (from the past 1008 hour(s))   CBC with Auto Differential    Collection Time: 05/17/22 11:12 AM   Result Value Ref Range    WBC 9.9 4.8 - 10.8 K/uL    RBC 4.61 4.20 - 5.40 M/uL    Hemoglobin 13.7 12.0 - 16.0 g/dL    Hematocrit 42.4 37.0 - 47.0 %    MCV 92.0 82.0 - 100.0 fL    MCH 29.8 27.0 - 31.3 pg    MCHC 32.4 (L) 33.0 - 37.0 %    RDW 15.1 (H) 11.5 - 14.5 % Platelets 716 127 - 206 K/uL    Neutrophils % 60.8 %    Lymphocytes % 27.7 %    Monocytes % 9.5 %    Eosinophils % 1.4 %    Basophils % 0.6 %    Neutrophils Absolute 6.0 1.4 - 6.5 K/uL    Lymphocytes Absolute 2.8 1.0 - 4.8 K/uL    Monocytes Absolute 0.9 (H) 0.2 - 0.8 K/uL    Eosinophils Absolute 0.1 0.0 - 0.7 K/uL    Basophils Absolute 0.1 0.0 - 0.2 K/uL   Comprehensive Metabolic Panel    Collection Time: 05/17/22 11:12 AM   Result Value Ref Range    Sodium 137 135 - 144 mEq/L    Potassium 4.4 3.4 - 4.9 mEq/L    Chloride 101 95 - 107 mEq/L    CO2 25 20 - 31 mEq/L    Anion Gap 11 9 - 15 mEq/L    Glucose 119 (H) 70 - 99 mg/dL    BUN 9 6 - 20 mg/dL    CREATININE 0.69 0.50 - 0.90 mg/dL    GFR Non-African American >60.0 >60    GFR  >60.0 >60    Calcium 9.0 8.5 - 9.9 mg/dL    Total Protein 7.0 6.3 - 8.0 g/dL    Albumin 4.1 3.5 - 4.6 g/dL    Total Bilirubin <0.2 0.2 - 0.7 mg/dL    Alkaline Phosphatase 78 40 - 130 U/L    ALT 11 0 - 33 U/L    AST 14 0 - 35 U/L    Globulin 2.9 2.3 - 3.5 g/dL   TSH    Collection Time: 05/17/22 11:12 AM   Result Value Ref Range    TSH 7.610 (H) 0.440 - 3.860 uIU/mL   T4, Free    Collection Time: 05/17/22 11:12 AM   Result Value Ref Range    T4 Free 1.02 0.84 - 1.68 ng/dL   Thyroid Peroxidase Antibody    Collection Time: 05/17/22 11:12 AM   Result Value Ref Range    THYROID PEROXIDASE (TPO) ABS >1542.0 (H) 0.0 - 25.0 IU/mL   Thyroglobulin    Collection Time: 05/17/22 11:12 AM   Result Value Ref Range    Thyroglobulin by LC-MS/MS, Serum/Plasma 5.4 1.3 - 31.8 ng/mL       MEDICATIONS:    Current Outpatient Medications:     nortriptyline (PAMELOR) 25 MG capsule, Take 2 capsules by mouth nightly for 7 days, THEN 3 capsules nightly for 23 days. , Disp: 83 capsule, Rfl: 0    ibuprofen (ADVIL;MOTRIN) 800 MG tablet, Take 1 tablet by mouth every 8 hours as needed for Pain, Disp: 21 tablet, Rfl: 0    levothyroxine (SYNTHROID) 25 MCG tablet, Take 1 tablet by mouth daily, Disp: 90 tablet, Rfl: 1    nystatin (MYCOSTATIN) 938045 UNIT/GM cream, Apply topically 2 times daily. , Disp: 30 g, Rfl: 2    fluconazole (DIFLUCAN) 150 MG tablet, Take 1 tablet by mouth Twice a Week, Disp: 24 tablet, Rfl: 3    pantoprazole (PROTONIX) 40 MG tablet, Take 1 tablet by mouth daily, Disp: 90 tablet, Rfl: 1    hydrOXYzine (ATARAX) 25 MG tablet, Take 3 tablets by mouth nightly, Disp: 270 tablet, Rfl: 2    albuterol sulfate HFA (VENTOLIN HFA) 108 (90 Base) MCG/ACT inhaler, Inhale 2 puffs into the lungs 4 times daily as needed for Wheezing (cough) Dispense 1 inhaler, Disp: 18 g, Rfl: 0    fluticasone (FLONASE) 50 MCG/ACT nasal spray, 1 spray by Nasal route daily, Disp: 1 each, Rfl: 0    metoprolol succinate (TOPROL XL) 25 MG extended release tablet, Take 1 tablet by mouth daily, Disp: 90 tablet, Rfl: 1    SUMAtriptan (IMITREX) 50 MG tablet, Take 1 tablet by mouth once as needed for Migraine, Disp: 9 tablet, Rfl: 3    cycloSPORINE (RESTASIS) 0.05 % ophthalmic emulsion, Place 1 drop into both eyes 2 times daily, Disp: 1 vial, Rfl: 3    Blood Pressure Monitoring (BLOOD PRESSURE KIT) ZELALEM, 1 Units by Does not apply route as needed (for BP monitoring at home for hypertension) Cuff for upper arm, Disp: 1 Device, Rfl: 0    Examination:    Vitals: not taken in person, most recent vitals in chart reviewed  There were no vitals filed for this visit.     Wt Readings from Last 3 Encounters:   12/22/21 220 lb (99.8 kg)   11/15/21 223 lb (101.2 kg)   09/23/21 220 lb (99.8 kg)       BP Readings from Last 3 Encounters:   12/22/21 (!) 122/97   11/15/21 120/85   09/23/21 (!) 124/91         Mental Status Examination:    Level of consciousness:  alert and oriented to person, place, and situation  Appearance:  well-appearing good grooming and good hygiene  Behavior/Motor:  no abnormalities noted  Attitude toward examiner: friendly, pleasant and cooperative, attentive and good eye contact  Speech:  spontaneous, normal rate and normal volume   Mood: \"anxious\"  Affect:  mood congruent  Thought processes:  linear and logical  Thought content:  Denies suicidal or homicidal ideation, denies auditory or visual hallucinations  Cognition:  no deficits in attention, concentration notable, recent memory grossly intact  Concentration intact  Memory intact  Insight good   Judgement fair   Fund of Knowledge adequate    PSYCHOTHERAPY/COUNSELING:  Encourage patient to attend outpatient appointments and therapy. [x] Therapeutic interview  [] Supportive  [x] CBT  [x] Ongoing  [] Other    No follow-ups on file. patient informed to call for follow-up or to reschedule appointment     Please note this report has been partially produced using speech recognition software  And may cause contain errors related to that system including grammar, punctuation and spelling as well as words and phrases that may seem inappropriate. If there are questions or concerns please feel free to contact me to clarify. Deysi Melton MD  Electronically signed by Deysi Melton MD on 6/24/2022 at 3:13 PM  6/24/2022 3:13 PM    Psychiatry   Due to this being a TeleHealth encounter, evaluation of the following organ systems is limited: Vitals/Constitutional/EENT/Resp/CV/GI//MS/Neuro/Skin/Heme-Lymph-Imm. An  electronic signature was used to authenticate this note. --Deysi Melton MD on 6/24/2022 at 3:13 PM    Pursuant to the emergency declaration under the [facility], [facility] waiver authority and the [employer] Act, this Virtual  Visit was conducted, with patient's consent, to reduce the patient's risk of exposure to COVID-19 and provide continuity of care for an established patient Services were provided through a video synchronous discussion virtually to substitute for in-person clinic visit. Treatment Plan:  Reviewed current Medications with the patient.  Education provided on the compliance with treatment. Reviewed OARRs, no concerns identified     The anticipated benefits and side effects of the medications, including the anticipated results of not receiving the medication, and of alternatives to the medications were explained to the patient and their informed consent was obtained for starting medications as well as adjusting the doses (titration or tapering) as indicated. The above information was given by physician in verbal form and sufficient understanding was in evidence. The patient participated in discussion of the information and question and/or concerns were addressed before the medication was given. Due to COVID 19 outbreak, patient's office visit was converted to a virtual visit.   Patient was contacted and agreed to proceed with a virtual visit via DOXY

## 2022-06-24 RX ORDER — NORTRIPTYLINE HYDROCHLORIDE 25 MG/1
CAPSULE ORAL
Qty: 83 CAPSULE | Refills: 0 | Status: SHIPPED | OUTPATIENT
Start: 2022-06-24 | End: 2022-07-22

## 2022-07-22 ENCOUNTER — OFFICE VISIT (OUTPATIENT)
Dept: FAMILY MEDICINE CLINIC | Age: 47
End: 2022-07-22
Payer: COMMERCIAL

## 2022-07-22 VITALS
DIASTOLIC BLOOD PRESSURE: 84 MMHG | TEMPERATURE: 98.6 F | WEIGHT: 220 LBS | HEART RATE: 86 BPM | HEIGHT: 57 IN | OXYGEN SATURATION: 99 % | BODY MASS INDEX: 47.46 KG/M2 | SYSTOLIC BLOOD PRESSURE: 132 MMHG

## 2022-07-22 DIAGNOSIS — R06.2 WHEEZING ON EXPIRATION: ICD-10-CM

## 2022-07-22 DIAGNOSIS — U07.1 COVID-19: Primary | ICD-10-CM

## 2022-07-22 LAB
Lab: ABNORMAL
PERFORMING INSTRUMENT: ABNORMAL
QC PASS/FAIL: ABNORMAL
SARS-COV-2, POC: DETECTED

## 2022-07-22 PROCEDURE — 87426 SARSCOV CORONAVIRUS AG IA: CPT

## 2022-07-22 PROCEDURE — 99213 OFFICE O/P EST LOW 20 MIN: CPT

## 2022-07-22 PROCEDURE — G8427 DOCREV CUR MEDS BY ELIG CLIN: HCPCS

## 2022-07-22 PROCEDURE — G8417 CALC BMI ABV UP PARAM F/U: HCPCS

## 2022-07-22 PROCEDURE — 4004F PT TOBACCO SCREEN RCVD TLK: CPT

## 2022-07-22 RX ORDER — AZITHROMYCIN 250 MG/1
250 TABLET, FILM COATED ORAL SEE ADMIN INSTRUCTIONS
Qty: 6 TABLET | Refills: 0 | Status: CANCELLED | OUTPATIENT
Start: 2022-07-22 | End: 2022-07-27

## 2022-07-22 RX ORDER — NORTRIPTYLINE HYDROCHLORIDE 25 MG/1
75 CAPSULE ORAL NIGHTLY
Qty: 90 CAPSULE | Refills: 2 | Status: SHIPPED | OUTPATIENT
Start: 2022-07-22 | End: 2022-10-10 | Stop reason: SDUPTHER

## 2022-07-22 RX ORDER — METHYLPREDNISOLONE 4 MG/1
TABLET ORAL
Qty: 1 KIT | Refills: 0 | Status: SHIPPED | OUTPATIENT
Start: 2022-07-22 | End: 2022-07-28

## 2022-07-22 RX ORDER — ALBUTEROL SULFATE 90 UG/1
2 AEROSOL, METERED RESPIRATORY (INHALATION) 4 TIMES DAILY PRN
Qty: 18 G | Refills: 0 | Status: SHIPPED | OUTPATIENT
Start: 2022-07-22 | End: 2022-10-25 | Stop reason: SDUPTHER

## 2022-07-22 RX ORDER — ALPRAZOLAM 2 MG/1
2 TABLET ORAL 2 TIMES DAILY PRN
COMMUNITY
Start: 2022-06-19

## 2022-07-22 ASSESSMENT — ENCOUNTER SYMPTOMS
EYE REDNESS: 0
CONSTIPATION: 0
RHINORRHEA: 1
CHEST TIGHTNESS: 1
DIARRHEA: 0
EYE PAIN: 1
SINUS PAIN: 0
EYE ITCHING: 0
SHORTNESS OF BREATH: 1
SINUS PRESSURE: 0
COLOR CHANGE: 0
EYE DISCHARGE: 0

## 2022-07-22 ASSESSMENT — PATIENT HEALTH QUESTIONNAIRE - PHQ9
9. THOUGHTS THAT YOU WOULD BE BETTER OFF DEAD, OR OF HURTING YOURSELF: 0
4. FEELING TIRED OR HAVING LITTLE ENERGY: 0
8. MOVING OR SPEAKING SO SLOWLY THAT OTHER PEOPLE COULD HAVE NOTICED. OR THE OPPOSITE, BEING SO FIGETY OR RESTLESS THAT YOU HAVE BEEN MOVING AROUND A LOT MORE THAN USUAL: 0
5. POOR APPETITE OR OVEREATING: 0
SUM OF ALL RESPONSES TO PHQ QUESTIONS 1-9: 0
3. TROUBLE FALLING OR STAYING ASLEEP: 0
SUM OF ALL RESPONSES TO PHQ9 QUESTIONS 1 & 2: 0
SUM OF ALL RESPONSES TO PHQ QUESTIONS 1-9: 0
7. TROUBLE CONCENTRATING ON THINGS, SUCH AS READING THE NEWSPAPER OR WATCHING TELEVISION: 0
1. LITTLE INTEREST OR PLEASURE IN DOING THINGS: 0
10. IF YOU CHECKED OFF ANY PROBLEMS, HOW DIFFICULT HAVE THESE PROBLEMS MADE IT FOR YOU TO DO YOUR WORK, TAKE CARE OF THINGS AT HOME, OR GET ALONG WITH OTHER PEOPLE: 0
SUM OF ALL RESPONSES TO PHQ QUESTIONS 1-9: 0
SUM OF ALL RESPONSES TO PHQ QUESTIONS 1-9: 0
2. FEELING DOWN, DEPRESSED OR HOPELESS: 0
6. FEELING BAD ABOUT YOURSELF - OR THAT YOU ARE A FAILURE OR HAVE LET YOURSELF OR YOUR FAMILY DOWN: 0

## 2022-07-22 NOTE — PATIENT INSTRUCTIONS
- OTC sudafed for congestion  - OTC tylenol/motrin for aches/fevers  - Follow up with PCP if symptoms worsen, go to ED if severe SOB  - Increase fluid intake  - Quarantine for 5 days at start of symptoms

## 2022-07-22 NOTE — PROGRESS NOTES
[facility] [address] Encounter    SUBJECTIVE    CHIEF COMPLAINT:   Chief Complaint   Patient presents with    URI       HPI  Nelsy Mcdonald is a 40 y.o. female who presents to the walk-in clinic today for:   HA, light yellow productive cough, nasal congestion, body aches x 2 days. She recently went to [facility] with family. Her family is also sick. She is covid vaccinated. Review of Systems   HENT:  Positive for ear pain, postnasal drip and rhinorrhea. Negative for ear discharge, sinus pressure, sinus pain and sneezing. Eyes:  Positive for pain. Negative for discharge, redness and itching. Respiratory:  Positive for chest tightness and shortness of breath. Cardiovascular:  Negative for palpitations. Gastrointestinal:  Negative for constipation and diarrhea. Skin:  Negative for color change. Neurological:  Negative for dizziness and light-headedness. Past Medical History:   Diagnosis Date    Anxiety and depression     Anxiety and depression     Asthma     Essential hypertension 5/18/2017    Headache(784.0)     Obesity     MARBELLA (obstructive sleep apnea) 4/29/2020    PTSD (post-traumatic stress disorder)     Urinary incontinence      History reviewed. No pertinent family history.   Social History     Socioeconomic History    Marital status:      Spouse name: Not on file    Number of children: Not on file    Years of education: Not on file    Highest education level: Not on file   Occupational History    Not on file   Tobacco Use    Smoking status: Every Day     Packs/day: 0.50     Types: Cigarettes    Smokeless tobacco: Never   Vaping Use    Vaping Use: Never used   Substance and Sexual Activity    Alcohol use: No    Drug use: No    Sexual activity: Yes   Other Topics Concern    Not on file   Social History Narrative    Not on file     Social Determinants of Health     Financial Resource Strain: Unknown    Difficulty of Paying Living Expenses: Patient Oropharynx is clear. No oropharyngeal exudate or posterior oropharyngeal erythema. Eyes:      General:         Right eye: No discharge. Left eye: No discharge. Conjunctiva/sclera: Conjunctivae normal.      Pupils: Pupils are equal, round, and reactive to light. Cardiovascular:      Rate and Rhythm: Normal rate and regular rhythm. Pulses: Normal pulses. Heart sounds: Normal heart sounds. No murmur heard. No friction rub. No gallop. Pulmonary:      Effort: Pulmonary effort is normal.      Breath sounds: Wheezing (all bases) present. Musculoskeletal:      Cervical back: Normal range of motion. Lymphadenopathy:      Cervical: No cervical adenopathy. Neurological:      Mental Status: She is alert. Orders Placed This Encounter   Procedures    POCT COVID-19, Antigen     Order Specific Question:   Is this test for diagnosis or screening? Answer:   Diagnosis of ill patient     Order Specific Question:   Symptomatic for COVID-19 as defined by CDC? Answer:   Yes     Order Specific Question:   Date of Symptom Onset     Answer:   7/22/2022     Order Specific Question:   Hospitalized for COVID-19? Answer:   No     Order Specific Question:   Admitted to ICU for COVID-19? Answer:   No     Order Specific Question:   Employed in healthcare setting? Answer:   No     Order Specific Question:   Resident in a congregate (group) care setting? Answer:   No     Order Specific Question:   Pregnant? Answer:   No     Order Specific Question:   Previously tested for COVID-19? Answer:   Yes       LABS:  No results found for this visit on 07/22/22. ASSESSMENT/PLAN:      1. COVID-19  - POCT COVID-19, Antigen - positive  - nirmatrelvir/ritonavir (PAXLOVID) 20 x 150 MG & 10 x 100MG; Take 3 tablets (two 150 mg nirmatrelvir and one 100 mg ritonavir tablets) by mouth every 12 hours for 5 days. Dispense: 30 tablet;  Refill: 0  - Quarantine 5 days  - OTC tylenol/motrin for pain/fevers  - OTC sudafed for congestion    2. Wheezing on expiration  - Likely r/t COVID-19  - albuterol sulfate HFA (VENTOLIN HFA) 108 (90 Base) MCG/ACT inhaler; Inhale 2 puffs into the lungs 4 times daily as needed for Wheezing  Dispense: 18 g; Refill: 0  - methylPREDNISolone (MEDROL DOSEPACK) 4 MG tablet; Take by mouth. Dispense: 1 kit; Refill: 0      Return if symptoms worsen or fail to improve. - Follow with PCP in 2 weeks or go to ED if severe SOB not relieved by albuterol    All prescribed medication today including purpose, proper use, and side effects discussed. Treatment plan/rationale and result expectations have been discussed with the patient who expresses understanding and desires to proceed. An electronic signature was used to authenticate this note.   Donna Bosworth, APRN - CNP

## 2022-07-22 NOTE — TELEPHONE ENCOUNTER
requesting medication refill. Rx requested:  Requested Prescriptions     Pending Prescriptions Disp Refills    nortriptyline (PAMELOR) 25 MG capsule [Pharmacy Med Name: NORTRIPTYLINE HCL 25 MG CAP] 83 capsule 0     Sig: TAKE 2 CAPSULES BY MOUTH NIGHTLY FOR 7 DAYS, THEN 3 CAPSULES NIGHTLY FOR 23 DAYS.        Last Office Visit:   6/13/2022    Last Tox screen:      Last Medication contract:      Last refilled on :6/24/2022      Next Visit Date:  Future Appointments   Date Time Provider [place]   8/30/2022  2:20 PM Hui Quiroz, [address]
complains of pain/discomfort

## 2022-07-31 ENCOUNTER — HOSPITAL ENCOUNTER (EMERGENCY)
Age: 47
Discharge: HOME | End: 2022-08-01
Attending: EMERGENCY MEDICINE
Payer: COMMERCIAL

## 2022-07-31 DIAGNOSIS — J40 BRONCHITIS: ICD-10-CM

## 2022-07-31 DIAGNOSIS — F41.1 ANXIETY STATE: Primary | ICD-10-CM

## 2022-07-31 PROCEDURE — 99285 EMERGENCY DEPT VISIT HI MDM: CPT

## 2022-07-31 PROCEDURE — 6360000002 HC RX W HCPCS: Performed by: EMERGENCY MEDICINE

## 2022-07-31 PROCEDURE — 93005 ELECTROCARDIOGRAM TRACING: CPT

## 2022-07-31 PROCEDURE — 96372 THER/PROPH/DIAG INJ SC/IM: CPT

## 2022-07-31 PROCEDURE — 6370000000 HC RX 637 (ALT 250 FOR IP): Performed by: EMERGENCY MEDICINE

## 2022-07-31 RX ORDER — METHYLPREDNISOLONE SODIUM SUCCINATE 125 MG/2ML
125 INJECTION, POWDER, LYOPHILIZED, FOR SOLUTION INTRAMUSCULAR; INTRAVENOUS ONCE
Status: COMPLETED | OUTPATIENT
Start: 2022-07-31 | End: 2022-07-31

## 2022-07-31 RX ORDER — LORAZEPAM 1 MG/1
1 TABLET ORAL ONCE
Status: COMPLETED | OUTPATIENT
Start: 2022-07-31 | End: 2022-07-31

## 2022-07-31 RX ADMIN — LORAZEPAM 1 MG: 1 TABLET ORAL at 23:58

## 2022-07-31 RX ADMIN — METHYLPREDNISOLONE SODIUM SUCCINATE 125 MG: 125 INJECTION, POWDER, FOR SOLUTION INTRAMUSCULAR; INTRAVENOUS at 23:56

## 2022-07-31 ASSESSMENT — ENCOUNTER SYMPTOMS
EYE DISCHARGE: 0
VOMITING: 0
NAUSEA: 0
SHORTNESS OF BREATH: 1
COUGH: 0
ABDOMINAL PAIN: 0
WHEEZING: 1
EYE REDNESS: 0
SORE THROAT: 0
BACK PAIN: 0

## 2022-07-31 ASSESSMENT — PAIN - FUNCTIONAL ASSESSMENT: PAIN_FUNCTIONAL_ASSESSMENT: NONE - DENIES PAIN

## 2022-08-01 ENCOUNTER — APPOINTMENT (OUTPATIENT)
Dept: GENERAL RADIOLOGY | Age: 47
End: 2022-08-01
Payer: COMMERCIAL

## 2022-08-01 VITALS
BODY MASS INDEX: 51.78 KG/M2 | HEART RATE: 108 BPM | SYSTOLIC BLOOD PRESSURE: 135 MMHG | WEIGHT: 240 LBS | RESPIRATION RATE: 20 BRPM | TEMPERATURE: 98.5 F | DIASTOLIC BLOOD PRESSURE: 100 MMHG | OXYGEN SATURATION: 96 % | HEIGHT: 57 IN

## 2022-08-01 LAB
ANION GAP SERPL CALCULATED.3IONS-SCNC: 11 MEQ/L (ref 9–15)
BASOPHILS ABSOLUTE: 0.1 K/UL (ref 0–0.1)
BASOPHILS RELATIVE PERCENT: 0.3 % (ref 0.1–1.2)
BUN BLDV-MCNC: 11 MG/DL (ref 6–20)
CALCIUM SERPL-MCNC: 8.9 MG/DL (ref 8.5–9.9)
CHLORIDE BLD-SCNC: 102 MEQ/L (ref 95–107)
CO2: 27 MEQ/L (ref 20–31)
CREAT SERPL-MCNC: 0.8 MG/DL (ref 0.5–0.9)
EKG ATRIAL RATE: 112 BPM
EKG P AXIS: 50 DEGREES
EKG P-R INTERVAL: 130 MS
EKG Q-T INTERVAL: 302 MS
EKG QRS DURATION: 82 MS
EKG QTC CALCULATION (BAZETT): 412 MS
EKG R AXIS: 52 DEGREES
EKG T AXIS: -28 DEGREES
EKG VENTRICULAR RATE: 112 BPM
EOSINOPHILS ABSOLUTE: 0.4 K/UL (ref 0–0.4)
EOSINOPHILS RELATIVE PERCENT: 2.4 % (ref 0.7–5.8)
GFR AFRICAN AMERICAN: >60
GFR NON-AFRICAN AMERICAN: >60
GLUCOSE BLD-MCNC: 130 MG/DL (ref 70–99)
HCT VFR BLD CALC: 42.5 % (ref 37–47)
HEMOGLOBIN: 13.3 G/DL (ref 11.2–15.7)
IMMATURE GRANULOCYTES #: 0.2 K/UL
IMMATURE GRANULOCYTES %: 1.4 %
LYMPHOCYTES ABSOLUTE: 4.3 K/UL (ref 1.2–3.7)
LYMPHOCYTES RELATIVE PERCENT: 28.6 %
MCH RBC QN AUTO: 28.8 PG (ref 25.6–32.2)
MCHC RBC AUTO-ENTMCNC: 31.3 % (ref 32.2–35.5)
MCV RBC AUTO: 92 FL (ref 79.4–94.8)
MONOCYTES ABSOLUTE: 1.1 K/UL (ref 0.2–0.9)
MONOCYTES RELATIVE PERCENT: 7.2 % (ref 4.7–12.5)
NEUTROPHILS ABSOLUTE: 9 K/UL (ref 1.6–6.1)
NEUTROPHILS RELATIVE PERCENT: 60.1 % (ref 34–71.1)
PDW BLD-RTO: 13.6 % (ref 11.7–14.4)
PLATELET # BLD: 339 K/UL (ref 182–369)
POTASSIUM SERPL-SCNC: 3.8 MEQ/L (ref 3.4–4.9)
RBC # BLD: 4.62 M/UL (ref 3.93–5.22)
SODIUM BLD-SCNC: 140 MEQ/L (ref 135–144)
WBC # BLD: 15.1 K/UL (ref 4–10)

## 2022-08-01 PROCEDURE — 71046 X-RAY EXAM CHEST 2 VIEWS: CPT

## 2022-08-01 PROCEDURE — 6370000000 HC RX 637 (ALT 250 FOR IP): Performed by: EMERGENCY MEDICINE

## 2022-08-01 PROCEDURE — 93010 ELECTROCARDIOGRAM REPORT: CPT | Performed by: INTERNAL MEDICINE

## 2022-08-01 PROCEDURE — 85025 COMPLETE CBC W/AUTO DIFF WBC: CPT

## 2022-08-01 PROCEDURE — 80048 BASIC METABOLIC PNL TOTAL CA: CPT

## 2022-08-01 PROCEDURE — 93005 ELECTROCARDIOGRAM TRACING: CPT

## 2022-08-01 RX ORDER — PREDNISONE 20 MG/1
60 TABLET ORAL ONCE
Status: DISCONTINUED | OUTPATIENT
Start: 2022-08-01 | End: 2022-08-01

## 2022-08-01 RX ORDER — AZITHROMYCIN 250 MG/1
TABLET, FILM COATED ORAL
Qty: 1 PACKET | Refills: 0 | Status: SHIPPED | OUTPATIENT
Start: 2022-08-01 | End: 2022-08-05

## 2022-08-01 RX ORDER — AZITHROMYCIN 250 MG/1
500 TABLET, FILM COATED ORAL ONCE
Status: COMPLETED | OUTPATIENT
Start: 2022-08-01 | End: 2022-08-01

## 2022-08-01 RX ORDER — PREDNISONE 20 MG/1
60 TABLET ORAL DAILY
Qty: 15 TABLET | Refills: 0 | Status: SHIPPED | OUTPATIENT
Start: 2022-08-01 | End: 2022-08-05

## 2022-08-01 RX ADMIN — AZITHROMYCIN MONOHYDRATE 500 MG: 250 TABLET ORAL at 01:10

## 2022-08-01 NOTE — ED PROVIDER NOTES
[address] [place] [address] ED  EMERGENCY DEPARTMENT ENCOUNTER      Pt Name: Adrian Perry  MRN: [id number]  [place] 1975  Date of evaluation: 7/31/2022  Provider: Jorge Alberto Cuellar DO        HISTORY OF PRESENT ILLNESS    Adrian Perry is a 40 y.o. female per chart review has ah/o anxiety, asthma, HTN, essential HTN, MARBELLA, PTSD. She presents with increased SOB. She recently had COVID-19 and still does not feel better. She is using her albuterol and nebs without any relief. The history is provided by the patient. Shortness of Breath  Severity:  Moderate  Onset quality:  Sudden  Timing:  Constant  Progression:  Worsening  Chronicity:  New  Context: activity and URI    Relieved by:  Nothing  Worsened by:  Deep breathing, stress and emotional stress  Ineffective treatments:  Inhaler, lying down and sitting up  Associated symptoms: wheezing    Associated symptoms: no abdominal pain, no chest pain, no cough, no ear pain, no fever, no neck pain, no rash, no sore throat and no vomiting    Risk factors: obesity and prolonged immobilization           REVIEW OF SYSTEMS       Review of Systems   Constitutional:  Negative for chills and fever. HENT:  Negative for ear pain and sore throat. Eyes:  Negative for discharge and redness. Respiratory:  Positive for shortness of breath and wheezing. Negative for cough. Cardiovascular:  Negative for chest pain and palpitations. Gastrointestinal:  Negative for abdominal pain, nausea and vomiting. Genitourinary:  Negative for difficulty urinating and dysuria. Musculoskeletal:  Negative for back pain and neck pain. Skin:  Negative for rash and wound. Neurological:  Negative for dizziness and syncope. Psychiatric/Behavioral:  Negative for confusion. The patient is not nervous/anxious. All other systems reviewed and are negative. Except as noted above the remainder of the review of systems was reviewed and negative.        PAST MEDICAL HISTORY     Past Medical History:   Diagnosis Date    Anxiety and depression     Anxiety and depression     Asthma     Essential hypertension 5/18/2017    Headache(784.0)     Obesity     MARBELLA (obstructive sleep apnea) 4/29/2020    PTSD (post-traumatic stress disorder)     Urinary incontinence          SURGICAL HISTORY       Past Surgical History:   Procedure Laterality Date    CHOLECYSTECTOMY      ENDOMETRIAL ABLATION      HYSTERECTOMY, TOTAL ABDOMINAL (CERVIX REMOVED)  7/15/15    DR. [facility]      TUBAL LIGATION           CURRENT MEDICATIONS       Discharge Medication List as of 8/1/2022  1:06 AM        CONTINUE these medications which have NOT CHANGED    Details   nortriptyline (PAMELOR) 25 MG capsule Take 3 capsules by mouth nightly, Disp-90 capsule, R-2Normal      ALPRAZolam (XANAX) 2 MG tablet Take 2 mg by mouth 2 times daily as needed for Sleep. Historical Med      !! albuterol sulfate HFA (VENTOLIN HFA) 108 (90 Base) MCG/ACT inhaler Inhale 2 puffs into the lungs 4 times daily as needed for Wheezing, Disp-18 g, R-0Normal      nirmatrelvir/ritonavir (PAXLOVID) 20 x 150 MG & 10 x 100MG Take 3 tablets (two 150 mg nirmatrelvir and one 100 mg ritonavir tablets) by mouth every 12 hours for 5 days. , Disp-30 tablet, R-0Normal      ibuprofen (ADVIL;MOTRIN) 800 MG tablet Take 1 tablet by mouth every 8 hours as needed for Pain, Disp-21 tablet, R-0Normal      levothyroxine (SYNTHROID) 25 MCG tablet Take 1 tablet by mouth daily, Disp-90 tablet, R-1Normal      nystatin (MYCOSTATIN) 248405 UNIT/GM cream Apply topically 2 times daily. , Disp-30 g, R-2, Normal      fluconazole (DIFLUCAN) 150 MG tablet Take 1 tablet by mouth Twice a Week, Disp-24 tablet, R-3Normal      pantoprazole (PROTONIX) 40 MG tablet Take 1 tablet by mouth daily, Disp-90 tablet, R-1Normal      hydrOXYzine (ATARAX) 25 MG tablet Take 3 tablets by mouth nightly, Disp-270 tablet, R-2Normal      !! albuterol sulfate HFA (VENTOLIN HFA) 108 (90 Base) MCG/ACT inhaler Inhale 2 puffs into the lungs 4 times daily as needed for Wheezing (cough) Dispense 1 inhaler, Disp-18 g, R-0Normal      fluticasone (FLONASE) 50 MCG/ACT nasal spray 1 spray by Nasal route daily, Disp-1 each, R-0Normal      metoprolol succinate (TOPROL XL) 25 MG extended release tablet Take 1 tablet by mouth daily, Disp-90 tablet, R-1Normal      SUMAtriptan (IMITREX) 50 MG tablet Take 1 tablet by mouth once as needed for Migraine, Disp-9 tablet, R-3Normal      cycloSPORINE (RESTASIS) 0.05 % ophthalmic emulsion Place 1 drop into both eyes 2 times daily, Disp-1 vial, R-3Normal      Blood Pressure Monitoring (BLOOD PRESSURE KIT) ZELALEM 1 Units by Does not apply route as needed (for BP monitoring at home for hypertension) Cuff for upper arm, Disp-1 Device,R-0Normal       !! - Potential duplicate medications found. Please discuss with provider. ALLERGIES     Aspirin, Codeine, Lithium, Naproxen, and Trazodone and nefazodone    FAMILY HISTORY     No family history on file.        SOCIAL HISTORY       Social History     Socioeconomic History    Marital status:    Tobacco Use    Smoking status: Every Day     Packs/day: 0.50     Types: Cigarettes    Smokeless tobacco: Never   Vaping Use    Vaping Use: Never used   Substance and Sexual Activity    Alcohol use: No    Drug use: No    Sexual activity: Yes     Social Determinants of Health     Financial Resource Strain: Unknown    Difficulty of Paying Living Expenses: Patient refused   Food Insecurity: Unknown    Worried About Running Out of Food in the Last Year: Patient refused    Ran Out of Food in the Last Year: Patient refused   Transportation Needs: Unknown    Lack of Transportation (Medical): Patient refused    Lack of Transportation (Non-Medical): Patient refused   Physical Activity: Unknown    Days of Exercise per Week: Patient refused    Minutes of Exercise per Session: Patient refused   Stress: Unknown    Feeling of Stress : Patient refused   Social Connections: Unknown    Frequency of Communication with Friends and Family: Patient refused    Frequency of Social Gatherings with Friends and Family: Patient refused    Attends Rastafari Services: Patient refused    Active Member of Clubs or Organizations: Patient refused    Attends Club or Organization Meetings: Patient refused    Marital Status: Patient refused   Intimate Partner Violence: Unknown    Fear of Current or Ex-Partner: Patient refused    Emotionally Abused: Patient refused    Physically Abused: Patient refused    Sexually Abused: Patient refused   Housing Stability: Unknown    Unable to Pay for Housing in the Last Year: Patient refused    Unstable Housing in the Last Year: Patient refused         PHYSICAL EXAM       ED Triage Vitals [07/31/22 2321]   BP Temp Temp Source Heart Rate Resp SpO2 Height Weight   130/80 98.5 °F (36.9 °C) Oral (!) 124 20 97 % 4' 9\" (1.448 m) 240 lb (108.9 kg)       Physical Exam  Vitals and nursing note reviewed. Constitutional:       Appearance: Normal appearance. HENT:      Head: Normocephalic and atraumatic. Right Ear: Tympanic membrane normal.      Left Ear: Tympanic membrane normal.      Nose: Nose normal.      Mouth/Throat:      Mouth: Mucous membranes are moist.      Pharynx: Oropharynx is clear. Eyes:      General: Lids are normal.      Extraocular Movements: Extraocular movements intact. Conjunctiva/sclera: Conjunctivae normal.      Pupils: Pupils are equal, round, and reactive to light. Cardiovascular:      Rate and Rhythm: Regular rhythm. Tachycardia present. Pulses: Normal pulses. Heart sounds: Normal heart sounds. Pulmonary:      Effort: Pulmonary effort is normal.      Breath sounds: Normal breath sounds. Abdominal:      General: Abdomen is flat. Bowel sounds are normal.      Palpations: Abdomen is soft. Musculoskeletal:         General: Normal range of motion.       Cervical back: Full passive range of motion without pain, normal range of motion and neck supple. Skin:     General: Skin is warm. Capillary Refill: Capillary refill takes less than 2 seconds. Neurological:      General: No focal deficit present. Mental Status: She is alert and oriented to person, place, and time. Deep Tendon Reflexes: Reflexes are normal and symmetric. Psychiatric:         Attention and Perception: Attention and perception normal.         Mood and Affect: Mood normal.         Behavior: Behavior normal. Behavior is cooperative. LABS:  Labs Reviewed   BASIC METABOLIC PANEL - Abnormal; Notable for the following components:       Result Value    Glucose 130 (*)     All other components within normal limits   CBC WITH AUTO DIFFERENTIAL - Abnormal; Notable for the following components:    WBC 15.1 (*)     MCHC 31.3 (*)     Neutrophils Absolute 9.0 (*)     Lymphocytes Absolute 4.3 (*)     Monocytes Absolute 1.1 (*)     All other components within normal limits         MDM:   Vitals:    Vitals:    07/31/22 2321 08/01/22 0039 08/01/22 0101   BP: 130/80 (!) 125/98 (!) 135/100   Pulse: (!) 124 (!) 106 (!) 108   Resp: 20 20 20   Temp: 98.5 °F (36.9 °C)     TempSrc: Oral     SpO2: 97% 97% 96%   Weight: 240 lb (108.9 kg)     Height: 4' 9\" (1.448 m)         MDM  Number of Diagnoses or Management Options  Anxiety state  Bronchitis  Diagnosis management comments: Patient presents with SOB. Labs, CXR, EKG ordered. Her labs did not show any acute changes  Her CXR: no acute changes  She will be discharged home with Rx for zpack. She will follow up in 2 days with her primary care doctor. Amount and/or Complexity of Data Reviewed  Clinical lab tests: ordered and reviewed  Tests in the radiology section of CPT®: ordered and reviewed         XR CHEST (2 VW)   Final Result   NO  ACTIVE LUNG DISEASE.                         EKG Interpretation    Interpreted by emergency department physician    Rhythm: sinus tachycardia  Rate: tachycardia  Axis: normal  Ectopy: none  Conduction: normal  ST Segments: nonspecific changes  T Waves: non specific changes  Q Waves: none    Clinical Impression: non-specific EKG    STEVEN SHI DO     The lab results, radiology and test results were reviewed with the patient and family. The patient will follow up in 2 days with their primary care doctor. If their symptoms change or get worse they will return to the ER. CRITICAL CARE TIME   Total CriticalCare time was 0 minutes, excluding separately reportable procedures. There was a high probability of clinically significant/life threatening deterioration in the patient's condition which required my urgent intervention. PROCEDURES:  Unlessotherwise noted below, none     Procedures      FINAL IMPRESSION      1. Anxiety state    2.  Bronchitis          DISPOSITION/PLAN   DISPOSITION Decision To Discharge 08/01/2022 01:01:37 AM          STEVEN Denise DO (electronically signed)  Attending Emergency Physician          Rob Greer DO  08/03/22 [id number]

## 2022-08-02 ENCOUNTER — CARE COORDINATION (OUTPATIENT)
Dept: CARE COORDINATION | Age: 47
End: 2022-08-02

## 2022-08-02 NOTE — CARE COORDINATION
Patient contacted regarding COVID-19 risk, exposure, and diagnosis. Discussed COVID-19 related testing which was available at this time. Test results were positive. Patient informed of results, if available? Yes. Completed 2022      Ambulatory Care Manager contacted the patient by telephone to perform post discharge assessment. Call within 2 business days of discharge: Yes. Verified name and  with patient as identifiers. Provided introduction to self, and explanation of the CTN/ACM role, and reason for call due to risk factors for infection and/or exposure to COVID-19. Symptoms reviewed with patient who verbalized the following symptoms: fatigue  shortness of breath  no worsening symptoms. Due to no new or worsening symptoms encounter was not routed to provider for escalation. Discussed follow-up appointments. If no appointment was previously scheduled, appointment scheduling offered: No and Previously Scheduled  . [address] follow up appointment(s):   Future Appointments   Date Time Provider [place]   2022  2:00 PM Haritha Jones APRN - [id number]   2022  2:20 PM Haritha Jones APRN - CNP [address]     Non-[facility] follow up appointment(s): N/A    Non-face-to-face services provided:  Obtained and reviewed discharge summary and/or continuity of care documents     Advance Care Planning:   Does patient have an Advance Directive:  reviewed and current. Educated patient about risk for severe COVID-19 due to risk factors according to CDC guidelines. ACM reviewed discharge instructions, medical action plan and red flag symptoms with the patient who verbalized understanding. Discussed COVID vaccination status: No. Education provided on COVID-19 vaccination as appropriate. Discussed exposure protocols and quarantine with CDC Guidelines.  Patient was given an opportunity to verbalize any questions and concerns and agrees to contact ACM or health care provider for questions related to their healthcare. Reviewed and educated patient on any new and changed medications related to discharge diagnosis     Was patient discharged with a pulse oximeter? no      ACM provided contact information. Plan for follow-up call in 5-7 days based on severity of symptoms and risk factors. Teresa Lab tells me that she is feeling much better today   she  feels that she has a good understanding of COVID symptoms and protocols. She has picked up the medications and shw is taking all medications as prescribed  I have asked her to rest, drink, plenty of liquids, take all medications as prescribed, and monitor symptoms closely. I have also made her aware of the symptoms that require returning to the ER for evaluation. She verbalizes understanding of the information discussed.

## 2022-08-05 ENCOUNTER — OFFICE VISIT (OUTPATIENT)
Dept: FAMILY MEDICINE CLINIC | Age: 47
End: 2022-08-05
Payer: COMMERCIAL

## 2022-08-05 ENCOUNTER — HOSPITAL ENCOUNTER (OUTPATIENT)
Dept: LAB | Age: 47
Discharge: HOME | End: 2022-08-05
Payer: COMMERCIAL

## 2022-08-05 VITALS
RESPIRATION RATE: 18 BRPM | HEIGHT: 57 IN | WEIGHT: 241.6 LBS | SYSTOLIC BLOOD PRESSURE: 118 MMHG | DIASTOLIC BLOOD PRESSURE: 80 MMHG | HEART RATE: 110 BPM | TEMPERATURE: 97.2 F | BODY MASS INDEX: 52.12 KG/M2 | OXYGEN SATURATION: 97 %

## 2022-08-05 DIAGNOSIS — N39.0 RECURRENT UTI: ICD-10-CM

## 2022-08-05 DIAGNOSIS — U07.1 COVID-19: ICD-10-CM

## 2022-08-05 DIAGNOSIS — R94.31 ABNORMAL EKG: ICD-10-CM

## 2022-08-05 DIAGNOSIS — R07.89 CHEST PRESSURE: ICD-10-CM

## 2022-08-05 DIAGNOSIS — R05.9 COUGH: ICD-10-CM

## 2022-08-05 DIAGNOSIS — R07.81 PLEURITIC CHEST PAIN: ICD-10-CM

## 2022-08-05 DIAGNOSIS — R30.0 DYSURIA: ICD-10-CM

## 2022-08-05 DIAGNOSIS — K21.9 GASTROESOPHAGEAL REFLUX DISEASE WITHOUT ESOPHAGITIS: ICD-10-CM

## 2022-08-05 DIAGNOSIS — R06.02 SHORTNESS OF BREATH: ICD-10-CM

## 2022-08-05 DIAGNOSIS — N39.3 STRESS INCONTINENCE: ICD-10-CM

## 2022-08-05 DIAGNOSIS — R06.02 SHORTNESS OF BREATH: Primary | ICD-10-CM

## 2022-08-05 DIAGNOSIS — R39.9 UTI SYMPTOMS: ICD-10-CM

## 2022-08-05 DIAGNOSIS — R10.9 RIGHT FLANK PAIN: ICD-10-CM

## 2022-08-05 DIAGNOSIS — R06.2 WHEEZING: ICD-10-CM

## 2022-08-05 DIAGNOSIS — R10.2 PELVIC PRESSURE IN FEMALE: ICD-10-CM

## 2022-08-05 DIAGNOSIS — F41.0 PANIC ATTACK: ICD-10-CM

## 2022-08-05 LAB
ALBUMIN SERPL-MCNC: 3.6 G/DL (ref 3.5–4.6)
ALP BLD-CCNC: 78 U/L (ref 40–130)
ALT SERPL-CCNC: 12 U/L (ref 0–33)
ANION GAP SERPL CALCULATED.3IONS-SCNC: 12 MEQ/L (ref 9–15)
AST SERPL-CCNC: 8 U/L (ref 0–35)
BASOPHILS ABSOLUTE: 0.1 K/UL (ref 0–0.1)
BASOPHILS RELATIVE PERCENT: 0.3 % (ref 0.1–1.2)
BILIRUB SERPL-MCNC: <0.2 MG/DL (ref 0.2–0.7)
BILIRUBIN, POC: ABNORMAL
BLOOD URINE, POC: ABNORMAL
BUN BLDV-MCNC: 17 MG/DL (ref 6–20)
CALCIUM SERPL-MCNC: 9.1 MG/DL (ref 8.5–9.9)
CHLORIDE BLD-SCNC: 100 MEQ/L (ref 95–107)
CLARITY, POC: CLEAR
CO2: 26 MEQ/L (ref 20–31)
COLOR, POC: YELLOW
CREAT SERPL-MCNC: 0.8 MG/DL (ref 0.5–0.9)
D DIMER: 0.38 MG/L FEU (ref 0–0.5)
EOSINOPHILS ABSOLUTE: 0.1 K/UL (ref 0–0.4)
EOSINOPHILS RELATIVE PERCENT: 0.4 % (ref 0.7–5.8)
GFR AFRICAN AMERICAN: >60
GFR NON-AFRICAN AMERICAN: >60
GLOBULIN: 3.4 G/DL (ref 2.3–3.5)
GLUCOSE BLD-MCNC: 132 MG/DL (ref 70–99)
GLUCOSE URINE, POC: ABNORMAL
HCT VFR BLD CALC: 43.8 % (ref 37–47)
HEMOGLOBIN: 14 G/DL (ref 11.2–15.7)
IMMATURE GRANULOCYTES #: 0.1 K/UL
IMMATURE GRANULOCYTES %: 0.6 %
KETONES, POC: ABNORMAL
LEUKOCYTE EST, POC: ABNORMAL
LYMPHOCYTES ABSOLUTE: 6.7 K/UL (ref 1.2–3.7)
LYMPHOCYTES RELATIVE PERCENT: 31.6 %
MCH RBC QN AUTO: 28.6 PG (ref 25.6–32.2)
MCHC RBC AUTO-ENTMCNC: 32 % (ref 32.2–35.5)
MCV RBC AUTO: 89.6 FL (ref 79.4–94.8)
MONOCYTES ABSOLUTE: 1.9 K/UL (ref 0.2–0.9)
MONOCYTES RELATIVE PERCENT: 8.7 % (ref 4.7–12.5)
NEUTROPHILS ABSOLUTE: 12.4 K/UL (ref 1.6–6.1)
NEUTROPHILS RELATIVE PERCENT: 58.4 % (ref 34–71.1)
NITRITE, POC: ABNORMAL
PDW BLD-RTO: 13.8 % (ref 11.7–14.4)
PH, POC: 6
PLATELET # BLD: 139 K/UL (ref 182–369)
PLATELET SLIDE REVIEW: ABNORMAL
POTASSIUM SERPL-SCNC: 3.5 MEQ/L (ref 3.4–4.9)
PROCALCITONIN: <0.02 NG/ML (ref 0–0.15)
PROTEIN, POC: ABNORMAL
RBC # BLD: 4.89 M/UL (ref 3.93–5.22)
SLIDE REVIEW: ABNORMAL
SODIUM BLD-SCNC: 138 MEQ/L (ref 135–144)
SPECIFIC GRAVITY, POC: 1.03
TOTAL PROTEIN: 7 G/DL (ref 6.3–8)
TROPONIN: <0.01 NG/ML (ref 0–0.01)
UROBILINOGEN, POC: ABNORMAL
WBC # BLD: 21.2 K/UL (ref 4–10)

## 2022-08-05 PROCEDURE — 84145 PROCALCITONIN (PCT): CPT

## 2022-08-05 PROCEDURE — 99215 OFFICE O/P EST HI 40 MIN: CPT | Performed by: NURSE PRACTITIONER

## 2022-08-05 PROCEDURE — 81003 URINALYSIS AUTO W/O SCOPE: CPT | Performed by: NURSE PRACTITIONER

## 2022-08-05 PROCEDURE — 4004F PT TOBACCO SCREEN RCVD TLK: CPT | Performed by: NURSE PRACTITIONER

## 2022-08-05 PROCEDURE — 80053 COMPREHEN METABOLIC PANEL: CPT

## 2022-08-05 PROCEDURE — G8427 DOCREV CUR MEDS BY ELIG CLIN: HCPCS | Performed by: NURSE PRACTITIONER

## 2022-08-05 PROCEDURE — 87086 URINE CULTURE/COLONY COUNT: CPT

## 2022-08-05 PROCEDURE — 84484 ASSAY OF TROPONIN QUANT: CPT

## 2022-08-05 PROCEDURE — 36415 COLL VENOUS BLD VENIPUNCTURE: CPT

## 2022-08-05 PROCEDURE — 85025 COMPLETE CBC W/AUTO DIFF WBC: CPT

## 2022-08-05 PROCEDURE — 85379 FIBRIN DEGRADATION QUANT: CPT

## 2022-08-05 PROCEDURE — G8417 CALC BMI ABV UP PARAM F/U: HCPCS | Performed by: NURSE PRACTITIONER

## 2022-08-05 RX ORDER — HYDROCODONE BITARTRATE AND HOMATROPINE METHYLBROMIDE ORAL SOLUTION 5; 1.5 MG/5ML; MG/5ML
2.5 LIQUID ORAL 4 TIMES DAILY PRN
Qty: 100 ML | Refills: 0 | Status: SHIPPED | OUTPATIENT
Start: 2022-08-05 | End: 2022-08-19

## 2022-08-05 RX ORDER — LEVOFLOXACIN 500 MG/1
500 TABLET, FILM COATED ORAL DAILY
Qty: 7 TABLET | Refills: 0 | Status: SHIPPED | OUTPATIENT
Start: 2022-08-05 | End: 2022-08-12

## 2022-08-05 RX ORDER — PANTOPRAZOLE SODIUM 40 MG/1
40 TABLET, DELAYED RELEASE ORAL DAILY
Qty: 90 TABLET | Refills: 1 | Status: SHIPPED | OUTPATIENT
Start: 2022-08-05 | End: 2022-08-30 | Stop reason: SDUPTHER

## 2022-08-05 RX ORDER — FLUTICASONE PROPIONATE AND SALMETEROL 250; 50 UG/1; UG/1
1 POWDER RESPIRATORY (INHALATION) 2 TIMES DAILY
Qty: 60 EACH | Refills: 3 | Status: SHIPPED | OUTPATIENT
Start: 2022-08-05 | End: 2022-08-30 | Stop reason: SDUPTHER

## 2022-08-05 RX ORDER — PREDNISONE 20 MG/1
TABLET ORAL
Qty: 20 TABLET | Refills: 0 | Status: SHIPPED | OUTPATIENT
Start: 2022-08-05 | End: 2022-08-15

## 2022-08-05 RX ORDER — ALPRAZOLAM 2 MG/1
2 TABLET ORAL 2 TIMES DAILY PRN
Qty: 60 TABLET | Refills: 2 | Status: SHIPPED | OUTPATIENT
Start: 2022-08-05 | End: 2022-08-30 | Stop reason: SDUPTHER

## 2022-08-05 NOTE — PROGRESS NOTES
Chief Complaint   Patient presents with    Post-COVID Symptoms     Pt had COVID that then turned in to bronchitis. Pt reports she is still having trouble breathing. SOB occurs out of no where not only on exertion. Pt had EKG completed that came back abnormal.     Urinary Frequency     Urinary frequency, burning when urinating and back pain x1 week. HPI: Nelsy Mcdonald is a 40 y.o. female presenting for follow-up of COVID-19 diagnosis and this significant chest tightness and coughing symptoms. She is also been having urinary frequency and urgency. Covid-19: She tested positive on July 22 at our walk- in clinic. Was given paxlovid and steroid. She states that symptoms worsened and was evaluated in the ER for severe shortness of breath. She was treated with antibiotic and steroids in the ER. He was sent home on prednisone taper and additional oral antibiotic. She states that this was just a few days ago. Her symptoms continue to persist and she has not been able to sleep at night due to her harsh cough. Gets into coughing episodes that make her feel like she could throw up. She also has shortness of breath and she feels a lot of chest pressure. Is painful when she tries to take a deep breath. Not sure if coming from her lung or her chest wall. She has not been able to do much of anything and feels very weak overall. She does have rescue inhaler at home but feels that this almost makes things worse for her. Chest pressure is reported to be in the center of her chest and is persistent. Dysuria/stress incontinence: He states that she has been having issues with painful urination. Has had issues with frequent UTI. Has history of hysterectomy and knows that her bladder has dropped some. Pressure in the pelvic region especially with urination has been rather persistent. She has not had this looked into by a specialist at this point. She denies any flank pain.   No hematuria that she is aware of.    Anxiety: She states that she feels that mood is stable overall on current medication and she has been taking Xanax and trying to take it less than she typically takes in the past.  No significant down or depressed moods. She states that her son recently has obtained a job which has been good for him and she is happy about that. No thoughts of self-harm or harm to others. She does admit that with recent illness symptoms and more difficulty breathing she has been feeling more anxious and probably has been taking her Xanax a little bit more than she has routinely in the past.  States that her heart rate does not seem to be related to her anxiety however. She was told in the emergency room that she was having some anxiety and given some IV medication to help with that. ROS: She denies any significant heart palpitations but does feel that her heart is beating hard and almost out of her chest.  She has not had any near-syncope or syncopal episodes but has had some lightheadedness with significant coughing episodes. She denies any significant change in bowel patterns. No severe diarrhea or constipation. No dark tarry or bloody stools. She denies any hemoptysis. No unexplained weight loss. No severe headache. No left or right-sided weakness. He has not had any recent GERD symptoms other than when she has persistent coughing. Medication for acid reflux has been helpful with no reported side effects. Skin rashes reported. No angioedema or urticaria. I have reviewed the following diagnostic data: Most recent ER visit note. Most recent EKG report with some nonspecific ST changes when compared to previous EKG. Hest x-ray showing no active acute findings.     Past Medical History:   Diagnosis Date    Anxiety and depression     Anxiety and depression     Asthma     Essential hypertension 5/18/2017    Headache(784.0)     Obesity     MARBELLA (obstructive sleep apnea) 4/29/2020    PTSD (post-traumatic stress disorder)     Urinary incontinence      Past Surgical History:   Procedure Laterality Date    CHOLECYSTECTOMY      ENDOMETRIAL ABLATION      HYSTERECTOMY, TOTAL ABDOMINAL (CERVIX REMOVED)  7/15/15    DR. Hoffman 667    TONSILLECTOMY      TUBAL LIGATION       Social History       Tobacco History       Smoking Status  Every Day Smoking Frequency  0.50 packs/day Smoking Tobacco Type  Cigarettes      Smokeless Tobacco Use  Never              Alcohol History       Alcohol Use Status  No              Drug Use       Drug Use Status  No              Sexual Activity       Sexually Active  Yes                     EXAM:  Constitutional Blood pressure 118/80, pulse (!) 110, temperature 97.2 °F (36.2 °C), temperature source Temporal, resp. rate 18, height 4' 9\" (1.448 m), weight 241 lb 9.6 oz (109.6 kg), last menstrual period 06/15/2015, SpO2 97 %, not currently breastfeeding. .  She has a normal affect, no acute distress, appears well developed and well nourished. Neck:  neck- supple, no mass, non-tender and no bruits  Lungs:  Breathing Pattern: audible wheezes, Breath sounds: diminished breath sounds- throughout and scattered and wheezing- throughout and scattered  Heart:  Heart sounds are normal. Elevated rate and rhythm without murmur, gallop or rub. Abdomen:  Soft, non-tender, normal bowel sounds. No bruits, organomegaly or masses. Extremities: Extremities warm to touch, pink, with no edema. No CVA tenderness  Cranial nerves 2-12 intact. Pupils are equal and reactive. There is no nystagmus. The gait is normal. Sensation grossly intact. There is no left or right sided weakness. DIAGNOSIS:    Diagnosis Orders   1.  Shortness of breath  CT CHEST W CONTRAST    ECHO Complete 2D W Doppler W Color    levoFLOXacin (LEVAQUIN) 500 MG tablet    Troponin    D-Dimer, Quantitative    predniSONE (DELTASONE) 20 MG tablet    [facility] - [address], Henrietta Blancas DO, Interventional Cardiology, [place]    CBC with Auto Differential Comprehensive Metabolic Panel    Procalcitonin      2. COVID-19  CT CHEST W CONTRAST    fluticasone-salmeterol (ADVAIR DISKUS) 250-50 MCG/ACT AEPB diskus inhaler    ECHO Complete 2D W Doppler W Color    levoFLOXacin (LEVAQUIN) 500 MG tablet    CBC with Auto Differential    Comprehensive Metabolic Panel    Procalcitonin      3. Pleuritic chest pain  CT CHEST W CONTRAST    fluticasone-salmeterol (ADVAIR DISKUS) 250-50 MCG/ACT AEPB diskus inhaler    ECHO Complete 2D W Doppler W Color    Troponin    D-Dimer, Quantitative    predniSONE (DELTASONE) 20 MG tablet    CBC with Auto Differential    Comprehensive Metabolic Panel    Procalcitonin      4. Abnormal EKG  CT CHEST W CONTRAST    ECHO Complete 2D W Doppler W Color    Troponin    D-Dimer, Quantitative    Scott Medina DO, Interventional Cardiology, [place]      5. UTI symptoms  POCT Urinalysis No Micro (Auto)      6. Cough  HYDROcodone homatropine (HYCODAN) 5-1.5 MG/5ML solution    fluticasone-salmeterol (ADVAIR DISKUS) 250-50 MCG/ACT AEPB diskus inhaler    predniSONE (DELTASONE) 20 MG tablet      7. Wheezing  fluticasone-salmeterol (ADVAIR DISKUS) 250-50 MCG/ACT AEPB diskus inhaler    predniSONE (DELTASONE) 20 MG tablet      8. Chest pressure  ECHO Complete 2D W Doppler W Color    Troponin    D-Dimer, Quantitative      9. Dysuria        10. Right flank pain        11. Pelvic pressure in female  [address], [address] [facility], [place], [place]      12. Stress incontinence  [address], [address] [facility], OB-[facility]N, [place]      13. Recurrent UTI  [address], [address] [facility], [facility], [place]      14. Panic attack  ALPRAZolam (XANAX) 2 MG tablet      15. Gastroesophageal reflux disease without esophagitis  pantoprazole (PROTONIX) 40 MG tablet          PLAN: Include orders in the DX section. Follow up: 1 month and as needed. Blood work one week prior as ordered. I was able to review most recent ER visit from a few days ago.   Chest x-ray with no acute findings. No lab work for troponin or D-dimer done but  General chemistry okay. CBC showing elevated WBC count as well as lymphocytes and monocytes. May be secondary to steroid use but also may be secondary to COVID-pneumonia. I do have clinical concern warranting urgent evaluation for acute cardiac process given physical examination findings today, patient reported symptoms and EKG changes noted with last EKG done. Recommend stat troponin and D-dimer as well as procalcitonin. We will repeat CBC although may expect further elevation of her white blood cell count as she has been taking high-dose prednisone taper. I advised stronger antibiotic and she verbalizes understanding. She presents today with her friend who also verbalizes understanding. We will order additional prednisone taper but patient is encouraged to hold off until trying additional combination inhaler. This may be more helpful in reducing bronchospasm as well as inflammation within the lung fields. Recommend CT scan of the chest.  Unable to obtain on a stat basis but patient is encouraged to go the emergency room again if symptoms become more significant. We will also contact if D-dimer is elevated or troponin positive. Recommend cardiology consult given recent EKG changes. She does have family history of heart disease and does have some apprehension regarding this. Tachycardia can be secondary to infection, airway restriction, anxiety and also recent steroid use. Do recommend echocardiogram to be done in the future as well after having COVID-19 and new onset cardiac symptoms. Furthermore, will send urine for culture. Recommend referral to gynecology due to history of bladder drop and recurrent UTI. She also endorses some retention. [place] [id number] [facility] provider. She verbalized understanding.     Discussed symptoms of worsening UTI including possible pyelonephritis that she should be evaluated for in the ER should they occur. We also discussed atypical chest pain presentation in women. Greater than 45 minutes was spent reviewing patient chart, reviewing patient history and symptoms, physical examination and clinical decision making. Short-term interval follow-up recommended. Refill of Xanax given to be taken sparingly for anxiety/panic symptoms that have worsened with recent onset of symptoms. Notify me if mood becomes unstable prior to next visit. Controlled Substance Monitoring:    Acute and Chronic Pain Monitoring:   RX Monitoring 8/5/2022   Attestation -   Periodic Controlled Substance Monitoring Possible medication side effects, risk of tolerance/dependence & alternative treatments discussed. ;No signs of potential drug abuse or diversion identified. ;Assessed functional status. Chronic Pain > 80 MEDD -     Controlled medication review:    Indication reviewed- anxiety/panic  Reviewed that condition still requires assistance with current medication. Reviewed that condition impacts psychological and/or physical function in daily life. Reviewed that medication does indeed improve function/pain  Reviewed patient/provider roles in compliance. Reviewed acceptable alternatives (CBT/medication/exercise/therapy)  Reviewed medication agreement. Reviewed use, abuse and diversion of medications. Discussed need for random urine testing at least yearly. No significant interactions/side effects reported. Please note this report has been partially produced using speech recognition software and may cause contain errors related to that system including grammar, punctuation and spelling as well as words and phrases that may seem inappropriate. If there are questions or concerns please feel free to contact me to clarify.           Please note this report has been partially produced using speech recognition software and may cause contain errors related to that system including grammar, punctuation and spelling as well as words and phrases that may seem inappropriate. If there are questions or concerns please feel free to contact me to clarify.       Electronically signed by ANA Alvares CNP-CNP, 7:38 AM 8/8/22

## 2022-08-07 LAB — URINE CULTURE, ROUTINE: NORMAL

## 2022-08-07 NOTE — RESULT ENCOUNTER NOTE
Please notify Wayne Scale that lab results are normal other  than very elevated WBC  count and slightly low  platelets. This can be secondary to high dose  steriod use. Negative heart and blood clot  testing. Recommend consult with cardiology as discussed  and additional heart testing. Go to ER if symptoms worsen. Follow up with me later this  month as scheduled. Recommend getting routine labs done prior to that time. This will include repeat CBC  check.

## 2022-08-08 ENCOUNTER — TELEPHONE (OUTPATIENT)
Dept: FAMILY MEDICINE CLINIC | Age: 47
End: 2022-08-08

## 2022-08-08 DIAGNOSIS — R06.2 WHEEZING: ICD-10-CM

## 2022-08-08 DIAGNOSIS — R06.02 SHORTNESS OF BREATH: Primary | ICD-10-CM

## 2022-08-08 NOTE — TELEPHONE ENCOUNTER
Pt reports still not feeling any better with her breathing. She is taking antibiotic, using albuterol but advair inhaler was not covered. Pt reports still having difficulty breathing on exertion.

## 2022-08-08 NOTE — TELEPHONE ENCOUNTER
Ok. Please let her know that I have sent an updated Rx for inhaler to [facility] to try. If symptoms worsen or do not improve in the next day or so, I would advise ER evaluation again.

## 2022-08-09 ENCOUNTER — CARE COORDINATION (OUTPATIENT)
Dept: CARE COORDINATION | Age: 47
End: 2022-08-09

## 2022-08-09 NOTE — TELEPHONE ENCOUNTER
Pt aware. Needs script sent to [facility] on [facility] in [place].

## 2022-08-09 NOTE — CARE COORDINATION
Patient contacted regarding COVID-19 risk, exposure, and diagnosis. Discussed COVID-19 related testing which was available at this time. Test results were positive. Patient informed of results, if available? Yes and Completed 2022    Ambulatory Care Manager contacted the patient by telephone to perform follow-up assessment. Verified name and  with patient as identifiers. Patient has following risk factors of: no known risk factors. Symptoms reviewed with patient who verbalized the following symptoms: cough  shortness of breath   No appetite   Due to worsening symptoms encounter was routed to provider for escalation. Educated patient about risk for severe COVID-19 due to risk factors according to CDC guidelines. ACM reviewed discharge instructions, medical action plan and red flag symptoms with the patient who verbalized understanding. Discussed COVID vaccination status: No. Education provided on COVID-19 vaccination as appropriate. Discussed exposure protocols and quarantine with CDC Guidelines. Patient was given an opportunity to verbalize any questions and concerns and agrees to contact ACM or health care provider for questions related to their healthcare. Was patient discharged with a pulse oximeter? no    ACM provided contact information. Plan for follow-up call in 3-5 days based on severity of symptoms and risk factors. [facility] tells me that she is not feeling any better. She states that her breathing is not any better and she feels SOB doing any activity. She is using the QVAR that Sabrina ordered along with her son's nebulizer. I have instructed her on the difference between the rescue and long acting bronchodilator  I have also instructed her to go to the ER for full evaluation and treatment due to her breathing. She tells me that the co pay for the ER is too high for her to keep going to the ER. She will go if symptoms worsen.   She adds that her appetite is not good as she does not feel like eating but she is drinking liquids. I will forward this note to Bry Carter for her input and advice.

## 2022-08-12 ENCOUNTER — CARE COORDINATION (OUTPATIENT)
Dept: CARE COORDINATION | Age: 47
End: 2022-08-12

## 2022-08-12 NOTE — CARE COORDINATION
You Patient resolved from the Care Transitions episode on 8/12/2022  Discussed COVID-19 related testing which was available at this time. Test results were positive. Patient informed of results, if available? Yes and Completed 7/22/2022    Patient/family has been provided the following resources and education related to COVID-19:                         Signs, symptoms and red flags related to COVID-19            CDC exposure and quarantine guidelines            [employer] exposure contact - [phone]            Contact for their local Department of Health                 Patient currently reports that the following symptoms have improved:  no new/worsening symptoms     No further outreach scheduled with this CTN/ACM. Episode of Care resolved. Patient has this CTN/ACM contact information if future needs arise.

## 2022-08-30 ENCOUNTER — OFFICE VISIT (OUTPATIENT)
Dept: FAMILY MEDICINE CLINIC | Age: 47
End: 2022-08-30
Payer: COMMERCIAL

## 2022-08-30 DIAGNOSIS — K21.9 GASTROESOPHAGEAL REFLUX DISEASE WITHOUT ESOPHAGITIS: ICD-10-CM

## 2022-08-30 DIAGNOSIS — R06.02 SHORTNESS OF BREATH: ICD-10-CM

## 2022-08-30 DIAGNOSIS — J45.20 MILD INTERMITTENT REACTIVE AIRWAY DISEASE WITHOUT COMPLICATION: ICD-10-CM

## 2022-08-30 DIAGNOSIS — R00.2 PALPITATIONS: ICD-10-CM

## 2022-08-30 DIAGNOSIS — R06.2 WHEEZING: ICD-10-CM

## 2022-08-30 DIAGNOSIS — G43.909 MIGRAINE WITHOUT STATUS MIGRAINOSUS, NOT INTRACTABLE, UNSPECIFIED MIGRAINE TYPE: ICD-10-CM

## 2022-08-30 DIAGNOSIS — E03.8 HYPOTHYROIDISM DUE TO HASHIMOTO'S THYROIDITIS: ICD-10-CM

## 2022-08-30 DIAGNOSIS — I10 ESSENTIAL HYPERTENSION: ICD-10-CM

## 2022-08-30 DIAGNOSIS — R07.81 PLEURITIC CHEST PAIN: ICD-10-CM

## 2022-08-30 DIAGNOSIS — J30.89 NON-SEASONAL ALLERGIC RHINITIS DUE TO OTHER ALLERGIC TRIGGER: ICD-10-CM

## 2022-08-30 DIAGNOSIS — H04.123 BILATERAL DRY EYES: ICD-10-CM

## 2022-08-30 DIAGNOSIS — E06.3 HYPOTHYROIDISM DUE TO HASHIMOTO'S THYROIDITIS: ICD-10-CM

## 2022-08-30 DIAGNOSIS — F41.9 ANXIETY: Primary | ICD-10-CM

## 2022-08-30 DIAGNOSIS — B37.9 YEAST INFECTION: ICD-10-CM

## 2022-08-30 DIAGNOSIS — F41.0 PANIC ATTACK: ICD-10-CM

## 2022-08-30 DIAGNOSIS — R05.9 COUGH: ICD-10-CM

## 2022-08-30 DIAGNOSIS — U07.1 COVID-19: ICD-10-CM

## 2022-08-30 DIAGNOSIS — B36.9 FUNGAL DERMATITIS: ICD-10-CM

## 2022-08-30 PROCEDURE — G8427 DOCREV CUR MEDS BY ELIG CLIN: HCPCS | Performed by: NURSE PRACTITIONER

## 2022-08-30 PROCEDURE — 99214 OFFICE O/P EST MOD 30 MIN: CPT | Performed by: NURSE PRACTITIONER

## 2022-08-30 RX ORDER — NYSTATIN 100000 U/G
CREAM TOPICAL
Qty: 30 G | Refills: 2 | Status: SHIPPED | OUTPATIENT
Start: 2022-08-30

## 2022-08-30 RX ORDER — FLUTICASONE PROPIONATE 50 MCG
1 SPRAY, SUSPENSION (ML) NASAL DAILY
Qty: 1 EACH | Refills: 0 | Status: SHIPPED | OUTPATIENT
Start: 2022-08-30 | End: 2022-09-26

## 2022-08-30 RX ORDER — ALBUTEROL SULFATE 90 UG/1
2 AEROSOL, METERED RESPIRATORY (INHALATION) 4 TIMES DAILY PRN
Qty: 18 G | Refills: 0 | Status: SHIPPED | OUTPATIENT
Start: 2022-08-30 | End: 2022-09-26

## 2022-08-30 RX ORDER — CYCLOSPORINE 0.5 MG/ML
1 EMULSION OPHTHALMIC 2 TIMES DAILY
Qty: 1 EACH | Refills: 3 | Status: SHIPPED | OUTPATIENT
Start: 2022-08-30

## 2022-08-30 RX ORDER — PANTOPRAZOLE SODIUM 40 MG/1
40 TABLET, DELAYED RELEASE ORAL DAILY
Qty: 90 TABLET | Refills: 1 | Status: SHIPPED | OUTPATIENT
Start: 2022-08-30

## 2022-08-30 RX ORDER — HYDROXYZINE HYDROCHLORIDE 25 MG/1
75 TABLET, FILM COATED ORAL NIGHTLY
Qty: 270 TABLET | Refills: 2 | Status: SHIPPED | OUTPATIENT
Start: 2022-08-30 | End: 2023-05-27

## 2022-08-30 RX ORDER — LEVOTHYROXINE SODIUM 0.03 MG/1
25 TABLET ORAL DAILY
Qty: 90 TABLET | Refills: 1 | Status: SHIPPED | OUTPATIENT
Start: 2022-08-30

## 2022-08-30 RX ORDER — ALPRAZOLAM 2 MG/1
2 TABLET ORAL 2 TIMES DAILY PRN
Qty: 60 TABLET | Refills: 2 | Status: SHIPPED | OUTPATIENT
Start: 2022-09-06 | End: 2022-10-25 | Stop reason: SDUPTHER

## 2022-08-30 RX ORDER — NEOMYCIN SULFATE, POLYMYXIN B SULFATE AND DEXAMETHASONE 3.5; 10000; 1 MG/ML; [USP'U]/ML; MG/ML
SUSPENSION/ DROPS OPHTHALMIC
COMMUNITY
Start: 2022-08-23

## 2022-08-30 RX ORDER — FLUCONAZOLE 150 MG/1
150 TABLET ORAL
Qty: 24 TABLET | Refills: 3 | Status: SHIPPED | OUTPATIENT
Start: 2022-09-01

## 2022-08-30 RX ORDER — METOPROLOL SUCCINATE 25 MG/1
25 TABLET, EXTENDED RELEASE ORAL DAILY
Qty: 90 TABLET | Refills: 1 | Status: SHIPPED | OUTPATIENT
Start: 2022-08-30 | End: 2022-10-25 | Stop reason: ALTCHOICE

## 2022-08-30 RX ORDER — SUMATRIPTAN 50 MG/1
50 TABLET, FILM COATED ORAL
Qty: 9 TABLET | Refills: 3 | Status: SHIPPED | OUTPATIENT
Start: 2022-08-30 | End: 2022-08-30

## 2022-08-30 RX ORDER — FLUTICASONE PROPIONATE AND SALMETEROL 250; 50 UG/1; UG/1
1 POWDER RESPIRATORY (INHALATION) 2 TIMES DAILY
Qty: 60 EACH | Refills: 3 | Status: SHIPPED | OUTPATIENT
Start: 2022-08-30 | End: 2022-10-25

## 2022-08-30 NOTE — PROGRESS NOTES
2022    TELEHEALTH EVALUATION -- Audio/Visual (During IYYAY-11 public health emergency)    Due to [place] 19 outbreak, patient's office visit was converted to a virtual visit. Patient was contacted and agreed to proceed with a virtual visit via [email or web address]. me  The risks and benefits of converting to a virtual visit were discussed in light of the current infectious disease epidemic. Patient also understood that insurance coverage and co-pays are up to their individual insurance plans. Benjamin Tuttle, was evaluated through a synchronous (real-time) audio-video encounter. The patient (or guardian if applicable) is aware that this is a billable service, which includes applicable co-pays. This Virtual Visit was conducted with patient's (and/or legal guardian's) consent. The visit was conducted pursuant to the emergency declaration under the [address] [place] [address], [address] [place] [address] [facility] authority and the [employer] and [employer] Act. Patient identification was verified, and a caregiver was present when appropriate. The patient was located at Home: [address] [place] [address]. Provider was located at [facility] (Appt Dept): [place] [address]  [facility],  [address] [place] [address]. Total time spent for this encounter: Not billed by time      HPI:    Benjamin Tuttle (:  1975) has requested an audio/video evaluation for the following concern(s): Anxiety: Patient reports that her mood has been stable since our last visit. She denies any significant anxiety or panic attack symptoms while taking her medications. Continues to follow a psychiatry routinely. No issues with insomnia or medication side effects reported. She continues to take Xanax as needed for anxiety throughout the day and does not take around the same time every day. She does not report any rebound anxiety. no thoughts of self harm or down or depressed moods.  No thoughts of self harm or harm to others. History of Covid-19: Asthma/allergic rhinitis:  She states that since having had code 19 lately, her breathing is back to normal. She is not had any recent significant cough or chest tightness. States that asthma is back to baseline and she would like to continue with her current inhalers. Has not how to use rescue inhaler very often. No chest congestion or sputum production. She denies any recent chest pain since her last visit. She states that she did have blood vessel break in her left eye and was told by her eye doctor in [facility] that she likely had covid eye. No visual changes or pain. She states that her blood pressure has been running normal well on current medication with no medication side effects and she is not had any recent heart palpitations. No exacerbation of chronic migraine headaches reported. Review of Systems  This patient reports no chest pain or pressure. There is no shortness of breath or cough. The patient reports no nausea or vomiting. There is no heartburn or indigestion. There is no diarrhea or constipation. No black, bloody, mucusy or tarry stool noticed. The patient reports no bloating and no change in appetite. There is no numbness, tingling or swelling in the extremities. Prior to Visit Medications    Medication Sig Taking? Authorizing Provider   neomycin-polymyxin-dexameth (MAXITROL) 3.5-25073-5.1 ophthalmic suspension INSTILL 1 DROP INTO LEFT EYE 3 TIMES A DAY Yes Historical Provider, MD   SUMAtriptan (IMITREX) 50 MG tablet Take 1 tablet by mouth once as needed for Migraine Yes ANA Abreu CNP   pantoprazole (PROTONIX) 40 MG tablet Take 1 tablet by mouth daily Yes ANA Abreu CNP   nystatin (MYCOSTATIN) 370019 UNIT/GM cream Apply topically 2 times daily.  Yes ANA Abreu CNP   metoprolol succinate (TOPROL XL) 25 MG extended release tablet Take 1 tablet by mouth daily Yes ANA Abreu CNP   levothyroxine (SYNTHROID) 25 MCG tablet Take 1 tablet by mouth daily Yes ANA Garcia CNP   hydrOXYzine HCl (ATARAX) 25 MG tablet Take 3 tablets by mouth nightly Yes ANA Garcia CNP   fluticasone-salmeterol (ADVAIR DISKUS) 250-50 MCG/ACT AEPB diskus inhaler Inhale 1 puff into the lungs 2 times daily Yes ANA Garcia CNP   fluticasone (FLONASE) 50 MCG/ACT nasal spray 1 spray by Nasal route daily Yes ANA Garcia CNP   fluconazole (DIFLUCAN) 150 MG tablet Take 1 tablet by mouth Twice a Week Yes ANA Garcia CNP   cycloSPORINE (RESTASIS) 0.05 % ophthalmic emulsion Place 1 drop into both eyes 2 times daily Yes ANA Garcia CNP   ALPRAZolam Trevino Latus) 2 MG tablet Take 1 tablet by mouth 2 times daily as needed for Sleep for up to 90 days. Yes ANA Garcia CNP   albuterol sulfate HFA (VENTOLIN HFA) 108 (90 Base) MCG/ACT inhaler Inhale 2 puffs into the lungs 4 times daily as needed for Wheezing (cough) Dispense 1 inhaler Yes ANA Garcia CNP   beclomethasone (QVAR REDIHALER) 80 MCG/ACT AERB inhaler Inhale 1 puff into the lungs in the morning and 1 puff in the evening. Yes ANA Garcia CNP   nortriptyline (PAMELOR) 25 MG capsule Take 3 capsules by mouth nightly Yes Marshall Rick MD   ALPRAZolam Trevino Latus) 2 MG tablet Take 2 mg by mouth 2 times daily as needed for Sleep.  Yes Christiane Cody MD   albuterol sulfate HFA (VENTOLIN HFA) 108 (90 Base) MCG/ACT inhaler Inhale 2 puffs into the lungs 4 times daily as needed for Wheezing Yes ANA Rainey CNP   ibuprofen (ADVIL;MOTRIN) 800 MG tablet Take 1 tablet by mouth every 8 hours as needed for Pain Yes [place], MD   Blood Pressure Monitoring (BLOOD PRESSURE KIT) ZELALEM 1 Units by Does not apply route as needed (for BP monitoring at home for hypertension) Cuff for upper arm Yes Marshall Rick MD       Social History     Tobacco Use    Smoking status: Every Day Packs/day: 0.50     Types: Cigarettes    Smokeless tobacco: Never   Vaping Use    Vaping Use: Never used   Substance Use Topics    Alcohol use: No    Drug use: No        PHYSICAL EXAMINATION:  [ INSTRUCTIONS:  \"[x]\" Indicates a positive item  \"[]\" Indicates a negative item  -- DELETE ALL ITEMS NOT EXAMINED]  [x] Alert  [x] Oriented to person/place/time    [x] No apparent distress  [] Toxic appearing    [] Face flushed appearing [] Sclera clear  [] Lips are cyanotic      [x] Breathing appears normal  [] Appears tachypneic      [] Rash on visible skin    [x] Cranial Nerves II-XII grossly intact    [x] Motor grossly intact in visible upper extremities    [] Motor grossly intact in visible lower extremities    [x] Normal Mood  [] Anxious appearing    [] Depressed appearing  [] Confused appearing      [] Poor short term memory  [] Poor long term memory    [] OTHER:      Due to this being a TeleHealth encounter, evaluation of the following organ systems is limited: Vitals/Constitutional/EENT/Resp/CV/GI//MS/Neuro/Skin/Heme-Lymph-Imm. ASSESSMENT/PLAN:   Diagnosis Orders   1. Anxiety  hydrOXYzine HCl (ATARAX) 25 MG tablet      2. Migraine without status migrainosus, not intractable, unspecified migraine type  SUMAtriptan (IMITREX) 50 MG tablet      3. Gastroesophageal reflux disease without esophagitis  pantoprazole (PROTONIX) 40 MG tablet      4. Fungal dermatitis  nystatin (MYCOSTATIN) 229676 UNIT/GM cream      5. Palpitations  metoprolol succinate (TOPROL XL) 25 MG extended release tablet      6. Essential hypertension  metoprolol succinate (TOPROL XL) 25 MG extended release tablet      7. Hypothyroidism due to Hashimoto's thyroiditis  levothyroxine (SYNTHROID) 25 MCG tablet      8. COVID-19  fluticasone-salmeterol (ADVAIR DISKUS) 250-50 MCG/ACT AEPB diskus inhaler      9. Pleuritic chest pain  fluticasone-salmeterol (ADVAIR DISKUS) 250-50 MCG/ACT AEPB diskus inhaler      10.  Cough  fluticasone-salmeterol (ADVAIR DISKUS) 250-50 MCG/ACT AEPB diskus inhaler      11. Wheezing  fluticasone-salmeterol (ADVAIR DISKUS) 250-50 MCG/ACT AEPB diskus inhaler      12. Non-seasonal allergic rhinitis due to other allergic trigger  fluticasone (FLONASE) 50 MCG/ACT nasal spray      13. Yeast infection  fluconazole (DIFLUCAN) 150 MG tablet      14. Bilateral dry eyes  cycloSPORINE (RESTASIS) 0.05 % ophthalmic emulsion      15. Shortness of breath        16. Panic attack  ALPRAZolam (XANAX) 2 MG tablet      17. Mild intermittent reactive airway disease without complication  albuterol sulfate HFA (VENTOLIN HFA) 108 (90 Base) MCG/ACT inhaler          Return in about 3 months (around 11/30/2022) for medication and lab review. .    Mood is reported to be stable on current domination of medication. She continues to follow a psychology and psychiatry. She is encouraged to limit use of Xanax as much as possible with goal to wean dosing over time. May want to consider cutting back to 1 mg tablets in the future to help with the weaning process. No medication side effects reported. Continue the same. Asthma has returned to baseline after recently having had covid 19. She denies any recent chest pain or pressure. No sputum production. Note in epic and wheezing. Continue current maintenance inhaler and rescue inhaler as needed. Patient is encouraged to notify me if any exacerbation of chronic disease conditions prior to her next visit. Otherwise continue current medications and current doses notify me of any new side effects. Controlled Substance Monitoring:    Acute and Chronic Pain Monitoring:   RX Monitoring 8/30/2022   Attestation -   Periodic Controlled Substance Monitoring Possible medication side effects, risk of tolerance/dependence & alternative treatments discussed. ;No signs of potential drug abuse or diversion identified. ;Assessed functional status.    Chronic Pain > 80 MEDD -           Please note this report has been partially produced using speech recognition software and may cause contain errors related to that system including grammar, punctuation and spelling as well as words and phrases that may seem inappropriate. If there are questions or concerns please feel free to contact me to clarify. An  electronic signature was used to authenticate this note. --Rosy Santana, ANA - CNP on 8/30/2022 at 3:39 PM        Pursuant to the emergency declaration under the [facility], [facility] waiver authority and the [employer] Act, this Virtual  Visit was conducted, with patient's consent, to reduce the patient's risk of exposure to COVID-19 and provide continuity of care for an established patient. Services were provided through a video synchronous discussion virtually to substitute for in-person clinic visit.

## 2022-09-06 ENCOUNTER — TELEPHONE (OUTPATIENT)
Dept: CARDIOLOGY CLINIC | Age: 47
End: 2022-09-06

## 2022-09-06 NOTE — TELEPHONE ENCOUNTER
Nissa Aburto  Patient Appointment Cancel Request Pool 19 hours ago (2:25 PM)     Appointment canceled for Reba Easley ([id number])  Visit Type: NEW PATIENT  Date        Time      Length    Provider                  Department  9/7/2022     3:20 PM  20 mins. DO Nikki Montoya 93     Reason for Cancellation: Family emergency       Batch Job User Brandy  Medina Watson 19 hours ago (2:00 PM)     DO Nissa Rizzo 6 days ago     LM  This is a reminder for your upcoming appointment. Location of Appointment: [facility] [address]  Provider: Mónica Young DO  Date: 9/07/22  Time: 3:20 PM     Please arrive 15 minutes prior to scheduled appointment time to complete paper work, bring photo ID and insurance cards. There is 1 questionnaire available for your appointment. All additional questionnaires will be available on September 4, 2022     [email or web address][View your available questionnaires]      Please click [email or web address]] to view more details about your appointment. This [facility] [employer] message has not been read. Tamika Neff 12 days ago     PM  Appointment Information:      Visit Type: New Patient Appointment          Date: 9/7/2022                  [facility]                  Provider: Mónica Young                  Time: 3:20 PM                  Length: 30 min     Appt Status: Scheduled        Appt Instructions:      Please arrive 15 minutes prior to scheduled appointment time to complete paper   work, bring photo ID and insurance cards. This [facility] message has not been read.

## 2022-09-15 ENCOUNTER — OFFICE VISIT (OUTPATIENT)
Dept: OBGYN CLINIC | Age: 47
End: 2022-09-15
Payer: COMMERCIAL

## 2022-09-15 ENCOUNTER — HOSPITAL ENCOUNTER (OUTPATIENT)
Dept: LAB | Age: 47
Discharge: HOME | End: 2022-09-15
Payer: COMMERCIAL

## 2022-09-15 VITALS
DIASTOLIC BLOOD PRESSURE: 76 MMHG | BODY MASS INDEX: 51.78 KG/M2 | SYSTOLIC BLOOD PRESSURE: 118 MMHG | WEIGHT: 240 LBS | HEIGHT: 57 IN

## 2022-09-15 DIAGNOSIS — R35.0 URINARY FREQUENCY: ICD-10-CM

## 2022-09-15 DIAGNOSIS — N89.8 VAGINAL ODOR: ICD-10-CM

## 2022-09-15 DIAGNOSIS — N39.3 STRESS INCONTINENCE: Primary | ICD-10-CM

## 2022-09-15 DIAGNOSIS — N39.3 STRESS INCONTINENCE: ICD-10-CM

## 2022-09-15 LAB — HBA1C MFR BLD: 6.1 % (ref 4.8–5.9)

## 2022-09-15 PROCEDURE — G8417 CALC BMI ABV UP PARAM F/U: HCPCS | Performed by: OBSTETRICS & GYNECOLOGY

## 2022-09-15 PROCEDURE — 36415 COLL VENOUS BLD VENIPUNCTURE: CPT

## 2022-09-15 PROCEDURE — 4004F PT TOBACCO SCREEN RCVD TLK: CPT | Performed by: OBSTETRICS & GYNECOLOGY

## 2022-09-15 PROCEDURE — 87808 TRICHOMONAS ASSAY W/OPTIC: CPT

## 2022-09-15 PROCEDURE — 83036 HEMOGLOBIN GLYCOSYLATED A1C: CPT

## 2022-09-15 PROCEDURE — 99204 OFFICE O/P NEW MOD 45 MIN: CPT | Performed by: OBSTETRICS & GYNECOLOGY

## 2022-09-15 PROCEDURE — G8427 DOCREV CUR MEDS BY ELIG CLIN: HCPCS | Performed by: OBSTETRICS & GYNECOLOGY

## 2022-09-15 PROCEDURE — 87210 SMEAR WET MOUNT SALINE/INK: CPT

## 2022-09-16 LAB
CLUE CELLS: ABNORMAL
TRICHOMONAS PREP: ABNORMAL
TRICHOMONAS VAGINALIS SCREEN: NEGATIVE
YEAST WET PREP: ABNORMAL

## 2022-09-18 RX ORDER — FLUCONAZOLE 150 MG/1
TABLET ORAL
Qty: 3 TABLET | Refills: 0 | Status: SHIPPED | OUTPATIENT
Start: 2022-09-18 | End: 2022-11-01 | Stop reason: ALTCHOICE

## 2022-09-18 ASSESSMENT — ENCOUNTER SYMPTOMS
APNEA: 0
SHORTNESS OF BREATH: 0
ABDOMINAL PAIN: 1

## 2022-09-18 NOTE — PROGRESS NOTES
Subjective:      Patient ID:  Manuelito Dumont is a 40 y.o. female with chief complaint of:  Chief Complaint   Patient presents with    Incontinence     Pt has been having issues with stress incontinence for the last two years, thinks her bladder may have dropped, she does not have a uterus and sometimes it feels like something is trying to protrude out. Also has had a vaginal odor for some time        As in chief complaint pt complains of urinary loss that is both due to stress and overactive bladder. She wakes at night several times and has hard time holding till she gets to bathroom  Patient admits to greater than 20lb weight gain. Past Medical History:   Diagnosis Date    Anxiety and depression     Anxiety and depression     Asthma     Essential hypertension 5/18/2017    Headache(784.0)     Obesity     MARBELLA (obstructive sleep apnea) 4/29/2020    PTSD (post-traumatic stress disorder)     Urinary incontinence      Past Surgical History:   Procedure Laterality Date    CHOLECYSTECTOMY      ENDOMETRIAL ABLATION      HYSTERECTOMY, TOTAL ABDOMINAL (CERVIX REMOVED)  7/15/15    DR. BECKHAM    TONSILLECTOMY      TUBAL LIGATION       No family history on file. Current Outpatient Medications on File Prior to Visit   Medication Sig Dispense Refill    neomycin-polymyxin-dexameth (MAXITROL) 3.5-25379-6.1 ophthalmic suspension INSTILL 1 DROP INTO LEFT EYE 3 TIMES A DAY      pantoprazole (PROTONIX) 40 MG tablet Take 1 tablet by mouth daily 90 tablet 1    nystatin (MYCOSTATIN) 223302 UNIT/GM cream Apply topically 2 times daily.  30 g 2    metoprolol succinate (TOPROL XL) 25 MG extended release tablet Take 1 tablet by mouth daily 90 tablet 1    levothyroxine (SYNTHROID) 25 MCG tablet Take 1 tablet by mouth daily 90 tablet 1    hydrOXYzine HCl (ATARAX) 25 MG tablet Take 3 tablets by mouth nightly 270 tablet 2    fluticasone-salmeterol (ADVAIR DISKUS) 250-50 MCG/ACT AEPB diskus inhaler Inhale 1 puff into the lungs 2 times daily 60 each 3    fluticasone (FLONASE) 50 MCG/ACT nasal spray 1 spray by Nasal route daily 1 each 0    fluconazole (DIFLUCAN) 150 MG tablet Take 1 tablet by mouth Twice a Week 24 tablet 3    cycloSPORINE (RESTASIS) 0.05 % ophthalmic emulsion Place 1 drop into both eyes 2 times daily 1 each 3    ALPRAZolam (XANAX) 2 MG tablet Take 1 tablet by mouth 2 times daily as needed for Sleep for up to 90 days. 60 tablet 2    albuterol sulfate HFA (VENTOLIN HFA) 108 (90 Base) MCG/ACT inhaler Inhale 2 puffs into the lungs 4 times daily as needed for Wheezing (cough) Dispense 1 inhaler 18 g 0    beclomethasone (QVAR REDIHALER) 80 MCG/ACT AERB inhaler Inhale 1 puff into the lungs in the morning and 1 puff in the evening. 1 each 2    ALPRAZolam (XANAX) 2 MG tablet Take 2 mg by mouth 2 times daily as needed for Sleep.      albuterol sulfate HFA (VENTOLIN HFA) 108 (90 Base) MCG/ACT inhaler Inhale 2 puffs into the lungs 4 times daily as needed for Wheezing 18 g 0    ibuprofen (ADVIL;MOTRIN) 800 MG tablet Take 1 tablet by mouth every 8 hours as needed for Pain 21 tablet 0    Blood Pressure Monitoring (BLOOD PRESSURE KIT) ZELALEM 1 Units by Does not apply route as needed (for BP monitoring at home for hypertension) Cuff for upper arm 1 Device 0    SUMAtriptan (IMITREX) 50 MG tablet Take 1 tablet by mouth once as needed for Migraine 9 tablet 3    nortriptyline (PAMELOR) 25 MG capsule Take 3 capsules by mouth nightly 90 capsule 2     No current facility-administered medications on file prior to visit. Allergies:  Aspirin, Codeine, Lithium, Naproxen, and Trazodone and nefazodone    Review of Systems   Constitutional:  Negative for fatigue and fever. Respiratory:  Negative for apnea and shortness of breath. Cardiovascular:  Negative for chest pain and palpitations. Gastrointestinal:  Positive for abdominal pain. Genitourinary:  Positive for frequency and urgency.  Negative for difficulty urinating, dysuria, pelvic pain, vaginal bleeding and vaginal discharge. Neurological:  Negative for dizziness, weakness and light-headedness. Objective:   /76   Ht 4' 9\" (1.448 m)   Wt 240 lb (108.9 kg)   LMP 06/15/2015   BMI 51.94 kg/m²      Physical Exam  Constitutional:       Appearance: Normal appearance. She is well-developed. Eyes:      Pupils: Pupils are equal, round, and reactive to light. Cardiovascular:      Rate and Rhythm: Normal rate and regular rhythm. Heart sounds: Normal heart sounds. Pulmonary:      Effort: Pulmonary effort is normal.   Abdominal:      General: Bowel sounds are normal.      Palpations: Abdomen is soft. Comments: Obese abdomen   Genitourinary:     Labia:         Right: No rash, tenderness or lesion. Left: No rash, tenderness or lesion. Vagina: Prolapsed vaginal walls (rectocele poor perineum support) present. No vaginal discharge, erythema or tenderness. Adnexa: Right adnexa normal and left adnexa normal.   Neurological:      Mental Status: She is alert and oriented to person, place, and time. Assessment:       Diagnosis Orders   1. Stress incontinence  Hemoglobin A1C      2. Vaginal odor  Wet prep, genital      3. Urinary frequency  Hemoglobin A1C            Plan:      Orders Placed This Encounter   Procedures    Wet prep, genital     Standing Status:   Future     Number of Occurrences:   1     Standing Expiration Date:   9/15/2023    Hemoglobin A1C     Standing Status:   Future     Number of Occurrences:   1     Standing Expiration Date:   9/15/2023     Orders Placed This Encounter   Medications    mirabegron (MYRBETRIQ) 50 MG TB24     Sig: Take 50 mg by mouth daily Lot [id number]  Exp 07/23     Dispense:  21 tablet     Refill:  0     We will trial the use of oab meds  Return in about 1 week (around 9/22/2022) for follow up results.      Eduardo Bob DO

## 2022-09-21 NOTE — RESULT ENCOUNTER NOTE
Please notify Lee Segovia that hemoglobin A1c or 3 month sugar average is elevated  at 6.1. This is diagnostic for pre-diabetes. Reduce simple sugars and starches in the diet and will plan to repeat lab work later this year. Follow up as scheduled and as needed.

## 2022-09-25 DIAGNOSIS — J45.20 MILD INTERMITTENT REACTIVE AIRWAY DISEASE WITHOUT COMPLICATION: ICD-10-CM

## 2022-09-25 DIAGNOSIS — J30.89 NON-SEASONAL ALLERGIC RHINITIS DUE TO OTHER ALLERGIC TRIGGER: ICD-10-CM

## 2022-09-25 NOTE — TELEPHONE ENCOUNTER
Pharmacy is requesting medication refill.  Please approve or deny this request.    Rx requested:  Requested Prescriptions     Pending Prescriptions Disp Refills    albuterol sulfate HFA (PROVENTIL;VENTOLIN;PROAIR) 108 (90 Base) MCG/ACT inhaler [Pharmacy Med Name: ALBUTEROL HFA (PROAIR) INHALER] 8.5 each      Sig: Inhale 2 puffs into the lungs every 4 hours as needed for Wheezing         Last Office Visit:   8/30/2022      Next Visit Date:  Future Appointments   Date Time Provider [place]   10/13/2022 11:00 AM Qamar Dorsey[address]   12/1/2022  1:30 PM Angelique Adams APRN - CNP [facility] [place] [address]

## 2022-09-25 NOTE — TELEPHONE ENCOUNTER
Pharmacy is requesting medication refill.  Please approve or deny this request.    Rx requested:  Requested Prescriptions     Pending Prescriptions Disp Refills    fluticasone (FLONASE) 50 MCG/ACT nasal spray [Pharmacy Med Name: FLUTICASONE PROP 50 MCG SPRAY]       Si spray by Nasal route daily         Last Office Visit:   2022      Next Visit Date:  Future Appointments   Date Time Provider [place]   10/13/2022 11:00 AM Gilda Kulkarni[address]   2022  1:30 PM [place], APRN - CNP [place] [address]

## 2022-09-26 RX ORDER — FLUTICASONE PROPIONATE 50 MCG
1 SPRAY, SUSPENSION (ML) NASAL DAILY
Qty: 1 EACH | Refills: 3 | Status: SHIPPED | OUTPATIENT
Start: 2022-09-26 | End: 2022-10-25 | Stop reason: ALTCHOICE

## 2022-09-26 RX ORDER — ALBUTEROL SULFATE 90 UG/1
2 AEROSOL, METERED RESPIRATORY (INHALATION) EVERY 4 HOURS PRN
Qty: 8.5 EACH | Refills: 3 | Status: SHIPPED | OUTPATIENT
Start: 2022-09-26

## 2022-10-07 NOTE — TELEPHONE ENCOUNTER
Medina  requesting medication refill.      Rx requested:  Requested Prescriptions     Pending Prescriptions Disp Refills    nortriptyline (PAMELOR) 25 MG capsule 90 capsule 2     Sig: Take 3 capsules by mouth nightly       Last Office Visit:   6/13/2022      Next Visit Date:  Future Appointments   Date Time Provider [place]   10/13/2022 11:00 AM Melina Edmonds, [address] [place] [address]   10/28/2022  1:30 PM Renu Jenkins MD [facility]  [facility]   12/1/2022  1:30 PM ANA Lange - [facility]     [facility] [address]

## 2022-10-10 RX ORDER — NORTRIPTYLINE HYDROCHLORIDE 25 MG/1
75 CAPSULE ORAL NIGHTLY
Qty: 90 CAPSULE | Refills: 2 | Status: SHIPPED | OUTPATIENT
Start: 2022-10-10 | End: 2022-11-01

## 2022-10-13 ENCOUNTER — OFFICE VISIT (OUTPATIENT)
Dept: OBGYN CLINIC | Age: 47
End: 2022-10-13
Payer: COMMERCIAL

## 2022-10-13 ENCOUNTER — OFFICE VISIT (OUTPATIENT)
Dept: PAIN MANAGEMENT | Age: 47
End: 2022-10-13
Payer: COMMERCIAL

## 2022-10-13 VITALS
SYSTOLIC BLOOD PRESSURE: 124 MMHG | DIASTOLIC BLOOD PRESSURE: 78 MMHG | HEIGHT: 57 IN | WEIGHT: 220 LBS | BODY MASS INDEX: 47.46 KG/M2

## 2022-10-13 VITALS
SYSTOLIC BLOOD PRESSURE: 122 MMHG | DIASTOLIC BLOOD PRESSURE: 76 MMHG | WEIGHT: 220 LBS | HEIGHT: 57 IN | BODY MASS INDEX: 47.46 KG/M2

## 2022-10-13 DIAGNOSIS — R35.0 URINARY FREQUENCY: ICD-10-CM

## 2022-10-13 DIAGNOSIS — N39.3 STRESS INCONTINENCE: Primary | ICD-10-CM

## 2022-10-13 DIAGNOSIS — M47.817 LUMBOSACRAL SPONDYLOSIS WITHOUT MYELOPATHY: Primary | ICD-10-CM

## 2022-10-13 PROCEDURE — 99204 OFFICE O/P NEW MOD 45 MIN: CPT | Performed by: PAIN MEDICINE

## 2022-10-13 PROCEDURE — G8484 FLU IMMUNIZE NO ADMIN: HCPCS | Performed by: PAIN MEDICINE

## 2022-10-13 PROCEDURE — G8484 FLU IMMUNIZE NO ADMIN: HCPCS | Performed by: OBSTETRICS & GYNECOLOGY

## 2022-10-13 PROCEDURE — 4004F PT TOBACCO SCREEN RCVD TLK: CPT | Performed by: OBSTETRICS & GYNECOLOGY

## 2022-10-13 PROCEDURE — G8427 DOCREV CUR MEDS BY ELIG CLIN: HCPCS | Performed by: OBSTETRICS & GYNECOLOGY

## 2022-10-13 PROCEDURE — G8417 CALC BMI ABV UP PARAM F/U: HCPCS | Performed by: OBSTETRICS & GYNECOLOGY

## 2022-10-13 PROCEDURE — G8427 DOCREV CUR MEDS BY ELIG CLIN: HCPCS | Performed by: PAIN MEDICINE

## 2022-10-13 PROCEDURE — 4004F PT TOBACCO SCREEN RCVD TLK: CPT | Performed by: PAIN MEDICINE

## 2022-10-13 PROCEDURE — G8417 CALC BMI ABV UP PARAM F/U: HCPCS | Performed by: PAIN MEDICINE

## 2022-10-13 PROCEDURE — 99213 OFFICE O/P EST LOW 20 MIN: CPT | Performed by: OBSTETRICS & GYNECOLOGY

## 2022-10-13 RX ORDER — METHYLPREDNISOLONE 4 MG/1
TABLET ORAL
Qty: 1 KIT | Refills: 0 | Status: SHIPPED | OUTPATIENT
Start: 2022-10-13 | End: 2022-10-19

## 2022-10-13 ASSESSMENT — ENCOUNTER SYMPTOMS
ABDOMINAL PAIN: 0
APNEA: 0
SHORTNESS OF BREATH: 0

## 2022-10-13 NOTE — PROGRESS NOTES
[facility] ([facility]) Physicians  Neurosurgery and Pain [facility]  [address] [facility], [address] [place] [address]., [place] [address]: [phone]  F: [phone]        Jania Baker  (1975)    10/13/2022    Subjective:     Jania Baker is 56 y.o. female who complains today of:     Chief Complaint   Patient presents with    Back Pain     Patient here today for initial evaluation. History of RF ablation many many years ago which helped significantly. Pain is flared up for last 4 months right-sided low back worse with walking bending. Better with sitting. She smokes. She works as a . No back surgery, not diabetic not on blood thinners. Pain can be achy or sharp. No recent physical therapy but is done in the past.  Pain affects quality life. Plan: We will get x-rays lumbar spine with flexion-extension views to evaluate for instability. We will set her up for right L4-5, L5-S1 facet joint injections. We will send a Medrol Dosepak. She will not take anti-inflammatories while on it. I urged her to quit smoking we talked about deleterious effects. Questions answered chart was reviewed. She understands a plan and is in agreement. Allergies:  Aspirin, Codeine, Lithium, Naproxen, and Trazodone and nefazodone    Past Medical History:   Diagnosis Date    Anxiety and depression     Anxiety and depression     Asthma     Essential hypertension 5/18/2017    Headache(784.0)     Obesity     MARBELLA (obstructive sleep apnea) 4/29/2020    PTSD (post-traumatic stress disorder)     Urinary incontinence      Past Surgical History:   Procedure Laterality Date    CHOLECYSTECTOMY      ENDOMETRIAL ABLATION      HYSTERECTOMY, TOTAL ABDOMINAL (CERVIX REMOVED)  7/15/15    DR. Hoffman 216    TONSILLECTOMY      TUBAL LIGATION       Family History   Problem Relation Age of Onset    Diabetes Mother     High Blood Pressure Mother      Social History     Socioeconomic History    Marital status:      Spouse name: Not on file    Number of children: Not on file    Years of education: Not on file    Highest education level: Not on file   Occupational History    Not on file   Tobacco Use    Smoking status: Every Day     Packs/day: 0.50     Types: Cigarettes    Smokeless tobacco: Never   Vaping Use    Vaping Use: Never used   Substance and Sexual Activity    Alcohol use: No    Drug use: No    Sexual activity: Yes   Other Topics Concern    Not on file   Social History Narrative    Not on file     Social Determinants of Health     Financial Resource Strain: Unknown    Difficulty of Paying Living Expenses: Patient refused   Food Insecurity: Unknown    Worried About Running Out of Food in the Last Year: Patient refused    [address] in the Last Year: Patient refused   Transportation Needs: Unknown    Lack of Transportation (Medical): Patient refused    Lack of Transportation (Non-Medical): Patient refused   Physical Activity: Unknown    Days of Exercise per Week: Patient refused    Minutes of Exercise per Session: Patient refused   Stress: Unknown    Feeling of Stress : Patient refused   Social Connections: Unknown    Frequency of Communication with Friends and Family: Patient refused    Frequency of Social Gatherings with Friends and Family: Patient refused    Attends [employer] Services: Patient refused    Active Member of Clubs or Organizations: Patient refused    Attends Club or Organization Meetings: Patient refused    Marital Status: Patient refused   Intimate Partner Violence: Unknown    Fear of Current or Ex-Partner: Patient refused    Emotionally Abused: Patient refused    Physically Abused: Patient refused    Sexually Abused: Patient refused   Housing Stability: Unknown    Unable to Pay for Housing in the Last Year: Patient refused    Number of Places Lived in the Last Year: Not on file    Unstable Housing in the Last Year: Patient refused       Current Outpatient Medications on File Prior to (90 Base) MCG/ACT inhaler Inhale 2 puffs into the lungs 4 times daily as needed for Wheezing 18 g 0    ibuprofen (ADVIL;MOTRIN) 800 MG tablet Take 1 tablet by mouth every 8 hours as needed for Pain 21 tablet 0    Blood Pressure Monitoring (BLOOD PRESSURE KIT) ZELALEM 1 Units by Does not apply route as needed (for BP monitoring at home for hypertension) Cuff for upper arm 1 Device 0     No current facility-administered medications on file prior to visit. HPI    Review of Systems   All other systems reviewed and are negative. Objective:     Vitals:  /78 (Site: Left Upper Arm, Position: Sitting)   Ht 4' 9\" (1.448 m)   Wt 220 lb (99.8 kg)   LMP 06/15/2015   BMI 47.61 kg/m² Pain Score:   7      Physical Exam  Patient is AO X 3 , recent and remote memory is intact,mood and affect normal,judgement and insight normal. Head normacephalic,eyes - PERRLA, EOMI, No obvious external Ears, Nose, mouth masses seen, neck supple, trachae midline, no abnormal lymph nodes visualized in neck or supraclavicular region. CN's 2-12 grossly intact. Strength functional for ambulation, balance functional, coordination normal. Visualized skin intact, no obvious lesion or visualized cyanosis. No swelling. Pulses intact. No obvious upper motor neuron signs. Sensation grossly intact to light touch. Strength functional upper extremities. SLR negative. Tender along R lumber facet joints with pain and decreased ROM. Extension and rotation with muscle spasms. Assessment:      Diagnosis Orders   1.  Lumbosacral spondylosis without myelopathy  XR LUMBAR SPINE (MIN 4 VIEWS)    VA INJ DX/THER AGNT PARAVERT FACET JOINT, LUMBAR/SAC, 1ST LEVEL    VA INJ DX/THER AGNT PARAVERT FACET JOINT, LUMBAR/SAC, 2ND LEVEL          Plan:

## 2022-10-13 NOTE — PROGRESS NOTES
Subjective:      Patient ID:  Jason Patino is a 56 y.o. female with chief complaint of:  Chief Complaint   Patient presents with    Follow-up     Med check, myrbetriq. Pt has been taking the 50 mg of myrbetriq and states she has had no improvement        Patient presents as follow up to oab medicine. Patient was not impressed with meds and did not note any improvement in frequency, night urination she is also bothered by loss with urge and stress. Patient desires to consider other intervention. Patient has risk factors due to her obesity and nicotine usage. Past Medical History:   Diagnosis Date    Anxiety and depression     Anxiety and depression     Asthma     Essential hypertension 5/18/2017    Headache(784.0)     Obesity     MARBELLA (obstructive sleep apnea) 4/29/2020    PTSD (post-traumatic stress disorder)     Urinary incontinence      Past Surgical History:   Procedure Laterality Date    CHOLECYSTECTOMY      ENDOMETRIAL ABLATION      HYSTERECTOMY, TOTAL ABDOMINAL (CERVIX REMOVED)  7/15/15    DR. Hoffman 540    TONSILLECTOMY      TUBAL LIGATION       Family History   Problem Relation Age of Onset    Diabetes Mother     High Blood Pressure Mother      Current Outpatient Medications on File Prior to Visit   Medication Sig Dispense Refill    nortriptyline (PAMELOR) 25 MG capsule Take 3 capsules by mouth nightly 90 capsule 2    albuterol sulfate HFA (PROVENTIL;VENTOLIN;PROAIR) 108 (90 Base) MCG/ACT inhaler Inhale 2 puffs into the lungs every 4 hours as needed for Wheezing 8.5 each 3    fluticasone (FLONASE) 50 MCG/ACT nasal spray 1 spray by Nasal route daily 1 each 3    fluconazole (DIFLUCAN) 150 MG tablet Take 1 tablet every 2 days for 3 doses. 3 tablet 0    mirabegron (MYRBETRIQ) 50 MG TB24 Take 50 mg by mouth daily Lot [id number]  Exp 07/23 21 tablet 0    pantoprazole (PROTONIX) 40 MG tablet Take 1 tablet by mouth daily 90 tablet 1    nystatin (MYCOSTATIN) 127297 UNIT/GM cream Apply topically 2 times daily.  30 g 2    metoprolol succinate (TOPROL XL) 25 MG extended release tablet Take 1 tablet by mouth daily 90 tablet 1    levothyroxine (SYNTHROID) 25 MCG tablet Take 1 tablet by mouth daily 90 tablet 1    hydrOXYzine HCl (ATARAX) 25 MG tablet Take 3 tablets by mouth nightly 270 tablet 2    fluticasone-salmeterol (ADVAIR DISKUS) 250-50 MCG/ACT AEPB diskus inhaler Inhale 1 puff into the lungs 2 times daily 60 each 3    fluconazole (DIFLUCAN) 150 MG tablet Take 1 tablet by mouth Twice a Week 24 tablet 3    cycloSPORINE (RESTASIS) 0.05 % ophthalmic emulsion Place 1 drop into both eyes 2 times daily 1 each 3    ALPRAZolam (XANAX) 2 MG tablet Take 1 tablet by mouth 2 times daily as needed for Sleep for up to 90 days. 60 tablet 2    beclomethasone (QVAR REDIHALER) 80 MCG/ACT AERB inhaler Inhale 1 puff into the lungs in the morning and 1 puff in the evening. 1 each 2    ALPRAZolam (XANAX) 2 MG tablet Take 2 mg by mouth 2 times daily as needed for Sleep.      albuterol sulfate HFA (VENTOLIN HFA) 108 (90 Base) MCG/ACT inhaler Inhale 2 puffs into the lungs 4 times daily as needed for Wheezing 18 g 0    ibuprofen (ADVIL;MOTRIN) 800 MG tablet Take 1 tablet by mouth every 8 hours as needed for Pain 21 tablet 0    Blood Pressure Monitoring (BLOOD PRESSURE KIT) ZELALEM 1 Units by Does not apply route as needed (for BP monitoring at home for hypertension) Cuff for upper arm 1 Device 0    neomycin-polymyxin-dexameth (MAXITROL) 3.5-88762-8.1 ophthalmic suspension INSTILL 1 DROP INTO LEFT EYE 3 TIMES A DAY (Patient not taking: Reported on 10/13/2022)      SUMAtriptan (IMITREX) 50 MG tablet Take 1 tablet by mouth once as needed for Migraine 9 tablet 3     No current facility-administered medications on file prior to visit. Allergies:  Aspirin, Codeine, Lithium, Naproxen, and Trazodone and nefazodone    Review of Systems   Constitutional:  Negative for fatigue and fever. Respiratory:  Negative for apnea and shortness of breath. Cardiovascular:  Negative for chest pain and palpitations. Gastrointestinal:  Negative for abdominal pain. Genitourinary:  Positive for frequency and urgency. Negative for difficulty urinating, dysuria, pelvic pain, vaginal bleeding and vaginal discharge. Neurological:  Negative for dizziness, weakness and light-headedness. Psychiatric/Behavioral:  Negative for agitation and dysphoric mood. Objective:   /76   Ht 4' 9\" (1.448 m)   Wt 220 lb (99.8 kg)   LMP 06/15/2015   BMI 47.61 kg/m²      Physical Exam  Constitutional:       Appearance: Normal appearance. She is obese. Neurological:      Mental Status: She is alert and oriented to person, place, and time. Psychiatric:         Mood and Affect: Mood normal.         Behavior: Behavior normal.       Assessment:       Diagnosis Orders   1. Stress incontinence        2. Urinary frequency           Discussed midurethral support with slings vs fillers. Will have pt see bri xavier for consultation   Plan:      No orders of the defined types were placed in this encounter. No orders of the defined types were placed in this encounter. No follow-ups on file.      Mar Chase DO

## 2022-10-17 DIAGNOSIS — M47.817 LUMBOSACRAL SPONDYLOSIS WITHOUT MYELOPATHY: ICD-10-CM

## 2022-10-18 ENCOUNTER — TELEPHONE (OUTPATIENT)
Dept: PAIN MANAGEMENT | Age: 47
End: 2022-10-18

## 2022-10-18 NOTE — TELEPHONE ENCOUNTER
BENEFITS: RIGHT L4-5, L5-S1 FACET      Insurance: M[employer]  Phone: [phone]  Contact Name: Kyaw Morales  Effective Date: 3.6.2022     Plan year: YES-CALENDAR  Deductible: 200.00      Deductible Met: 200.00  Allowed/benefits paid at: 80% AFTER DEDUCTIBLE  OOP: 1200.00 MET $868.35  Freq Limits: 29127 & 64494--BASED ON MEDICAL NECESSITY  Prior Auth Requirement: NO AUTH REQUIRED    Notes: NO PRE-EX CLAUSE    Call Reference #: [id number]    Time of call: 11:35AM

## 2022-10-24 ENCOUNTER — PROCEDURE VISIT (OUTPATIENT)
Dept: PAIN MANAGEMENT | Age: 47
End: 2022-10-24
Payer: COMMERCIAL

## 2022-10-24 DIAGNOSIS — M47.817 LUMBOSACRAL SPONDYLOSIS WITHOUT MYELOPATHY: Primary | ICD-10-CM

## 2022-10-24 PROCEDURE — 64494 INJ PARAVERT F JNT L/S 2 LEV: CPT | Performed by: PAIN MEDICINE

## 2022-10-24 PROCEDURE — 64493 INJ PARAVERT F JNT L/S 1 LEV: CPT | Performed by: PAIN MEDICINE

## 2022-10-24 RX ORDER — LIDOCAINE HYDROCHLORIDE 10 MG/ML
10 INJECTION, SOLUTION EPIDURAL; INFILTRATION; INTRACAUDAL; PERINEURAL ONCE
Status: COMPLETED | OUTPATIENT
Start: 2022-10-24 | End: 2022-10-24

## 2022-10-24 RX ORDER — BETAMETHASONE SODIUM PHOSPHATE AND BETAMETHASONE ACETATE 3; 3 MG/ML; MG/ML
6 INJECTION, SUSPENSION INTRA-ARTICULAR; INTRALESIONAL; INTRAMUSCULAR; SOFT TISSUE ONCE
Status: COMPLETED | OUTPATIENT
Start: 2022-10-24 | End: 2022-10-24

## 2022-10-24 RX ADMIN — LIDOCAINE HYDROCHLORIDE 10 MG: 10 INJECTION, SOLUTION EPIDURAL; INFILTRATION; INTRACAUDAL; PERINEURAL at 10:42

## 2022-10-24 RX ADMIN — BETAMETHASONE SODIUM PHOSPHATE AND BETAMETHASONE ACETATE 6 MG: 3; 3 INJECTION, SUSPENSION INTRA-ARTICULAR; INTRALESIONAL; INTRAMUSCULAR; SOFT TISSUE at 10:42

## 2022-10-24 NOTE — PROGRESS NOTES
[facility]) Physicians  Neurosurgery and Pain [facility]  [address] [facility] [address] [facility] , [address] [facility], [place] [address]: [phone]  F: [phone]       LUMBAR FACET JOINT INJECTION       Provider: Jori Uribe DO          Patient Name: Farooq Tompkins : 1975        Date: 10/24/2022        Farooq Tompkins is here today for interventional pain management. Preoperatively, the patient presents with facet joint mediated pain as per history and exam. Patient has failed NSAIDs, PT, and conservative treatment. Patient has significant psychological and functional impairment due to these conditions. Standard ASIPP guidelines were followed and sterile technique used. Area was cleaned with Betadine x3. Informed consent was obtained. Fluoroscopic guidance was used for this procedure. Appropriate oblique views were used to open up the facet joints. Appropriate length spinal needle was advanced to each facet joint. Negative aspiration was achieved. Approximately 6mg Celestone was divided equally and 0.5 cc of 1% preservative free Lidocaine was injected in the joints injected without difficulty. Patient tolerated the procedure well, no obvious complications occurred during the procedure. Patient was appropriately monitored and discharged home in stable condition with their usual motor strength. Post Op instructions were given to patient. Patient told to resume blood thinners if they stopped them prior to procedure. Relevent and recent imaging reviewed with patient today.            [] Bilateral [] T12-L1       [] L1-2      [x] Right [] L2-3       [] L3-4      [] Left [x] L4-5         [x] L5-S1                           Physician: Jori Uribe DO

## 2022-10-25 ENCOUNTER — OFFICE VISIT (OUTPATIENT)
Dept: OBGYN CLINIC | Age: 47
End: 2022-10-25
Payer: COMMERCIAL

## 2022-10-25 ENCOUNTER — OFFICE VISIT (OUTPATIENT)
Dept: CARDIOLOGY CLINIC | Age: 47
End: 2022-10-25
Payer: COMMERCIAL

## 2022-10-25 VITALS
HEIGHT: 57 IN | DIASTOLIC BLOOD PRESSURE: 82 MMHG | HEART RATE: 76 BPM | SYSTOLIC BLOOD PRESSURE: 114 MMHG | BODY MASS INDEX: 52.21 KG/M2 | WEIGHT: 242 LBS

## 2022-10-25 VITALS
HEART RATE: 118 BPM | BODY MASS INDEX: 52.58 KG/M2 | RESPIRATION RATE: 16 BRPM | SYSTOLIC BLOOD PRESSURE: 108 MMHG | OXYGEN SATURATION: 98 % | DIASTOLIC BLOOD PRESSURE: 68 MMHG | WEIGHT: 243 LBS

## 2022-10-25 DIAGNOSIS — I10 ESSENTIAL HYPERTENSION: Primary | ICD-10-CM

## 2022-10-25 DIAGNOSIS — E78.2 MIXED HYPERLIPIDEMIA: ICD-10-CM

## 2022-10-25 DIAGNOSIS — E66.01 MORBID OBESITY (HCC): ICD-10-CM

## 2022-10-25 DIAGNOSIS — R06.02 SHORTNESS OF BREATH: ICD-10-CM

## 2022-10-25 DIAGNOSIS — I20.8 ANGINA AT REST (HCC): ICD-10-CM

## 2022-10-25 DIAGNOSIS — N39.3 SUI (STRESS URINARY INCONTINENCE, FEMALE): ICD-10-CM

## 2022-10-25 DIAGNOSIS — N39.41 URGE INCONTINENCE OF URINE: ICD-10-CM

## 2022-10-25 DIAGNOSIS — N39.46 MIXED INCONTINENCE URGE AND STRESS: Primary | ICD-10-CM

## 2022-10-25 PROBLEM — I20.89 ANGINA AT REST: Status: ACTIVE | Noted: 2022-10-25

## 2022-10-25 PROBLEM — E78.5 HYPERLIPIDEMIA: Status: ACTIVE | Noted: 2022-10-25

## 2022-10-25 LAB
CHOLESTEROL, FASTING: 245 MG/DL (ref 0–199)
HDLC SERPL-MCNC: 52 MG/DL (ref 40–59)
LDL CHOLESTEROL CALCULATED: 166 MG/DL (ref 0–129)
TRIGLYCERIDE, FASTING: 133 MG/DL (ref 0–150)

## 2022-10-25 PROCEDURE — 99204 OFFICE O/P NEW MOD 45 MIN: CPT | Performed by: INTERNAL MEDICINE

## 2022-10-25 PROCEDURE — 99213 OFFICE O/P EST LOW 20 MIN: CPT | Performed by: OBSTETRICS & GYNECOLOGY

## 2022-10-25 PROCEDURE — 4004F PT TOBACCO SCREEN RCVD TLK: CPT | Performed by: OBSTETRICS & GYNECOLOGY

## 2022-10-25 PROCEDURE — G8484 FLU IMMUNIZE NO ADMIN: HCPCS | Performed by: OBSTETRICS & GYNECOLOGY

## 2022-10-25 PROCEDURE — G8417 CALC BMI ABV UP PARAM F/U: HCPCS | Performed by: INTERNAL MEDICINE

## 2022-10-25 PROCEDURE — 4004F PT TOBACCO SCREEN RCVD TLK: CPT | Performed by: INTERNAL MEDICINE

## 2022-10-25 PROCEDURE — G8484 FLU IMMUNIZE NO ADMIN: HCPCS | Performed by: INTERNAL MEDICINE

## 2022-10-25 PROCEDURE — G8427 DOCREV CUR MEDS BY ELIG CLIN: HCPCS | Performed by: INTERNAL MEDICINE

## 2022-10-25 PROCEDURE — G8427 DOCREV CUR MEDS BY ELIG CLIN: HCPCS | Performed by: OBSTETRICS & GYNECOLOGY

## 2022-10-25 PROCEDURE — G8417 CALC BMI ABV UP PARAM F/U: HCPCS | Performed by: OBSTETRICS & GYNECOLOGY

## 2022-10-25 RX ORDER — SOLIFENACIN SUCCINATE 5 MG/1
5 TABLET, FILM COATED ORAL DAILY
Qty: 14 TABLET | Refills: 0 | Status: SHIPPED | OUTPATIENT
Start: 2022-10-25 | End: 2022-10-25

## 2022-10-25 RX ORDER — SOLIFENACIN SUCCINATE 10 MG/1
10 TABLET, FILM COATED ORAL DAILY
Qty: 15 TABLET | Refills: 0 | Status: SHIPPED | OUTPATIENT
Start: 2022-10-25 | End: 2022-12-01

## 2022-10-25 NOTE — PROGRESS NOTES
Chief Complaint   Patient presents with    Establish Cardiologist     Referred by Sabrina    Abnormal Test Results     Abn ekg    Shortness of Breath       10-25-22: atient presents for initial medical evaluation. Patient is followed on a regular basis by Dr. Iglesia Marquez, APRN - CNP. Patient with past medical history of hypertension, hyperlipidemia, tobacco use. Complains of severe midsternal chest pressure heaviness, states like she is feeling feels like he is having a heart attack. She has associated shortness of breath as well as radiation of the chest pain to her back. Patient states her symptoms lasted approximate 10 minutes and resolved on its own  No significant worsening with activity  She denies any history myocardial infarction, congestive heart failure or arrhythmia. Mother with history of coronary disease/PCI. EKG on 7/31/2022 with normal sinus rhythm nonspecific ST change        Patient Active Problem List   Diagnosis    Anxiety and depression    PTSD (post-traumatic stress disorder)    Migraine headache    Nausea and vomiting    S/P abdominal hysterectomy    Severe recurrent major depressive disorder with psychotic features (HCC)    Morbid obesity (HCC)    Overactive bladder    Stress incontinence, female    Abdominal pain    Blood in the urine    Essential hypertension    Yeast infection    Vitamin D deficiency    Former smoker    Obsessive-compulsive disorder    Bilateral dry eyes    Shortness of breath    MARBELLA (obstructive sleep apnea)    Moderate episode of recurrent major depressive disorder (HCC)    ALFREDO (generalized anxiety disorder)    Angina at rest [facility])    Hyperlipidemia       Past Surgical History:   Procedure Laterality Date    CHOLECYSTECTOMY      ENDOMETRIAL ABLATION      HYSTERECTOMY, TOTAL ABDOMINAL (CERVIX REMOVED)  7/15/15    DR. Hoffman 066    TONSILLECTOMY      TUBAL LIGATION         Social History     Socioeconomic History    Marital status:      Spouse name: None    Number of children: None    Years of education: None    Highest education level: None   Tobacco Use    Smoking status: Every Day     Packs/day: 0.50     Types: Cigarettes    Smokeless tobacco: Never   Vaping Use    Vaping Use: Never used   Substance and Sexual Activity    Alcohol use: No    Drug use: No    Sexual activity: Yes     Social Determinants of Health     Financial Resource Strain: Unknown    Difficulty of Paying Living Expenses: Patient refused   Food Insecurity: Unknown    Worried About Running Out of Food in the Last Year: Patient refused    Ran Out of Food in the Last Year: Patient refused   Transportation Needs: Unknown    Lack of Transportation (Medical): Patient refused    Lack of Transportation (Non-Medical): Patient refused   Physical Activity: Unknown    Days of Exercise per Week: Patient refused    Minutes of Exercise per Session: Patient refused   Stress: Unknown    Feeling of Stress : Patient refused   Social Connections: Unknown    Frequency of Communication with Friends and Family: Patient refused    Frequency of Social Gatherings with Friends and Family: Patient refused    Attends [employer] Services: Patient refused    Active Member of Clubs or Organizations: Patient refused    Attends Club or Organization Meetings: Patient refused    Marital Status: Patient refused   Intimate Partner Violence: Unknown    Fear of Current or Ex-Partner: Patient refused    Emotionally Abused: Patient refused    Physically Abused: Patient refused    Sexually Abused: Patient refused   Housing Stability: Unknown    Unable to Pay for Housing in the Last Year: Patient refused    Unstable Housing in the Last Year: Patient refused       Family History   Problem Relation Age of Onset    Diabetes Mother     High Blood Pressure Mother        Current Outpatient Medications   Medication Sig Dispense Refill    nortriptyline (PAMELOR) 25 MG capsule Take 3 capsules by mouth nightly 90 capsule 2    albuterol sulfate HFA (PROVENTIL;VENTOLIN;PROAIR) 108 (90 Base) MCG/ACT inhaler Inhale 2 puffs into the lungs every 4 hours as needed for Wheezing 8.5 each 3    fluconazole (DIFLUCAN) 150 MG tablet Take 1 tablet every 2 days for 3 doses. 3 tablet 0    pantoprazole (PROTONIX) 40 MG tablet Take 1 tablet by mouth daily 90 tablet 1    nystatin (MYCOSTATIN) 533993 UNIT/GM cream Apply topically 2 times daily. 30 g 2    levothyroxine (SYNTHROID) 25 MCG tablet Take 1 tablet by mouth daily 90 tablet 1    hydrOXYzine HCl (ATARAX) 25 MG tablet Take 3 tablets by mouth nightly 270 tablet 2    fluconazole (DIFLUCAN) 150 MG tablet Take 1 tablet by mouth Twice a Week 24 tablet 3    cycloSPORINE (RESTASIS) 0.05 % ophthalmic emulsion Place 1 drop into both eyes 2 times daily 1 each 3    beclomethasone (QVAR REDIHALER) 80 MCG/ACT AERB inhaler Inhale 1 puff into the lungs in the morning and 1 puff in the evening. 1 each 2    ALPRAZolam (XANAX) 2 MG tablet Take 2 mg by mouth 2 times daily as needed for Sleep.      ibuprofen (ADVIL;MOTRIN) 800 MG tablet Take 1 tablet by mouth every 8 hours as needed for Pain 21 tablet 0    Blood Pressure Monitoring (BLOOD PRESSURE KIT) ZELALEM 1 Units by Does not apply route as needed (for BP monitoring at home for hypertension) Cuff for upper arm 1 Device 0    mirabegron (MYRBETRIQ) 50 MG TB24 Take 50 mg by mouth daily Lot [id number]  Exp 07/23 21 tablet 0    neomycin-polymyxin-dexameth (MAXITROL) 3.5-24361-6.1 ophthalmic suspension INSTILL 1 DROP INTO LEFT EYE 3 TIMES A DAY (Patient not taking: Reported on 10/13/2022)      SUMAtriptan (IMITREX) 50 MG tablet Take 1 tablet by mouth once as needed for Migraine 9 tablet 3     No current facility-administered medications for this visit. Aspirin, Codeine, Lithium, Naproxen, and Trazodone and nefazodone    Review of Systems:  General ROS: negative  Psychological ROS: negative  Hematological and Lymphatic ROS: No history of blood clots or bleeding disorder.    Respiratory ROS: positive for - shortness of breath  Cardiovascular ROS: positive for - dyspnea on exertion  Gastrointestinal ROS: no abdominal pain, change in bowel habits, or black or bloody stools  Genito-Urinary ROS: no dysuria, trouble voiding, or hematuria  Musculoskeletal ROS: negative  Neurological ROS: no TIA or stroke symptoms  Dermatological ROS: negative    VITALS:  Blood pressure 108/68, pulse (!) 118, resp. rate 16, weight 243 lb (110.2 kg), last menstrual period 06/15/2015, SpO2 98 %, not currently breastfeeding. Body mass index is 52.58 kg/m². Physical Examination:  General appearance - overweight  Mental status - alert, oriented to person, place, and time  Neck - Neck is supple, no JVD or carotid bruits. No thyromegaly or adenopathy. Chest - clear to auscultation, no wheezes, rales or rhonchi, symmetric air entry  Heart - normal rate, regular rhythm, normal S1, S2, no murmurs, rubs, clicks or gallops  Abdomen - soft, nontender, nondistended, no masses or organomegaly  Neurological - alert, oriented, normal speech, no focal findings or movement disorder noted  Extremities - peripheral pulses normal, no pedal edema, no clubbing or cyanosis  Skin - normal coloration and turgor, no rashes, no suspicious skin lesions noted      EKG: normal sinus rhythm, nonspecific ST and T waves changes    Orders Placed This Encounter   Procedures    Holter monitor 48 hour       ASSESSMENT:     Diagnosis Orders   1. Essential hypertension  Holter monitor 48 hour      2. Angina at rest [facility])        3. Shortness of breath        4. Morbid obesity ([facility] [address]        5. Mixed hyperlipidemia              PLAN:         As always, aggressive risk factor modification is strongly recommended. We should adhere to the JNC VIII guidelines for HTN management and the NCEP ATP III guidelines for LDL-C management. Cardiac diet is always recommended with low fat, cholesterol, calories and sodium. Continue medications at current doses.      Non invasive cardiac testing will be ordered to further evaluate for any ischemic or structural heart disease as a cause of the patient symptoms. We will proceed with a Cardiolite stress test and echo. If continues to have CP despite negative testing, then will need C. Place on holter monitor    Check lipid profile    Check EKG next OV    Smoking cessation was strongly recommended    Patient was advised and encouraged to check blood pressure at home or at a pharmacy, maintain a logbook, and also call us back if blood pressure are above the target ranges or if it is low. Patient clearly understands and agrees to the instructions. We will need to continue to monitor muscle and liver enzymes, BUN, CR, and electrolytes.

## 2022-10-25 NOTE — PROGRESS NOTES
Naseem Howell is a 56 y.o. female who presents here today for complaints of Incontinence (Urine. )    Leakage with laughing , coughing and sneezing. Copious amounts of urine , wears pads all the time and soaks the pads. Also urgency, frequency, incontinence, nocturia  Mentions the leakage with coughing and movement is worse . Tried Mirabegron 50 mg daily for the urge symptoms with no resolution . Sri Chiu Vitals:  /82 (Site: Right Upper Arm, Position: Sitting, Cuff Size: Large Adult)   Pulse 76   Ht 4' 9\" (1.448 m)   Wt 242 lb (109.8 kg)   LMP 06/15/2015   BMI 52.37 kg/m²   Allergies:  Aspirin, Codeine, Lithium, Naproxen, and Trazodone and nefazodone  Past Medical History:   Diagnosis Date    Angina at rest [facility]) 10/25/2022    Anxiety and depression     Anxiety and depression     Asthma     Essential hypertension 5/18/2017    Headache(784.0)     Hyperlipidemia 10/25/2022    Obesity     MARBELLA (obstructive sleep apnea) 4/29/2020    PTSD (post-traumatic stress disorder)     Urinary incontinence      Past Surgical History:   Procedure Laterality Date    CHOLECYSTECTOMY      ENDOMETRIAL ABLATION      HYSTERECTOMY, TOTAL ABDOMINAL (CERVIX REMOVED)  7/15/15    DR. BECKHAM    TONSILLECTOMY      TUBAL LIGATION       OB History    No obstetric history on file.        Family History   Problem Relation Age of Onset    Diabetes Mother     High Blood Pressure Mother      Social History     Socioeconomic History    Marital status:      Spouse name: Not on file    Number of children: Not on file    Years of education: Not on file    Highest education level: Not on file   Occupational History    Not on file   Tobacco Use    Smoking status: Every Day     Packs/day: 0.50     Types: Cigarettes    Smokeless tobacco: Never   Vaping Use    Vaping Use: Never used   Substance and Sexual Activity    Alcohol use: No    Drug use: No    Sexual activity: Yes   Other Topics Concern    Not on file   Social History Narrative Not on file     Social Determinants of Health     Financial Resource Strain: Unknown    Difficulty of Paying Living Expenses: Patient refused   Food Insecurity: Unknown    Worried About Running Out of Food in the Last Year: Patient refused    [address] in the Last Year: Patient refused   Transportation Needs: Unknown    Lack of Transportation (Medical): Patient refused    Lack of Transportation (Non-Medical): Patient refused   Physical Activity: Unknown    Days of Exercise per Week: Patient refused    Minutes of Exercise per Session: Patient refused   Stress: Unknown    Feeling of Stress : Patient refused   Social Connections: Unknown    Frequency of Communication with Friends and Family: Patient refused    Frequency of Social Gatherings with Friends and Family: Patient refused    Attends Restorationist Services: Patient refused    Active Member of Clubs or Organizations: Patient refused    Attends Club or Organization Meetings: Patient refused    Marital Status: Patient refused   Intimate Partner Violence: Unknown    Fear of Current or Ex-Partner: Patient refused    Emotionally Abused: Patient refused    Physically Abused: Patient refused    Sexually Abused: Patient refused   Housing Stability: Unknown    Unable to Pay for Housing in the Last Year: Patient refused    Number of Places Lived in the Last Year: Not on file    Unstable Housing in the Last Year: Patient refused       Contraceptive method:  none    Patient's medications, allergies, past medical, surgical, social and family histories were reviewed and updated as appropriate. Review of Systems  As per chief complaint   All other systems reviewed and are negative.   Urin eleakage   Physical Exam:  Vitals:  /82 (Site: Right Upper Arm, Position: Sitting, Cuff Size: Large Adult)   Pulse 76   Ht 4' 9\" (1.448 m)   Wt 242 lb (109.8 kg)   LMP 06/15/2015   BMI 52.37 kg/m²   Lungs: CTAB   Heart : Regular S1/S2, no M/R/G  Abdomen: Soft , NT, ND , + BS Pelvic exam : + urine leak with cough and valsalva , with urethral hypermobility noted. No prolapse. Assessment:      Diagnosis Orders   1. Mixed incontinence urge and stress        2. KAYDEN (stress urinary incontinence, female)        3. Urge incontinence of urine            Plan: For transobturator tape , altis for the KAYDEN portion   Will be scheduled for UDT and cystoscopy. Failed anticholinergics for urge symptoms. Will give trial of  vesicare 10 mg po daily. Return in 2 weeks   Will finalize plan after completion of testing. No orders of the defined types were placed in this encounter. Orders Placed This Encounter   Medications    DISCONTD: solifenacin (VESICARE) 5 MG tablet     Sig: Take 1 tablet by mouth daily     Dispense:  14 tablet     Refill:  0    solifenacin (VESICARE) 10 MG tablet     Sig: Take 1 tablet by mouth daily     Dispense:  15 tablet     Refill:  0       Follow Up:  Return for F/U for results, medication assessment.         Alan Cruz MD

## 2022-10-31 ENCOUNTER — TELEPHONE (OUTPATIENT)
Dept: PAIN MANAGEMENT | Age: 47
End: 2022-10-31

## 2022-10-31 NOTE — TELEPHONE ENCOUNTER
Pt states that she did not receive much relief from injections on 10/24/22 and asks if she can be prescribed something to help with the pain?     Pt states norco has worked in the past.

## 2022-11-01 ENCOUNTER — OFFICE VISIT (OUTPATIENT)
Dept: PAIN MANAGEMENT | Age: 47
End: 2022-11-01
Payer: COMMERCIAL

## 2022-11-01 ENCOUNTER — TELEMEDICINE (OUTPATIENT)
Dept: BEHAVIORAL/MENTAL HEALTH CLINIC | Age: 47
End: 2022-11-01
Payer: COMMERCIAL

## 2022-11-01 VITALS
TEMPERATURE: 98.6 F | OXYGEN SATURATION: 98 % | HEIGHT: 57 IN | SYSTOLIC BLOOD PRESSURE: 118 MMHG | WEIGHT: 243 LBS | DIASTOLIC BLOOD PRESSURE: 70 MMHG | BODY MASS INDEX: 52.42 KG/M2 | RESPIRATION RATE: 16 BRPM | HEART RATE: 98 BPM

## 2022-11-01 DIAGNOSIS — R52 PAIN: ICD-10-CM

## 2022-11-01 DIAGNOSIS — F41.1 GAD (GENERALIZED ANXIETY DISORDER): ICD-10-CM

## 2022-11-01 DIAGNOSIS — M47.817 LUMBOSACRAL SPONDYLOSIS WITHOUT MYELOPATHY: Primary | ICD-10-CM

## 2022-11-01 DIAGNOSIS — F33.1 MODERATE EPISODE OF RECURRENT MAJOR DEPRESSIVE DISORDER (HCC): Primary | ICD-10-CM

## 2022-11-01 PROCEDURE — 99215 OFFICE O/P EST HI 40 MIN: CPT | Performed by: PSYCHIATRY & NEUROLOGY

## 2022-11-01 PROCEDURE — G8428 CUR MEDS NOT DOCUMENT: HCPCS | Performed by: PSYCHIATRY & NEUROLOGY

## 2022-11-01 PROCEDURE — G8417 CALC BMI ABV UP PARAM F/U: HCPCS | Performed by: NURSE PRACTITIONER

## 2022-11-01 PROCEDURE — G8484 FLU IMMUNIZE NO ADMIN: HCPCS | Performed by: NURSE PRACTITIONER

## 2022-11-01 PROCEDURE — 3074F SYST BP LT 130 MM HG: CPT | Performed by: NURSE PRACTITIONER

## 2022-11-01 PROCEDURE — 99214 OFFICE O/P EST MOD 30 MIN: CPT | Performed by: NURSE PRACTITIONER

## 2022-11-01 PROCEDURE — G8427 DOCREV CUR MEDS BY ELIG CLIN: HCPCS | Performed by: NURSE PRACTITIONER

## 2022-11-01 PROCEDURE — 3078F DIAST BP <80 MM HG: CPT | Performed by: NURSE PRACTITIONER

## 2022-11-01 PROCEDURE — 4004F PT TOBACCO SCREEN RCVD TLK: CPT | Performed by: NURSE PRACTITIONER

## 2022-11-01 RX ORDER — PROPRANOLOL HYDROCHLORIDE 20 MG/1
20 TABLET ORAL 3 TIMES DAILY
Qty: 90 TABLET | Refills: 3 | Status: SHIPPED | OUTPATIENT
Start: 2022-11-01

## 2022-11-01 RX ORDER — NORTRIPTYLINE HYDROCHLORIDE 25 MG/1
CAPSULE ORAL
Qty: 120 CAPSULE | Refills: 3 | Status: SHIPPED | OUTPATIENT
Start: 2022-11-01 | End: 2023-03-01

## 2022-11-01 RX ORDER — HYDROCODONE BITARTRATE AND ACETAMINOPHEN 5; 325 MG/1; MG/1
1 TABLET ORAL 2 TIMES DAILY PRN
Qty: 28 TABLET | Refills: 0 | Status: SHIPPED | OUTPATIENT
Start: 2022-11-01 | End: 2022-11-15

## 2022-11-01 ASSESSMENT — ENCOUNTER SYMPTOMS
BACK PAIN: 1
ABDOMINAL PAIN: 0
SORE THROAT: 0
SHORTNESS OF BREATH: 0

## 2022-11-01 NOTE — PROGRESS NOTES
11/1/2022    40 mins total for this encounter =     30 minutes with direct communication with patient for encounter     10 mins (in addition) spent on chart review, and in the ordering of all necessary labs and/or medications      The risks and benefits of converting to a virtual visit were discussed in light of the current infectious disease epidemic. Patient also understood that insurance coverage and co-pays are up to their individual insurance plans. Patient Location:        Patient's home address  Provider Location ([facility]):        [facility]  Chief complaint No chief complaint on file. TELEHEALTH PSYCHIATRY FOLLOWUP (OUTPATIENT)   Audio/Visual (During CNNTT-90 public health emergency)          Psychiatric Diagnoses:  1. Moderate episode of recurrent major depressive disorder ([facility] [address]    2. ALFREDO (generalized anxiety disorder)        Assessment/Plan:       Patient denies thoughts of harm to self or others. No acute safety concerns voiced at this appointment. MOOD: IMPROVEMENT with SOME CONTINUED SYMPTOMS: Patient reports improvement in some symptoms of depression. Continues to have symptoms of low mood , anhedonia (difficulty finding enjoyment in things) , and anergia (low energy) . Despite continued symptoms, patient feels able to attend to activities of daily living. SLEEP: Patient reports sleep has been at least 8 hours a night and wakes feeling rested in the morning. No concerns related to sleep today    ATTENTION AND FOCUS: Patient denies any concerns related to attention and focus, and no concerns related to memory. Occasionally going for walks. ANXIETY: Patient denies any concerns related to anxiety today. BIPOLAR ROBIN: No concerns. No manic symptoms endorsed today and no concern for robin. SUBSTANCE USE: No concerns for ongoing substance use. Patient denies any recent substance use    ASSESSMENT/PLAN: Medication changes per plan below.  Discussed with patient the risks and benefits of their psychiatric medication and patient was amenable to plan as outlined below    COUNSELING or THERAPY:   Continue to encourage therapy as part of ongoing treatment of their mental health concerns. Continue to encourage physical activity and adherence to medication regimen. DATE and changes made            6/13/22  -Has not been taking lurasidone Elisha Thomas), this caused significant GI distress   \"Very argumentative\" at times, more irritability   -lurasidone (Latuda) 40 mg daily STOP this and try nortriptyline (Pamelor)                         11/1/22  -INCREASE amitriptyline (Elavil) from 75 mg every night to 150 mg every night   -propranolol hcl (Inderal) 20 mg three times daily   -                  Medical Diagnoses:  Patient Active Problem List   Diagnosis    Anxiety and depression    PTSD (post-traumatic stress disorder)    Migraine headache    Nausea and vomiting    S/P abdominal hysterectomy    Severe recurrent major depressive disorder with psychotic features ([facility] [place] [address]    Morbid obesity (HCC)    Overactive bladder    Stress incontinence, female    Abdominal pain    Blood in the urine    Essential hypertension    Yeast infection    Vitamin D deficiency    Former smoker    Obsessive-compulsive disorder    Bilateral dry eyes    Shortness of breath    MARBELLA (obstructive sleep apnea)    Moderate episode of recurrent major depressive disorder (HCC)    ALFREDO (generalized anxiety disorder)    Angina at rest [facility])    Hyperlipidemia         AT TODAY'S VISIT     Crisis plan reviewed and patient verbally contracts for safety. Go to ED with emergent symptoms or safety concerns. Risks, benefits, side effects of medications, including any / all black box warnings, discussed with patient, who verbalizes their understanding. Pt is kings for safety and denies thoughts of SI/HI. Amenable to plan. No acute concerns to address regarding medications.      Subjective:      Patient denies medication side effects apart from those mentioned in the assessment and plan. Patient reports they have been compliant with current medication regimen and have not missed a dose. At today's visit, patient denies thoughts of harm to self or others since last appointment, and denies auditory or visual hallucinations. ROS:  [x] All negative/unchanged except if checked.  Explain positive(checked items) below:       Denies any new or changed physical symptoms other than those noted in the subjective portion of this note   [] Constitutional  [] Eyes  [] Ear/Nose/Mouth/Throat  [] Respiratory  [] CV  [] GI  []   [] Musculoskeletal  [] Skin/Breast  [] Neurological  [] Endocrine  [] Heme/Lymph  [] Allergic/Immunologic    OBJECTIVE:   Recent Results (from the past 1008 hour(s))   Lipid, Fasting    Collection Time: 10/25/22  9:25 AM   Result Value Ref Range    Cholesterol, Fasting 245 (H) 0 - 199 mg/dL    Triglyceride, Fasting 133 0 - 150 mg/dL    HDL 52 40 - 59 mg/dL    LDL Calculated 166 (H) 0 - 129 mg/dL   COVID-19, Rapid    Collection Time: 11/10/22  2:49 PM    Specimen: Nasopharyngeal Swab   Result Value Ref Range    SARS-CoV-2, NAAT Not Detected Not Detected   Rapid Influenza A/B Antigens    Collection Time: 11/10/22  2:49 PM    Specimen: Nasopharyngeal   Result Value Ref Range    Influenza A by PCR POSITIVE (A)     Influenza B by PCR Negative    Rapid Strep Screen    Collection Time: 11/10/22  2:49 PM    Specimen: Throat   Result Value Ref Range    Strep Grp A PCR Negative        MEDICATIONS:    Current Outpatient Medications:     propranolol (INDERAL) 20 MG tablet, Take 1 tablet by mouth 3 times daily, Disp: 90 tablet, Rfl: 3    nortriptyline (PAMELOR) 25 MG capsule, Take 1 capsule by mouth every morning (before breakfast) AND 3 capsules nightly., Disp: 120 capsule, Rfl: 3    albuterol sulfate HFA (VENTOLIN HFA) 108 (90 Base) MCG/ACT inhaler, Inhale 2 puffs into the lungs 4 times daily as needed for Wheezing, Disp: 18 g, Rfl: 5 albuterol (ACCUNEB) 0.63 MG/3ML nebulizer solution, Take 3 mLs by nebulization every 4 hours as needed for Wheezing, Disp: 270 mL, Rfl: 3    oseltamivir (TAMIFLU) 75 MG capsule, Take 1 capsule by mouth 2 times daily for 5 days, Disp: 10 capsule, Rfl: 0    HYDROcodone-acetaminophen (NORCO) 5-325 MG per tablet, Take 1 tablet by mouth 2 times daily as needed for Pain for up to 14 days. , Disp: 28 tablet, Rfl: 0    solifenacin (VESICARE) 10 MG tablet, Take 1 tablet by mouth daily, Disp: 15 tablet, Rfl: 0    albuterol sulfate HFA (PROVENTIL;VENTOLIN;PROAIR) 108 (90 Base) MCG/ACT inhaler, Inhale 2 puffs into the lungs every 4 hours as needed for Wheezing, Disp: 8.5 each, Rfl: 3    mirabegron (MYRBETRIQ) 50 MG TB24, Take 50 mg by mouth daily Lot [id number] Exp 07/23, Disp: 21 tablet, Rfl: 0    neomycin-polymyxin-dexameth (MAXITROL) 3.5-08798-6.1 ophthalmic suspension, , Disp: , Rfl:     SUMAtriptan (IMITREX) 50 MG tablet, Take 1 tablet by mouth once as needed for Migraine, Disp: 9 tablet, Rfl: 3    pantoprazole (PROTONIX) 40 MG tablet, Take 1 tablet by mouth daily, Disp: 90 tablet, Rfl: 1    nystatin (MYCOSTATIN) 763640 UNIT/GM cream, Apply topically 2 times daily. , Disp: 30 g, Rfl: 2    levothyroxine (SYNTHROID) 25 MCG tablet, Take 1 tablet by mouth daily, Disp: 90 tablet, Rfl: 1    hydrOXYzine HCl (ATARAX) 25 MG tablet, Take 3 tablets by mouth nightly, Disp: 270 tablet, Rfl: 2    fluconazole (DIFLUCAN) 150 MG tablet, Take 1 tablet by mouth Twice a Week, Disp: 24 tablet, Rfl: 3    cycloSPORINE (RESTASIS) 0.05 % ophthalmic emulsion, Place 1 drop into both eyes 2 times daily, Disp: 1 each, Rfl: 3    beclomethasone (QVAR REDIHALER) 80 MCG/ACT AERB inhaler, Inhale 1 puff into the lungs in the morning and 1 puff in the evening., Disp: 1 each, Rfl: 2    ALPRAZolam (XANAX) 2 MG tablet, Take 2 mg by mouth 2 times daily as needed for Sleep., Disp: , Rfl:     ibuprofen (ADVIL;MOTRIN) 800 MG tablet, Take 1 tablet by mouth every 8 hours as needed for Pain, Disp: 21 tablet, Rfl: 0    Blood Pressure Monitoring (BLOOD PRESSURE KIT) ZELALEM, 1 Units by Does not apply route as needed (for BP monitoring at home for hypertension) Cuff for upper arm, Disp: 1 Device, Rfl: 0    Examination:    Vitals: not taken in person, most recent vitals in chart reviewed  There were no vitals filed for this visit. Wt Readings from Last 3 Encounters:   11/10/22 230 lb (104.3 kg)   11/01/22 243 lb (110.2 kg)   10/25/22 242 lb (109.8 kg)       BP Readings from Last 3 Encounters:   11/10/22 (!) 135/97   11/01/22 118/70   10/25/22 114/82         Mental Status Examination:    Level of consciousness:  alert and oriented to person, place, and situation  Appearance:  well-appearing good grooming and good hygiene  Behavior/Motor:  no abnormalities noted  Attitude toward examiner: friendly, pleasant and cooperative, attentive and good eye contact  Speech:  spontaneous, normal rate and normal volume   Mood: \"good\"  Affect:  mood congruent  Thought processes:  linear and logical  Thought content:  Denies suicidal or homicidal ideation, denies auditory or visual hallucinations  Cognition:  no deficits in attention, concentration notable, recent memory grossly intact  Concentration intact  Memory intact  Insight good   Judgement fair   Fund of Knowledge adequate    PSYCHOTHERAPY/COUNSELING:  Encourage patient to attend outpatient appointments and therapy. [x] Therapeutic interview  [] Supportive  [x] CBT  [x] Ongoing  [] Other    No follow-ups on file. patient informed to call for follow-up or to reschedule appointment     Please note this report has been partially produced using speech recognition software  And may cause contain errors related to that system including grammar, punctuation and spelling as well as words and phrases that may seem inappropriate. If there are questions or concerns please feel free to contact me to clarify.     Zonia Vo MD  Electronically signed by Genoveva Colon MD on 11/13/2022 at 10:55 PM  11/13/2022 10:55 PM    Psychiatry   Due to this being a TeleHealth encounter, evaluation of the following organ systems is limited: Vitals/Constitutional/EENT/Resp/CV/GI//MS/Neuro/Skin/Heme-Lymph-Imm. An  electronic signature was used to authenticate this note. --Genoveva Colon MD on 11/13/2022 at 10:55 PM    Pursuant to the emergency declaration under the [address] [place] [address] waiver authority and the [employer] Act, this Virtual  Visit was conducted, with patient's consent, to reduce the patient's risk of exposure to COVID-19 and provide continuity of care for an established patient Services were provided through a video synchronous discussion virtually to substitute for in-person clinic visit. Treatment Plan:  Reviewed current Medications with the patient. Education provided on the compliance with treatment. Reviewed OARRs, no concerns identified     The anticipated benefits and side effects of the medications, including the anticipated results of not receiving the medication, and of alternatives to the medications were explained to the patient and their informed consent was obtained for starting medications as well as adjusting the doses (titration or tapering) as indicated. The above information was given by physician in verbal form and sufficient understanding was in evidence. The patient participated in discussion of the information and question and/or concerns were addressed before the medication was given. Due to COVID 19 outbreak, patient's office visit was converted to a virtual visit.   Patient was contacted and agreed to proceed with a virtual visit via DOXY

## 2022-11-01 NOTE — PROGRESS NOTES
Marilee Gomes  (1975)    11/1/2022    Subjective:     Marilee Gomes is 56 y.o. female who complains today of:    Chief Complaint   Patient presents with    Lower Back Pain         Allergies:  Aspirin, Codeine, Lithium, Naproxen, and Trazodone and nefazodone    Past Medical History:   Diagnosis Date    Angina at rest [facility]) 10/25/2022    Anxiety and depression     Anxiety and depression     Asthma     Essential hypertension 5/18/2017    Headache(784.0)     Hyperlipidemia 10/25/2022    Obesity     MARBELLA (obstructive sleep apnea) 4/29/2020    PTSD (post-traumatic stress disorder)     Urinary incontinence      Past Surgical History:   Procedure Laterality Date    CHOLECYSTECTOMY      ENDOMETRIAL ABLATION      HYSTERECTOMY, TOTAL ABDOMINAL (CERVIX REMOVED)  7/15/15    DR. Hoffman 660    TONSILLECTOMY      TUBAL LIGATION       Family History   Problem Relation Age of Onset    Diabetes Mother     High Blood Pressure Mother      Social History     Socioeconomic History    Marital status:      Spouse name: Not on file    Number of children: Not on file    Years of education: Not on file    Highest education level: Not on file   Occupational History    Not on file   Tobacco Use    Smoking status: Every Day     Packs/day: 0.50     Types: Cigarettes    Smokeless tobacco: Never   Vaping Use    Vaping Use: Never used   Substance and Sexual Activity    Alcohol use: No    Drug use: No    Sexual activity: Yes   Other Topics Concern    Not on file   Social History Narrative    Not on file     Social Determinants of Health     Financial Resource Strain: Unknown    Difficulty of Paying Living Expenses: Patient refused   Food Insecurity: Unknown    Worried About Running Out of Food in the Last Year: Patient refused    [address] in the Last Year: Patient refused   Transportation Needs: Unknown    Lack of Transportation (Medical): Patient refused    Lack of Transportation (Non-Medical): Patient refused   Physical Activity: Unknown    Days of Exercise per Week: Patient refused    Minutes of Exercise per Session: Patient refused   Stress: Unknown    Feeling of Stress : Patient refused   Social Connections: Unknown    Frequency of Communication with Friends and Family: Patient refused    Frequency of Social Gatherings with Friends and Family: Patient refused    Attends Hindu Services: Patient refused    Active Member of Clubs or Organizations: Patient refused    Attends Club or Organization Meetings: Patient refused    Marital Status: Patient refused   Intimate Partner Violence: Unknown    Fear of Current or Ex-Partner: Patient refused    Emotionally Abused: Patient refused    Physically Abused: Patient refused    Sexually Abused: Patient refused   Housing Stability: Unknown    Unable to Pay for Housing in the Last Year: Patient refused    Number of Places Lived in the Last Year: Not on file    Unstable Housing in the Last Year: Patient refused       Current Outpatient Medications on File Prior to Visit   Medication Sig Dispense Refill    propranolol (INDERAL) 20 MG tablet Take 1 tablet by mouth 3 times daily 90 tablet 3    nortriptyline (PAMELOR) 25 MG capsule Take 1 capsule by mouth every morning (before breakfast) AND 3 capsules nightly. 120 capsule 3    solifenacin (VESICARE) 10 MG tablet Take 1 tablet by mouth daily 15 tablet 0    albuterol sulfate HFA (PROVENTIL;VENTOLIN;PROAIR) 108 (90 Base) MCG/ACT inhaler Inhale 2 puffs into the lungs every 4 hours as needed for Wheezing 8.5 each 3    mirabegron (MYRBETRIQ) 50 MG TB24 Take 50 mg by mouth daily Lot [id number]  Exp 07/23 21 tablet 0    neomycin-polymyxin-dexameth (MAXITROL) 3.5-34338-6.1 ophthalmic suspension       pantoprazole (PROTONIX) 40 MG tablet Take 1 tablet by mouth daily 90 tablet 1    nystatin (MYCOSTATIN) 479180 UNIT/GM cream Apply topically 2 times daily.  30 g 2    levothyroxine (SYNTHROID) 25 MCG tablet Take 1 tablet by mouth daily 90 tablet 1 hydrOXYzine HCl (ATARAX) 25 MG tablet Take 3 tablets by mouth nightly 270 tablet 2    fluconazole (DIFLUCAN) 150 MG tablet Take 1 tablet by mouth Twice a Week 24 tablet 3    cycloSPORINE (RESTASIS) 0.05 % ophthalmic emulsion Place 1 drop into both eyes 2 times daily 1 each 3    beclomethasone (QVAR REDIHALER) 80 MCG/ACT AERB inhaler Inhale 1 puff into the lungs in the morning and 1 puff in the evening. 1 each 2    ALPRAZolam (XANAX) 2 MG tablet Take 2 mg by mouth 2 times daily as needed for Sleep.      ibuprofen (ADVIL;MOTRIN) 800 MG tablet Take 1 tablet by mouth every 8 hours as needed for Pain 21 tablet 0    Blood Pressure Monitoring (BLOOD PRESSURE KIT) ZELALEM 1 Units by Does not apply route as needed (for BP monitoring at home for hypertension) Cuff for upper arm 1 Device 0    SUMAtriptan (IMITREX) 50 MG tablet Take 1 tablet by mouth once as needed for Migraine 9 tablet 3     No current facility-administered medications on file prior to visit. Pt presents today for a f/u of chronic low back pain. Patient had initial consult with Dr. Paulino Nair 3 weeks ago for back pain. Previous RF several years ago helped. She got 80% pain relief following MBBS. No history of back surgery. Physical therapy has been tried in the past history of anxiety and depression, MARBELLA, PTSD, obesity. X-ray lumbar spine 10/13/2022 shows arthritis, possible stenosis. Pt feels that bending & lifting aggravates the pain. Pt denies radiating numbness and tingling in the BLE. Medrol  pack sent last OV.    10/24/2022 right L4-5, 5-S1 facet injections plan. Review of Systems   Constitutional:  Negative for fever. HENT:  Negative for congestion and sore throat. Respiratory:  Negative for shortness of breath. Gastrointestinal:  Negative for abdominal pain. Genitourinary:  Negative for difficulty urinating. Musculoskeletal:  Positive for back pain. Neurological:  Negative for speech difficulty and headaches. Hematological:  Negative for adenopathy. Psychiatric/Behavioral:  Negative for agitation. All other systems reviewed and are negative. Objective:     Vitals:  /70 (Site: Right Upper Arm, Position: Sitting, Cuff Size: Large Adult)   Pulse 98   Temp 98.6 °F (37 °C) (Temporal)   Resp 16   Ht 4' 9\" (1.448 m)   Wt 243 lb (110.2 kg)   LMP 06/15/2015   SpO2 98%   BMI 52.58 kg/m² Pain Score:   9      Physical Exam  Vitals and nursing note reviewed. Pt is alert and oriented x 3. Recent and remote memory is intact. Mood, judgement and affect are normal.  No signs of distress or SOB noted. Visualized skin intact. Sensation intact to light touch. Decreased ROM with flexion and extension of low back. Tender with palpation to bilateral lumbar spine with positive provacative maneuvers noted. Negative SLR. Strength, balance, and coordination are functional for ambulation. Assessment:      Diagnosis Orders   1. Lumbosacral spondylosis without myelopathy  HYDROcodone-acetaminophen (NORCO) 5-325 MG per tablet    ND INJ DX/THER AGNT PARAVERT FACET JOINT, LUMBAR/SAC, 1ST LEVEL    ND INJ DX/THER AGNT PARAVERT FACET JOINT, LUMBAR/SAC, 2ND LEVEL      2. Pain  HYDROcodone-acetaminophen (NORCO) 5-325 MG per tablet          Plan:     Periodic Controlled Substance Monitoring: No signs of potential drug abuse or diversion identified. Donald Almendarez, APRN - CNP)    Orders Placed This Encounter   Medications    HYDROcodone-acetaminophen (NORCO) 5-325 MG per tablet     Sig: Take 1 tablet by mouth 2 times daily as needed for Pain for up to 14 days.      Dispense:  28 tablet     Refill:  0     Reduce doses taken as pain becomes manageable       Orders Placed This Encounter   Procedures    ND INJ DX/THER AGNT PARAVERT FACET JOINT, LUMBAR/SAC, 1ST LEVEL     Standing Status:   Future     Standing Expiration Date:   1/30/2023    ND INJ DX/THER AGNT PARAVERT FACET JOINT, LUMBAR/SAC, 2ND LEVEL       MBB with SK. Patient has pain BL and wants both sides. Standing Status:   Future     Standing Expiration Date:   1/30/2023     Discussed options with the patient today. The facet injections helped but wore off quickly. She would like to proceed with MBB's and RFA. We will order bilateral L2-3-4 5 MBB's for her chronic pain. She also request some Norco to use short-term until she can be seen again for injections. Do not intend to continue long-term. All questions were answered. Pt verbalized understanding and agrees with above plan. We will go ahead and order diagnostic bilateral L2, L3, L4, L5 medial branch blocks to see if the patient is a candidate for RF ablation. she has failed conservative treatment in the past. Anatomic model of pathology was shown. Risks and benefits of the procedure were discussed. All questions were answered and patient understands and agrees with the plan. Will continue medications for chronic pain as they help pt function with ADL and improve quality of life. This NP saw pt under direct supervision of Dr. Michael Cao. Follow up:  Return if symptoms worsen or fail to improve.     Phyllis Anthony, APRN - CNP

## 2022-11-10 ENCOUNTER — APPOINTMENT (OUTPATIENT)
Dept: GENERAL RADIOLOGY | Age: 47
End: 2022-11-10
Payer: COMMERCIAL

## 2022-11-10 ENCOUNTER — HOSPITAL ENCOUNTER (EMERGENCY)
Age: 47
Discharge: HOME | End: 2022-11-10
Attending: EMERGENCY MEDICINE | Admitting: EMERGENCY MEDICINE
Payer: COMMERCIAL

## 2022-11-10 VITALS
TEMPERATURE: 97.8 F | SYSTOLIC BLOOD PRESSURE: 135 MMHG | RESPIRATION RATE: 20 BRPM | HEIGHT: 57 IN | WEIGHT: 230 LBS | OXYGEN SATURATION: 98 % | HEART RATE: 80 BPM | BODY MASS INDEX: 49.62 KG/M2 | DIASTOLIC BLOOD PRESSURE: 97 MMHG

## 2022-11-10 DIAGNOSIS — J10.1 INFLUENZA A: Primary | ICD-10-CM

## 2022-11-10 LAB
INFLUENZA A BY PCR: POSITIVE
INFLUENZA B BY PCR: NEGATIVE
SARS-COV-2, NAAT: NOT DETECTED
STREP GRP A PCR: NEGATIVE

## 2022-11-10 PROCEDURE — 71045 X-RAY EXAM CHEST 1 VIEW: CPT

## 2022-11-10 PROCEDURE — 96372 THER/PROPH/DIAG INJ SC/IM: CPT

## 2022-11-10 PROCEDURE — 87651 STREP A DNA AMP PROBE: CPT

## 2022-11-10 PROCEDURE — 99284 EMERGENCY DEPT VISIT MOD MDM: CPT

## 2022-11-10 PROCEDURE — 87502 INFLUENZA DNA AMP PROBE: CPT

## 2022-11-10 PROCEDURE — 87635 SARS-COV-2 COVID-19 AMP PRB: CPT

## 2022-11-10 PROCEDURE — 6360000002 HC RX W HCPCS: Performed by: EMERGENCY MEDICINE

## 2022-11-10 RX ORDER — DEXAMETHASONE SODIUM PHOSPHATE 10 MG/ML
10 INJECTION INTRAMUSCULAR; INTRAVENOUS ONCE
Status: COMPLETED | OUTPATIENT
Start: 2022-11-10 | End: 2022-11-10

## 2022-11-10 RX ORDER — DEXAMETHASONE SODIUM PHOSPHATE 10 MG/ML
INJECTION, SOLUTION INTRAMUSCULAR; INTRAVENOUS
Status: DISCONTINUED
Start: 2022-11-10 | End: 2022-11-10 | Stop reason: HOSPADM

## 2022-11-10 RX ORDER — ALBUTEROL SULFATE 0.63 MG/3ML
1 SOLUTION RESPIRATORY (INHALATION) EVERY 4 HOURS PRN
Qty: 270 ML | Refills: 3 | Status: SHIPPED | OUTPATIENT
Start: 2022-11-10

## 2022-11-10 RX ORDER — OSELTAMIVIR PHOSPHATE 75 MG/1
75 CAPSULE ORAL 2 TIMES DAILY
Qty: 10 CAPSULE | Refills: 0 | Status: SHIPPED | OUTPATIENT
Start: 2022-11-10 | End: 2022-11-15

## 2022-11-10 RX ORDER — ALBUTEROL SULFATE 90 UG/1
2 AEROSOL, METERED RESPIRATORY (INHALATION) 4 TIMES DAILY PRN
Qty: 18 G | Refills: 5 | Status: SHIPPED | OUTPATIENT
Start: 2022-11-10

## 2022-11-10 RX ADMIN — DEXAMETHASONE SODIUM PHOSPHATE 10 MG: 10 INJECTION, SOLUTION INTRAMUSCULAR; INTRAVENOUS at 14:59

## 2022-11-10 ASSESSMENT — PAIN SCALES - GENERAL: PAINLEVEL_OUTOF10: 5

## 2022-11-10 ASSESSMENT — PAIN DESCRIPTION - DESCRIPTORS: DESCRIPTORS: ACHING

## 2022-11-10 ASSESSMENT — PAIN - FUNCTIONAL ASSESSMENT: PAIN_FUNCTIONAL_ASSESSMENT: 0-10

## 2022-11-10 ASSESSMENT — PAIN DESCRIPTION - LOCATION: LOCATION: GENERALIZED

## 2022-11-10 ASSESSMENT — PAIN DESCRIPTION - PAIN TYPE: TYPE: ACUTE PAIN

## 2022-11-10 ASSESSMENT — PAIN DESCRIPTION - FREQUENCY: FREQUENCY: INTERMITTENT

## 2022-11-10 NOTE — ED TRIAGE NOTES
Patient presents to ED with c/o cough and nasal congestion and body aches that started on Monday.  She does report being exposed to Flu this week by her family

## 2022-11-10 NOTE — ED TRIAGE NOTES
Patient presents to ED with c/o cough and sore throat and runny nose, and body aches that started on Monday -she does report having family at home that recently where diagnosed with the flu

## 2022-11-10 NOTE — ED PROVIDER NOTES
CC/HPI: 44-year-old female to the emergency department with flulike symptoms. Patient states she has other family members that she has been around that were diagnosed with flu. She has felt fevered chilled and body aches. No vomiting mild diarrhea. Patient states she has a history of asthma and feels wheezy and short of breath at times. No recent travel no calf pain or swelling. Patient states she has a home nebulizer but is out of medication      VITALS/PMH/PSH: Reviewed per nurses notes    REVIEW OF SYSTEMS: As in chief complaint history of present illness, otherwise all other systems are reviewed and negative the total 10 systems reviewed    PHYSICAL EXAM:  GEN: Pt alert and oriented, no acute distress  HEENT:         Normocephalic/Atramatic        PERRL, EOMI       EACs and TMs clear b/l       Throat mildly erythematous. No edema noted. No exudates noted. Moist membranes  NECK: Nontender, no signs of trauma, no lymphadenopathy  HEART: Reg S1/S2, without murmer, rub or gallop  LUNGS: Slightly diminished but clear to auscultation bilaterally, respirations even and unlabored  MUSCULOSKELETAL/EXTREMITITES:  No signs of trauma, cyanosis or edema. No calf swelling or tenderness  LYMPH: no peripheral lympadenopathy noted  SKIN:  Warm & dry, no rash  NEUROLOGIC:  Alert and oriented. Speech clear    Medical decision making/ED course;  Patient main stable throughout the course of emergency department stay. COVID testing was negative. Flu testing was positive for influenza A. Patient states with her asthma usually a shot of steroids helps a great deal so she was given 10 mg of Decadron while in the emergency department. Chest x-ray was interpreted by me as showing no obvious signs of acute pulmonary disease    Final Clinical impression;  1) influenza A    Disposition/plan; patient discharged home in stable condition given discharge instructions on influenza A.   Patient was given prescription for albuterol inhaler and nebulizer medication also Tamiflu. Patient was given off work note. Strongly encouraged to follow-up with primary care physician next week unless symptoms are resolving. Return for worsening or changes to symptoms.      Anshul Bateman DO  11/10/22 [id number]

## 2022-11-10 NOTE — Clinical Note
Ino Diver was seen and treated in our emergency department on 11/10/2022. She may return to work on 11/12/2022. If you have any questions or concerns, please don't hesitate to call.       Ira Joseph, DO

## 2022-11-13 DIAGNOSIS — F41.9 ANXIETY: Primary | ICD-10-CM

## 2022-11-15 ENCOUNTER — HOSPITAL ENCOUNTER (OUTPATIENT)
Dept: NON INVASIVE DIAGNOSTICS | Age: 47
Discharge: HOME | End: 2022-11-15
Payer: COMMERCIAL

## 2022-11-15 DIAGNOSIS — I10 ESSENTIAL HYPERTENSION: ICD-10-CM

## 2022-11-15 PROCEDURE — 93225 XTRNL ECG REC<48 HRS REC: CPT

## 2022-11-15 PROCEDURE — 93226 XTRNL ECG REC<48 HR SCAN A/R: CPT

## 2022-11-15 RX ORDER — ALPRAZOLAM 2 MG/1
2 TABLET ORAL NIGHTLY PRN
Qty: 60 TABLET | Refills: 0 | Status: SHIPPED | OUTPATIENT
Start: 2022-11-15 | End: 2022-12-01 | Stop reason: SDUPTHER

## 2022-11-15 NOTE — TELEPHONE ENCOUNTER
Pharmacy is requesting medication refill. Please approve or deny this request.    Rx requested:  Requested Prescriptions     Pending Prescriptions Disp Refills    ALPRAZolam (XANAX) 2 MG tablet [Pharmacy Med Name: ALPRAZOLAM 2 MG TABLET] 60 tablet 2     Sig: Take 1 tablet by mouth nightly as needed for Sleep for up to 30 days.          Last Office Visit:   8/30/2022      Next Visit Date:  Future Appointments   Date Time Provider [place]   11/22/2022 10:30 [facility] [place] [facility] [place]   12/1/2022  1:30 PM [facility]   12/5/2022 10:00 AM SCHEDULE, [facility]O [facility] [address] [facility] [place]   12/6/2022 12:30 PM Lilia Roland MD [facility]  [place]   12/7/2022  1:30 PM Isaiah Grady MD [facility]   1/3/2023  4:00 PM ANA Crowley CNP [facility] EMERGENCY MEDICAL CENTER AT [place]   2/1/2023 12:00 PM Lilia Roland MD [facility]  [place]

## 2022-11-16 ENCOUNTER — HOSPITAL ENCOUNTER (EMERGENCY)
Age: 47
Discharge: HOME | End: 2022-11-16
Payer: COMMERCIAL

## 2022-11-16 VITALS
RESPIRATION RATE: 18 BRPM | OXYGEN SATURATION: 98 % | HEART RATE: 102 BPM | TEMPERATURE: 97.3 F | SYSTOLIC BLOOD PRESSURE: 146 MMHG | DIASTOLIC BLOOD PRESSURE: 88 MMHG

## 2022-11-16 DIAGNOSIS — H66.002 ACUTE SUPPURATIVE OTITIS MEDIA OF LEFT EAR WITHOUT SPONTANEOUS RUPTURE OF TYMPANIC MEMBRANE, RECURRENCE NOT SPECIFIED: Primary | ICD-10-CM

## 2022-11-16 DIAGNOSIS — R05.9 COUGH, UNSPECIFIED TYPE: ICD-10-CM

## 2022-11-16 DIAGNOSIS — J02.9 VIRAL PHARYNGITIS: ICD-10-CM

## 2022-11-16 LAB
INFLUENZA A BY PCR: NEGATIVE
INFLUENZA B BY PCR: NEGATIVE
SARS-COV-2, NAAT: NOT DETECTED
STREP GRP A PCR: NEGATIVE

## 2022-11-16 PROCEDURE — 6370000000 HC RX 637 (ALT 250 FOR IP)

## 2022-11-16 PROCEDURE — 99283 EMERGENCY DEPT VISIT LOW MDM: CPT

## 2022-11-16 PROCEDURE — 87651 STREP A DNA AMP PROBE: CPT

## 2022-11-16 PROCEDURE — 87502 INFLUENZA DNA AMP PROBE: CPT

## 2022-11-16 PROCEDURE — 87635 SARS-COV-2 COVID-19 AMP PRB: CPT

## 2022-11-16 PROCEDURE — 6360000002 HC RX W HCPCS

## 2022-11-16 RX ORDER — DEXAMETHASONE 4 MG/1
8 TABLET ORAL ONCE
Status: COMPLETED | OUTPATIENT
Start: 2022-11-16 | End: 2022-11-16

## 2022-11-16 RX ORDER — AZITHROMYCIN 250 MG/1
TABLET, FILM COATED ORAL
Qty: 1 PACKET | Refills: 0 | Status: SHIPPED | OUTPATIENT
Start: 2022-11-16 | End: 2022-11-20

## 2022-11-16 RX ORDER — ACETAMINOPHEN 500 MG
1000 TABLET ORAL ONCE
Status: COMPLETED | OUTPATIENT
Start: 2022-11-16 | End: 2022-11-16

## 2022-11-16 RX ORDER — GUAIFENESIN AND DEXTROMETHORPHAN HYDROBROMIDE 600; 30 MG/1; MG/1
1 TABLET, EXTENDED RELEASE ORAL DAILY
Qty: 14 TABLET | Refills: 0 | Status: SHIPPED | OUTPATIENT
Start: 2022-11-16 | End: 2022-11-30

## 2022-11-16 RX ORDER — BENZONATATE 200 MG/1
200 CAPSULE ORAL 3 TIMES DAILY PRN
Qty: 21 CAPSULE | Refills: 0 | Status: SHIPPED | OUTPATIENT
Start: 2022-11-16 | End: 2022-11-23

## 2022-11-16 RX ADMIN — DEXAMETHASONE 8 MG: 4 TABLET ORAL at 18:50

## 2022-11-16 RX ADMIN — ACETAMINOPHEN 1000 MG: 500 TABLET ORAL at 18:50

## 2022-11-16 ASSESSMENT — PAIN DESCRIPTION - ORIENTATION
ORIENTATION: RIGHT;LEFT
ORIENTATION: LEFT;RIGHT

## 2022-11-16 ASSESSMENT — ENCOUNTER SYMPTOMS
BACK PAIN: 0
VOMITING: 0
NAUSEA: 0
DIARRHEA: 0
SORE THROAT: 1
SHORTNESS OF BREATH: 0
ABDOMINAL PAIN: 0
COUGH: 1

## 2022-11-16 ASSESSMENT — PAIN SCALES - GENERAL
PAINLEVEL_OUTOF10: 6
PAINLEVEL_OUTOF10: 6

## 2022-11-16 ASSESSMENT — PAIN DESCRIPTION - LOCATION
LOCATION: EAR
LOCATION: EAR

## 2022-11-16 ASSESSMENT — PAIN - FUNCTIONAL ASSESSMENT: PAIN_FUNCTIONAL_ASSESSMENT: 0-10

## 2022-11-16 NOTE — ED PROVIDER NOTES
[facility] ED  eMERGENCYdEPARTMENT eNCOUnter      Pt Name: Farooq Tompkins  MRN: [id number]  [place] 1975of evaluation: 11/16/2022  Provider:ANA Clark CNP    CHIEF COMPLAINT       Chief Complaint   Patient presents with    Pharyngitis     Pt c/o sore throat and bilateral ear pain  was diagnosed with flu a few weeks ago          HISTORY OF PRESENT ILLNESS  (Location/Symptom, Timing/Onset, Context/Setting, Quality, Duration, Modifying Factors, Severity.)   Farooq Tompkins is a 56 y.o. female hx of HA, Anxiety, Asthma, HTN, who presents to the emergency department with pharyngitis and otalgia. Patient states she has had a sore throat, hoarse voice, ear pain x 2 weeks. She was diagnosed with influenza A 11/10/2022. Ear and throat pain 6/10 ache she has not taken anything for. Denies any fever, chills, CP, SOB, abdominal pain, nausea, emesis. HPI    Nursing Notes were reviewed and I agree. REVIEW OF SYSTEMS    (2-9 systems for level 4, 10 or more for level 5)     Review of Systems   Constitutional:  Negative for activity change, chills and fever. HENT:  Positive for ear pain and sore throat. Eyes:  Negative for visual disturbance. Respiratory:  Positive for cough. Negative for shortness of breath. Cardiovascular:  Negative for chest pain, palpitations and leg swelling. Gastrointestinal:  Negative for abdominal pain, diarrhea, nausea and vomiting. Genitourinary:  Negative for dysuria. Musculoskeletal:  Negative for back pain. Skin:  Negative for rash. Neurological:  Negative for dizziness and weakness. as noted above the remainder of the review of systems was reviewed and negative.        PAST MEDICAL HISTORY     Past Medical History:   Diagnosis Date    Angina at rest [facility]) 10/25/2022    Anxiety and depression     Anxiety and depression     Asthma     Essential hypertension 5/18/2017    Headache(784.0)     Hyperlipidemia 10/25/2022    Obesity     MARBELLA (obstructive sleep apnea) 4/29/2020    PTSD (post-traumatic stress disorder)     Urinary incontinence          SURGICAL HISTORY       Past Surgical History:   Procedure Laterality Date    CHOLECYSTECTOMY      ENDOMETRIAL ABLATION      HYSTERECTOMY, TOTAL ABDOMINAL (CERVIX REMOVED)  7/15/15    DR. BECKHAM    TONSILLECTOMY      TUBAL LIGATION           CURRENT MEDICATIONS       Previous Medications    ALBUTEROL (ACCUNEB) 0.63 MG/3ML NEBULIZER SOLUTION    Take 3 mLs by nebulization every 4 hours as needed for Wheezing    ALBUTEROL SULFATE HFA (PROVENTIL;VENTOLIN;PROAIR) 108 (90 BASE) MCG/ACT INHALER    Inhale 2 puffs into the lungs every 4 hours as needed for Wheezing    ALBUTEROL SULFATE HFA (VENTOLIN HFA) 108 (90 BASE) MCG/ACT INHALER    Inhale 2 puffs into the lungs 4 times daily as needed for Wheezing    ALPRAZOLAM (XANAX) 2 MG TABLET    Take 1 tablet by mouth nightly as needed for Sleep for up to 30 days. BECLOMETHASONE (QVAR REDIHALER) 80 MCG/ACT AERB INHALER    Inhale 1 puff into the lungs in the morning and 1 puff in the evening. BLOOD PRESSURE MONITORING (BLOOD PRESSURE KIT) ZELALEM    1 Units by Does not apply route as needed (for BP monitoring at home for hypertension) Cuff for upper arm    CYCLOSPORINE (RESTASIS) 0.05 % OPHTHALMIC EMULSION    Place 1 drop into both eyes 2 times daily    FLUCONAZOLE (DIFLUCAN) 150 MG TABLET    Take 1 tablet by mouth Twice a Week    HYDROXYZINE HCL (ATARAX) 25 MG TABLET    Take 3 tablets by mouth nightly    IBUPROFEN (ADVIL;MOTRIN) 800 MG TABLET    Take 1 tablet by mouth every 8 hours as needed for Pain    LEVOTHYROXINE (SYNTHROID) 25 MCG TABLET    Take 1 tablet by mouth daily    MIRABEGRON (MYRBETRIQ) 50 MG TB24    Take 50 mg by mouth daily Lot [id number]  Exp 07/23    NEOMYCIN-POLYMYXIN-DEXAMETH (MAXITROL) 3.5-72474-8.1 OPHTHALMIC SUSPENSION        NORTRIPTYLINE (PAMELOR) 25 MG CAPSULE    Take 1 capsule by mouth every morning (before breakfast) AND 3 capsules nightly.     NYSTATIN (MYCOSTATIN) 094576 UNIT/GM CREAM    Apply topically 2 times daily.     PANTOPRAZOLE (PROTONIX) 40 MG TABLET    Take 1 tablet by mouth daily    PROPRANOLOL (INDERAL) 20 MG TABLET    Take 1 tablet by mouth 3 times daily    SOLIFENACIN (VESICARE) 10 MG TABLET    Take 1 tablet by mouth daily    SUMATRIPTAN (IMITREX) 50 MG TABLET    Take 1 tablet by mouth once as needed for Migraine       ALLERGIES     Aspirin, Codeine, Lithium, Naproxen, and Trazodone and nefazodone    HISTORY       Family History   Problem Relation Age of Onset    Diabetes Mother     High Blood Pressure Mother           SOCIAL HISTORY       Social History     Socioeconomic History    Marital status:    Tobacco Use    Smoking status: Every Day     Packs/day: 0.50     Types: Cigarettes     Passive exposure: Current    Smokeless tobacco: Never   Vaping Use    Vaping Use: Never used   Substance and Sexual Activity    Alcohol use: No    Drug use: No    Sexual activity: Yes     Partners: Male     Social Determinants of Health     Financial Resource Strain: Unknown    Difficulty of Paying Living Expenses: Patient refused   Food Insecurity: Unknown    Worried About Running Out of Food in the Last Year: Patient refused    Ran Out of Food in the Last Year: Patient refused   Transportation Needs: Unknown    Lack of Transportation (Medical): Patient refused    Lack of Transportation (Non-Medical): Patient refused   Physical Activity: Unknown    Days of Exercise per Week: Patient refused    Minutes of Exercise per Session: Patient refused   Stress: Unknown    Feeling of Stress : Patient refused   Social Connections: Unknown    Frequency of Communication with Friends and Family: Patient refused    Frequency of Social Gatherings with Friends and Family: Patient refused    Attends [employer] Services: Patient refused    Active Member of Clubs or Organizations: Patient refused    Attends Club or Organization Meetings: Patient refused    Marital Status: Patient refused   Intimate Partner Violence: Unknown    Fear of Current or Ex-Partner: Patient refused    Emotionally Abused: Patient refused    Physically Abused: Patient refused    Sexually Abused: Patient refused   Housing Stability: Unknown    Unable to Pay for Housing in the Last Year: Patient refused    Unstable Housing in the Last Year: Patient refused       SCREENINGS    [place] Coma Scale  Eye Opening: Spontaneous  Best Verbal Response: Oriented  Best Motor Response: Obeys commands  [place] Coma Scale Score: 15      PHYSICAL EXAM    (up to 7 forlevel 4, 8 or more for level 5)     ED Triage Vitals [11/16/22 1836]   BP Temp Temp src Heart Rate Resp SpO2 Height Weight   (!) 146/89 97.3 °F (36.3 °C) -- (!) 101 18 99 % -- --       Physical Exam  Vitals and nursing note reviewed. Constitutional:       General: She is not in acute distress. Appearance: She is well-developed. She is not ill-appearing. HENT:      Head: Normocephalic and atraumatic. Right Ear: External ear normal. Tenderness present. A middle ear effusion is present. Tympanic membrane is erythematous. Left Ear: External ear normal. A middle ear effusion is present. Nose: Congestion present. Mouth/Throat:      Mouth: Mucous membranes are moist.      Tonsils: No tonsillar exudate or tonsillar abscesses. Eyes:      Conjunctiva/sclera: Conjunctivae normal.      Pupils: Pupils are equal, round, and reactive to light. Cardiovascular:      Rate and Rhythm: Normal rate and regular rhythm. Heart sounds: Normal heart sounds. No murmur heard. No friction rub. Pulmonary:      Effort: Pulmonary effort is normal. No respiratory distress. Breath sounds: Normal breath sounds. No wheezing or rhonchi. Abdominal:      General: Bowel sounds are normal. There is no distension. Palpations: Abdomen is soft. Tenderness: There is no abdominal tenderness. Musculoskeletal:         General: Normal range of motion. Cervical back: Normal range of motion and neck supple. Skin:     General: Skin is warm and dry. Capillary Refill: Capillary refill takes less than 2 seconds. Neurological:      General: No focal deficit present. Mental Status: She is alert and oriented to person, place, and time. Psychiatric:         Mood and Affect: Mood normal.         Behavior: Behavior normal.         DIAGNOSTIC RESULTS     RADIOLOGY:   Non-plain film images such as CT, Ultrasound and MRI are read by the radiologist. Plain radiographic images are visualized and preliminarilyinterpreted by ANA Thomas CNP with the below findings:        Interpretation per the Radiologist below, if available at the time of this note:    No orders to display       LABS:  Labs Reviewed   RAPID STREP SCREEN   RAPID INFLUENZA A/B ANTIGENS   COVID-19, RAPID       All other labs were within normal range or not returnedas of this dictation. EMERGENCYDEPARTMENT COURSE and DIFFERENTIAL DIAGNOSIS/MDM:   Vitals:    Vitals:    11/16/22 1836   BP: (!) 146/89   Pulse: (!) 101   Resp: 18   Temp: 97.3 °F (36.3 °C)   SpO2: 99%       REASSESSMENT            MDM  HS 56year old female presents to ED for pharyngitis and otalgia. Patient afebrile and hemodynamically stable. Medicated with Tylenol and Decadron po. Rapid flu negative. Rapid COVID negative. Rapid strep negative. Pain improved post medication. Suspect right otitis media with post viral cough  Rx Azithromycin, Tessalon, Mucinex DM  Discussed Dx, Tx, Rx, follow up, reasons to return to ED for further treatment patient states understanding and denies any questions. PROCEDURES:    Procedures      FINAL IMPRESSION      1. Acute suppurative otitis media of left ear without spontaneous rupture of tympanic membrane, recurrence not specified Stable   2. Viral pharyngitis Stable   3.  Cough, unspecified type Stable         DISPOSITION/PLAN   DISPOSITION Discharge - Pending Orders Complete 11/16/2022 07:36:01 PM      PATIENT REFERRED TO:  ANA Aggarwal CNP  [address], [address] [address] [address]  [phone]    In 2 days      [facility] ED  [address] [id number] [phone]    If symptoms worsen    DISCHARGE MEDICATIONS:  New Prescriptions    AZITHROMYCIN (ZITHROMAX Z-JOSH) 250 MG TABLET    Take 2 tablets (500 mg) on Day 1, and then take 1 tablet (250 mg) on days 2 through 5.     BENZONATATE (TESSALON) 200 MG CAPSULE    Take 1 capsule by mouth 3 times daily as needed for Cough    DEXTROMETHORPHAN-GUAIFENESIN (MUCINEX DM)  MG TB12    Take 1 tablet by mouth daily for 14 days       (Please note that portions of this note were completed with a voice recognition program.  Efforts were made to edit the dictations but occasionally words are mis-transcribed.)    Merlinda Sic, APRN - CNP Merlinda Sic, APRN - CNP  11/16/22 1936

## 2022-11-16 NOTE — Clinical Note
Jacki Britton was seen and treated in our emergency department on 11/16/2022. She may return to work on 11/19/2022. If you have any questions or concerns, please don't hesitate to call.       Jignesh Cunningham, APRN - CNP

## 2022-11-21 ENCOUNTER — TELEPHONE (OUTPATIENT)
Dept: CARDIOLOGY CLINIC | Age: 47
End: 2022-11-21

## 2022-11-21 NOTE — TELEPHONE ENCOUNTER
SRIDEVI TCB  TO RESCHEDULE APPOINTMENT WITH [facility] ON 11/22/22. HOLTER MONITOR HAS NOT BEEN SENT BACK.  NO REPORT

## 2022-11-23 RX ORDER — NORTRIPTYLINE HYDROCHLORIDE 25 MG/1
CAPSULE ORAL
Qty: 120 CAPSULE | Refills: 3 | OUTPATIENT
Start: 2022-11-23 | End: 2023-03-23

## 2022-11-23 RX ORDER — PROPRANOLOL HYDROCHLORIDE 20 MG/1
TABLET ORAL
Qty: 90 TABLET | Refills: 3 | OUTPATIENT
Start: 2022-11-23

## 2022-12-01 ENCOUNTER — OFFICE VISIT (OUTPATIENT)
Dept: FAMILY MEDICINE CLINIC | Age: 47
End: 2022-12-01
Payer: COMMERCIAL

## 2022-12-01 VITALS
OXYGEN SATURATION: 97 % | DIASTOLIC BLOOD PRESSURE: 84 MMHG | WEIGHT: 241 LBS | SYSTOLIC BLOOD PRESSURE: 128 MMHG | BODY MASS INDEX: 52 KG/M2 | TEMPERATURE: 97.1 F | HEIGHT: 57 IN | HEART RATE: 91 BPM | RESPIRATION RATE: 18 BRPM

## 2022-12-01 DIAGNOSIS — G89.29 CHRONIC MIDLINE LOW BACK PAIN WITHOUT SCIATICA: ICD-10-CM

## 2022-12-01 DIAGNOSIS — R00.2 PALPITATIONS: ICD-10-CM

## 2022-12-01 DIAGNOSIS — B37.9 YEAST INFECTION: ICD-10-CM

## 2022-12-01 DIAGNOSIS — M54.50 CHRONIC MIDLINE LOW BACK PAIN WITHOUT SCIATICA: ICD-10-CM

## 2022-12-01 DIAGNOSIS — M62.89 PELVIC FLOOR DYSFUNCTION: ICD-10-CM

## 2022-12-01 DIAGNOSIS — I20.8 ANGINA AT REST (HCC): ICD-10-CM

## 2022-12-01 DIAGNOSIS — J45.20 MILD INTERMITTENT REACTIVE AIRWAY DISEASE WITHOUT COMPLICATION: ICD-10-CM

## 2022-12-01 DIAGNOSIS — J30.89 NON-SEASONAL ALLERGIC RHINITIS DUE TO OTHER ALLERGIC TRIGGER: ICD-10-CM

## 2022-12-01 DIAGNOSIS — N39.3 STRESS INCONTINENCE, FEMALE: ICD-10-CM

## 2022-12-01 DIAGNOSIS — H66.91 RIGHT OTITIS MEDIA, UNSPECIFIED OTITIS MEDIA TYPE: ICD-10-CM

## 2022-12-01 DIAGNOSIS — F41.9 ANXIETY: Primary | ICD-10-CM

## 2022-12-01 DIAGNOSIS — F33.1 MODERATE EPISODE OF RECURRENT MAJOR DEPRESSIVE DISORDER (HCC): ICD-10-CM

## 2022-12-01 DIAGNOSIS — B36.9 FUNGAL DERMATITIS: ICD-10-CM

## 2022-12-01 DIAGNOSIS — H04.123 BILATERAL DRY EYES: ICD-10-CM

## 2022-12-01 DIAGNOSIS — J01.10 ACUTE NON-RECURRENT FRONTAL SINUSITIS: ICD-10-CM

## 2022-12-01 DIAGNOSIS — N32.81 OVERACTIVE BLADDER: ICD-10-CM

## 2022-12-01 PROCEDURE — G8427 DOCREV CUR MEDS BY ELIG CLIN: HCPCS | Performed by: NURSE PRACTITIONER

## 2022-12-01 PROCEDURE — G8484 FLU IMMUNIZE NO ADMIN: HCPCS | Performed by: NURSE PRACTITIONER

## 2022-12-01 PROCEDURE — 99214 OFFICE O/P EST MOD 30 MIN: CPT | Performed by: NURSE PRACTITIONER

## 2022-12-01 PROCEDURE — G8417 CALC BMI ABV UP PARAM F/U: HCPCS | Performed by: NURSE PRACTITIONER

## 2022-12-01 PROCEDURE — 3078F DIAST BP <80 MM HG: CPT | Performed by: NURSE PRACTITIONER

## 2022-12-01 PROCEDURE — 3074F SYST BP LT 130 MM HG: CPT | Performed by: NURSE PRACTITIONER

## 2022-12-01 PROCEDURE — 4004F PT TOBACCO SCREEN RCVD TLK: CPT | Performed by: NURSE PRACTITIONER

## 2022-12-01 RX ORDER — CYCLOSPORINE 0.5 MG/ML
1 EMULSION OPHTHALMIC 2 TIMES DAILY
Qty: 1 EACH | Refills: 3 | Status: SHIPPED | OUTPATIENT
Start: 2022-12-01

## 2022-12-01 RX ORDER — IBUPROFEN 800 MG/1
800 TABLET ORAL EVERY 8 HOURS PRN
Qty: 21 TABLET | Refills: 0 | Status: SHIPPED | OUTPATIENT
Start: 2022-12-01

## 2022-12-01 RX ORDER — ALPRAZOLAM 2 MG/1
2 TABLET ORAL NIGHTLY PRN
Qty: 60 TABLET | Refills: 2 | Status: CANCELLED | OUTPATIENT
Start: 2022-12-10 | End: 2023-03-10

## 2022-12-01 RX ORDER — AZITHROMYCIN 250 MG/1
TABLET, FILM COATED ORAL
Qty: 6 TABLET | Refills: 1 | Status: SHIPPED | OUTPATIENT
Start: 2022-12-01 | End: 2022-12-11

## 2022-12-01 RX ORDER — ALPRAZOLAM 2 MG/1
2 TABLET ORAL NIGHTLY PRN
Qty: 60 TABLET | Refills: 2 | Status: SHIPPED | OUTPATIENT
Start: 2022-12-01 | End: 2022-12-02 | Stop reason: SDUPTHER

## 2022-12-01 RX ORDER — FLUCONAZOLE 150 MG/1
150 TABLET ORAL
Qty: 24 TABLET | Refills: 3 | Status: SHIPPED | OUTPATIENT
Start: 2022-12-01

## 2022-12-01 RX ORDER — NYSTATIN 100000 U/G
CREAM TOPICAL
Qty: 30 G | Refills: 2 | Status: SHIPPED | OUTPATIENT
Start: 2022-12-01

## 2022-12-01 RX ORDER — METOPROLOL SUCCINATE 25 MG/1
TABLET, EXTENDED RELEASE ORAL
COMMUNITY
Start: 2022-11-29 | End: 2022-12-01

## 2022-12-01 NOTE — PROGRESS NOTES
Chief Complaint   Patient presents with    ED Follow-up     Flu & ear infection f/u. Pt reports she still has a throbbing pain in right ear. Pt had influenza A and reports having a lingering cough. Discuss Medications       HPI: Yokasta Soriano is a 56 y.o. female presenting for follow-up of recent ER visits. Flu/ear infection: He states that she was diagnosed with influenza A on November 10 and then after having experienced some severe ear pain was diagnosed with ear infection on the 16th. She was seen in the ER both of these times. States that she was given an antibiotic and symptoms started to get better with the ear but then when she completed the antibiotic symptom started to get worse again and she now has significant pain in the right ear that is throbbing. She also has a lingering cough. Chronic low back pain: following with pain management. She states that she has had injections and is hoping for nerve burning procedure in the future. She continues low back stretching exercises/therapy exercises and does not report any significant leg weakness or frequent falls. Depression/anxiety/panic attack: she continues to follow with [facility]. She states overall her mood has been stable on current medication. She continues to take the Xanax twice per day to help with ongoing panic symptoms. She has not felt ready to start decreasing the dose of the medication yet especially with her son recently getting into additional trouble with the law. She is also worried about her  who is overworked and overstressed. She denies any significant down or depressed thoughts and no thoughts of self-harm or harm to others. She feels that she is doing okay overall considering the stress that she is under. Chest pain/palpitations: She has had ongoing/recurrent issues with chest pain and pressure as well as palpitations. He has not had any syncope or near syncope episodes.   Does seem to be somewhat related to her stress levels but can occur at different times. She has worn Holter monitor but has not been able to return the device to be read. She also needs to follow-up with cardiologist again and she is aware of that. She has not had any severe chest pain to the point that she needed ER evaluation but is aware of the need to do this should symptoms return and persist.  Has been on medication for blood pressure in the past but states that her blood pressure has been good and her heart rate has been running in the upper 90s typically other than when she is feeling a lot of palpitations it can be a bit irregular. He has been taking Inderal 3 times daily to help with anxiety and palpitations. She has had difficulty tolerating beta-blockers in the past otherwise. Dress incontinence/overactive bladder: She follows with [facility] gynecology and has been taking medication for overactive bladder. Plans to follow-up again in the future and does have recurrent issues with yeast/urinary symptoms. No acute symptoms reported currently. ROS: Reports no current issues with chest pain and no severe palpitations today. No new onset neurological symptoms. No left or right-sided weakness or acute visual changes or headache. No significant chest tightness or wheezing. No persistent diarrhea or constipation. No mucus or blood in the stools. She feels that she has recurrent issues with yeast infection and oral thrush and would like another round of antifungal medication. This has worsened after recent antibiotic. She does not report any flank pain or hematuria. She is not sure if she should be seeing a urologist or who she be should be following up with. EXAM:  Constitutional Blood pressure 128/84, pulse 91, temperature 97.1 °F (36.2 °C), temperature source Temporal, resp.  rate 18, height 4' 9\" (1.448 m), weight 241 lb (109.3 kg), last menstrual period 06/15/2015, SpO2 97 %, not currently breastfeeding. .  She has a normal affect, no acute distress, appears well developed and well nourished. Ears:  TM- right-  injected and left-  fluid-  thick white  Nose/Sinuses:  positive findings: mucosa erythematous and swollen  Mouth/Throat:  Mucosa moist.  No lesions. Pharynx without erythema, edema or exudate. Neck:  neck- supple, no mass, non-tender and no bruits  Lungs:  Normal expansion. Clear to auscultation. No rales, rhonchi, or wheezing., No chest wall tenderness. Heart:  Heart sounds are normal.  Regular rate and rhythm without murmur, gallop or rub. Abdomen:  Soft, non-tender, normal bowel sounds. No bruits, organomegaly or masses. Extremities: Extremities warm to touch, pink, with no edema. DIAGNOSIS:    Diagnosis Orders   1. Anxiety  DISCONTINUED: ALPRAZolam (XANAX) 2 MG tablet      2. Moderate episode of recurrent major depressive disorder ([address]        3. Bilateral dry eyes  cycloSPORINE (RESTASIS) 0.05 % ophthalmic emulsion      4. Chronic midline low back pain without sciatica  ibuprofen (ADVIL;MOTRIN) 800 MG tablet      5. Yeast infection  fluconazole (DIFLUCAN) 150 MG tablet      6. Fungal dermatitis  nystatin (MYCOSTATIN) 022325 UNIT/GM cream      7. Angina at rest [facility])        8. Right otitis media, unspecified otitis media type  azithromycin (ZITHROMAX) 250 MG tablet      9. Acute non-recurrent frontal sinusitis  azithromycin (ZITHROMAX) 250 MG tablet      10. Mild intermittent reactive airway disease without complication        11. Non-seasonal allergic rhinitis due to other allergic trigger        12. Palpitations        13. Stress incontinence, female        15. Pelvic floor dysfunction        15. Overactive bladder            PLAN: Include orders in the DX section. 1.  2.  She reports overall mood stability other than continued panic attack symptoms secondary to increased stress with concern for family members.   She continues to follow with [id number] [facility] psychiatry and feels that she is doing well on medication. Has been taking the Xanax 2 mg twice per day and will need an updated refill. No side effects. 4.  She will continue to follow with [address] [place] [address] pain management specialist and is hoping to have additional treatment approved through her insurance in the near future. Continue low back stretching exercises and ibuprofen with food as needed. 3.  5.  6.  8.  9.   10.  11.Continue current regimen for asthma/allergies/dry eye. Recommend additional antibiotic for continued infection and notify me if symptoms do not resolve or worsen. Antifungal medication ordered. Notify me if those symptoms do not resolve. 7.  12. She is encouraged to return her Holter monitor soon as possible so that she can follow-up with cardiology regarding ongoing/intermittent symptoms. She is aware of need to go to the ER should symptoms become more significant. 13.  14.  15.  Continue current medication as prescribed by gynecology if effective. Recommend follow-up with gynecologist for pelvic floor dysfunction likely contributing to symptoms. The patient is instructed to take Probiotic tablets twice a day for the duration of antibiotic therapy and for 4 days after completion of antibiotics. This will help restore the good bacteria to your colon and prevent side effects of antibiotic therapy such as cramping and diarrhea. Probiotic tablets can be found at your local pharmacy over the counter. Ask your pharmacist if you need help finding tablets. Controlled Substance Monitoring:    Acute and Chronic Pain Monitoring:   RX Monitoring 12/1/2022   Attestation -   Periodic Controlled Substance Monitoring Possible medication side effects, risk of tolerance/dependence & alternative treatments discussed. ;No signs of potential drug abuse or diversion identified. ;Assessed functional status.    Chronic Pain > 80 MEDD -       Controlled medication review:    Indication reviewed- panic attack  Reviewed that condition still requires assistance with current medication. Reviewed that condition impacts psychological and/or physical function in daily life. Reviewed that medication does indeed improve function/pain  Reviewed patient/provider roles in compliance. Reviewed acceptable alternatives (CBT/medication/exercise/therapy)  Reviewed medication agreement. Reviewed use, abuse and diversion of medications. Discussed need for random urine testing at least yearly. No significant interactions/side effects reported. Please note this report has been partially produced using speech recognition software and may cause contain errors related to that system including grammar, punctuation and spelling as well as words and phrases that may seem inappropriate. If there are questions or concerns please feel free to contact me to clarify.       Electronically signed by ANA Andrew - CNP-CNP, 4:26 PM 12/2/22

## 2022-12-02 DIAGNOSIS — F41.9 ANXIETY: ICD-10-CM

## 2022-12-02 RX ORDER — ALPRAZOLAM 2 MG/1
2 TABLET ORAL 2 TIMES DAILY PRN
Qty: 60 TABLET | Refills: 2 | Status: SHIPPED | OUTPATIENT
Start: 2022-12-02 | End: 2023-03-02

## 2022-12-06 ENCOUNTER — TELEMEDICINE (OUTPATIENT)
Dept: BEHAVIORAL/MENTAL HEALTH CLINIC | Age: 47
End: 2022-12-06

## 2022-12-06 DIAGNOSIS — F33.1 MODERATE EPISODE OF RECURRENT MAJOR DEPRESSIVE DISORDER (HCC): Primary | ICD-10-CM

## 2022-12-06 RX ORDER — PROPRANOLOL HYDROCHLORIDE 40 MG/1
40 TABLET ORAL 3 TIMES DAILY
Qty: 90 TABLET | Refills: 2 | Status: SHIPPED | OUTPATIENT
Start: 2022-12-06

## 2022-12-06 NOTE — PROGRESS NOTES
12/6/2022    40 mins total for this encounter =     30 minutes with direct communication with patient for encounter     10 mins (in addition) spent on chart review, and in the ordering of all necessary labs and/or medications      The risks and benefits of converting to a virtual visit were discussed in light of the current infectious disease epidemic. Patient also understood that insurance coverage and co-pays are up to their individual insurance plans. Patient Location:        Patient's home address  Provider Location ([facility]  Chief complaint No chief complaint on file. TELEHEALTH PSYCHIATRY FOLLOWUP (OUTPATIENT)   Audio/Visual (During SGMBF-13 public health emergency)          Psychiatric Diagnoses:  1. Moderate episode of recurrent major depressive disorder [facility])        Assessment/Plan:       Patient denies thoughts of harm to self or others. No acute safety concerns voiced at this appointment. MOOD: Patient reports mood has been stable. Patient has been able to attend to activities of daily living, has good energy, good mood, and good motivation. SLEEP: Patient reports sleep has been at least 8 hours a night and wakes feeling rested in the morning. No concerns related to sleep today    ATTENTION AND FOCUS: Patient denies any concerns related to attention and focus, and no concerns related to memory. Occasionally going for walks. ANXIETY: Patient denies any concerns related to anxiety today. BIPOLAR ROBIN: No concerns. No manic symptoms endorsed today and no concern for robin. SUBSTANCE USE: No concerns for ongoing substance use. Patient denies any recent substance use    ASSESSMENT/PLAN: Medication changes per plan below.  Discussed with patient the risks and benefits of their psychiatric medication and patient was amenable to plan as outlined below    COUNSELING or THERAPY:   Continue to encourage therapy as part of ongoing treatment of their mental health concerns. Continue to encourage physical activity and adherence to medication regimen. DATE and changes made           6/13/22  -Has not been taking lurasidone (Latuda), this caused significant GI distress   \"Very argumentative\" at times, more irritability   -lurasidone (Latuda) 40 mg daily STOP this and try nortriptyline (Pamelor)                          11/1/22  -INCREASE amitriptyline (Elavil) from 75 mg every night to 150 mg every night   -propranolol hcl (Inderal) 20 mg three times daily                     12/6/22  -propranolol hcl (Inderal) increase to 40 mg three times daily as needed for anxiety (increase from 20 mg)   -amitriptyline (Elavil) 25 mg each morning and 75 mg every night   -hydroxyzine hcl (Atarax) 75 mg every night                   Medical Diagnoses:  Patient Active Problem List   Diagnosis    Anxiety and depression    PTSD (post-traumatic stress disorder)    Migraine headache    Nausea and vomiting    S/P abdominal hysterectomy    Severe recurrent major depressive disorder with psychotic features ([facility] [address] [address]    Morbid obesity (HCC)    Overactive bladder    Stress incontinence, female    Abdominal pain    Blood in the urine    Essential hypertension    Yeast infection    Vitamin D deficiency    Former smoker    Obsessive-compulsive disorder    Bilateral dry eyes    Shortness of breath    MARBELLA (obstructive sleep apnea)    Moderate episode of recurrent major depressive disorder (HCC)    ALFREDO (generalized anxiety disorder)    Angina at rest [facility])    Hyperlipidemia         AT TODAY'S VISIT     Crisis plan reviewed and patient verbally contracts for safety. Go to ED with emergent symptoms or safety concerns. Risks, benefits, side effects of medications, including any / all black box warnings, discussed with patient, who verbalizes their understanding. Pt is kings for safety and denies thoughts of SI/HI. Amenable to plan. No acute concerns to address regarding medications.      Subjective: Patient denies medication side effects apart from those mentioned in the assessment and plan. Patient reports they have been compliant with current medication regimen and have not missed a dose. At today's visit, patient denies thoughts of harm to self or others since last appointment, and denies auditory or visual hallucinations. ROS:  [x] All negative/unchanged except if checked.  Explain positive(checked items) below:       Denies any new or changed physical symptoms other than those noted in the subjective portion of this note   [] Constitutional  [] Eyes  [] Ear/Nose/Mouth/Throat  [] Respiratory  [] CV  [] GI  []   [] Musculoskeletal  [] Skin/Breast  [] Neurological  [] Endocrine  [] Heme/Lymph  [] Allergic/Immunologic    OBJECTIVE:   Recent Results (from the past 1008 hour(s))   COVID-19, Rapid    Collection Time: 11/10/22  2:49 PM    Specimen: Nasopharyngeal Swab   Result Value Ref Range    SARS-CoV-2, NAAT Not Detected Not Detected   Rapid Influenza A/B Antigens    Collection Time: 11/10/22  2:49 PM    Specimen: Nasopharyngeal   Result Value Ref Range    Influenza A by PCR POSITIVE (A)     Influenza B by PCR Negative    Rapid Strep Screen    Collection Time: 11/10/22  2:49 PM    Specimen: Throat   Result Value Ref Range    Strep Grp A PCR Negative    Rapid Strep Screen    Collection Time: 11/16/22  6:57 PM    Specimen: Throat   Result Value Ref Range    Strep Grp A PCR Negative    Rapid Influenza A/B Antigens    Collection Time: 11/16/22  6:57 PM    Specimen: Nasopharyngeal   Result Value Ref Range    Influenza A by PCR Negative     Influenza B by PCR Negative    COVID-19, Rapid    Collection Time: 11/16/22  6:57 PM    Specimen: Nasopharyngeal Swab   Result Value Ref Range    SARS-CoV-2, NAAT Not Detected Not Detected       MEDICATIONS:    Current Outpatient Medications:     propranolol (INDERAL) 40 MG tablet, Take 1 tablet by mouth 3 times daily, Disp: 90 tablet, Rfl: 2    ALPRAZolam (XANAX) 2 MG tablet, Take 1 tablet by mouth 2 times daily as needed for Sleep or Anxiety for up to 90 days. , Disp: 60 tablet, Rfl: 2    cycloSPORINE (RESTASIS) 0.05 % ophthalmic emulsion, Place 1 drop into both eyes 2 times daily, Disp: 1 each, Rfl: 3    ibuprofen (ADVIL;MOTRIN) 800 MG tablet, Take 1 tablet by mouth every 8 hours as needed for Pain, Disp: 21 tablet, Rfl: 0    fluconazole (DIFLUCAN) 150 MG tablet, Take 1 tablet by mouth Twice a Week, Disp: 24 tablet, Rfl: 3    nystatin (MYCOSTATIN) 598447 UNIT/GM cream, Apply topically 2 times daily. , Disp: 30 g, Rfl: 2    albuterol sulfate HFA (VENTOLIN HFA) 108 (90 Base) MCG/ACT inhaler, Inhale 2 puffs into the lungs 4 times daily as needed for Wheezing, Disp: 18 g, Rfl: 5    albuterol (ACCUNEB) 0.63 MG/3ML nebulizer solution, Take 3 mLs by nebulization every 4 hours as needed for Wheezing, Disp: 270 mL, Rfl: 3    nortriptyline (PAMELOR) 25 MG capsule, Take 1 capsule by mouth every morning (before breakfast) AND 3 capsules nightly., Disp: 120 capsule, Rfl: 3    albuterol sulfate HFA (PROVENTIL;VENTOLIN;PROAIR) 108 (90 Base) MCG/ACT inhaler, Inhale 2 puffs into the lungs every 4 hours as needed for Wheezing, Disp: 8.5 each, Rfl: 3    mirabegron (MYRBETRIQ) 50 MG TB24, Take 50 mg by mouth daily Lot [id number] Exp 07/23, Disp: 21 tablet, Rfl: 0    neomycin-polymyxin-dexameth (MAXITROL) 3.5-50981-0.1 ophthalmic suspension, , Disp: , Rfl:     SUMAtriptan (IMITREX) 50 MG tablet, Take 1 tablet by mouth once as needed for Migraine, Disp: 9 tablet, Rfl: 3    pantoprazole (PROTONIX) 40 MG tablet, Take 1 tablet by mouth daily, Disp: 90 tablet, Rfl: 1    levothyroxine (SYNTHROID) 25 MCG tablet, Take 1 tablet by mouth daily, Disp: 90 tablet, Rfl: 1    beclomethasone (QVAR REDIHALER) 80 MCG/ACT AERB inhaler, Inhale 1 puff into the lungs in the morning and 1 puff in the evening., Disp: 1 each, Rfl: 2    Blood Pressure Monitoring (BLOOD PRESSURE KIT) ZELALEM, 1 Units by Does not apply route as needed (for BP monitoring at home for hypertension) Cuff for upper arm, Disp: 1 Device, Rfl: 0    Examination:    Vitals: not taken in person, most recent vitals in chart reviewed  There were no vitals filed for this visit. Wt Readings from Last 3 Encounters:   12/01/22 241 lb (109.3 kg)   11/10/22 230 lb (104.3 kg)   11/01/22 243 lb (110.2 kg)       BP Readings from Last 3 Encounters:   12/01/22 128/84   11/16/22 (!) 146/88   11/10/22 (!) 135/97         Mental Status Examination:    Level of consciousness:  alert and oriented to person, place, and situation  Appearance:  well-appearing good grooming and good hygiene  Behavior/Motor:  no abnormalities noted  Attitude toward examiner: friendly, pleasant and cooperative, attentive and good eye contact  Speech:  spontaneous, normal rate and normal volume   Mood: \"good\"  Affect:  mood congruent  Thought processes:  linear and logical  Thought content:  Denies suicidal or homicidal ideation, denies auditory or visual hallucinations  Cognition:  no deficits in attention, concentration notable, recent memory grossly intact  Concentration intact  Memory intact  Insight good   Judgement fair   Fund of Knowledge adequate    PSYCHOTHERAPY/COUNSELING:  Encourage patient to attend outpatient appointments and therapy. [x] Therapeutic interview  [] Supportive  [x] CBT  [x] Ongoing  [] Other    No follow-ups on file. patient informed to call for follow-up or to reschedule appointment     Please note this report has been partially produced using speech recognition software  And may cause contain errors related to that system including grammar, punctuation and spelling as well as words and phrases that may seem inappropriate. If there are questions or concerns please feel free to contact me to clarify.     Phil Quintanilla MD  Electronically signed by Phil Quintanilla MD on 12/13/2022 at 6:10 PM  12/13/2022 6:10 PM    Psychiatry   Due to this being a TeleHealth encounter,

## 2022-12-20 NOTE — TELEPHONE ENCOUNTER
[facility] is requesting medication refill for 90 days    Rx requested:  Requested Prescriptions     Pending Prescriptions Disp Refills    propranolol (INDERAL) 40 MG tablet 270 tablet 0     Sig: Take 1 tablet by mouth 3 times daily       Last Office Visit:   12/6/2022    Last Tox screen:      Last Medication contract:      Last refilled on :      Next Visit Date:  Future Appointments   Date Time Provider [place]   1/3/2023  4:00 PM ANA Davis - CNP [facility] AT [place]   1/12/2023  1:30 PM Letty Odell MD [facility]  [place]   1/17/2023  3:45 PM Leisa Escamilla MD [facility] [address] [place]   2/1/2023 12:00 PM Letty Odell MD [facility]  [place]

## 2022-12-22 RX ORDER — PROPRANOLOL HYDROCHLORIDE 40 MG/1
40 TABLET ORAL 3 TIMES DAILY
Qty: 270 TABLET | Refills: 0 | Status: SHIPPED | OUTPATIENT
Start: 2022-12-22

## 2023-01-12 ENCOUNTER — TELEMEDICINE (OUTPATIENT)
Dept: BEHAVIORAL/MENTAL HEALTH CLINIC | Age: 48
End: 2023-01-12

## 2023-01-12 DIAGNOSIS — F33.1 MODERATE EPISODE OF RECURRENT MAJOR DEPRESSIVE DISORDER (HCC): Primary | ICD-10-CM

## 2023-01-12 DIAGNOSIS — F41.1 GAD (GENERALIZED ANXIETY DISORDER): ICD-10-CM

## 2023-01-12 RX ORDER — RISPERIDONE 0.5 MG/1
0.5 TABLET ORAL 2 TIMES DAILY
Qty: 60 TABLET | Refills: 3 | Status: SHIPPED | OUTPATIENT
Start: 2023-01-12

## 2023-01-12 NOTE — PROGRESS NOTES
1/12/2023    40 mins total for this encounter =     30 minutes with direct communication with patient for encounter     10 mins (in addition) spent on chart review, and in the ordering of all necessary labs and/or medications      The risks and benefits of converting to a virtual visit were discussed in light of the current infectious disease epidemic. Patient also understood that insurance coverage and co-pays are up to their individual insurance plans. Patient Location:        Patient's home address  Provider Location ([facility]):        [facility]  Chief complaint No chief complaint on file. TELEHEALTH PSYCHIATRY FOLLOWUP (OUTPATIENT)   Audio/Visual (During WCPME-81 public health emergency)          Psychiatric Diagnoses:  1. Moderate episode of recurrent major depressive disorder ([facility] [address]    2. ALFREDO (generalized anxiety disorder)        Assessment/Plan:     Patient denies thoughts of harm to self or others. No acute safety concerns voiced at this appointment. MOOD: Patient reports mood has been stable. Patient has been able to attend to activities of daily living, has good energy, good mood, and good motivation. SLEEP: Patient reports sleep has been at least 8 hours a night and wakes feeling rested in the morning. No concerns related to sleep today    ATTENTION AND FOCUS: Patient denies any concerns related to attention and focus, and no concerns related to memory. Occasionally going for walks. ANXIETY: Patient denies any concerns related to anxiety today. BIPOLAR ROBIN: No concerns. No manic symptoms endorsed today and no concern for robin. SUBSTANCE USE: No concerns for ongoing substance use. Patient denies any recent substance use    ASSESSMENT/PLAN: Medication changes per plan below.  Discussed with patient the risks and benefits of their psychiatric medication and patient was amenable to plan as outlined below    COUNSELING or THERAPY:   Continue to encourage therapy as part of ongoing treatment of their mental health concerns. Continue to encourage physical activity and adherence to medication regimen. DATE and changes made  HPI    Continue propranolol hcl (Inderal) and continue risperidone (Risperdal)   Continue nortriptyline (Pamelor)         Medical Diagnoses:  Patient Active Problem List   Diagnosis    Anxiety and depression    PTSD (post-traumatic stress disorder)    Migraine headache    Nausea and vomiting    S/P abdominal hysterectomy    Severe recurrent major depressive disorder with psychotic features (HCC)    Morbid obesity (HCC)    Overactive bladder    Stress incontinence, female    Abdominal pain    Blood in the urine    Essential hypertension    Yeast infection    Vitamin D deficiency    Former smoker    Obsessive-compulsive disorder    Bilateral dry eyes    Shortness of breath    MARBELLA (obstructive sleep apnea)    Moderate episode of recurrent major depressive disorder (HCC)    ALFREDO (generalized anxiety disorder)    Angina at rest [facility])    Hyperlipidemia         AT TODAY'S VISIT     Crisis plan reviewed and patient verbally contracts for safety. Go to ED with emergent symptoms or safety concerns. Risks, benefits, side effects of medications, including any / all black box warnings, discussed with patient, who verbalizes their understanding. Pt is kings for safety and denies thoughts of SI/HI. Amenable to plan. No acute concerns to address regarding medications. Subjective:      Patient denies medication side effects apart from those mentioned in the assessment and plan. Patient reports they have been compliant with current medication regimen and have not missed a dose. At today's visit, patient denies thoughts of harm to self or others since last appointment, and denies auditory or visual hallucinations. ROS:  [x] All negative/unchanged except if checked.  Explain positive(checked items) below:       Denies any new or changed physical symptoms other than those noted in the subjective portion of this note   [] Constitutional  [] Eyes  [] Ear/Nose/Mouth/Throat  [] Respiratory  [] CV  [] GI  []   [] Musculoskeletal  [] Skin/Breast  [] Neurological  [] Endocrine  [] Heme/Lymph  [] Allergic/Immunologic    OBJECTIVE:   No results found for this or any previous visit (from the past 1008 hour(s)). MEDICATIONS:    Current Outpatient Medications:     risperiDONE (RISPERDAL) 0.5 MG tablet, Take 1 tablet by mouth 2 times daily, Disp: 60 tablet, Rfl: 3    propranolol (INDERAL) 40 MG tablet, Take 1 tablet by mouth 3 times daily, Disp: 270 tablet, Rfl: 0    ALPRAZolam (XANAX) 2 MG tablet, Take 1 tablet by mouth 2 times daily as needed for Sleep or Anxiety for up to 90 days. , Disp: 60 tablet, Rfl: 2    cycloSPORINE (RESTASIS) 0.05 % ophthalmic emulsion, Place 1 drop into both eyes 2 times daily, Disp: 1 each, Rfl: 3    ibuprofen (ADVIL;MOTRIN) 800 MG tablet, Take 1 tablet by mouth every 8 hours as needed for Pain, Disp: 21 tablet, Rfl: 0    fluconazole (DIFLUCAN) 150 MG tablet, Take 1 tablet by mouth Twice a Week, Disp: 24 tablet, Rfl: 3    nystatin (MYCOSTATIN) 986566 UNIT/GM cream, Apply topically 2 times daily. , Disp: 30 g, Rfl: 2    albuterol sulfate HFA (VENTOLIN HFA) 108 (90 Base) MCG/ACT inhaler, Inhale 2 puffs into the lungs 4 times daily as needed for Wheezing, Disp: 18 g, Rfl: 5    albuterol (ACCUNEB) 0.63 MG/3ML nebulizer solution, Take 3 mLs by nebulization every 4 hours as needed for Wheezing, Disp: 270 mL, Rfl: 3    nortriptyline (PAMELOR) 25 MG capsule, Take 1 capsule by mouth every morning (before breakfast) AND 3 capsules nightly., Disp: 120 capsule, Rfl: 3    albuterol sulfate HFA (PROVENTIL;VENTOLIN;PROAIR) 108 (90 Base) MCG/ACT inhaler, Inhale 2 puffs into the lungs every 4 hours as needed for Wheezing, Disp: 8.5 each, Rfl: 3    mirabegron (MYRBETRIQ) 50 MG TB24, Take 50 mg by mouth daily Lot [id number] Exp 07/23, Disp: 21 tablet, Rfl: 0 neomycin-polymyxin-dexameth (MAXITROL) 3.5-72624-3.1 ophthalmic suspension, , Disp: , Rfl:     SUMAtriptan (IMITREX) 50 MG tablet, Take 1 tablet by mouth once as needed for Migraine, Disp: 9 tablet, Rfl: 3    pantoprazole (PROTONIX) 40 MG tablet, Take 1 tablet by mouth daily, Disp: 90 tablet, Rfl: 1    levothyroxine (SYNTHROID) 25 MCG tablet, Take 1 tablet by mouth daily, Disp: 90 tablet, Rfl: 1    beclomethasone (QVAR REDIHALER) 80 MCG/ACT AERB inhaler, Inhale 1 puff into the lungs in the morning and 1 puff in the evening., Disp: 1 each, Rfl: 2    Blood Pressure Monitoring (BLOOD PRESSURE KIT) ZELALEM, 1 Units by Does not apply route as needed (for BP monitoring at home for hypertension) Cuff for upper arm, Disp: 1 Device, Rfl: 0    Examination:    Vitals: not taken in person, most recent vitals in chart reviewed  There were no vitals filed for this visit. Wt Readings from Last 3 Encounters:   12/01/22 241 lb (109.3 kg)   11/10/22 230 lb (104.3 kg)   11/01/22 243 lb (110.2 kg)       BP Readings from Last 3 Encounters:   12/01/22 128/84   11/16/22 (!) 146/88   11/10/22 (!) 135/97         Mental Status Examination:    Level of consciousness:  alert and oriented to person, place, and situation  Appearance:  well-appearing good grooming and good hygiene  Behavior/Motor:  no abnormalities noted  Attitude toward examiner: friendly, pleasant and cooperative, attentive and good eye contact  Speech:  spontaneous, normal rate and normal volume   Mood: \"good\"  Affect:  mood congruent  Thought processes:  linear and logical  Thought content:  Denies suicidal or homicidal ideation, denies auditory or visual hallucinations  Cognition:  no deficits in attention, concentration notable, recent memory grossly intact  Concentration intact  Memory intact  Insight good   Judgement fair   Fund of Knowledge adequate    PSYCHOTHERAPY/COUNSELING:  Encourage patient to attend outpatient appointments and therapy.     [x] Therapeutic interview  [] Supportive  [x] CBT  [x] Ongoing  [] Other    No follow-ups on file. patient informed to call for follow-up or to reschedule appointment     Please note this report has been partially produced using speech recognition software  And may cause contain errors related to that system including grammar, punctuation and spelling as well as words and phrases that may seem inappropriate. If there are questions or concerns please feel free to contact me to clarify. Anisha Villanueva MD  Electronically signed by Anisha Villanueva MD on 1/20/2023 at 3:45 PM  1/20/2023 3:45 PM    Psychiatry   Due to this being a TeleHealth encounter, evaluation of the following organ systems is limited: Vitals/Constitutional/EENT/Resp/CV/GI//MS/Neuro/Skin/Heme-Lymph-Imm. An  electronic signature was used to authenticate this note. --Anisha Villanueva MD on 1/20/2023 at 3:45 PM    Pursuant to the emergency declaration under the [facility], [facility] waiver authority and the [employer] Act, this Virtual  Visit was conducted, with patient's consent, to reduce the patient's risk of exposure to COVID-19 and provide continuity of care for an established patient Services were provided through a video synchronous discussion virtually to substitute for in-person clinic visit. Treatment Plan:  Reviewed current Medications with the patient. Education provided on the compliance with treatment. Reviewed OARRs, no concerns identified     The anticipated benefits and side effects of the medications, including the anticipated results of not receiving the medication, and of alternatives to the medications were explained to the patient and their informed consent was obtained for starting medications as well as adjusting the doses (titration or tapering) as indicated.  The above information was given by physician in verbal form and sufficient understanding was in evidence. The patient participated in discussion of the information and question and/or concerns were addressed before the medication was given. Due to COVID 19 outbreak, patient's office visit was converted to a virtual visit.   Patient was contacted and agreed to proceed with a virtual visit via DOXY

## 2023-01-25 ENCOUNTER — HOSPITAL ENCOUNTER (EMERGENCY)
Age: 48
Discharge: HOME | End: 2023-01-25
Attending: EMERGENCY MEDICINE
Payer: COMMERCIAL

## 2023-01-25 VITALS
SYSTOLIC BLOOD PRESSURE: 125 MMHG | TEMPERATURE: 97.8 F | BODY MASS INDEX: 56.47 KG/M2 | HEIGHT: 55 IN | DIASTOLIC BLOOD PRESSURE: 85 MMHG | HEART RATE: 100 BPM | WEIGHT: 244 LBS | RESPIRATION RATE: 18 BRPM | OXYGEN SATURATION: 97 %

## 2023-01-25 DIAGNOSIS — H11.32 CONJUNCTIVAL HEMORRHAGE OF LEFT EYE: ICD-10-CM

## 2023-01-25 DIAGNOSIS — S05.02XA ABRASION OF LEFT CORNEA, INITIAL ENCOUNTER: Primary | ICD-10-CM

## 2023-01-25 PROCEDURE — 6370000000 HC RX 637 (ALT 250 FOR IP): Performed by: EMERGENCY MEDICINE

## 2023-01-25 PROCEDURE — 99283 EMERGENCY DEPT VISIT LOW MDM: CPT

## 2023-01-25 RX ORDER — TOBRAMYCIN 0.3 %
OINTMENT (GRAM) OPHTHALMIC (EYE)
Qty: 5 G | Refills: 0 | Status: SHIPPED | OUTPATIENT
Start: 2023-01-25 | End: 2023-02-04

## 2023-01-25 RX ADMIN — FLUORESCEIN SODIUM 1 MG: 1 STRIP OPHTHALMIC at 16:37

## 2023-01-25 ASSESSMENT — ENCOUNTER SYMPTOMS
WHEEZING: 0
CONSTIPATION: 0
CHEST TIGHTNESS: 0
SHORTNESS OF BREATH: 0
EYE DISCHARGE: 0
BLOOD IN STOOL: 0
FACIAL SWELLING: 0
VOICE CHANGE: 0
DIARRHEA: 0
ABDOMINAL PAIN: 0
PHOTOPHOBIA: 1
BACK PAIN: 0
COUGH: 0
EYE REDNESS: 1
STRIDOR: 0
SINUS PRESSURE: 0
TROUBLE SWALLOWING: 0
SORE THROAT: 0
EYE PAIN: 1
VOMITING: 0
CHOKING: 0

## 2023-01-25 ASSESSMENT — LIFESTYLE VARIABLES
HOW MANY STANDARD DRINKS CONTAINING ALCOHOL DO YOU HAVE ON A TYPICAL DAY: 1 OR 2
HOW OFTEN DO YOU HAVE A DRINK CONTAINING ALCOHOL: MONTHLY OR LESS

## 2023-01-25 NOTE — ED PROVIDER NOTES
[facility] ED  eMERGENCY dEPARTMENT eNCOUnter      Pt Name: Medina Peck  MRN: [id number]  Birthdate 1975  Date of evaluation: 1/25/2023  Provider: Eva Skinner MD    CHIEF COMPLAINT       Chief Complaint   Patient presents with    Eye Pain     Red, painful, burning in left eye. Onset- yesterday.         HISTORY OF PRESENT ILLNESS   (Location/Symptom, Timing/Onset,Context/Setting, Quality, Duration, Modifying Factors, Severity)  Note limiting factors.   Medina Peck is a 47 y.o. female who presents to the emergency department patient noted redness to the left eye when she woke up and some light sensitivity and some discomfort to the left eye has no drainage no fever no rash patient also noticed some dark-colored stools denies taking any iron patient has a history of gastritis and takes Prilosec on a regular basis had a nausea and vomiting denies any abdominal discomfort remote abdominal hysterectomy PTSD anxiety depression hyperlipidemia coronary disease obesity he does not use contact lens patient has no injury or fall    HPI    NursingNotes were reviewed.    REVIEW OF SYSTEMS    (2-9 systems for level 4, 10 or more for level 5)     Review of Systems   Constitutional: Negative.  Negative for activity change and fever.   HENT:  Negative for congestion, drooling, facial swelling, mouth sores, nosebleeds, sinus pressure, sore throat, trouble swallowing and voice change.    Eyes:  Positive for photophobia, pain and redness. Negative for discharge and visual disturbance.   Respiratory:  Negative for cough, choking, chest tightness, shortness of breath, wheezing and stridor.    Cardiovascular:  Negative for chest pain, palpitations and leg swelling.   Gastrointestinal:  Negative for abdominal pain, blood in stool, constipation, diarrhea and vomiting.   Endocrine: Negative for cold intolerance, polyphagia and polyuria.   Genitourinary:  Negative for dysuria, flank pain, frequency, genital sores and  urgency. Musculoskeletal:  Negative for back pain, joint swelling, neck pain and neck stiffness. Skin:  Negative for pallor and rash. Neurological:  Negative for tremors, seizures, syncope, weakness, numbness and headaches. Hematological:  Negative for adenopathy. Does not bruise/bleed easily. Psychiatric/Behavioral:  Negative for agitation, behavioral problems, hallucinations and sleep disturbance. The patient is not hyperactive. All other systems reviewed and are negative. Except as noted above the remainder of the review of systems was reviewed and negative. PAST MEDICAL HISTORY     Past Medical History:   Diagnosis Date    Angina at rest [facility]) 10/25/2022    Anxiety and depression     Anxiety and depression     Asthma     Essential hypertension 5/18/2017    Headache(784.0)     Hyperlipidemia 10/25/2022    Obesity     MARBELLA (obstructive sleep apnea) 4/29/2020    PTSD (post-traumatic stress disorder)     Urinary incontinence          SURGICALHISTORY       Past Surgical History:   Procedure Laterality Date    CHOLECYSTECTOMY      ENDOMETRIAL ABLATION      HYSTERECTOMY, TOTAL ABDOMINAL (CERVIX REMOVED)  7/15/15    DR. BECKHAM    TONSILLECTOMY      TUBAL LIGATION           CURRENT MEDICATIONS       Previous Medications    ALBUTEROL (ACCUNEB) 0.63 MG/3ML NEBULIZER SOLUTION    Take 3 mLs by nebulization every 4 hours as needed for Wheezing    ALBUTEROL SULFATE HFA (PROVENTIL;VENTOLIN;PROAIR) 108 (90 BASE) MCG/ACT INHALER    Inhale 2 puffs into the lungs every 4 hours as needed for Wheezing    ALBUTEROL SULFATE HFA (VENTOLIN HFA) 108 (90 BASE) MCG/ACT INHALER    Inhale 2 puffs into the lungs 4 times daily as needed for Wheezing    ALPRAZOLAM (XANAX) 2 MG TABLET    Take 1 tablet by mouth 2 times daily as needed for Sleep or Anxiety for up to 90 days. BECLOMETHASONE (QVAR REDIHALER) 80 MCG/ACT AERB INHALER    Inhale 1 puff into the lungs in the morning and 1 puff in the evening.     BLOOD PRESSURE MONITORING (BLOOD PRESSURE KIT) ZELALEM    1 Units by Does not apply route as needed (for BP monitoring at home for hypertension) Cuff for upper arm    CYCLOSPORINE (RESTASIS) 0.05 % OPHTHALMIC EMULSION    Place 1 drop into both eyes 2 times daily    FLUCONAZOLE (DIFLUCAN) 150 MG TABLET    Take 1 tablet by mouth Twice a Week    IBUPROFEN (ADVIL;MOTRIN) 800 MG TABLET    Take 1 tablet by mouth every 8 hours as needed for Pain    LEVOTHYROXINE (SYNTHROID) 25 MCG TABLET    Take 1 tablet by mouth daily    MIRABEGRON (MYRBETRIQ) 50 MG TB24    Take 50 mg by mouth daily Lot [id number]  Exp 07/23    NEOMYCIN-POLYMYXIN-DEXAMETH (MAXITROL) 3.5-64941-1.1 OPHTHALMIC SUSPENSION        NORTRIPTYLINE (PAMELOR) 25 MG CAPSULE    Take 1 capsule by mouth every morning (before breakfast) AND 3 capsules nightly. NYSTATIN (MYCOSTATIN) 620123 UNIT/GM CREAM    Apply topically 2 times daily.     PANTOPRAZOLE (PROTONIX) 40 MG TABLET    Take 1 tablet by mouth daily    PROPRANOLOL (INDERAL) 40 MG TABLET    Take 1 tablet by mouth 3 times daily    RISPERIDONE (RISPERDAL) 0.5 MG TABLET    Take 1 tablet by mouth 2 times daily    SUMATRIPTAN (IMITREX) 50 MG TABLET    Take 1 tablet by mouth once as needed for Migraine       ALLERGIES     Aspirin, Codeine, Lithium, Naproxen, and Trazodone and nefazodone    FAMILY HISTORY       Family History   Problem Relation Age of Onset    Diabetes Mother     High Blood Pressure Mother           SOCIAL HISTORY       Social History     Socioeconomic History    Marital status:      Spouse name: None    Number of children: None    Years of education: None    Highest education level: None   Tobacco Use    Smoking status: Former     Packs/day: 0.50     Types: Cigarettes     Passive exposure: Current    Smokeless tobacco: Never   Vaping Use    Vaping Use: Never used   Substance and Sexual Activity    Alcohol use: No     Comment: rare    Drug use: No    Sexual activity: Yes     Partners: Male     Social Determinants of Health     Financial Resource Strain: Unknown    Difficulty of Paying Living Expenses: Patient refused   Food Insecurity: Unknown    Worried About Running Out of Food in the Last Year: Patient refused    [address] in the Last Year: Patient refused   Transportation Needs: Unknown    Lack of Transportation (Medical): Patient refused    Lack of Transportation (Non-Medical): Patient refused   Physical Activity: Unknown    Days of Exercise per Week: Patient refused    Minutes of Exercise per Session: Patient refused   Stress: Unknown    Feeling of Stress : Patient refused   Social Connections: Unknown    Frequency of Communication with Friends and Family: Patient refused    Frequency of Social Gatherings with Friends and Family: Patient refused    Attends Congregation Services: Patient refused    Active Member of Clubs or Organizations: Patient refused    Attends Club or Organization Meetings: Patient refused    Marital Status: Patient refused   Intimate Partner Violence: Unknown    Fear of Current or Ex-Partner: Patient refused    Emotionally Abused: Patient refused    Physically Abused: Patient refused    Sexually Abused: Patient refused   Housing Stability: Unknown    Unable to Pay for Housing in the Last Year: Patient refused    Unstable Housing in the Last Year: Patient refused       SCREENINGS      @FLOW([id number])@      PHYSICAL EXAM    (up to 7 for level 4, 8 or more for level 5)     ED Triage Vitals [01/25/23 1633]   BP Temp Temp Source Heart Rate Resp SpO2 Height Weight   125/85 97.8 °F (36.6 °C) Temporal 100 18 97 % 4' 4\" (1.321 m) 244 lb (110.7 kg)       Physical Exam  Vitals and nursing note reviewed. Constitutional:       General: She is not in acute distress. Appearance: She is obese. She is not ill-appearing, toxic-appearing or diaphoretic. HENT:      Head: Normocephalic and atraumatic.       Right Ear: Tympanic membrane, ear canal and external ear normal.      Left Ear: Tympanic membrane, ear canal and external ear normal.      Nose: Nose normal. No congestion or rhinorrhea. Mouth/Throat:      Mouth: Mucous membranes are moist.      Pharynx: No oropharyngeal exudate. Eyes:      General: Lids are normal. Lids are everted, no foreign bodies appreciated. Gaze aligned appropriately. Right eye: No foreign body, discharge or hordeolum. Left eye: No foreign body, discharge or hordeolum. Extraocular Movements: Extraocular movements intact. Conjunctiva/sclera:      Left eye: Left conjunctiva is injected. Hemorrhage present. Pupils: Pupils are equal, round, and reactive to light. Neck:      Vascular: No carotid bruit. Cardiovascular:      Rate and Rhythm: Normal rate and regular rhythm. Heart sounds: No murmur heard. No gallop. Pulmonary:      Effort: No respiratory distress. Breath sounds: No stridor. No rhonchi or rales. Chest:      Chest wall: No tenderness. Abdominal:      General: There is no distension. Palpations: There is no mass. Tenderness: There is no abdominal tenderness. There is no right CVA tenderness, left CVA tenderness, guarding or rebound. Hernia: No hernia is present. Genitourinary:     Rectum: Normal. Guaiac result negative. Comments: Attention come to the rectal examination in the presence of nurse, due to patient complaining of dark stools patient does have a dark stool but they are negative for blood testing no jackie blood noted there is no hemorrhoids  Musculoskeletal:      Cervical back: Neck supple. No rigidity or tenderness. Lymphadenopathy:      Cervical: No cervical adenopathy. Skin:     Capillary Refill: Capillary refill takes less than 2 seconds. Coloration: Skin is not jaundiced or pale. Findings: No bruising, erythema, lesion or rash. Neurological:      General: No focal deficit present. Mental Status: She is alert. Cranial Nerves: No cranial nerve deficit.       Sensory: No sensory deficit. Motor: No weakness. Coordination: Coordination normal.      Gait: Gait normal.      Deep Tendon Reflexes: Reflexes normal.       DIAGNOSTIC RESULTS     EKG: All EKG's are interpreted by the Emergency Department Physician who either signs or Co-signsthis chart in the absence of a cardiologist.        RADIOLOGY:   Levorn Clement such as CT, Ultrasound and MRI are read by the radiologist. Plain radiographic images are visualized and preliminarily interpreted by the emergency physician with the below findings:    nterpretation per the Radiologist below, if available at the time ofthis note:    No orders to display         ED BEDSIDE ULTRASOUND:   Performed by ED Physician - none    LABS:  Labs Reviewed - No data to display    All other labs were within normal range or not returned as of this dictation.     EMERGENCY DEPARTMENT COURSE and DIFFERENTIAL DIAGNOSIS/MDM:   Vitals:    Vitals:    01/25/23 1633   BP: 125/85   Pulse: 100   Resp: 18   Temp: 97.8 °F (36.6 °C)   TempSrc: Temporal   SpO2: 97%   Weight: 244 lb (110.7 kg)   Height: 4' 4\" (1.321 m)       MDM  Number of Diagnoses or Management Options  Abrasion of left cornea, initial encounter  Conjunctival hemorrhage of left eye  Diagnosis management comments: Attention come to the left eye patient has a subconjunctival hemorrhage involving the left corner of the left eye extraocular movement good patient in no foreign body visible patient I examined under fluorescein light has small 3:00 corneal abrasion after using tetracaine ophthalmic solution as well as fluorescein dye to the left eye patient advised to follow-up by eye doctor and subsequent subconjunctival hemorrhage which is ER will not go over the next 2 weeks time patient is very history and that he obesity uses Prilosec patient also states that she has some dark stools for which rectal exams are performed in the presence of nurse, guaiac negative no jackie blood noted no external hemorrhoid noted        CRITICAL CARE TIME   Total Critical Care time was  minutes, excluding separately reportableprocedures. There was a high probability of clinicallysignificant/life threatening deterioration in the patient's condition which required my urgent intervention. CONSULTS:  None    PROCEDURES:  Unless otherwise noted below, none     Procedures    FINAL IMPRESSION      1. Abrasion of left cornea, initial encounter    2.  Conjunctival hemorrhage of left eye          DISPOSITION/PLAN   DISPOSITION        PATIENT REFERRED TO:  DO Mik Plazasuki  [phone]    In 2 days      DISCHARGE MEDICATIONS:  New Prescriptions    TOBRAMYCIN (TOBREX) 0.3 % OPHTHALMIC OINTMENT    Apply thin layer to left eye 4 times a day next 5 days          (Please note that portions of this note were completed with a voice recognition program.  Efforts were made to edit the dictations but occasionally words are mis-transcribed.)    Sary Mares MD (electronically signed)  Attending Emergency Physician        Sary Mares MD  01/25/23 [id number]

## 2023-01-25 NOTE — ED TRIAGE NOTES
Pt presents with redness, swelling, and pressure to left eye. Pt denies eyeglasses/contacts or exposure to foreign body. Pt states redness has progressed since yesterday.

## 2023-01-25 NOTE — ED NOTES
Pt experienced dark colored stool. Dr. Fabian Massed at bedside for rectal exam and hemoccult was negative.        Teetee Shipman RN  01/25/23 [id number]

## 2023-02-06 RX ORDER — RISPERIDONE 0.5 MG/1
TABLET ORAL
Qty: 60 TABLET | Refills: 3 | OUTPATIENT
Start: 2023-02-06

## 2023-02-28 ENCOUNTER — HOSPITAL ENCOUNTER (OUTPATIENT)
Age: 48
Discharge: HOME | End: 2023-03-02
Payer: COMMERCIAL

## 2023-02-28 ENCOUNTER — HOSPITAL ENCOUNTER (OUTPATIENT)
Dept: GENERAL RADIOLOGY | Age: 48
Discharge: HOME | End: 2023-03-02
Payer: COMMERCIAL

## 2023-02-28 DIAGNOSIS — R07.89 OTHER CHEST PAIN: ICD-10-CM

## 2023-02-28 DIAGNOSIS — R05.9 COUGH, UNSPECIFIED TYPE: ICD-10-CM

## 2023-02-28 DIAGNOSIS — R09.89 OTHER SPECIFIED SYMPTOMS AND SIGNS INVOLVING THE CIRCULATORY AND RESPIRATORY SYSTEMS: ICD-10-CM

## 2023-02-28 PROCEDURE — 71046 X-RAY EXAM CHEST 2 VIEWS: CPT

## 2023-11-26 ENCOUNTER — APPOINTMENT (OUTPATIENT)
Dept: GENERAL RADIOLOGY | Age: 48
End: 2023-11-26
Payer: COMMERCIAL

## 2023-11-26 ENCOUNTER — HOSPITAL ENCOUNTER (EMERGENCY)
Age: 48
Discharge: HOME | End: 2023-11-26
Attending: EMERGENCY MEDICINE
Payer: COMMERCIAL

## 2023-11-26 VITALS
OXYGEN SATURATION: 97 % | SYSTOLIC BLOOD PRESSURE: 132 MMHG | WEIGHT: 243 LBS | BODY MASS INDEX: 54.66 KG/M2 | HEIGHT: 56 IN | RESPIRATION RATE: 18 BRPM | DIASTOLIC BLOOD PRESSURE: 93 MMHG | HEART RATE: 100 BPM | TEMPERATURE: 98.1 F

## 2023-11-26 DIAGNOSIS — R05.1 ACUTE COUGH: ICD-10-CM

## 2023-11-26 DIAGNOSIS — J22 ACUTE RESPIRATORY INFECTION: Primary | ICD-10-CM

## 2023-11-26 LAB — STREP GRP A PCR: NEGATIVE

## 2023-11-26 PROCEDURE — 96372 THER/PROPH/DIAG INJ SC/IM: CPT

## 2023-11-26 PROCEDURE — 6360000002 HC RX W HCPCS: Performed by: NURSE PRACTITIONER

## 2023-11-26 PROCEDURE — 87651 STREP A DNA AMP PROBE: CPT

## 2023-11-26 PROCEDURE — 99284 EMERGENCY DEPT VISIT MOD MDM: CPT

## 2023-11-26 PROCEDURE — 71045 X-RAY EXAM CHEST 1 VIEW: CPT

## 2023-11-26 PROCEDURE — 6370000000 HC RX 637 (ALT 250 FOR IP): Performed by: NURSE PRACTITIONER

## 2023-11-26 RX ORDER — HYDROCODONE BITARTRATE AND ACETAMINOPHEN 5; 325 MG/1; MG/1
1 TABLET ORAL ONCE
Status: COMPLETED | OUTPATIENT
Start: 2023-11-26 | End: 2023-11-26

## 2023-11-26 RX ORDER — AZITHROMYCIN 250 MG/1
TABLET, FILM COATED ORAL
Qty: 1 PACKET | Refills: 0 | Status: SHIPPED | OUTPATIENT
Start: 2023-11-26 | End: 2023-11-30

## 2023-11-26 RX ORDER — BENZONATATE 100 MG/1
100 CAPSULE ORAL 3 TIMES DAILY PRN
Status: DISCONTINUED | OUTPATIENT
Start: 2023-11-26 | End: 2023-11-26 | Stop reason: HOSPADM

## 2023-11-26 RX ORDER — AZITHROMYCIN 250 MG/1
500 TABLET, FILM COATED ORAL ONCE
Status: COMPLETED | OUTPATIENT
Start: 2023-11-26 | End: 2023-11-26

## 2023-11-26 RX ORDER — BENZONATATE 200 MG/1
200 CAPSULE ORAL 3 TIMES DAILY PRN
Qty: 21 CAPSULE | Refills: 0 | Status: SHIPPED | OUTPATIENT
Start: 2023-11-26 | End: 2023-12-03

## 2023-11-26 RX ORDER — DEXAMETHASONE SODIUM PHOSPHATE 10 MG/ML
10 INJECTION, SOLUTION INTRAMUSCULAR; INTRAVENOUS ONCE
Status: COMPLETED | OUTPATIENT
Start: 2023-11-26 | End: 2023-11-26

## 2023-11-26 RX ADMIN — BENZONATATE 100 MG: 100 CAPSULE ORAL at 13:23

## 2023-11-26 RX ADMIN — HYDROCODONE BITARTRATE AND ACETAMINOPHEN 1 TABLET: 5; 325 TABLET ORAL at 13:22

## 2023-11-26 RX ADMIN — DEXAMETHASONE SODIUM PHOSPHATE 10 MG: 10 INJECTION, SOLUTION INTRAMUSCULAR; INTRAVENOUS at 14:05

## 2023-11-26 RX ADMIN — AZITHROMYCIN DIHYDRATE 500 MG: 250 TABLET, FILM COATED ORAL at 14:05

## 2023-11-26 ASSESSMENT — ENCOUNTER SYMPTOMS
RHINORRHEA: 0
CONSTIPATION: 0
EYE PAIN: 0
BACK PAIN: 0
EYE REDNESS: 0
SINUS PAIN: 0
STRIDOR: 0
EYE DISCHARGE: 0
SORE THROAT: 1
VOMITING: 0
ABDOMINAL DISTENTION: 0
FACIAL SWELLING: 0
ABDOMINAL PAIN: 0
SINUS PRESSURE: 0
SHORTNESS OF BREATH: 0
CHOKING: 0
PHOTOPHOBIA: 0
VOICE CHANGE: 0
DIARRHEA: 0
WHEEZING: 0
ALLERGIC/IMMUNOLOGIC NEGATIVE: 1
EYE ITCHING: 0
APNEA: 0
TROUBLE SWALLOWING: 0
BLOOD IN STOOL: 0
CHEST TIGHTNESS: 0
COLOR CHANGE: 0
COUGH: 1
NAUSEA: 0

## 2023-11-26 ASSESSMENT — PAIN - FUNCTIONAL ASSESSMENT: PAIN_FUNCTIONAL_ASSESSMENT: NONE - DENIES PAIN

## 2023-11-26 NOTE — ED NOTES
Pt is given d/c instructions, work excuse and informed to  two e scripts at [facility], [facility] Spe[facility]. Pt voiced understanding of d/c instructions without further questions.       Perlita Duong RN  11/26/23 [id number]

## 2023-11-26 NOTE — ED TRIAGE NOTES
Patient to ER with cough and sore throat for one week. Family at home sick. Axox4.  Electronically signed by Mercy Diaz RN on 11/26/2023 at 1:21 PM Home

## 2023-11-26 NOTE — ED PROVIDER NOTES
[address] [facility] ED  EMERGENCY DEPARTMENT ENCOUNTER      Pt Name: Michael Weaver  MRN: [id number]  [address] [facility] [place] 1975  Date of evaluation: 11/26/2023  Provider: ANA Whitehead CNP    CHIEF COMPLAINT       Chief Complaint   Patient presents with    Cough     Symptoms began Tuesday, cough, sore throat, SOB         HISTORY OF PRESENT ILLNESS   (Location/Symptom, Timing/Onset, Context/Setting, Quality, Duration, Modifying Factors, Severity)  Note limiting factors. Michael Weaver is a 53 y.o. female who, per chart review, has past medical history of htn, hpl, obesity, PTSD, MARBELLA, anxiety, depression, asthma presents to the emergency department with c/o nonproductive cough, sore throat, lost her voice. States she was seen at urgent care on the 21st.  She has been using an inhaler and took a course of steroids with no improvement in symptoms. Pt denies chest pain, shortness of breath, palpitations, fevers, abdominal pain, nausea, vomiting, diarrhea, dysuria, hematuria, flank pain, incontinence. Nursing Notes were reviewed. REVIEW OF SYSTEMS    (2-9 systems for level 4, 10 or more for level 5)     Review of Systems   Constitutional:  Negative for chills, diaphoresis, fatigue and fever. HENT:  Positive for sore throat. Negative for congestion, dental problem, drooling, ear discharge, ear pain, facial swelling, hearing loss, mouth sores, nosebleeds, postnasal drip, rhinorrhea, sinus pressure, sinus pain, sneezing, tinnitus, trouble swallowing and voice change. Eyes:  Negative for photophobia, pain, discharge, redness, itching and visual disturbance. Respiratory:  Positive for cough. Negative for apnea, choking, chest tightness, shortness of breath, wheezing and stridor. Cardiovascular:  Negative for chest pain, palpitations and leg swelling. Gastrointestinal:  Negative for abdominal distention, abdominal pain, blood in stool, constipation, diarrhea, nausea and vomiting.    Endocrine:

## 2023-12-16 ENCOUNTER — APPOINTMENT (OUTPATIENT)
Dept: GENERAL RADIOLOGY | Age: 48
End: 2023-12-16
Payer: COMMERCIAL

## 2023-12-16 ENCOUNTER — HOSPITAL ENCOUNTER (EMERGENCY)
Age: 48
Discharge: HOME | End: 2023-12-16
Attending: EMERGENCY MEDICINE
Payer: COMMERCIAL

## 2023-12-16 ENCOUNTER — APPOINTMENT (OUTPATIENT)
Dept: CT IMAGING | Age: 48
End: 2023-12-16
Payer: COMMERCIAL

## 2023-12-16 VITALS
BODY MASS INDEX: 47.46 KG/M2 | WEIGHT: 220 LBS | OXYGEN SATURATION: 97 % | RESPIRATION RATE: 18 BRPM | SYSTOLIC BLOOD PRESSURE: 132 MMHG | TEMPERATURE: 98 F | HEIGHT: 57 IN | HEART RATE: 86 BPM | DIASTOLIC BLOOD PRESSURE: 84 MMHG

## 2023-12-16 DIAGNOSIS — N83.292 COMPLEX CYST OF LEFT OVARY: ICD-10-CM

## 2023-12-16 DIAGNOSIS — U07.1 COVID-19: Primary | ICD-10-CM

## 2023-12-16 LAB
ALBUMIN SERPL-MCNC: 3.6 G/DL (ref 3.5–4.6)
ALP SERPL-CCNC: 92 U/L (ref 40–130)
ALT SERPL-CCNC: 14 U/L (ref 0–33)
ANION GAP SERPL CALCULATED.3IONS-SCNC: 8 MEQ/L (ref 9–15)
AST SERPL-CCNC: 11 U/L (ref 0–35)
BACTERIA URNS QL MICRO: ABNORMAL /HPF
BASOPHILS # BLD: 0.1 K/UL (ref 0–0.1)
BASOPHILS NFR BLD: 0.6 % (ref 0.1–1.2)
BILIRUB SERPL-MCNC: <0.2 MG/DL (ref 0.2–0.7)
BILIRUB UR QL STRIP: NEGATIVE
BUN SERPL-MCNC: 12 MG/DL (ref 6–20)
CALCIUM SERPL-MCNC: 9.3 MG/DL (ref 8.5–9.9)
CHLORIDE SERPL-SCNC: 104 MEQ/L (ref 95–107)
CLARITY UR: CLEAR
CO2 SERPL-SCNC: 28 MEQ/L (ref 20–31)
COLOR UR: YELLOW
CREAT SERPL-MCNC: 0.76 MG/DL (ref 0.5–0.9)
EOSINOPHIL # BLD: 0.1 K/UL (ref 0–0.4)
EOSINOPHIL NFR BLD: 1.6 % (ref 0.7–5.8)
EPI CELLS #/AREA URNS HPF: ABNORMAL /HPF
ERYTHROCYTE [DISTWIDTH] IN BLOOD BY AUTOMATED COUNT: 13.2 % (ref 11.7–14.4)
GLOBULIN SER CALC-MCNC: 3.3 G/DL (ref 2.3–3.5)
GLUCOSE SERPL-MCNC: 110 MG/DL (ref 70–99)
GLUCOSE UR STRIP-MCNC: NEGATIVE MG/DL
HCT VFR BLD AUTO: 41.3 % (ref 37–47)
HGB BLD-MCNC: 13 G/DL (ref 11.2–15.7)
HGB UR QL STRIP: NORMAL
IMM GRANULOCYTES # BLD: 0 K/UL
IMM GRANULOCYTES NFR BLD: 0.2 %
INFLUENZA A BY PCR: NEGATIVE
INFLUENZA B BY PCR: NEGATIVE
KETONES UR STRIP-MCNC: NEGATIVE MG/DL
LEUKOCYTE ESTERASE UR QL STRIP: NEGATIVE
LIPASE SERPL-CCNC: 18 U/L (ref 12–95)
LYMPHOCYTES # BLD: 3 K/UL (ref 1.2–3.7)
LYMPHOCYTES NFR BLD: 37 %
MAGNESIUM SERPL-MCNC: 2 MG/DL (ref 1.7–2.4)
MCH RBC QN AUTO: 28.8 PG (ref 25.6–32.2)
MCHC RBC AUTO-ENTMCNC: 31.5 % (ref 32.2–35.5)
MCV RBC AUTO: 91.6 FL (ref 79.4–94.8)
MONOCYTES # BLD: 0.7 K/UL (ref 0.2–0.9)
MONOCYTES NFR BLD: 8.6 % (ref 4.7–12.5)
NEUTROPHILS # BLD: 4.2 K/UL (ref 1.6–6.1)
NEUTS SEG NFR BLD: 52 % (ref 34–71.1)
NITRITE UR QL STRIP: NEGATIVE
PH UR STRIP: 6 [PH] (ref 5–9)
PLATELET # BLD AUTO: 319 K/UL (ref 182–369)
POTASSIUM SERPL-SCNC: 3.9 MEQ/L (ref 3.4–4.9)
PROT SERPL-MCNC: 6.9 G/DL (ref 6.3–8)
PROT UR STRIP-MCNC: NEGATIVE MG/DL
RBC # BLD AUTO: 4.51 M/UL (ref 3.93–5.22)
RBC #/AREA URNS HPF: ABNORMAL /HPF (ref 0–2)
SARS-COV-2 RDRP RESP QL NAA+PROBE: DETECTED
SODIUM SERPL-SCNC: 140 MEQ/L (ref 135–144)
SP GR UR STRIP: >=1.03 (ref 1–1.03)
UROBILINOGEN UR STRIP-ACNC: 0.2 E.U./DL
WBC # BLD AUTO: 8 K/UL (ref 4–10)
WBC #/AREA URNS HPF: ABNORMAL /HPF (ref 0–5)

## 2023-12-16 PROCEDURE — 83735 ASSAY OF MAGNESIUM: CPT

## 2023-12-16 PROCEDURE — 87635 SARS-COV-2 COVID-19 AMP PRB: CPT

## 2023-12-16 PROCEDURE — 81001 URINALYSIS AUTO W/SCOPE: CPT

## 2023-12-16 PROCEDURE — 85025 COMPLETE CBC W/AUTO DIFF WBC: CPT

## 2023-12-16 PROCEDURE — 36415 COLL VENOUS BLD VENIPUNCTURE: CPT

## 2023-12-16 PROCEDURE — 2580000003 HC RX 258: Performed by: EMERGENCY MEDICINE

## 2023-12-16 PROCEDURE — 6360000002 HC RX W HCPCS: Performed by: EMERGENCY MEDICINE

## 2023-12-16 PROCEDURE — 87502 INFLUENZA DNA AMP PROBE: CPT

## 2023-12-16 PROCEDURE — 80053 COMPREHEN METABOLIC PANEL: CPT

## 2023-12-16 PROCEDURE — 83690 ASSAY OF LIPASE: CPT

## 2023-12-16 PROCEDURE — 71045 X-RAY EXAM CHEST 1 VIEW: CPT

## 2023-12-16 PROCEDURE — 74176 CT ABD & PELVIS W/O CONTRAST: CPT

## 2023-12-16 RX ORDER — CYCLOBENZAPRINE HCL 10 MG
10 TABLET ORAL 3 TIMES DAILY PRN
Qty: 30 TABLET | Refills: 0 | Status: SHIPPED | OUTPATIENT
Start: 2023-12-16 | End: 2023-12-26

## 2023-12-16 RX ORDER — GUAIFENESIN 600 MG/1
1200 TABLET, EXTENDED RELEASE ORAL 2 TIMES DAILY
Qty: 40 TABLET | Refills: 0 | Status: SHIPPED | OUTPATIENT
Start: 2023-12-16 | End: 2023-12-16

## 2023-12-16 RX ORDER — ORPHENADRINE CITRATE 30 MG/ML
60 INJECTION INTRAMUSCULAR; INTRAVENOUS ONCE
Status: COMPLETED | OUTPATIENT
Start: 2023-12-16 | End: 2023-12-16

## 2023-12-16 RX ORDER — GUAIFENESIN 600 MG/1
1200 TABLET, EXTENDED RELEASE ORAL 2 TIMES DAILY
Qty: 40 TABLET | Refills: 0 | Status: SHIPPED | OUTPATIENT
Start: 2023-12-16 | End: 2023-12-26

## 2023-12-16 RX ORDER — DEXAMETHASONE SODIUM PHOSPHATE 10 MG/ML
10 INJECTION, SOLUTION INTRAMUSCULAR; INTRAVENOUS ONCE
Status: COMPLETED | OUTPATIENT
Start: 2023-12-16 | End: 2023-12-16

## 2023-12-16 RX ORDER — 0.9 % SODIUM CHLORIDE 0.9 %
1000 INTRAVENOUS SOLUTION INTRAVENOUS ONCE
Status: COMPLETED | OUTPATIENT
Start: 2023-12-16 | End: 2023-12-16

## 2023-12-16 RX ORDER — CYCLOBENZAPRINE HCL 10 MG
10 TABLET ORAL 3 TIMES DAILY PRN
Qty: 30 TABLET | Refills: 0 | Status: SHIPPED | OUTPATIENT
Start: 2023-12-16 | End: 2023-12-16

## 2023-12-16 RX ORDER — BENZONATATE 200 MG/1
200 CAPSULE ORAL 3 TIMES DAILY PRN
Qty: 30 CAPSULE | Refills: 0 | Status: SHIPPED | OUTPATIENT
Start: 2023-12-16 | End: 2023-12-23

## 2023-12-16 RX ORDER — ACETAMINOPHEN 500 MG
500 TABLET ORAL 4 TIMES DAILY PRN
Qty: 50 TABLET | Refills: 0 | Status: SHIPPED | OUTPATIENT
Start: 2023-12-16

## 2023-12-16 RX ORDER — ACETAMINOPHEN 500 MG
500 TABLET ORAL 4 TIMES DAILY PRN
Qty: 50 TABLET | Refills: 0 | Status: SHIPPED | OUTPATIENT
Start: 2023-12-16 | End: 2023-12-16

## 2023-12-16 RX ORDER — BENZONATATE 200 MG/1
200 CAPSULE ORAL 3 TIMES DAILY PRN
Qty: 30 CAPSULE | Refills: 0 | Status: SHIPPED | OUTPATIENT
Start: 2023-12-16 | End: 2023-12-16

## 2023-12-16 RX ADMIN — SODIUM CHLORIDE 1000 ML: 9 INJECTION, SOLUTION INTRAVENOUS at 11:40

## 2023-12-16 RX ADMIN — ORPHENADRINE CITRATE 60 MG: 60 INJECTION INTRAMUSCULAR; INTRAVENOUS at 11:41

## 2023-12-16 RX ADMIN — DEXAMETHASONE SODIUM PHOSPHATE 10 MG: 10 INJECTION, SOLUTION INTRAMUSCULAR; INTRAVENOUS at 10:52

## 2023-12-16 ASSESSMENT — PAIN - FUNCTIONAL ASSESSMENT: PAIN_FUNCTIONAL_ASSESSMENT: 0-10

## 2023-12-16 ASSESSMENT — PAIN DESCRIPTION - LOCATION: LOCATION: FLANK

## 2023-12-16 ASSESSMENT — LIFESTYLE VARIABLES
HOW OFTEN DO YOU HAVE A DRINK CONTAINING ALCOHOL: NEVER
HOW MANY STANDARD DRINKS CONTAINING ALCOHOL DO YOU HAVE ON A TYPICAL DAY: PATIENT DOES NOT DRINK

## 2023-12-16 ASSESSMENT — PAIN DESCRIPTION - ORIENTATION: ORIENTATION: RIGHT

## 2023-12-16 NOTE — ED TRIAGE NOTES
Pt presents with cough that has been going on for a month    Pt was seen approx 2 weeks ago and diagnosed with a respiratory infection, pt finished course of antibiotics and sts that the cough has returned    Pt also complains of right flank pain that began yesterday   Pt has not taken anything for pain

## 2023-12-29 ENCOUNTER — OFFICE VISIT (OUTPATIENT)
Dept: OBGYN CLINIC | Age: 48
End: 2023-12-29
Payer: COMMERCIAL

## 2023-12-29 VITALS
BODY MASS INDEX: 52.64 KG/M2 | HEART RATE: 82 BPM | WEIGHT: 244 LBS | SYSTOLIC BLOOD PRESSURE: 120 MMHG | HEIGHT: 57 IN | DIASTOLIC BLOOD PRESSURE: 74 MMHG

## 2023-12-29 DIAGNOSIS — R10.9 FLANK PAIN: ICD-10-CM

## 2023-12-29 DIAGNOSIS — S31.41XA TRAUMATIC VAGINAL LACERATION, INITIAL ENCOUNTER: ICD-10-CM

## 2023-12-29 DIAGNOSIS — N83.202 LEFT OVARIAN CYST: Primary | ICD-10-CM

## 2023-12-29 DIAGNOSIS — R10.2 PELVIC PAIN: ICD-10-CM

## 2023-12-29 LAB
BACTERIA URNS QL MICRO: NEGATIVE /HPF
BILIRUB UR QL STRIP: NEGATIVE
CLARITY UR: CLEAR
COLOR UR: YELLOW
EPI CELLS #/AREA URNS AUTO: ABNORMAL /HPF (ref 0–5)
GLUCOSE UR STRIP-MCNC: NEGATIVE MG/DL
HGB UR QL STRIP: ABNORMAL
HYALINE CASTS #/AREA URNS AUTO: ABNORMAL /HPF (ref 0–5)
KETONES UR STRIP-MCNC: NEGATIVE MG/DL
LEUKOCYTE ESTERASE UR QL STRIP: NEGATIVE
NITRITE UR QL STRIP: NEGATIVE
PH UR STRIP: 5.5 [PH] (ref 5–9)
PROT UR STRIP-MCNC: NEGATIVE MG/DL
RBC #/AREA URNS AUTO: ABNORMAL /HPF (ref 0–5)
SP GR UR STRIP: 1.02 (ref 1–1.03)
URINE REFLEX TO CULTURE: ABNORMAL
UROBILINOGEN UR STRIP-ACNC: 0.2 E.U./DL
WBC #/AREA URNS AUTO: ABNORMAL /HPF (ref 0–5)

## 2023-12-29 PROCEDURE — G8484 FLU IMMUNIZE NO ADMIN: HCPCS | Performed by: OBSTETRICS & GYNECOLOGY

## 2023-12-29 PROCEDURE — G8417 CALC BMI ABV UP PARAM F/U: HCPCS | Performed by: OBSTETRICS & GYNECOLOGY

## 2023-12-29 PROCEDURE — 3074F SYST BP LT 130 MM HG: CPT | Performed by: OBSTETRICS & GYNECOLOGY

## 2023-12-29 PROCEDURE — 3078F DIAST BP <80 MM HG: CPT | Performed by: OBSTETRICS & GYNECOLOGY

## 2023-12-29 PROCEDURE — 99213 OFFICE O/P EST LOW 20 MIN: CPT | Performed by: OBSTETRICS & GYNECOLOGY

## 2023-12-29 PROCEDURE — 1036F TOBACCO NON-USER: CPT | Performed by: OBSTETRICS & GYNECOLOGY

## 2023-12-29 PROCEDURE — G8427 DOCREV CUR MEDS BY ELIG CLIN: HCPCS | Performed by: OBSTETRICS & GYNECOLOGY

## 2024-03-12 ENCOUNTER — APPOINTMENT (OUTPATIENT)
Dept: GENERAL RADIOLOGY | Age: 49
End: 2024-03-12
Payer: COMMERCIAL

## 2024-03-12 ENCOUNTER — HOSPITAL ENCOUNTER (EMERGENCY)
Age: 49
Discharge: HOME | End: 2024-03-12
Attending: EMERGENCY MEDICINE
Payer: COMMERCIAL

## 2024-03-12 VITALS
RESPIRATION RATE: 20 BRPM | SYSTOLIC BLOOD PRESSURE: 140 MMHG | HEART RATE: 97 BPM | WEIGHT: 232 LBS | OXYGEN SATURATION: 95 % | HEIGHT: 57 IN | TEMPERATURE: 98.1 F | BODY MASS INDEX: 50.05 KG/M2 | DIASTOLIC BLOOD PRESSURE: 88 MMHG

## 2024-03-12 DIAGNOSIS — M79.672 PAIN OF LEFT HEEL: Primary | ICD-10-CM

## 2024-03-12 PROCEDURE — 73630 X-RAY EXAM OF FOOT: CPT

## 2024-03-12 PROCEDURE — 99283 EMERGENCY DEPT VISIT LOW MDM: CPT

## 2024-03-12 RX ORDER — HYDROCODONE BITARTRATE AND ACETAMINOPHEN 5; 325 MG/1; MG/1
1 TABLET ORAL EVERY 6 HOURS PRN
Qty: 10 TABLET | Refills: 0 | Status: SHIPPED | OUTPATIENT
Start: 2024-03-12 | End: 2024-03-15

## 2024-03-12 ASSESSMENT — PAIN - FUNCTIONAL ASSESSMENT: PAIN_FUNCTIONAL_ASSESSMENT: 0-10

## 2024-03-12 ASSESSMENT — PAIN DESCRIPTION - DESCRIPTORS: DESCRIPTORS: THROBBING

## 2024-03-12 ASSESSMENT — PAIN DESCRIPTION - LOCATION: LOCATION: FOOT

## 2024-03-12 ASSESSMENT — PAIN DESCRIPTION - ORIENTATION: ORIENTATION: LEFT

## 2024-03-12 ASSESSMENT — PAIN DESCRIPTION - PAIN TYPE: TYPE: ACUTE PAIN

## 2024-03-12 ASSESSMENT — PAIN SCALES - GENERAL: PAINLEVEL_OUTOF10: 8

## 2024-03-12 ASSESSMENT — PAIN DESCRIPTION - FREQUENCY: FREQUENCY: CONTINUOUS

## 2024-03-12 NOTE — ED TRIAGE NOTES
Pt a/o x 3 skin pink w/d resp non labored. Pt reports her heel started hurting 3 days ago and now every time she steps down pain shoot into heel and up lt leg. Neg swelling noted

## 2024-03-12 NOTE — ED PROVIDER NOTES
[facility] ED  EMERGENCY DEPARTMENT ENCOUNTER      Pt Name: Medina Peck  MRN: [id number]  Birthdate 1975  Date of evaluation: 3/12/2024  Provider: Maris Herrera DO  11:27 AM    CHIEF COMPLAINT       Chief Complaint   Patient presents with    Foot Pain     Pt c/o L. Heel pain onset 3 days. Pt states when she steps down it feels like \" an electric shock going up my leg\"     Chief complaint: Left heel pain  History of chief complaint: This 48-year-old female presents the emergency department complaining of onset 2 to 3 days ago with increased focal tenderness on the bottom of the left heel.  Patient states the pain does radiate somewhat diffusely through the heel.  Increased pain with weightbearing.  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  Patient denies any numbness tingling or weakness to the extremity.  Patient states she is otherwise been well, not a diabetic, denies other complaint.  Patient states she has been applying ice locally and taking ibuprofen without significant improvement.    Nursing Notes were reviewed.    REVIEW OF SYSTEMS       Review of Systems  Pertinent findings documented in the history of present illness  Except as noted above the remainder of the review of systems was reviewed and negative.       PAST MEDICAL HISTORY     Past Medical History:   Diagnosis Date    Angina at rest 10/25/2022    Anxiety and depression     Anxiety and depression     Asthma     Essential hypertension 5/18/2017    Headache(784.0)     Hyperlipidemia 10/25/2022    Obesity     MARBELLA (obstructive sleep apnea) 4/29/2020    PTSD (post-traumatic stress disorder)     Urinary incontinence          SURGICAL HISTORY       Past Surgical History:   Procedure Laterality Date    CHOLECYSTECTOMY      ENDOMETRIAL ABLATION      HYSTERECTOMY, TOTAL ABDOMINAL (CERVIX REMOVED)  7/15/15        TONSILLECTOMY      TUBAL LIGATION           CURRENT

## 2024-06-05 ENCOUNTER — HOSPITAL ENCOUNTER (OUTPATIENT)
Dept: WOMENS IMAGING | Age: 49
Discharge: HOME | End: 2024-06-07
Payer: COMMERCIAL

## 2024-06-05 VITALS — HEIGHT: 57 IN | BODY MASS INDEX: 50.19 KG/M2

## 2024-06-05 DIAGNOSIS — Z12.31 ENCOUNTER FOR SCREENING MAMMOGRAM FOR BREAST CANCER: ICD-10-CM

## 2024-06-05 PROCEDURE — 77063 BREAST TOMOSYNTHESIS BI: CPT

## 2024-12-09 ENCOUNTER — APPOINTMENT (OUTPATIENT)
Dept: GENERAL RADIOLOGY | Age: 49
End: 2024-12-09

## 2024-12-09 ENCOUNTER — HOSPITAL ENCOUNTER (EMERGENCY)
Age: 49
Discharge: HOME | End: 2024-12-09
Attending: EMERGENCY MEDICINE

## 2024-12-09 VITALS
HEART RATE: 98 BPM | WEIGHT: 225 LBS | OXYGEN SATURATION: 97 % | BODY MASS INDEX: 50.61 KG/M2 | DIASTOLIC BLOOD PRESSURE: 84 MMHG | RESPIRATION RATE: 18 BRPM | TEMPERATURE: 98.4 F | HEIGHT: 56 IN | SYSTOLIC BLOOD PRESSURE: 131 MMHG

## 2024-12-09 DIAGNOSIS — R05.1 ACUTE COUGH: Primary | ICD-10-CM

## 2024-12-09 DIAGNOSIS — J01.10 ACUTE FRONTAL SINUSITIS, RECURRENCE NOT SPECIFIED: ICD-10-CM

## 2024-12-09 LAB
ALBUMIN SERPL-MCNC: 3.9 G/DL (ref 3.5–4.6)
ALP SERPL-CCNC: 73 U/L (ref 40–130)
ALT SERPL-CCNC: 16 U/L (ref 0–33)
ANION GAP SERPL CALCULATED.3IONS-SCNC: 11 MEQ/L (ref 9–15)
AST SERPL-CCNC: 18 U/L (ref 0–35)
BASOPHILS # BLD: 0.1 K/UL (ref 0–0.1)
BASOPHILS NFR BLD: 0.6 % (ref 0.1–1.2)
BILIRUB SERPL-MCNC: 0.3 MG/DL (ref 0.2–0.7)
BUN SERPL-MCNC: 12 MG/DL (ref 6–20)
CALCIUM SERPL-MCNC: 9.3 MG/DL (ref 8.5–9.9)
CHLORIDE SERPL-SCNC: 103 MEQ/L (ref 95–107)
CO2 SERPL-SCNC: 29 MEQ/L (ref 20–31)
CREAT SERPL-MCNC: 0.92 MG/DL (ref 0.5–0.9)
EOSINOPHIL # BLD: 0.1 K/UL (ref 0–0.4)
EOSINOPHIL NFR BLD: 0.5 % (ref 0.7–5.8)
ERYTHROCYTE [DISTWIDTH] IN BLOOD BY AUTOMATED COUNT: 12.9 % (ref 11.7–14.4)
GLOBULIN SER CALC-MCNC: 3.4 G/DL (ref 2.3–3.5)
GLUCOSE SERPL-MCNC: 109 MG/DL (ref 70–99)
HCT VFR BLD AUTO: 41.2 % (ref 37–47)
HGB BLD-MCNC: 13.2 G/DL (ref 11.2–15.7)
IMM GRANULOCYTES # BLD: 0 K/UL
IMM GRANULOCYTES NFR BLD: 0.2 %
LYMPHOCYTES # BLD: 4.8 K/UL (ref 1.2–3.7)
LYMPHOCYTES NFR BLD: 39.5 %
MCH RBC QN AUTO: 28.3 PG (ref 25.6–32.2)
MCHC RBC AUTO-ENTMCNC: 32 % (ref 32.2–35.5)
MCV RBC AUTO: 88.4 FL (ref 79.4–94.8)
MONOCYTES # BLD: 1 K/UL (ref 0.2–0.9)
MONOCYTES NFR BLD: 8.2 % (ref 4.7–12.5)
NEUTROPHILS # BLD: 6.2 K/UL (ref 1.6–6.1)
NEUTS SEG NFR BLD: 51 % (ref 34–71.1)
PLATELET # BLD AUTO: 378 K/UL (ref 182–369)
POTASSIUM SERPL-SCNC: 3.1 MEQ/L (ref 3.4–4.9)
PROT SERPL-MCNC: 7.3 G/DL (ref 6.3–8)
RBC # BLD AUTO: 4.66 M/UL (ref 3.93–5.22)
SODIUM SERPL-SCNC: 143 MEQ/L (ref 135–144)
WBC # BLD AUTO: 12.1 K/UL (ref 4–10)

## 2024-12-09 PROCEDURE — 80053 COMPREHEN METABOLIC PANEL: CPT

## 2024-12-09 PROCEDURE — 6360000002 HC RX W HCPCS: Performed by: EMERGENCY MEDICINE

## 2024-12-09 PROCEDURE — 71046 X-RAY EXAM CHEST 2 VIEWS: CPT

## 2024-12-09 PROCEDURE — 85025 COMPLETE CBC W/AUTO DIFF WBC: CPT

## 2024-12-09 PROCEDURE — 2580000003 HC RX 258: Performed by: EMERGENCY MEDICINE

## 2024-12-09 PROCEDURE — 96375 TX/PRO/DX INJ NEW DRUG ADDON: CPT

## 2024-12-09 PROCEDURE — 96365 THER/PROPH/DIAG IV INF INIT: CPT

## 2024-12-09 PROCEDURE — 36415 COLL VENOUS BLD VENIPUNCTURE: CPT

## 2024-12-09 PROCEDURE — 6370000000 HC RX 637 (ALT 250 FOR IP): Performed by: EMERGENCY MEDICINE

## 2024-12-09 PROCEDURE — 99284 EMERGENCY DEPT VISIT MOD MDM: CPT

## 2024-12-09 RX ORDER — POTASSIUM CHLORIDE 1500 MG/1
20 TABLET, EXTENDED RELEASE ORAL ONCE
Status: COMPLETED | OUTPATIENT
Start: 2024-12-09 | End: 2024-12-09

## 2024-12-09 RX ORDER — ONDANSETRON 2 MG/ML
4 INJECTION INTRAMUSCULAR; INTRAVENOUS ONCE
Status: COMPLETED | OUTPATIENT
Start: 2024-12-09 | End: 2024-12-09

## 2024-12-09 RX ORDER — FENTANYL CITRATE 50 UG/ML
75 INJECTION, SOLUTION INTRAMUSCULAR; INTRAVENOUS ONCE
Status: COMPLETED | OUTPATIENT
Start: 2024-12-09 | End: 2024-12-09

## 2024-12-09 RX ORDER — METHYLPREDNISOLONE SODIUM SUCCINATE 125 MG/2ML
125 INJECTION INTRAMUSCULAR; INTRAVENOUS ONCE
Status: COMPLETED | OUTPATIENT
Start: 2024-12-09 | End: 2024-12-09

## 2024-12-09 RX ORDER — RIZATRIPTAN BENZOATE 10 MG/1
10 TABLET ORAL 3 TIMES DAILY PRN
Qty: 30 TABLET | Refills: 0 | Status: SHIPPED | OUTPATIENT
Start: 2024-12-09 | End: 2024-12-09

## 2024-12-09 RX ORDER — GUAIFENESIN AND DEXTROMETHORPHAN HYDROBROMIDE 1200; 60 MG/1; MG/1
1 TABLET, EXTENDED RELEASE ORAL 2 TIMES DAILY
Qty: 20 TABLET | Refills: 0 | Status: SHIPPED | OUTPATIENT
Start: 2024-12-09

## 2024-12-09 RX ORDER — RIZATRIPTAN BENZOATE 10 MG/1
10 TABLET ORAL
Qty: 30 TABLET | Refills: 0 | Status: SHIPPED | OUTPATIENT
Start: 2024-12-09 | End: 2024-12-09

## 2024-12-09 RX ORDER — MAGNESIUM SULFATE 1 G/100ML
1000 INJECTION INTRAVENOUS ONCE
Status: COMPLETED | OUTPATIENT
Start: 2024-12-09 | End: 2024-12-09

## 2024-12-09 RX ORDER — GUAIFENESIN AND DEXTROMETHORPHAN HYDROBROMIDE 1200; 60 MG/1; MG/1
1 TABLET, EXTENDED RELEASE ORAL 2 TIMES DAILY
Qty: 20 TABLET | Refills: 0 | Status: SHIPPED | OUTPATIENT
Start: 2024-12-09 | End: 2024-12-09

## 2024-12-09 RX ORDER — 0.9 % SODIUM CHLORIDE 0.9 %
1000 INTRAVENOUS SOLUTION INTRAVENOUS ONCE
Status: COMPLETED | OUTPATIENT
Start: 2024-12-09 | End: 2024-12-09

## 2024-12-09 RX ADMIN — MAGNESIUM SULFATE HEPTAHYDRATE 1000 MG: 1 INJECTION, SOLUTION INTRAVENOUS at 12:44

## 2024-12-09 RX ADMIN — POTASSIUM CHLORIDE 20 MEQ: 1500 TABLET, EXTENDED RELEASE ORAL at 13:42

## 2024-12-09 RX ADMIN — SODIUM CHLORIDE 1000 ML: 9 INJECTION, SOLUTION INTRAVENOUS at 12:44

## 2024-12-09 RX ADMIN — FENTANYL CITRATE 75 MCG: 50 INJECTION INTRAMUSCULAR; INTRAVENOUS at 13:00

## 2024-12-09 RX ADMIN — ONDANSETRON 4 MG: 2 INJECTION, SOLUTION INTRAMUSCULAR; INTRAVENOUS at 13:00

## 2024-12-09 RX ADMIN — METHYLPREDNISOLONE SODIUM SUCCINATE 125 MG: 125 INJECTION INTRAMUSCULAR; INTRAVENOUS at 12:45

## 2024-12-09 ASSESSMENT — ENCOUNTER SYMPTOMS
BACK PAIN: 0
EYE PAIN: 0
CONSTIPATION: 0
EYE REDNESS: 0
WHEEZING: 1
BLOOD IN STOOL: 0
DIARRHEA: 0
STRIDOR: 0
SORE THROAT: 0
COUGH: 1
EYE DISCHARGE: 0
SHORTNESS OF BREATH: 1
CHOKING: 0
ABDOMINAL PAIN: 0
TROUBLE SWALLOWING: 0
VOICE CHANGE: 0
FACIAL SWELLING: 0
SINUS PRESSURE: 0
CHEST TIGHTNESS: 0
VOMITING: 0

## 2024-12-09 ASSESSMENT — LIFESTYLE VARIABLES
HOW MANY STANDARD DRINKS CONTAINING ALCOHOL DO YOU HAVE ON A TYPICAL DAY: PATIENT DOES NOT DRINK
HOW OFTEN DO YOU HAVE A DRINK CONTAINING ALCOHOL: NEVER

## 2024-12-09 ASSESSMENT — PAIN DESCRIPTION - LOCATION: LOCATION: HEAD

## 2024-12-09 ASSESSMENT — PAIN - FUNCTIONAL ASSESSMENT: PAIN_FUNCTIONAL_ASSESSMENT: NONE - DENIES PAIN

## 2024-12-09 ASSESSMENT — PAIN SCALES - GENERAL: PAINLEVEL_OUTOF10: 10

## 2024-12-09 NOTE — ED TRIAGE NOTES
Patient presents to ED with c/o cough that has been ongoing for the past few weeks and has completed 2 courses of abx and 1 course of steroids that has not helped

## 2024-12-09 NOTE — ED PROVIDER NOTES
[id number]  [phone]    In 1 week        DISCHARGE MEDICATIONS:  Current Discharge Medication List        START taking these medications    Details   rizatriptan (MAXALT) 10 MG tablet Take 1 tablet by mouth once as needed for Migraine May repeat in 2 hours if needed  Qty: 30 tablet, Refills: 0      Dextromethorphan-guaiFENesin (MUCINEX DM MAXIMUM STRENGTH)  MG TB12 Take 1 tablet by mouth in the morning and at bedtime  Qty: 20 tablet, Refills: 0      amoxicillin-clavulanate (AUGMENTIN) 875-125 MG per tablet Take 1 tablet by mouth 2 times daily for 10 days  Qty: 20 tablet, Refills: 0                (Please note that portions of this note were completed with a voice recognition program.  Efforts were made to edit the dictations but occasionally words are mis-transcribed.)    Eva Skinner MD (electronically signed)  Attending Emergency Physician        Eva Skinner MD  12/09/24 [id number]

## 2024-12-17 ENCOUNTER — APPOINTMENT (OUTPATIENT)
Dept: CT IMAGING | Age: 49
End: 2024-12-17

## 2024-12-17 ENCOUNTER — HOSPITAL ENCOUNTER (EMERGENCY)
Age: 49
Discharge: HOME | End: 2024-12-17
Attending: EMERGENCY MEDICINE

## 2024-12-17 VITALS
OXYGEN SATURATION: 97 % | SYSTOLIC BLOOD PRESSURE: 103 MMHG | RESPIRATION RATE: 20 BRPM | WEIGHT: 225 LBS | TEMPERATURE: 98.5 F | HEIGHT: 56 IN | HEART RATE: 109 BPM | DIASTOLIC BLOOD PRESSURE: 72 MMHG | BODY MASS INDEX: 50.61 KG/M2

## 2024-12-17 DIAGNOSIS — J98.01 BRONCHOSPASM: ICD-10-CM

## 2024-12-17 DIAGNOSIS — J18.9 LINGULAR PNEUMONIA: Primary | ICD-10-CM

## 2024-12-17 LAB
ANION GAP SERPL CALCULATED.3IONS-SCNC: 13 MEQ/L (ref 9–15)
BASOPHILS # BLD: 0 K/UL (ref 0–0.1)
BASOPHILS NFR BLD: 0.2 % (ref 0.1–1.2)
BNP BLD-MCNC: 69 PG/ML
BUN SERPL-MCNC: 11 MG/DL (ref 6–20)
CALCIUM SERPL-MCNC: 10.1 MG/DL (ref 8.5–9.9)
CHLORIDE SERPL-SCNC: 102 MEQ/L (ref 95–107)
CO2 SERPL-SCNC: 24 MEQ/L (ref 20–31)
CREAT SERPL-MCNC: 0.79 MG/DL (ref 0.5–0.9)
EOSINOPHIL # BLD: 0.3 K/UL (ref 0–0.4)
EOSINOPHIL NFR BLD: 1.5 % (ref 0.7–5.8)
ERYTHROCYTE [DISTWIDTH] IN BLOOD BY AUTOMATED COUNT: 12.8 % (ref 11.7–14.4)
GLUCOSE SERPL-MCNC: 200 MG/DL (ref 70–99)
HCT VFR BLD AUTO: 43.1 % (ref 37–47)
HGB BLD-MCNC: 13.9 G/DL (ref 11.2–15.7)
IMM GRANULOCYTES # BLD: 0.2 K/UL
IMM GRANULOCYTES NFR BLD: 0.8 %
LYMPHOCYTES # BLD: 3 K/UL (ref 1.2–3.7)
LYMPHOCYTES NFR BLD: 13.9 %
MCH RBC QN AUTO: 28.4 PG (ref 25.6–32.2)
MCHC RBC AUTO-ENTMCNC: 32.3 % (ref 32.2–35.5)
MCV RBC AUTO: 88.1 FL (ref 79.4–94.8)
MONOCYTES # BLD: 1.2 K/UL (ref 0.2–0.9)
MONOCYTES NFR BLD: 5.4 % (ref 4.7–12.5)
NEUTROPHILS # BLD: 17 K/UL (ref 1.6–6.1)
NEUTS SEG NFR BLD: 78.2 % (ref 34–71.1)
PLATELET # BLD AUTO: 391 K/UL (ref 182–369)
POTASSIUM SERPL-SCNC: 3.8 MEQ/L (ref 3.4–4.9)
RBC # BLD AUTO: 4.89 M/UL (ref 3.93–5.22)
SARS-COV-2 RDRP RESP QL NAA+PROBE: NOT DETECTED
SODIUM SERPL-SCNC: 139 MEQ/L (ref 135–144)
TROPONIN, HIGH SENSITIVITY: <6 NG/L (ref 0–19)
WBC # BLD AUTO: 21.7 K/UL (ref 4–10)

## 2024-12-17 PROCEDURE — 84484 ASSAY OF TROPONIN QUANT: CPT

## 2024-12-17 PROCEDURE — 36415 COLL VENOUS BLD VENIPUNCTURE: CPT

## 2024-12-17 PROCEDURE — 2580000003 HC RX 258: Performed by: EMERGENCY MEDICINE

## 2024-12-17 PROCEDURE — 85025 COMPLETE CBC W/AUTO DIFF WBC: CPT

## 2024-12-17 PROCEDURE — 99285 EMERGENCY DEPT VISIT HI MDM: CPT

## 2024-12-17 PROCEDURE — 87635 SARS-COV-2 COVID-19 AMP PRB: CPT

## 2024-12-17 PROCEDURE — 94640 AIRWAY INHALATION TREATMENT: CPT

## 2024-12-17 PROCEDURE — 94664 DEMO&/EVAL PT USE INHALER: CPT

## 2024-12-17 PROCEDURE — 80048 BASIC METABOLIC PNL TOTAL CA: CPT

## 2024-12-17 PROCEDURE — 96365 THER/PROPH/DIAG IV INF INIT: CPT

## 2024-12-17 PROCEDURE — 71275 CT ANGIOGRAPHY CHEST: CPT

## 2024-12-17 PROCEDURE — 83880 ASSAY OF NATRIURETIC PEPTIDE: CPT

## 2024-12-17 PROCEDURE — 96375 TX/PRO/DX INJ NEW DRUG ADDON: CPT

## 2024-12-17 PROCEDURE — 6360000002 HC RX W HCPCS: Performed by: EMERGENCY MEDICINE

## 2024-12-17 PROCEDURE — 93005 ELECTROCARDIOGRAM TRACING: CPT

## 2024-12-17 PROCEDURE — 6360000004 HC RX CONTRAST MEDICATION: Performed by: EMERGENCY MEDICINE

## 2024-12-17 RX ORDER — ALBUTEROL SULFATE 0.83 MG/ML
2.5 SOLUTION RESPIRATORY (INHALATION) ONCE
Status: COMPLETED | OUTPATIENT
Start: 2024-12-17 | End: 2024-12-17

## 2024-12-17 RX ORDER — IOPAMIDOL 755 MG/ML
75 INJECTION, SOLUTION INTRAVASCULAR
Status: COMPLETED | OUTPATIENT
Start: 2024-12-17 | End: 2024-12-17

## 2024-12-17 RX ORDER — DOXYCYCLINE HYCLATE 100 MG
100 TABLET ORAL 2 TIMES DAILY
Qty: 20 TABLET | Refills: 0 | Status: SHIPPED | OUTPATIENT
Start: 2024-12-17 | End: 2024-12-27

## 2024-12-17 RX ORDER — DOXYCYCLINE 100 MG/1
100 CAPSULE ORAL ONCE
Status: DISCONTINUED | OUTPATIENT
Start: 2024-12-17 | End: 2024-12-17

## 2024-12-17 RX ORDER — DEXAMETHASONE SODIUM PHOSPHATE 10 MG/ML
8 INJECTION, SOLUTION INTRAMUSCULAR; INTRAVENOUS ONCE
Status: COMPLETED | OUTPATIENT
Start: 2024-12-17 | End: 2024-12-17

## 2024-12-17 RX ADMIN — ALBUTEROL SULFATE 2.5 MG: 2.5 SOLUTION RESPIRATORY (INHALATION) at 12:56

## 2024-12-17 RX ADMIN — IOPAMIDOL 75 ML: 755 INJECTION, SOLUTION INTRAVENOUS at 13:42

## 2024-12-17 RX ADMIN — CEFTRIAXONE 1000 MG: 1 INJECTION, POWDER, FOR SOLUTION INTRAMUSCULAR; INTRAVENOUS at 15:19

## 2024-12-17 RX ADMIN — DEXAMETHASONE SODIUM PHOSPHATE 8 MG: 10 INJECTION, SOLUTION INTRAMUSCULAR; INTRAVENOUS at 15:16

## 2024-12-17 ASSESSMENT — ENCOUNTER SYMPTOMS
ABDOMINAL PAIN: 0
DIARRHEA: 0
SHORTNESS OF BREATH: 1
SINUS PAIN: 0
COUGH: 1
EYE DISCHARGE: 0
COLOR CHANGE: 0
BACK PAIN: 0
EYE REDNESS: 0
SORE THROAT: 0
NAUSEA: 0

## 2024-12-17 ASSESSMENT — PAIN - FUNCTIONAL ASSESSMENT: PAIN_FUNCTIONAL_ASSESSMENT: NONE - DENIES PAIN

## 2024-12-17 NOTE — ED NOTES
Pt up out of bed and ambulated down hallway. Spo2 remains above 96% but mostly at 98%hr 136. Dr Herrera aware

## 2024-12-17 NOTE — ED TRIAGE NOTES
Pt a/o x 3 skin pink w/d resp non labored.at rest lungs very diminished. Pt reports sob cough x 1 month but worse over last 3 days. Pt states she has been tx for bronchitis x 3 times. Pt obese female in nad.

## 2024-12-17 NOTE — ED PROVIDER NOTES
reveals a leukocytosis at 21.7 BMP reveals hyperglycemia at 200 troponin is within normal limits cardiac BNP is normal COVID screening is negative    All other labs were within normal range or not returned as of this dictation.    EMERGENCY DEPARTMENT COURSE and DIFFERENTIAL DIAGNOSIS/MDM:   Vitals:    Vitals:    12/17/24 1419 12/17/24 1430 12/17/24 1445 12/17/24 1459   BP: 98/85 108/71 119/77 103/72   Pulse:  (!) 110 (!) 109 (!) 109   Resp:  14 18 20   Temp:       TempSrc:       SpO2: 97% 96% 96% 97%   Weight:       Height:         Treatment and course:IV was established basic blood work was sent EKG was obtained CT scan ordered.  Decadron 8 mg IV was given along with albuterol nebulizer.  Repeat blood pressure 108/71 without intervention.  Rocephin 1 g IV along with doxycycline 100 mg p.o. was initiated with patchy hilar infiltrates and leukocytosis.  Patient ambulated with pulse ox monitor and maintaining 95 to 97%    Patient is well-appearing maintaining pulse ox no findings of respiratory distress, sepsis, meningitis or significant dehydration.    FINAL IMPRESSION      1. Lingular pneumonia    2. Bronchospasm          DISPOSITION/PLAN   DISPOSITION Discharge - Pending Orders Complete 12/17/2024 03:16:39 PM   DISPOSITION CONDITION StablePatient discharged home advised continue home nebulizer treatment albuterol every 4 hours as needed for repetitive cough wheeze or shortness of breath.   finish the prednisone course and take the levaquin 10 day course prescribed by your PCP yesterday.  .  Patient advised to return if any change or worsening increased difficulty breathing persistent vomiting not keeping down fluids or medications weak or dizzy feeling.  Patient to follow-up with primary physician for repeat assessment in the next 2 days        PATIENT REFERRED TO:  Nallely Beasley, ANA - CNP  [address] [place] [id number] [phone]    In 2 days        DISCHARGE MEDICATIONS:  New Prescriptions

## 2024-12-18 LAB
EKG ATRIAL RATE: 108 BPM
EKG P AXIS: 42 DEGREES
EKG P-R INTERVAL: 138 MS
EKG Q-T INTERVAL: 320 MS
EKG QRS DURATION: 86 MS
EKG QTC CALCULATION (BAZETT): 428 MS
EKG R AXIS: 43 DEGREES
EKG T AXIS: 10 DEGREES
EKG VENTRICULAR RATE: 108 BPM

## 2025-01-21 ENCOUNTER — OFFICE VISIT (OUTPATIENT)
Dept: PULMONOLOGY | Age: 50
End: 2025-01-21
Payer: COMMERCIAL

## 2025-01-21 VITALS
BODY MASS INDEX: 54.89 KG/M2 | HEART RATE: 99 BPM | TEMPERATURE: 97.6 F | OXYGEN SATURATION: 96 % | WEIGHT: 244 LBS | DIASTOLIC BLOOD PRESSURE: 74 MMHG | SYSTOLIC BLOOD PRESSURE: 118 MMHG | HEIGHT: 56 IN

## 2025-01-21 DIAGNOSIS — R05.9 COUGH, UNSPECIFIED TYPE: Primary | ICD-10-CM

## 2025-01-21 DIAGNOSIS — G47.30 SLEEP DISORDER BREATHING: ICD-10-CM

## 2025-01-21 DIAGNOSIS — R06.02 SHORTNESS OF BREATH: ICD-10-CM

## 2025-01-21 PROCEDURE — 3078F DIAST BP <80 MM HG: CPT | Performed by: INTERNAL MEDICINE

## 2025-01-21 PROCEDURE — 3074F SYST BP LT 130 MM HG: CPT | Performed by: INTERNAL MEDICINE

## 2025-01-21 PROCEDURE — 99204 OFFICE O/P NEW MOD 45 MIN: CPT | Performed by: INTERNAL MEDICINE

## 2025-01-21 NOTE — PROGRESS NOTES
NEW PATIENT VISIT-PULMONARY/SLEEP    1/21/2025     REFERRING PHYSICIAN:  Nallely Beasley APRN - CNP     REASON FOR REFERRAL:  SOB    HPI:     Medina Peck is a 49 y.o. female who was referred to pulmonary clinic for evaluation.     Current symptoms-shortness of breath and cough for the last few weeks.  Cough is mainly dry.  Associated with wheezing.  Has been to urgent care multiple times and was given steroids and antibiotics with improvement in her symptoms.  Has been given inhalers with minimal improvement.  Quit smoking 9 years ago.  Has not been hospitalized in the past for any respiratory issues.  She has been diagnosed with moderate case of obstructive sleep apnea in 2020 but has not been on treatment since then.  She tried the CPAP for few weeks and she gave up on it.  She has not been evaluated by sleep specialist.  She gained weight.  She continues to wake up gasping for air and choking.  She has reflux and she takes Protonix.               Past Medical History   Past Medical History:   Diagnosis Date    Angina at rest (HCC) 10/25/2022    Anxiety and depression     Anxiety and depression     Asthma     Essential hypertension 05/18/2017    Headache(784.0)     Hyperlipidemia 10/25/2022    Obesity     MARBELLA (obstructive sleep apnea) 04/29/2020    PTSD (post-traumatic stress disorder)     Urinary incontinence        Past Surgical History  Past Surgical History:   Procedure Laterality Date    CHOLECYSTECTOMY      ENDOMETRIAL ABLATION      HYSTERECTOMY, TOTAL ABDOMINAL (CERVIX REMOVED)  07/15/2015     (partial - left one ovary)    OVARY REMOVAL  07/15/2015    still has 1 ovary    TONSILLECTOMY      TUBAL LIGATION         Allergies  Allergies   Allergen Reactions    Aspirin Shortness Of Breath    Codeine     Lithium     Naproxen     Trazodone And Nefazodone      Couldn't swallow          Medications  Current Outpatient Medications   Medication Sig Dispense Refill

## 2025-02-25 ENCOUNTER — TRANSCRIBE ORDERS (OUTPATIENT)
Dept: ADMINISTRATIVE | Age: 50
End: 2025-02-25

## 2025-02-25 DIAGNOSIS — J98.01 ACUTE BRONCHOSPASM: ICD-10-CM

## 2025-02-25 DIAGNOSIS — R05.9 COUGH, UNSPECIFIED TYPE: ICD-10-CM

## 2025-02-25 DIAGNOSIS — R06.02 SHORTNESS OF BREATH: Primary | ICD-10-CM

## 2025-02-25 DIAGNOSIS — J40 BRONCHITIS: ICD-10-CM

## 2025-02-26 ENCOUNTER — HOSPITAL ENCOUNTER (OUTPATIENT)
Dept: ORTHOPEDIC SURGERY | Age: 50
Discharge: HOME | End: 2025-02-28
Payer: COMMERCIAL

## 2025-02-26 ENCOUNTER — OFFICE VISIT (OUTPATIENT)
Age: 50
End: 2025-02-26
Payer: COMMERCIAL

## 2025-02-26 VITALS
SYSTOLIC BLOOD PRESSURE: 118 MMHG | HEART RATE: 97 BPM | WEIGHT: 242 LBS | DIASTOLIC BLOOD PRESSURE: 74 MMHG | HEIGHT: 57 IN | OXYGEN SATURATION: 97 % | BODY MASS INDEX: 52.21 KG/M2

## 2025-02-26 DIAGNOSIS — M25.572 CHRONIC PAIN OF BOTH ANKLES: ICD-10-CM

## 2025-02-26 DIAGNOSIS — G89.29 CHRONIC PAIN OF BOTH ANKLES: ICD-10-CM

## 2025-02-26 DIAGNOSIS — M25.50 POLYARTHRALGIA: ICD-10-CM

## 2025-02-26 DIAGNOSIS — M25.521 RIGHT ELBOW PAIN: ICD-10-CM

## 2025-02-26 DIAGNOSIS — M25.571 CHRONIC PAIN OF BOTH ANKLES: ICD-10-CM

## 2025-02-26 DIAGNOSIS — M25.50 POLYARTHRALGIA: Primary | ICD-10-CM

## 2025-02-26 LAB
CRP SERPL HS-MCNC: <3 MG/L (ref 0–5)
ERYTHROCYTE [SEDIMENTATION RATE] IN BLOOD BY WESTERGREN METHOD: 32 MM (ref 0–20)
HBV SURFACE AG SERPL QL IA: NORMAL

## 2025-02-26 PROCEDURE — 3078F DIAST BP <80 MM HG: CPT | Performed by: INTERNAL MEDICINE

## 2025-02-26 PROCEDURE — 99204 OFFICE O/P NEW MOD 45 MIN: CPT | Performed by: INTERNAL MEDICINE

## 2025-02-26 PROCEDURE — 3074F SYST BP LT 130 MM HG: CPT | Performed by: INTERNAL MEDICINE

## 2025-02-26 PROCEDURE — 73070 X-RAY EXAM OF ELBOW: CPT

## 2025-02-26 PROCEDURE — 73600 X-RAY EXAM OF ANKLE: CPT

## 2025-02-26 ASSESSMENT — ENCOUNTER SYMPTOMS
EYE REDNESS: 0
COUGH: 1
SHORTNESS OF BREATH: 1
EYE PAIN: 0
ABDOMINAL PAIN: 0

## 2025-02-26 NOTE — PROGRESS NOTES
Reason for Referral:       No referring provider defined for this encounter.    Chief Complaint   Patient presents with    [facility]     Patient here today with pain in ankles, knees and elbow right arm.           HISTORY OF PRESENT ILLNESS: Ms.Heather MELVIN Peck is a 49 y.o. female referred for evaluation of rheumatoid arthritis.  This very pleasant 49-year-old female presents today with a 1+ year history of right elbow and bilateral ankle pain.  She actually has pain in many joints but these areas seem to bother her the most.  She states that she was diagnosed with rheumatoid arthritis about a year ago by her PCP.  Previous rheumatoid factor in 2021 was negative and repeat studies done last month showed negative rheumatoid factor and CCP.  The most recent C-reactive protein was normal.  Patient states for the past 2 months she has required the use of steroids not only for joint pain but because of recent respiratory symptoms.  She is going to have a CT of her chest done in early March.    The patient describes subjective swelling of her ankles and pain when she walks.  She does not really specifically say that she is having problems in her fingers although they do hurt at times as do her toes.  Her main issue has been his right elbow.  She does not recall any injury to it nor does she do repetitive motions.  It hurts to extend her leonor she has had subjective swelling.  She thinks that she was seen by orthopedist for this but I do not see any records of this nor does she remember any of the details.  She has not been through any type of physical therapy.    She states that she has been on hydroxychloroquine from her PCP for the last year and there was mention of starting her on methotrexate.  She has not started any other DMARDs though.  In the past she has taken anti-inflammatory medications including over-the-counter NSAIDs and meloxicam without any issues.  She is currently on the prednisone

## 2025-02-26 NOTE — PATIENT INSTRUCTIONS
Labs today and I will contact you with these results    X-rays of your right elbow and both ankles today and I will contact you with these results and recommendations as well    Please let me know when you have completed the CT scan of your chest and I will make comments on that once I see these results.    Let me know when you finish your prednisone and I may restart you on meloxicam.    Continue the hydroxychloroquine for now.  I will have you sign a release so I can see your most recent eye exam    Plan for follow-up here within 4 weeks or sooner if needed.

## 2025-02-27 LAB — HEPATITIS C ANTIBODY: NONREACTIVE

## 2025-02-28 LAB — ACE SERPL-CCNC: 42 U/L (ref 16–85)

## 2025-03-06 ENCOUNTER — HOSPITAL ENCOUNTER (OUTPATIENT)
Dept: CT IMAGING | Age: 50
Discharge: HOME | End: 2025-03-08
Payer: COMMERCIAL

## 2025-03-06 DIAGNOSIS — J98.01 ACUTE BRONCHOSPASM: ICD-10-CM

## 2025-03-06 DIAGNOSIS — R05.9 COUGH, UNSPECIFIED TYPE: ICD-10-CM

## 2025-03-06 DIAGNOSIS — J40 BRONCHITIS: ICD-10-CM

## 2025-03-06 DIAGNOSIS — R06.02 SHORTNESS OF BREATH: ICD-10-CM

## 2025-03-06 PROCEDURE — 71260 CT THORAX DX C+: CPT

## 2025-03-06 PROCEDURE — 6360000004 HC RX CONTRAST MEDICATION: Performed by: NURSE PRACTITIONER

## 2025-03-06 RX ORDER — IOPAMIDOL 755 MG/ML
100 INJECTION, SOLUTION INTRAVASCULAR
Status: COMPLETED | OUTPATIENT
Start: 2025-03-06 | End: 2025-03-06

## 2025-03-06 RX ADMIN — IOPAMIDOL 75 ML: 755 INJECTION, SOLUTION INTRAVENOUS at 10:21

## 2025-03-11 ENCOUNTER — HOSPITAL ENCOUNTER (OUTPATIENT)
Dept: SLEEP CENTER | Age: 50
Discharge: HOME | End: 2025-03-13
Attending: INTERNAL MEDICINE
Payer: COMMERCIAL

## 2025-03-11 DIAGNOSIS — G47.30 SLEEP DISORDER BREATHING: ICD-10-CM

## 2025-03-11 PROCEDURE — 95806 SLEEP STUDY UNATT&RESP EFFT: CPT

## 2025-03-24 DIAGNOSIS — G47.30 SLEEP DISORDER BREATHING: Primary | ICD-10-CM

## 2025-03-24 DIAGNOSIS — G47.33 OSA (OBSTRUCTIVE SLEEP APNEA): ICD-10-CM

## 2025-03-27 ENCOUNTER — OFFICE VISIT (OUTPATIENT)
Dept: PULMONOLOGY | Age: 50
End: 2025-03-27
Payer: COMMERCIAL

## 2025-03-27 VITALS
TEMPERATURE: 97.6 F | WEIGHT: 242 LBS | HEART RATE: 88 BPM | OXYGEN SATURATION: 97 % | HEIGHT: 57 IN | BODY MASS INDEX: 52.21 KG/M2 | SYSTOLIC BLOOD PRESSURE: 118 MMHG | DIASTOLIC BLOOD PRESSURE: 74 MMHG

## 2025-03-27 DIAGNOSIS — R05.2 SUBACUTE COUGH: Primary | ICD-10-CM

## 2025-03-27 DIAGNOSIS — G47.33 OSA (OBSTRUCTIVE SLEEP APNEA): ICD-10-CM

## 2025-03-27 DIAGNOSIS — E66.9 OBESITY, UNSPECIFIED CLASS, UNSPECIFIED OBESITY TYPE, UNSPECIFIED WHETHER SERIOUS COMORBIDITY PRESENT: ICD-10-CM

## 2025-03-27 DIAGNOSIS — Z72.0 TOBACCO ABUSE: ICD-10-CM

## 2025-03-27 DIAGNOSIS — R06.02 SHORTNESS OF BREATH: ICD-10-CM

## 2025-03-27 DIAGNOSIS — G47.34 SLEEP RELATED HYPOXIA: ICD-10-CM

## 2025-03-27 PROCEDURE — 99214 OFFICE O/P EST MOD 30 MIN: CPT | Performed by: INTERNAL MEDICINE

## 2025-03-27 PROCEDURE — 3078F DIAST BP <80 MM HG: CPT | Performed by: INTERNAL MEDICINE

## 2025-03-27 PROCEDURE — 3074F SYST BP LT 130 MM HG: CPT | Performed by: INTERNAL MEDICINE

## 2025-03-27 RX ORDER — METHYLPREDNISOLONE 4 MG/1
TABLET ORAL
Qty: 1 KIT | Refills: 0 | Status: SHIPPED | OUTPATIENT
Start: 2025-03-27 | End: 2025-04-02

## 2025-03-27 RX ORDER — FLUTICASONE FUROATE AND VILANTEROL 100; 25 UG/1; UG/1
1 POWDER RESPIRATORY (INHALATION) DAILY
Qty: 1 EACH | Refills: 3 | Status: SHIPPED | OUTPATIENT
Start: 2025-03-27

## 2025-03-27 NOTE — PROGRESS NOTES
albuterol sulfate HFA (PROVENTIL;VENTOLIN;PROAIR) 108 (90 Base) MCG/ACT inhaler Inhale 2 puffs into the lungs every 4 hours as needed for Wheezing 8.5 each 3    mirabegron (MYRBETRIQ) 50 MG TB24 Take 50 mg by mouth daily Lot [id number]  Exp 07/23 21 tablet 0    neomycin-polymyxin-dexameth (MAXITROL) 3.5-59232-9.1 ophthalmic suspension       pantoprazole (PROTONIX) 40 MG tablet Take 1 tablet by mouth daily 90 tablet 1    levothyroxine (SYNTHROID) 25 MCG tablet Take 1 tablet by mouth daily 90 tablet 1    Blood Pressure Monitoring (BLOOD PRESSURE KIT) ZELALEM 1 Units by Does not apply route as needed (for BP monitoring at home for hypertension) Cuff for upper arm 1 Device 0    rizatriptan (MAXALT) 10 MG tablet Take 1 tablet by mouth once as needed for Migraine May repeat in 2 hours if needed 30 tablet 0    nortriptyline (PAMELOR) 25 MG capsule Take 1 capsule by mouth every morning (before breakfast) AND 3 capsules nightly. 120 capsule 3    SUMAtriptan (IMITREX) 50 MG tablet Take 1 tablet by mouth once as needed for Migraine 9 tablet 3     No current facility-administered medications for this visit.       Social History  Social History     Tobacco Use    Smoking status: Former     Current packs/day: 0.50     Types: Cigarettes     Passive exposure: Current    Smokeless tobacco: Never   Substance Use Topics    Alcohol use: Yes     Comment: rare       Family History  Family History   Problem Relation Age of Onset    Diabetes Mother     High Blood Pressure Mother     Breast Cancer Maternal Grandmother     Right Breast Cancer Paternal Grandmother         right mastectomy       Review of Systems  All review of systems has been obtained and negative other than what was mentionedin HPI.   Physical Exam       Vitals:    03/27/25 1405   BP: 118/74   Pulse: 88   Temp: 97.6 °F (36.4 °C)   TempSrc: Tympanic   SpO2: 97%   Weight: 109.8 kg (242 lb)   Height: 1.448 m (4' 9.01\")          General appearance: Well appearing. No acute

## 2025-04-16 ENCOUNTER — TELEPHONE (OUTPATIENT)
Age: 50
End: 2025-04-16

## 2025-04-16 NOTE — TELEPHONE ENCOUNTER
PT CALLED IN TO THE OFFICE BECAUSE HER TITRATION WAS DENIED.        DO YOU WANT TO ORDER APAP?

## 2025-04-29 ENCOUNTER — HOSPITAL ENCOUNTER (OUTPATIENT)
Dept: PULMONOLOGY | Age: 50
Discharge: HOME | End: 2025-04-29

## 2025-04-29 DIAGNOSIS — R05.2 SUBACUTE COUGH: ICD-10-CM

## 2025-04-29 RX ORDER — ALBUTEROL SULFATE 0.83 MG/ML
SOLUTION RESPIRATORY (INHALATION)
Status: DISCONTINUED
Start: 2025-04-29 | End: 2025-04-29 | Stop reason: WASHOUT

## 2025-05-06 PROBLEM — G47.30 SLEEP DISORDER BREATHING: Status: ACTIVE | Noted: 2025-05-06

## 2025-08-15 ENCOUNTER — TRANSCRIBE ORDERS (OUTPATIENT)
Dept: ADMINISTRATIVE | Age: 50
End: 2025-08-15

## 2025-08-15 DIAGNOSIS — R10.84 ABDOMINAL PAIN, GENERALIZED: Primary | ICD-10-CM

## 2025-08-15 DIAGNOSIS — R10.9 ABDOMINAL PAIN, UNSPECIFIED ABDOMINAL LOCATION: ICD-10-CM

## 2025-08-26 ENCOUNTER — TRANSCRIBE ORDERS (OUTPATIENT)
Dept: ADMINISTRATIVE | Age: 50
End: 2025-08-26

## 2025-08-26 DIAGNOSIS — R10.9 ABDOMINAL PAIN, UNSPECIFIED ABDOMINAL LOCATION: ICD-10-CM

## 2025-08-26 DIAGNOSIS — M54.50 LOW BACK PAIN, UNSPECIFIED BACK PAIN LATERALITY, UNSPECIFIED CHRONICITY, UNSPECIFIED WHETHER SCIATICA PRESENT: Primary | ICD-10-CM
